# Patient Record
Sex: FEMALE | Race: WHITE | NOT HISPANIC OR LATINO | Employment: OTHER | URBAN - METROPOLITAN AREA
[De-identification: names, ages, dates, MRNs, and addresses within clinical notes are randomized per-mention and may not be internally consistent; named-entity substitution may affect disease eponyms.]

---

## 2017-03-01 ENCOUNTER — TELEPHONE (OUTPATIENT)
Dept: HEMATOLOGY/ONCOLOGY | Facility: CLINIC | Age: 77
End: 2017-03-01

## 2017-03-01 NOTE — LETTER
[facility]  [address] [place] [address]  Phone: [phone] April 26, 2017         Geraldine Martinez [address] [place] [address]    Dear Claduia:                                                          As part of patient care service, the [facility] Department of [facility] offers patients a reminder of follow-up appointments your provider has suggested. Our appointment reminder systems show that your previous appointment with Dr. Mascorro requires a follow-up appointment and lab work.    If you would like to schedule an appointment, please call the department at: Dept: [phone]     To ensure obtain the most convenient appointment for you schedule, we advise you to call at your earliest convenience. To help save you time, we also encourage patients to call between 2 and 5 PM whenever possible as that is when we experience lower call volumes.    There are many different benefit plans offered by insurance companies for your health needs.  If you are not certain what your particular benefits are, please contact Member Service Department of your insurance carrier prior to your visit. Please bring your current insurance card for verification of benefits to your appointment.  If your insurance has changed, please call [phone].  When your appointment is scheduled, we are reserving that time especially for you in order to provide the best possible care in a timely fashion.  Although we take care of many emergency situations on a daily basis that may unavoidably delay your appointment, we try to minimize this as much as possible and will provide you with an alternative appointment should the delay be unacceptable to you  -- please just ask if this is the case on the date of the visit and wish to reschedule.    We do sincerely appreciate your time, and your timely arrival for your appointment, so that all patients can been seen as close to their appointment  time as possible. If you find yourself unable to keep your appointed time, we would be happy to reschedule your appointment for a better time for you.  Please make every attempt to cancel and reschedule your appointment at least 24 hours prior to your scheduled appointment time. This allows us the opportunity to schedule another patient in need of our service.        @[email or web address]@

## 2017-05-08 DIAGNOSIS — Z51.11 ENCOUNTER FOR ANTINEOPLASTIC CHEMOTHERAPY: ICD-10-CM

## 2017-05-08 DIAGNOSIS — G62.0 NEUROPATHY DUE TO DRUGS: ICD-10-CM

## 2017-05-08 DIAGNOSIS — I48.20 CHRONIC ATRIAL FIBRILLATION: ICD-10-CM

## 2017-05-08 DIAGNOSIS — I10 ESSENTIAL HYPERTENSION: ICD-10-CM

## 2017-05-08 DIAGNOSIS — K21.00 GASTROESOPHAGEAL REFLUX DISEASE WITH ESOPHAGITIS: ICD-10-CM

## 2017-05-08 DIAGNOSIS — E78.00 HYPERCHOLESTEROLEMIA: ICD-10-CM

## 2017-05-08 NOTE — TELEPHONE ENCOUNTER
Informed pt that she is not due for a mammogram until Sept 2017 but that she does need to do fasting labs and have a visit with Dr. Mascorro. Appts scheduled for Tue 5/30/17. Pt needs refills on gabapentin, pravastatin and metoprolol. Informed her that Dr. Mascorro will be back in the office on Tue 5/9/17 to address her refill requests.    ----- Message from Livia Sauceda sent at 5/8/2017 10:10 AM CDT -----  Contact: [phone] or [phone] self  Patient is requesting to talk to Elizabeth. She needs to schedule a mammogram. Patient also needs medication refilled. She also needs an appointment but wants to talk with Elizabeth before doing all of this.

## 2017-05-09 DIAGNOSIS — Z51.11 ENCOUNTER FOR ANTINEOPLASTIC CHEMOTHERAPY: ICD-10-CM

## 2017-05-09 DIAGNOSIS — I10 ESSENTIAL HYPERTENSION: ICD-10-CM

## 2017-05-09 DIAGNOSIS — K21.00 GASTROESOPHAGEAL REFLUX DISEASE WITH ESOPHAGITIS: ICD-10-CM

## 2017-05-09 DIAGNOSIS — I48.20 CHRONIC ATRIAL FIBRILLATION: ICD-10-CM

## 2017-05-09 RX ORDER — METOPROLOL SUCCINATE 100 MG/1
100 TABLET, EXTENDED RELEASE ORAL DAILY
Qty: 90 TABLET | Refills: 3 | Status: SHIPPED | OUTPATIENT
Start: 2017-05-09 | End: 2017-05-09 | Stop reason: SDUPTHER

## 2017-05-09 RX ORDER — GABAPENTIN 300 MG/1
300 CAPSULE ORAL NIGHTLY
Qty: 90 CAPSULE | Refills: 3 | Status: SHIPPED | OUTPATIENT
Start: 2017-05-09 | End: 2018-04-26 | Stop reason: SDUPTHER

## 2017-05-09 RX ORDER — METOPROLOL SUCCINATE 100 MG/1
100 TABLET, EXTENDED RELEASE ORAL DAILY
Qty: 90 TABLET | Refills: 3 | Status: SHIPPED | OUTPATIENT
Start: 2017-05-09 | End: 2018-04-28 | Stop reason: SDUPTHER

## 2017-05-09 RX ORDER — PRAVASTATIN SODIUM 40 MG/1
40 TABLET ORAL DAILY
Qty: 90 TABLET | Refills: 3 | Status: SHIPPED | OUTPATIENT
Start: 2017-05-09 | End: 2018-04-26 | Stop reason: SDUPTHER

## 2017-05-30 ENCOUNTER — OFFICE VISIT (OUTPATIENT)
Dept: HEMATOLOGY/ONCOLOGY | Facility: CLINIC | Age: 77
End: 2017-05-30
Payer: MEDICARE

## 2017-05-30 ENCOUNTER — LAB VISIT (OUTPATIENT)
Dept: LAB | Facility: HOSPITAL | Age: 77
End: 2017-05-30
Attending: INTERNAL MEDICINE
Payer: MEDICARE

## 2017-05-30 VITALS
SYSTOLIC BLOOD PRESSURE: 124 MMHG | DIASTOLIC BLOOD PRESSURE: 78 MMHG | HEART RATE: 73 BPM | HEIGHT: 59 IN | TEMPERATURE: 98 F | WEIGHT: 157.44 LBS | BODY MASS INDEX: 31.74 KG/M2 | OXYGEN SATURATION: 97 %

## 2017-05-30 DIAGNOSIS — R79.9 ABNORMAL FINDING OF BLOOD CHEMISTRY: ICD-10-CM

## 2017-05-30 DIAGNOSIS — C50.911 BREAST CANCER, RIGHT BREAST: ICD-10-CM

## 2017-05-30 DIAGNOSIS — Z79.811 AROMATASE INHIBITOR USE: ICD-10-CM

## 2017-05-30 DIAGNOSIS — C50.411 BREAST CANCER OF UPPER-OUTER QUADRANT OF RIGHT FEMALE BREAST: Primary | ICD-10-CM

## 2017-05-30 DIAGNOSIS — K59.01 SLOW TRANSIT CONSTIPATION: ICD-10-CM

## 2017-05-30 LAB
ALBUMIN SERPL BCP-MCNC: 3.7 G/DL
ALP SERPL-CCNC: 53 U/L
ALT SERPL W/O P-5'-P-CCNC: 18 U/L
ANION GAP SERPL CALC-SCNC: 9 MMOL/L
AST SERPL-CCNC: 26 U/L
BASOPHILS # BLD AUTO: 0.02 K/UL
BASOPHILS NFR BLD: 0.3 %
BILIRUB SERPL-MCNC: 0.7 MG/DL
BUN SERPL-MCNC: 12 MG/DL
CALCIUM SERPL-MCNC: 9.7 MG/DL
CHLORIDE SERPL-SCNC: 104 MMOL/L
CHOLEST/HDLC SERPL: 2.6 {RATIO}
CO2 SERPL-SCNC: 28 MMOL/L
CREAT SERPL-MCNC: 0.9 MG/DL
DIFFERENTIAL METHOD: ABNORMAL
EOSINOPHIL # BLD AUTO: 0.2 K/UL
EOSINOPHIL NFR BLD: 2.6 %
ERYTHROCYTE [DISTWIDTH] IN BLOOD BY AUTOMATED COUNT: 14.7 %
EST. GFR  (AFRICAN AMERICAN): >60 ML/MIN/1.73 M^2
EST. GFR  (NON AFRICAN AMERICAN): >60 ML/MIN/1.73 M^2
GLUCOSE SERPL-MCNC: 100 MG/DL
HCT VFR BLD AUTO: 37.1 %
HDL/CHOLESTEROL RATIO: 38.8 %
HDLC SERPL-MCNC: 178 MG/DL
HDLC SERPL-MCNC: 69 MG/DL
HGB BLD-MCNC: 12.4 G/DL
LDLC SERPL CALC-MCNC: 86.8 MG/DL
LYMPHOCYTES # BLD AUTO: 2 K/UL
LYMPHOCYTES NFR BLD: 32.6 %
MCH RBC QN AUTO: 30.7 PG
MCHC RBC AUTO-ENTMCNC: 33.4 %
MCV RBC AUTO: 92 FL
MONOCYTES # BLD AUTO: 0.5 K/UL
MONOCYTES NFR BLD: 8.3 %
NEUTROPHILS # BLD AUTO: 3.5 K/UL
NEUTROPHILS NFR BLD: 56 %
NONHDLC SERPL-MCNC: 109 MG/DL
PLATELET # BLD AUTO: 293 K/UL
PMV BLD AUTO: 9.6 FL
POTASSIUM SERPL-SCNC: 4.4 MMOL/L
PROT SERPL-MCNC: 7.1 G/DL
RBC # BLD AUTO: 4.04 M/UL
SODIUM SERPL-SCNC: 141 MMOL/L
TRIGL SERPL-MCNC: 111 MG/DL
WBC # BLD AUTO: 6.25 K/UL

## 2017-05-30 PROCEDURE — 80061 LIPID PANEL: CPT

## 2017-05-30 PROCEDURE — 99213 OFFICE O/P EST LOW 20 MIN: CPT | Mod: PBBFAC,PO | Performed by: INTERNAL MEDICINE

## 2017-05-30 PROCEDURE — 85025 COMPLETE CBC W/AUTO DIFF WBC: CPT

## 2017-05-30 PROCEDURE — 99999 PR PBB SHADOW E&M-EST. PATIENT-LVL III: CPT | Mod: PBBFAC,,, | Performed by: INTERNAL MEDICINE

## 2017-05-30 PROCEDURE — 36415 COLL VENOUS BLD VENIPUNCTURE: CPT

## 2017-05-30 PROCEDURE — 80053 COMPREHEN METABOLIC PANEL: CPT

## 2017-05-30 PROCEDURE — 99213 OFFICE O/P EST LOW 20 MIN: CPT | Mod: S$PBB,,, | Performed by: INTERNAL MEDICINE

## 2017-05-30 RX ORDER — LACTULOSE 10 G/15ML
20 SOLUTION ORAL 4 TIMES DAILY PRN
Qty: 500 ML | Refills: 3 | Status: SHIPPED | OUTPATIENT
Start: 2017-05-30 | End: 2018-07-03 | Stop reason: SDUPTHER

## 2017-05-30 NOTE — PROGRESS NOTES
Subjective:       Patient ID: Claudia Sierra is a 76 y.o. female.    Chief Complaint: No chief complaint on file.    HPI      Ms. 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    Relevant oncologic history: She felt a mass in the right armpit in March 2013.  Mammogram showed a 5 mm right breast mass with 2 enlarged right axillary lymph nodes.  Biopsy of right breast mass showed fibroadenoma.  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  HER-2/agustín was negative.  Breast MRI did not show an actual primary.  A PET scan done in April showed hypermetabolic axillary and subpectoral nodes. She received 4 cycles of neoadjuvant Adriamycin/Cytoxan chemotherapy followed by one cycle of docetaxel chemotherapy.  She then underwent a right axillary lymph node dissection on 10/8/13. 12 lymph nodes were removed. None were involved with malignancy. She had a pathological complete response.  She then completed 9 weekly doses of paclitaxel from November 2013 to January 2014 in an adjuvant fashion. Following this she underwent 28 radiation treatments to the right breast starting 2/10/14 completing 3/20/14. She then developed lymphedema of the right breast. Nothing in the right arm. She was evaluated in the lymphedema clinic and underwent some physical therapy.    She started Arimidex on 7/10/15.     Neuropathy stable, she is on neurontin 300 mg QHS.   She wants to stay on this dose and take it at bedtime.  Taking it at the daytime makes her groggy.    Her chest port was removed in June 2015.  Gaining weight, feels it is from Arimidex.    Review of Systems    CONSTITUTIONAL: No weight gain. No fevers.  ENT/MOUTH: No voice change, sore throat, mouth ulcers or gum  bleeding.  CARDIOVASCULAR: No chest pain or shortness of breath.  RESPIRATORY: No cough, hemoptysis or dyspnea.  HEME/LYMPH: Some pain in the right axilla after lymph node dissection. Right breast edema - chronic and stable.  GASTROINTESTINAL: No abdominal pain, nausea or change in bowel habits.    Objective:      Physical Exam    GENERAL: Well-groomed, moderately-built. Vitals noted.  ENT&MOUTH: Oral mucosa moist. No thrush, gum bleeding or oral masses noted.  NECK: Supple without any masses. Thyroid is non-tender.  LYMPH NODES: None felt in the neck or supraclavicular areas.  BREAST: Well-healed surgical scar in the right axilla.  No open areas.  No drainage. Right breast swollen when compared to the left consistent with lymphedema of the breast. No arm lymphedema noted.  LUNGS: Clear. No wheeze.  Breathing nonlabored.  HEART: S1, S2 heard. Rhythm irregular. No tachycardia or gallops. No pedal edema.   ABDOMEN: Soft and non-tender. No palpable masses, hepatosplenomegaly or ascites.     Assessment:       1. Breast cancer of upper-outer quadrant of right female breast    2. Aromatase inhibitor use        Plan:   Labs reviewed.   Mammogram due Sept 2017.    She is having some weight gain, she is very concerned, feels it is from Arimidex.  Discussed the pros and cons of discontinuing her Arimidex in her clinical situation.  She wants to get her next mammogram that is scheduled for September and if that is unremarkable AND if she is not able to avoid weight gain in the next few months - she is agreeable to stopping Arimidex at that time.    Continue neurontin 300 mg QHS.     Refilled Lactulose.    RTC 6 months with labs.

## 2017-07-14 DIAGNOSIS — C50.411 MALIGNANT NEOPLASM OF UPPER-OUTER QUADRANT OF RIGHT BREAST IN FEMALE, ESTROGEN RECEPTOR POSITIVE: Primary | ICD-10-CM

## 2017-07-14 DIAGNOSIS — Z17.0 MALIGNANT NEOPLASM OF UPPER-OUTER QUADRANT OF RIGHT BREAST IN FEMALE, ESTROGEN RECEPTOR POSITIVE: Primary | ICD-10-CM

## 2017-07-14 RX ORDER — ANASTROZOLE 1 MG/1
1 TABLET ORAL DAILY
Qty: 90 TABLET | Refills: 3 | Status: SHIPPED | OUTPATIENT
Start: 2017-07-14 | End: 2018-05-10 | Stop reason: SDUPTHER

## 2017-09-12 ENCOUNTER — HOSPITAL ENCOUNTER (OUTPATIENT)
Dept: RADIOLOGY | Facility: HOSPITAL | Age: 77
Discharge: HOME | End: 2017-09-12
Attending: INTERNAL MEDICINE
Payer: MEDICARE

## 2017-09-12 DIAGNOSIS — C50.411 BREAST CANCER OF UPPER-OUTER QUADRANT OF RIGHT FEMALE BREAST: ICD-10-CM

## 2017-09-12 PROCEDURE — 77062 BREAST TOMOSYNTHESIS BI: CPT | Mod: TC

## 2017-09-12 PROCEDURE — 77062 BREAST TOMOSYNTHESIS BI: CPT | Mod: 26,,, | Performed by: RADIOLOGY

## 2017-09-12 PROCEDURE — 77066 DX MAMMO INCL CAD BI: CPT | Mod: 26,,, | Performed by: RADIOLOGY

## 2017-11-03 ENCOUNTER — HOSPITAL ENCOUNTER (EMERGENCY)
Facility: HOSPITAL | Age: 77
Discharge: HOME | End: 2017-11-03
Attending: EMERGENCY MEDICINE
Payer: MEDICARE

## 2017-11-03 VITALS
WEIGHT: 157 LBS | RESPIRATION RATE: 16 BRPM | DIASTOLIC BLOOD PRESSURE: 60 MMHG | OXYGEN SATURATION: 95 % | BODY MASS INDEX: 31.65 KG/M2 | TEMPERATURE: 99 F | HEART RATE: 82 BPM | HEIGHT: 59 IN | SYSTOLIC BLOOD PRESSURE: 136 MMHG

## 2017-11-03 DIAGNOSIS — J10.1 INFLUENZA A: Primary | ICD-10-CM

## 2017-11-03 LAB
ALBUMIN SERPL BCP-MCNC: 3.5 G/DL
ALP SERPL-CCNC: 49 U/L
ALT SERPL W/O P-5'-P-CCNC: 23 U/L
ANION GAP SERPL CALC-SCNC: 12 MMOL/L
AST SERPL-CCNC: 42 U/L
BASOPHILS # BLD AUTO: 0.02 K/UL
BASOPHILS NFR BLD: 0.4 %
BILIRUB SERPL-MCNC: 0.3 MG/DL
BUN SERPL-MCNC: 9 MG/DL
CALCIUM SERPL-MCNC: 9.1 MG/DL
CHLORIDE SERPL-SCNC: 100 MMOL/L
CO2 SERPL-SCNC: 21 MMOL/L
CREAT SERPL-MCNC: 0.8 MG/DL
DIFFERENTIAL METHOD: ABNORMAL
EOSINOPHIL # BLD AUTO: 0 K/UL
EOSINOPHIL NFR BLD: 0.4 %
ERYTHROCYTE [DISTWIDTH] IN BLOOD BY AUTOMATED COUNT: 14.7 %
EST. GFR  (AFRICAN AMERICAN): >60 ML/MIN/1.73 M^2
EST. GFR  (NON AFRICAN AMERICAN): >60 ML/MIN/1.73 M^2
FLUAV AG SPEC QL IA: POSITIVE
FLUBV AG SPEC QL IA: NEGATIVE
GLUCOSE SERPL-MCNC: 95 MG/DL
HCT VFR BLD AUTO: 37 %
HGB BLD-MCNC: 12.5 G/DL
LYMPHOCYTES # BLD AUTO: 2 K/UL
LYMPHOCYTES NFR BLD: 38.3 %
MCH RBC QN AUTO: 31.4 PG
MCHC RBC AUTO-ENTMCNC: 33.8 G/DL
MCV RBC AUTO: 93 FL
MONOCYTES # BLD AUTO: 1 K/UL
MONOCYTES NFR BLD: 20.1 %
NEUTROPHILS # BLD AUTO: 2.1 K/UL
NEUTROPHILS NFR BLD: 40.6 %
PLATELET # BLD AUTO: 238 K/UL
PMV BLD AUTO: 9.6 FL
POTASSIUM SERPL-SCNC: 4.2 MMOL/L
PROT SERPL-MCNC: 7 G/DL
RBC # BLD AUTO: 3.98 M/UL
SODIUM SERPL-SCNC: 133 MMOL/L
SPECIMEN SOURCE: ABNORMAL
WBC # BLD AUTO: 5.17 K/UL

## 2017-11-03 PROCEDURE — 99284 EMERGENCY DEPT VISIT MOD MDM: CPT

## 2017-11-03 PROCEDURE — 87400 INFLUENZA A/B EACH AG IA: CPT

## 2017-11-03 PROCEDURE — 80053 COMPREHEN METABOLIC PANEL: CPT

## 2017-11-03 PROCEDURE — 85025 COMPLETE CBC W/AUTO DIFF WBC: CPT

## 2017-11-03 PROCEDURE — 25000003 PHARM REV CODE 250: Performed by: EMERGENCY MEDICINE

## 2017-11-03 RX ORDER — OSELTAMIVIR PHOSPHATE 75 MG/1
75 CAPSULE ORAL 2 TIMES DAILY
Qty: 10 CAPSULE | Refills: 0 | Status: SHIPPED | OUTPATIENT
Start: 2017-11-03 | End: 2017-11-08

## 2017-11-03 RX ORDER — OSELTAMIVIR PHOSPHATE 75 MG/1
75 CAPSULE ORAL
Status: COMPLETED | OUTPATIENT
Start: 2017-11-03 | End: 2017-11-03

## 2017-11-03 RX ADMIN — OSELTAMIVIR PHOSPHATE 75 MG: 75 CAPSULE ORAL at 07:11

## 2017-11-03 NOTE — ED NOTES
Patient has verified the spelling of their name and  on armband  LOC: The patient is awake, alert, and aware of environment with an appropriate affect, the patient is oriented x 3 and speaking appropriately.   APPEARANCE: Patient resting comfortably and in no acute distress, patient is clean and well groomed, patient's clothing is properly fastened.   SKIN: The skin is warm and dry, color consistent with ethnicity, patient has normal skin turgor and moist mucus membranes, skin intact, no breakdown or bruising noted.   :   Voids without difficulty  MUSCULOSKELETAL: Patient moving all extremities spontaneously, no obvious swelling or deformities noted.   RESPIRATORY: Airway is open and patent, respirations are spontaneous, patient has a normal effort and rate, no accessory muscle use noted, bilateral breath sounds clear, denies SOB   ABDOMEN: Soft and non tender to palpation, no distention noted, normoactive bowel sounds present in all four quadrants.   CARDIAC:  Normal rate and rhythm, no peripheral edema noted, less then 3 second capillary refill, denies chest pain  COMPLAINT:  Generalized body aches, cough, headache, nausea  -  All symptoms since Wednesday, rates pain 3 out of 10

## 2017-11-04 NOTE — ED PROVIDER NOTES
Encounter Date: 11/3/2017       History     Chief Complaint   Patient presents with    Cough     Reports a productive cough with runny nose since Wed. Reports nausea without vomiting and a headache. Denies diarrhea or constipation.     Fever     + fever at home of 103 on Wed and 100.6 today.    Generalized Body Aches     Patient presents emergency Department with a cough for the past 2 days. She had a  fever of 103 two days ago along with a severe headache.  The patient states initially her cough was dry but now it's becoming more productive. She has had pneumonia in the past and is wrooed that she is developing pneumonia again. She recently went on a trip on a plane. She has not had a flu shot yet this year. She has a history of breast CA and has been in remission for the past 3-4 years.          Review of patient's allergies indicates:   Allergen Reactions    Codeine Other (See Comments)     Feel like (climbing the walls)    Penicillins Rash     Past Medical History:   Diagnosis Date    *Atrial fibrillation     Asthma     Blood transfusion     Breast cancer     Right breast cancer    Breast mass, right     Cataract     Hypertension     S/P chemotherapy, time since 4-12 weeks     breast/ lymph node cancer     Past Surgical History:   Procedure Laterality Date    APPENDECTOMY      BREAST BIOPSY      right    BREAST LUMPECTOMY Right 2013    HYSTERECTOMY      lymphnode removal      12 from Right axilla    PORTACATH PLACEMENT  2013    TONSILLECTOMY       Family History   Problem Relation Age of Onset    Cancer Sister     Heart disease Mother     Heart disease Father      Social History   Substance Use Topics    Smoking status: Never Smoker    Smokeless tobacco: Never Used    Alcohol use 1.2 oz/week     2 Glasses of wine per week     Review of Systems   Constitutional: Positive for activity change, appetite change, chills, fatigue and fever.   HENT: Positive for congestion and sore throat.     Respiratory: Positive for cough. Negative for chest tightness, shortness of breath and wheezing.    Cardiovascular: Negative for chest pain.   Gastrointestinal: Negative for abdominal pain, diarrhea, nausea and vomiting.   Genitourinary: Negative for dysuria and flank pain.   Musculoskeletal: Negative for back pain, neck pain and neck stiffness.   Skin: Negative for rash.   Neurological: Positive for light-headedness and headaches. Negative for dizziness and weakness.   Hematological: Does not bruise/bleed easily.       Physical Exam     Initial Vitals [11/03/17 1809]   BP Pulse Resp Temp SpO2   121/64 82 18 98.6 °F (37 °C) 99 %      MAP       83         Physical Exam    Nursing note and vitals reviewed.  Constitutional: She appears well-developed and well-nourished.   HENT:   Head: Normocephalic and atraumatic.   Mouth/Throat: Oropharynx is clear and moist.   Eyes: Conjunctivae and EOM are normal. Pupils are equal, round, and reactive to light.   Neck: Normal range of motion. Neck supple.   Cardiovascular: Normal rate, regular rhythm, normal heart sounds and intact distal pulses. Exam reveals no gallop and no friction rub.    No murmur heard.  Pulmonary/Chest: Breath sounds normal.   Abdominal: Soft. Bowel sounds are normal. She exhibits no distension. There is no tenderness. There is no rebound and no guarding.   Musculoskeletal: Normal range of motion. She exhibits no edema or tenderness.   Lymphadenopathy:     She has no cervical adenopathy.   Neurological: She is alert and oriented to person, place, and time. She has normal strength and normal reflexes.   Skin: Skin is warm and dry.   Psychiatric: She has a normal mood and affect. Her behavior is normal. Judgment and thought content normal.         ED Course   Procedures  Labs Reviewed   INFLUENZA A AND B ANTIGEN - Abnormal; Notable for the following:        Result Value    Influenza A Ag, EIA Positive (*)     All other components within normal limits   CBC  W/ AUTO DIFFERENTIAL   COMPREHENSIVE METABOLIC PANEL             Medical Decision Making:   Clinical Tests:   Lab Tests: Ordered and Reviewed  The following lab test(s) were unremarkable: CBC and CMP       <> Summary of Lab: Positive influenza A  Radiological Study: Ordered and Reviewed                   ED Course      Clinical Impression:   The encounter diagnosis was Influenza A.                           Lilliana Dunn MD  11/03/17 2053

## 2017-11-17 ENCOUNTER — TELEPHONE (OUTPATIENT)
Dept: HEMATOLOGY/ONCOLOGY | Facility: CLINIC | Age: 77
End: 2017-11-17

## 2018-01-11 ENCOUNTER — LAB VISIT (OUTPATIENT)
Dept: LAB | Facility: HOSPITAL | Age: 78
End: 2018-01-11
Attending: INTERNAL MEDICINE
Payer: MEDICARE

## 2018-01-11 ENCOUNTER — OFFICE VISIT (OUTPATIENT)
Dept: HEMATOLOGY/ONCOLOGY | Facility: CLINIC | Age: 78
End: 2018-01-11
Payer: MEDICARE

## 2018-01-11 VITALS
SYSTOLIC BLOOD PRESSURE: 139 MMHG | DIASTOLIC BLOOD PRESSURE: 72 MMHG | BODY MASS INDEX: 29.6 KG/M2 | HEIGHT: 59 IN | HEART RATE: 64 BPM | OXYGEN SATURATION: 95 % | WEIGHT: 146.81 LBS

## 2018-01-11 DIAGNOSIS — C50.911 BREAST CANCER, RIGHT BREAST: ICD-10-CM

## 2018-01-11 DIAGNOSIS — R79.9 ABNORMAL FINDING OF BLOOD CHEMISTRY: ICD-10-CM

## 2018-01-11 DIAGNOSIS — Z79.811 AROMATASE INHIBITOR USE: ICD-10-CM

## 2018-01-11 DIAGNOSIS — C50.411 MALIGNANT NEOPLASM OF UPPER-OUTER QUADRANT OF RIGHT BREAST IN FEMALE, ESTROGEN RECEPTOR POSITIVE: Primary | ICD-10-CM

## 2018-01-11 DIAGNOSIS — I89.0 LYMPHEDEMA: ICD-10-CM

## 2018-01-11 DIAGNOSIS — M94.9 DISORDER OF CARTILAGE: ICD-10-CM

## 2018-01-11 DIAGNOSIS — N64.89 EDEMA OF BREAST: ICD-10-CM

## 2018-01-11 DIAGNOSIS — Z17.0 MALIGNANT NEOPLASM OF UPPER-OUTER QUADRANT OF RIGHT BREAST IN FEMALE, ESTROGEN RECEPTOR POSITIVE: Primary | ICD-10-CM

## 2018-01-11 LAB
ALBUMIN SERPL BCP-MCNC: 3.5 G/DL
ALP SERPL-CCNC: 66 U/L
ALT SERPL W/O P-5'-P-CCNC: 24 U/L
ANION GAP SERPL CALC-SCNC: 8 MMOL/L
AST SERPL-CCNC: 31 U/L
BASOPHILS # BLD AUTO: 0.02 K/UL
BASOPHILS NFR BLD: 0.3 %
BILIRUB SERPL-MCNC: 0.4 MG/DL
BUN SERPL-MCNC: 10 MG/DL
CALCIUM SERPL-MCNC: 9.6 MG/DL
CHLORIDE SERPL-SCNC: 105 MMOL/L
CHOLEST SERPL-MCNC: 162 MG/DL
CHOLEST/HDLC SERPL: 2.5 {RATIO}
CO2 SERPL-SCNC: 26 MMOL/L
CREAT SERPL-MCNC: 0.8 MG/DL
DIFFERENTIAL METHOD: ABNORMAL
EOSINOPHIL # BLD AUTO: 0.2 K/UL
EOSINOPHIL NFR BLD: 2.3 %
ERYTHROCYTE [DISTWIDTH] IN BLOOD BY AUTOMATED COUNT: 14.3 %
EST. GFR  (AFRICAN AMERICAN): >60 ML/MIN/1.73 M^2
EST. GFR  (NON AFRICAN AMERICAN): >60 ML/MIN/1.73 M^2
GLUCOSE SERPL-MCNC: 100 MG/DL
HCT VFR BLD AUTO: 36.3 %
HDLC SERPL-MCNC: 65 MG/DL
HDLC SERPL: 40.1 %
HGB BLD-MCNC: 11.8 G/DL
LDLC SERPL CALC-MCNC: 83.6 MG/DL
LYMPHOCYTES # BLD AUTO: 1.9 K/UL
LYMPHOCYTES NFR BLD: 29 %
MCH RBC QN AUTO: 30.8 PG
MCHC RBC AUTO-ENTMCNC: 32.5 G/DL
MCV RBC AUTO: 95 FL
MONOCYTES # BLD AUTO: 0.6 K/UL
MONOCYTES NFR BLD: 9.5 %
NEUTROPHILS # BLD AUTO: 3.9 K/UL
NEUTROPHILS NFR BLD: 58.6 %
NONHDLC SERPL-MCNC: 97 MG/DL
PLATELET # BLD AUTO: 311 K/UL
PMV BLD AUTO: 9.2 FL
POTASSIUM SERPL-SCNC: 4.7 MMOL/L
PROT SERPL-MCNC: 7.2 G/DL
RBC # BLD AUTO: 3.83 M/UL
SODIUM SERPL-SCNC: 139 MMOL/L
TRIGL SERPL-MCNC: 67 MG/DL
WBC # BLD AUTO: 6.65 K/UL

## 2018-01-11 PROCEDURE — 99213 OFFICE O/P EST LOW 20 MIN: CPT | Mod: S$PBB,,, | Performed by: INTERNAL MEDICINE

## 2018-01-11 PROCEDURE — 99213 OFFICE O/P EST LOW 20 MIN: CPT | Mod: PBBFAC,PO | Performed by: INTERNAL MEDICINE

## 2018-01-11 PROCEDURE — 80053 COMPREHEN METABOLIC PANEL: CPT

## 2018-01-11 PROCEDURE — 80061 LIPID PANEL: CPT

## 2018-01-11 PROCEDURE — 36415 COLL VENOUS BLD VENIPUNCTURE: CPT

## 2018-01-11 PROCEDURE — 85025 COMPLETE CBC W/AUTO DIFF WBC: CPT

## 2018-01-11 PROCEDURE — 99999 PR PBB SHADOW E&M-EST. PATIENT-LVL III: CPT | Mod: PBBFAC,,, | Performed by: INTERNAL MEDICINE

## 2018-01-11 NOTE — PROGRESS NOTES
Subjective:       Patient ID: Claudia Sierra is a 77 y.o. female.    Chief Complaint: No chief complaint on file.    HPI      Ms. 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    Relevant oncologic history: She felt a mass in the right armpit in March 2013.  Mammogram showed a 5 mm right breast mass with 2 enlarged right axillary lymph nodes.  Biopsy of right breast mass showed fibroadenoma.  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  HER-2/agustín was negative.  Breast MRI did not show an actual primary.  A PET scan done in April showed hypermetabolic axillary and subpectoral nodes. She received 4 cycles of neoadjuvant Adriamycin/Cytoxan chemotherapy followed by one cycle of docetaxel chemotherapy.  She then underwent a right axillary lymph node dissection on 10/8/13. 12 lymph nodes were removed. None were involved with malignancy. She had a pathological complete response.  She then completed 9 weekly doses of paclitaxel from November 2013 to January 2014 in an adjuvant fashion. Following this she underwent 28 radiation treatments to the right breast starting 2/10/14 completing 3/20/14. She then developed lymphedema of the right breast. Nothing in the right arm. She was evaluated in the lymphedema clinic and underwent some physical therapy.    She started Arimidex on 7/10/15.     Neuropathy stable, she is on neurontin 300 mg QHS.   She wants to stay on this dose and take it at bedtime.  Taking it at the daytime makes her groggy.    Her chest port was removed in June 2015.  Weight stable -     Review of Systems    CONSTITUTIONAL: No weight gain. No fevers.  ENT/MOUTH: No voice change, sore throat, mouth ulcers or gum bleeding.  CARDIOVASCULAR: No chest  pain or shortness of breath.  RESPIRATORY: No cough, hemoptysis or dyspnea.  HEME/LYMPH: Some pain in the right axilla after lymph node dissection. Right breast edema - chronic and stable.  GASTROINTESTINAL: No abdominal pain, nausea or change in bowel habits.    Objective:      Physical Exam    GENERAL: Well-groomed, moderately-built. Vitals noted.  ENT&MOUTH: Oral mucosa moist. No thrush, gum bleeding or oral masses noted.  NECK: Supple without any masses. Thyroid is non-tender.  LYMPH NODES: None felt in the neck or supraclavicular areas.  BREAST: Well-healed surgical scar in the right axilla.  No open areas.  No drainage. Right breast swollen when compared to the left consistent with lymphedema of the breast. No arm lymphedema noted.  LUNGS: Clear. No wheeze.  Breathing nonlabored.  HEART: S1, S2 heard. Rhythm regular. No tachycardia or gallops. No pedal edema.   ABDOMEN: Soft and non-tender. No palpable masses, hepatosplenomegaly or ascites.     Assessment:       1. Malignant neoplasm of upper-outer quadrant of right breast in female, estrogen receptor positive    2. Aromatase inhibitor use    3. Lymphedema    4. Edema of breast    5. Abnormal finding of blood chemistry     6. Disorder of cartilage         Plan:   Labs reviewed.     Mammogram due Sept 2018.    Continue neurontin 300 mg QHS.     RTC in sept after mammogram. Will also check DEXA scan at that time

## 2018-04-26 DIAGNOSIS — E78.00 HYPERCHOLESTEROLEMIA: ICD-10-CM

## 2018-04-26 DIAGNOSIS — G62.0 NEUROPATHY DUE TO DRUGS: ICD-10-CM

## 2018-04-26 RX ORDER — GABAPENTIN 300 MG/1
CAPSULE ORAL
Qty: 90 CAPSULE | Refills: 3 | Status: SHIPPED | OUTPATIENT
Start: 2018-04-26 | End: 2019-03-30 | Stop reason: SDUPTHER

## 2018-04-26 RX ORDER — PRAVASTATIN SODIUM 40 MG/1
TABLET ORAL
Qty: 90 TABLET | Refills: 3 | Status: SHIPPED | OUTPATIENT
Start: 2018-04-26 | End: 2019-03-30 | Stop reason: SDUPTHER

## 2018-04-28 DIAGNOSIS — Z51.11 ENCOUNTER FOR ANTINEOPLASTIC CHEMOTHERAPY: ICD-10-CM

## 2018-04-28 DIAGNOSIS — I48.20 CHRONIC ATRIAL FIBRILLATION: ICD-10-CM

## 2018-04-28 DIAGNOSIS — I10 ESSENTIAL HYPERTENSION: ICD-10-CM

## 2018-04-28 DIAGNOSIS — K21.00 GASTROESOPHAGEAL REFLUX DISEASE WITH ESOPHAGITIS: ICD-10-CM

## 2018-04-30 RX ORDER — METOPROLOL SUCCINATE 100 MG/1
TABLET, EXTENDED RELEASE ORAL
Qty: 90 TABLET | Refills: 3 | Status: SHIPPED | OUTPATIENT
Start: 2018-04-30 | End: 2019-03-30 | Stop reason: SDUPTHER

## 2018-05-10 DIAGNOSIS — Z17.0 MALIGNANT NEOPLASM OF UPPER-OUTER QUADRANT OF RIGHT BREAST IN FEMALE, ESTROGEN RECEPTOR POSITIVE: ICD-10-CM

## 2018-05-10 DIAGNOSIS — C50.411 MALIGNANT NEOPLASM OF UPPER-OUTER QUADRANT OF RIGHT BREAST IN FEMALE, ESTROGEN RECEPTOR POSITIVE: ICD-10-CM

## 2018-05-10 RX ORDER — ANASTROZOLE 1 MG/1
1 TABLET ORAL DAILY
Qty: 90 TABLET | Refills: 3 | Status: SHIPPED | OUTPATIENT
Start: 2018-05-10 | End: 2019-06-29 | Stop reason: SDUPTHER

## 2018-07-03 DIAGNOSIS — K59.01 SLOW TRANSIT CONSTIPATION: ICD-10-CM

## 2018-07-03 RX ORDER — LACTULOSE 10 G/15ML
SOLUTION ORAL; RECTAL
Qty: 500 ML | Refills: 3 | Status: SHIPPED | OUTPATIENT
Start: 2018-07-03 | End: 2020-07-06 | Stop reason: SDUPTHER

## 2018-09-14 ENCOUNTER — TELEPHONE (OUTPATIENT)
Dept: HEMATOLOGY/ONCOLOGY | Facility: CLINIC | Age: 78
End: 2018-09-14

## 2018-09-14 DIAGNOSIS — E78.00 HYPERCHOLESTEROLEMIA: ICD-10-CM

## 2018-09-14 DIAGNOSIS — C50.411 MALIGNANT NEOPLASM OF UPPER-OUTER QUADRANT OF RIGHT FEMALE BREAST, UNSPECIFIED ESTROGEN RECEPTOR STATUS: Primary | ICD-10-CM

## 2018-09-14 DIAGNOSIS — E55.9 VITAMIN D DEFICIENCY, UNSPECIFIED: ICD-10-CM

## 2018-09-14 NOTE — TELEPHONE ENCOUNTER
Recall appt made for October. Pt confirmed.     ----- Message from Hannah Gonzalez sent at 9/14/2018 11:29 AM CDT -----  Contact: [phone]/ self   Patient called in requesting to speak with you. Patient prefers to speak with a nurse. Please advise.

## 2018-10-23 ENCOUNTER — HOSPITAL ENCOUNTER (OUTPATIENT)
Dept: RADIOLOGY | Facility: HOSPITAL | Age: 78
Discharge: HOME | End: 2018-10-23
Attending: INTERNAL MEDICINE
Payer: MEDICARE

## 2018-10-23 DIAGNOSIS — M94.9 DISORDER OF CARTILAGE: ICD-10-CM

## 2018-10-23 DIAGNOSIS — Z17.0 MALIGNANT NEOPLASM OF UPPER-OUTER QUADRANT OF RIGHT BREAST IN FEMALE, ESTROGEN RECEPTOR POSITIVE: ICD-10-CM

## 2018-10-23 DIAGNOSIS — C50.411 MALIGNANT NEOPLASM OF UPPER-OUTER QUADRANT OF RIGHT BREAST IN FEMALE, ESTROGEN RECEPTOR POSITIVE: ICD-10-CM

## 2018-10-23 PROCEDURE — 77080 DXA BONE DENSITY AXIAL: CPT | Mod: 26,,, | Performed by: RADIOLOGY

## 2018-10-23 PROCEDURE — 77062 BREAST TOMOSYNTHESIS BI: CPT | Mod: 26,,, | Performed by: RADIOLOGY

## 2018-10-23 PROCEDURE — 77062 BREAST TOMOSYNTHESIS BI: CPT | Mod: TC

## 2018-10-23 PROCEDURE — 77080 DXA BONE DENSITY AXIAL: CPT | Mod: TC

## 2018-10-23 PROCEDURE — 77066 DX MAMMO INCL CAD BI: CPT | Mod: 26,,, | Performed by: RADIOLOGY

## 2018-10-25 ENCOUNTER — LAB VISIT (OUTPATIENT)
Dept: LAB | Facility: HOSPITAL | Age: 78
End: 2018-10-25
Attending: INTERNAL MEDICINE
Payer: MEDICARE

## 2018-10-25 DIAGNOSIS — E55.9 VITAMIN D DEFICIENCY, UNSPECIFIED: ICD-10-CM

## 2018-10-25 DIAGNOSIS — C50.411 MALIGNANT NEOPLASM OF UPPER-OUTER QUADRANT OF RIGHT FEMALE BREAST, UNSPECIFIED ESTROGEN RECEPTOR STATUS: ICD-10-CM

## 2018-10-25 DIAGNOSIS — E78.00 HYPERCHOLESTEROLEMIA: ICD-10-CM

## 2018-10-25 LAB
25(OH)D3+25(OH)D2 SERPL-MCNC: 26 NG/ML
ALBUMIN SERPL BCP-MCNC: 3.9 G/DL
ALP SERPL-CCNC: 60 U/L
ALT SERPL W/O P-5'-P-CCNC: 19 U/L
ANION GAP SERPL CALC-SCNC: 10 MMOL/L
AST SERPL-CCNC: 30 U/L
BILIRUB SERPL-MCNC: 0.6 MG/DL
BUN SERPL-MCNC: 10 MG/DL
CALCIUM SERPL-MCNC: 10.1 MG/DL
CHLORIDE SERPL-SCNC: 106 MMOL/L
CHOLEST SERPL-MCNC: 194 MG/DL
CHOLEST/HDLC SERPL: 2.6 {RATIO}
CO2 SERPL-SCNC: 27 MMOL/L
CREAT SERPL-MCNC: 0.9 MG/DL
ERYTHROCYTE [DISTWIDTH] IN BLOOD BY AUTOMATED COUNT: 14.8 %
EST. GFR  (AFRICAN AMERICAN): >60 ML/MIN/1.73 M^2
EST. GFR  (NON AFRICAN AMERICAN): >60 ML/MIN/1.73 M^2
GLUCOSE SERPL-MCNC: 97 MG/DL
HCT VFR BLD AUTO: 38.6 %
HDLC SERPL-MCNC: 74 MG/DL
HDLC SERPL: 38.1 %
HGB BLD-MCNC: 12.7 G/DL
LDLC SERPL CALC-MCNC: 102.6 MG/DL
MCH RBC QN AUTO: 31.1 PG
MCHC RBC AUTO-ENTMCNC: 32.9 G/DL
MCV RBC AUTO: 95 FL
NEUTROPHILS # BLD AUTO: 2.9 K/UL
NONHDLC SERPL-MCNC: 120 MG/DL
PLATELET # BLD AUTO: 321 K/UL
PMV BLD AUTO: 9.6 FL
POTASSIUM SERPL-SCNC: 4.5 MMOL/L
PROT SERPL-MCNC: 7.6 G/DL
RBC # BLD AUTO: 4.08 M/UL
SODIUM SERPL-SCNC: 143 MMOL/L
TRIGL SERPL-MCNC: 87 MG/DL
WBC # BLD AUTO: 5.86 K/UL

## 2018-10-25 PROCEDURE — 85027 COMPLETE CBC AUTOMATED: CPT

## 2018-10-25 PROCEDURE — 80053 COMPREHEN METABOLIC PANEL: CPT

## 2018-10-25 PROCEDURE — 80061 LIPID PANEL: CPT

## 2018-10-25 PROCEDURE — 82306 VITAMIN D 25 HYDROXY: CPT

## 2018-10-25 PROCEDURE — 36415 COLL VENOUS BLD VENIPUNCTURE: CPT

## 2018-11-01 ENCOUNTER — OFFICE VISIT (OUTPATIENT)
Dept: HEMATOLOGY/ONCOLOGY | Facility: CLINIC | Age: 78
End: 2018-11-01
Payer: MEDICARE

## 2018-11-01 VITALS
OXYGEN SATURATION: 98 % | DIASTOLIC BLOOD PRESSURE: 88 MMHG | TEMPERATURE: 97 F | HEART RATE: 76 BPM | SYSTOLIC BLOOD PRESSURE: 173 MMHG | RESPIRATION RATE: 16 BRPM | WEIGHT: 153.44 LBS | BODY MASS INDEX: 30.99 KG/M2

## 2018-11-01 DIAGNOSIS — E55.9 VITAMIN D DEFICIENCY: ICD-10-CM

## 2018-11-01 DIAGNOSIS — Z79.811 AROMATASE INHIBITOR USE: Primary | ICD-10-CM

## 2018-11-01 DIAGNOSIS — N64.89 EDEMA OF BREAST: ICD-10-CM

## 2018-11-01 DIAGNOSIS — I89.0 LYMPHEDEMA: ICD-10-CM

## 2018-11-01 DIAGNOSIS — G62.0 NEUROPATHY DUE TO DRUGS: ICD-10-CM

## 2018-11-01 PROCEDURE — 99213 OFFICE O/P EST LOW 20 MIN: CPT | Mod: PBBFAC,PO | Performed by: INTERNAL MEDICINE

## 2018-11-01 PROCEDURE — 99999 PR PBB SHADOW E&M-EST. PATIENT-LVL III: CPT | Mod: PBBFAC,,, | Performed by: INTERNAL MEDICINE

## 2018-11-01 PROCEDURE — 99213 OFFICE O/P EST LOW 20 MIN: CPT | Mod: S$PBB,,, | Performed by: INTERNAL MEDICINE

## 2018-11-01 RX ORDER — SODIUM FLUORIDE/POTASSIUM NIT 1.1 %-5 %
PASTE (ML) DENTAL
Refills: 6 | COMMUNITY
Start: 2018-10-04 | End: 2021-08-03

## 2018-11-01 NOTE — PROGRESS NOTES
Subjective:       Patient ID: Claudia Sierra is a 78 y.o. female.    Chief Complaint: Breast Cancer    HPI      Ms. Claudia Sierra presents to the clinic for followup of locally advanced weakly ER positive, [place] negative and HER-2/agustín negative right breast carcinoma.    Relevant oncologic history: She felt a mass in the right armpit in March 2013.  Mammogram showed a 5 mm right breast mass with 2 enlarged right axillary lymph nodes.  Biopsy of right breast mass showed fibroadenoma.  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  HER-2/agustín was negative.  Breast MRI did not show an actual primary.  A PET scan done in April showed hypermetabolic axillary and subpectoral nodes. She received 4 cycles of neoadjuvant Adriamycin/Cytoxan chemotherapy followed by one cycle of docetaxel chemotherapy.  She then underwent a right axillary lymph node dissection on 10/8/13. 12 lymph nodes were removed. None were involved with malignancy. She had a pathological complete response.  She then completed 9 weekly doses of paclitaxel from November 2013 to January 2014 in an adjuvant fashion. Following this she underwent 28 radiation treatments to the right breast starting 2/10/14 completing 3/20/14. She then developed lymphedema of the right breast. Nothing in the right arm. She was evaluated in the lymphedema clinic and underwent some physical therapy.    She started Arimidex on 7/10/15.     Neuropathy stable, she is on neurontin 300 mg QHS.   She wants to stay on this dose and take it at bedtime.  Taking it at the daytime makes her groggy.    Her chest port was removed in June 2015.  Weight stable.    Review of Systems    CONSTITUTIONAL: No weight gain. No fevers.  ENT/MOUTH: No voice change, sore throat, mouth ulcers or gum bleeding.  CARDIOVASCULAR: No chest pain or shortness  of breath.  RESPIRATORY: No cough, hemoptysis or dyspnea.  HEME/LYMPH: Some pain in the right axilla after lymph node dissection. Right breast edema - chronic and stable.  GASTROINTESTINAL: No abdominal pain, nausea or change in bowel habits.    Objective:      Physical Exam    GENERAL: Well-groomed, moderately-built. Vitals noted.  ENT&MOUTH: Oral mucosa moist. No thrush, gum bleeding or oral masses noted.  NECK: Supple without any masses. Thyroid is non-tender.  LYMPH NODES: None felt in the neck or supraclavicular areas.  BREAST: Well-healed surgical scar in the right axilla.  No open areas.  No drainage. Right breast swollen when compared to the left consistent with lymphedema of the breast. No arm lymphedema noted.  LUNGS: Clear. No wheeze.  Breathing nonlabored.  HEART: S1, S2 heard. Rhythm regular. No tachycardia or gallops. No pedal edema.   ABDOMEN: Soft and non-tender. No palpable masses, hepatosplenomegaly or ascites.     Assessment:       1. 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 Edema of breast    4. Lymphedema        Plan:   Labs reviewed.     Next MMG due Oct 2019    Continue Neurontin 300 mg QHS.     Recent DEXA WNL.    Continue Arimidex.    Start OTC Vit D 2000 units/day for Vit D Def - Hopefully it will help with some of the bone pains    RTC 6 mo

## 2019-03-30 DIAGNOSIS — K21.00 GASTROESOPHAGEAL REFLUX DISEASE WITH ESOPHAGITIS: ICD-10-CM

## 2019-03-30 DIAGNOSIS — E78.00 HYPERCHOLESTEROLEMIA: ICD-10-CM

## 2019-03-30 DIAGNOSIS — I10 ESSENTIAL HYPERTENSION: ICD-10-CM

## 2019-03-30 DIAGNOSIS — Z51.11 ENCOUNTER FOR ANTINEOPLASTIC CHEMOTHERAPY: ICD-10-CM

## 2019-03-30 DIAGNOSIS — G62.0 NEUROPATHY DUE TO DRUGS: ICD-10-CM

## 2019-03-30 DIAGNOSIS — I48.20 CHRONIC ATRIAL FIBRILLATION: ICD-10-CM

## 2019-03-31 RX ORDER — PRAVASTATIN SODIUM 40 MG/1
TABLET ORAL
Qty: 90 TABLET | Refills: 3 | Status: SHIPPED | OUTPATIENT
Start: 2019-03-31 | End: 2020-03-30

## 2019-03-31 RX ORDER — GABAPENTIN 300 MG/1
CAPSULE ORAL
Qty: 90 CAPSULE | Refills: 3 | Status: SHIPPED | OUTPATIENT
Start: 2019-03-31 | End: 2020-01-29 | Stop reason: SDUPTHER

## 2019-03-31 RX ORDER — METOPROLOL SUCCINATE 100 MG/1
TABLET, EXTENDED RELEASE ORAL
Qty: 90 TABLET | Refills: 3 | Status: SHIPPED | OUTPATIENT
Start: 2019-03-31 | End: 2020-03-30

## 2019-05-02 ENCOUNTER — LAB VISIT (OUTPATIENT)
Dept: LAB | Facility: HOSPITAL | Age: 79
End: 2019-05-02
Attending: INTERNAL MEDICINE
Payer: MEDICARE

## 2019-05-02 ENCOUNTER — OFFICE VISIT (OUTPATIENT)
Dept: HEMATOLOGY/ONCOLOGY | Facility: CLINIC | Age: 79
End: 2019-05-02
Payer: MEDICARE

## 2019-05-02 VITALS
OXYGEN SATURATION: 97 % | DIASTOLIC BLOOD PRESSURE: 74 MMHG | WEIGHT: 147.06 LBS | SYSTOLIC BLOOD PRESSURE: 153 MMHG | RESPIRATION RATE: 16 BRPM | TEMPERATURE: 97 F | BODY MASS INDEX: 29.65 KG/M2 | HEIGHT: 59 IN | HEART RATE: 66 BPM

## 2019-05-02 DIAGNOSIS — E55.9 VITAMIN D DEFICIENCY: ICD-10-CM

## 2019-05-02 DIAGNOSIS — G62.0 NEUROPATHY DUE TO DRUGS: ICD-10-CM

## 2019-05-02 DIAGNOSIS — C50.411 MALIGNANT NEOPLASM OF UPPER-OUTER QUADRANT OF RIGHT BREAST IN FEMALE, ESTROGEN RECEPTOR POSITIVE: Primary | ICD-10-CM

## 2019-05-02 DIAGNOSIS — Z79.811 AROMATASE INHIBITOR USE: ICD-10-CM

## 2019-05-02 DIAGNOSIS — Z17.0 MALIGNANT NEOPLASM OF UPPER-OUTER QUADRANT OF RIGHT BREAST IN FEMALE, ESTROGEN RECEPTOR POSITIVE: Primary | ICD-10-CM

## 2019-05-02 DIAGNOSIS — N64.89 EDEMA OF BREAST: ICD-10-CM

## 2019-05-02 LAB
25(OH)D3+25(OH)D2 SERPL-MCNC: 49 NG/ML (ref 30–96)
ALBUMIN SERPL BCP-MCNC: 3.7 G/DL (ref 3.5–5.2)
ALP SERPL-CCNC: 51 U/L (ref 55–135)
ALT SERPL W/O P-5'-P-CCNC: 20 U/L (ref 10–44)
ANION GAP SERPL CALC-SCNC: 10 MMOL/L (ref 8–16)
AST SERPL-CCNC: 30 U/L (ref 10–40)
BASOPHILS # BLD AUTO: 0.02 K/UL (ref 0–0.2)
BASOPHILS NFR BLD: 0.3 % (ref 0–1.9)
BILIRUB SERPL-MCNC: 0.5 MG/DL (ref 0.1–1)
BUN SERPL-MCNC: 10 MG/DL (ref 8–23)
CALCIUM SERPL-MCNC: 9.8 MG/DL (ref 8.7–10.5)
CHLORIDE SERPL-SCNC: 101 MMOL/L (ref 95–110)
CO2 SERPL-SCNC: 27 MMOL/L (ref 23–29)
CREAT SERPL-MCNC: 0.9 MG/DL (ref 0.5–1.4)
DIFFERENTIAL METHOD: ABNORMAL
EOSINOPHIL # BLD AUTO: 0.1 K/UL (ref 0–0.5)
EOSINOPHIL NFR BLD: 2.3 % (ref 0–8)
ERYTHROCYTE [DISTWIDTH] IN BLOOD BY AUTOMATED COUNT: 14.8 % (ref 11.5–14.5)
EST. GFR  (AFRICAN AMERICAN): >60 ML/MIN/1.73 M^2
EST. GFR  (NON AFRICAN AMERICAN): >60 ML/MIN/1.73 M^2
GLUCOSE SERPL-MCNC: 92 MG/DL (ref 70–110)
HCT VFR BLD AUTO: 37.5 % (ref 37–48.5)
HGB BLD-MCNC: 12 G/DL (ref 12–16)
LYMPHOCYTES # BLD AUTO: 1.8 K/UL (ref 1–4.8)
LYMPHOCYTES NFR BLD: 29.6 % (ref 18–48)
MCH RBC QN AUTO: 29.9 PG (ref 27–31)
MCHC RBC AUTO-ENTMCNC: 32 G/DL (ref 32–36)
MCV RBC AUTO: 93 FL (ref 82–98)
MONOCYTES # BLD AUTO: 0.8 K/UL (ref 0.3–1)
MONOCYTES NFR BLD: 12.9 % (ref 4–15)
NEUTROPHILS # BLD AUTO: 3.4 K/UL (ref 1.8–7.7)
NEUTROPHILS NFR BLD: 54.6 % (ref 38–73)
PLATELET # BLD AUTO: 296 K/UL (ref 150–350)
PMV BLD AUTO: 9.6 FL (ref 9.2–12.9)
POTASSIUM SERPL-SCNC: 4.4 MMOL/L (ref 3.5–5.1)
PROT SERPL-MCNC: 6.9 G/DL (ref 6–8.4)
RBC # BLD AUTO: 4.02 M/UL (ref 4–5.4)
SODIUM SERPL-SCNC: 138 MMOL/L (ref 136–145)
WBC # BLD AUTO: 6.22 K/UL (ref 3.9–12.7)

## 2019-05-02 PROCEDURE — 99213 OFFICE O/P EST LOW 20 MIN: CPT | Mod: PBBFAC,PO | Performed by: INTERNAL MEDICINE

## 2019-05-02 PROCEDURE — 99213 OFFICE O/P EST LOW 20 MIN: CPT | Mod: S$PBB,,, | Performed by: INTERNAL MEDICINE

## 2019-05-02 PROCEDURE — 80053 COMPREHEN METABOLIC PANEL: CPT

## 2019-05-02 PROCEDURE — 99999 PR PBB SHADOW E&M-EST. PATIENT-LVL III: ICD-10-PCS | Mod: PBBFAC,,, | Performed by: INTERNAL MEDICINE

## 2019-05-02 PROCEDURE — 82306 VITAMIN D 25 HYDROXY: CPT

## 2019-05-02 PROCEDURE — 99213 PR OFFICE/OUTPT VISIT, EST, LEVL III, 20-29 MIN: ICD-10-PCS | Mod: S$PBB,,, | Performed by: INTERNAL MEDICINE

## 2019-05-02 PROCEDURE — 36415 COLL VENOUS BLD VENIPUNCTURE: CPT

## 2019-05-02 PROCEDURE — 85025 COMPLETE CBC W/AUTO DIFF WBC: CPT

## 2019-05-02 PROCEDURE — 99999 PR PBB SHADOW E&M-EST. PATIENT-LVL III: CPT | Mod: PBBFAC,,, | Performed by: INTERNAL MEDICINE

## 2019-05-02 NOTE — PROGRESS NOTES
Subjective:       Patient ID: Claudia Sierra is a 78 y.o. female.    Chief Complaint: No chief complaint on file.    HPI      Ms. Claudia Sierra presents to the clinic for followup of locally advanced weakly ER positive, [place] negative and HER-2/agustín negative right breast carcinoma.    Relevant oncologic history: She felt a mass in the right armpit in March 2013.  Mammogram showed a 5 mm right breast mass with 2 enlarged right axillary lymph nodes.  Biopsy of right breast mass showed fibroadenoma.  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  HER-2/agustín was negative.  Breast MRI did not show an actual primary.  A PET scan done in April showed hypermetabolic axillary and subpectoral nodes. She received 4 cycles of neoadjuvant Adriamycin/Cytoxan chemotherapy followed by one cycle of docetaxel chemotherapy.  She then underwent a right axillary lymph node dissection on 10/8/13. 12 lymph nodes were removed. None were involved with malignancy. She had a pathological complete response.  She then completed 9 weekly doses of paclitaxel from November 2013 to January 2014 in an adjuvant fashion. Following this she underwent 28 radiation treatments to the right breast starting 2/10/14 completing 3/20/14. She then developed lymphedema of the right breast. Nothing in the right arm. She was evaluated in the lymphedema clinic and underwent some physical therapy.    She started Arimidex on 7/10/15.     Neuropathy stable, she is on neurontin 300 mg QHS.   She wants to stay on this dose and take it at bedtime.  Taking it at the daytime makes her groggy.    Her chest port was removed in June 2015.  Weight stable.    Review of Systems    CONSTITUTIONAL: No weight gain. No fevers.  ENT/MOUTH: No voice change, sore throat, mouth ulcers or gum bleeding.  CARDIOVASCULAR: No chest pain  or shortness of breath.  RESPIRATORY: No cough, hemoptysis or dyspnea.  HEME/LYMPH: Some pain in the right axilla after lymph node dissection. Right breast edema - chronic and stable.  GASTROINTESTINAL: No abdominal pain, nausea or change in bowel habits.    Objective:      Physical Exam    GENERAL: Well-groomed, moderately-built. Vitals noted.  ENT&MOUTH: Oral mucosa moist. No thrush, gum bleeding or oral masses noted.  NECK: Supple without any masses. Thyroid is non-tender.  LYMPH NODES: None felt in the neck or supraclavicular areas.  BREAST: Well-healed surgical scar in the right axilla.  No open areas.  No drainage. Right breast swollen when compared to the left consistent with lymphedema of the breast. No arm lymphedema noted.  LUNGS: Clear. No wheeze.  Breathing nonlabored.  HEART: S1, S2 heard. Rhythm regular. No tachycardia or gallops. No pedal edema.   ABDOMEN: Soft and non-tender. No palpable masses, hepatosplenomegaly or ascites.     Assessment:       1. Malignant neoplasm of upper-outer quadrant of right breast in female, estrogen receptor positive    2. Aromatase inhibitor use    3. 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 Edema of breast    5. Vitamin D deficiency        Plan:   Labs reviewed.     Next MMG due Oct 2019    Continue Neurontin 300 mg QHS.     Last DEXA Oct 2018 WNL     Continue Arimidex - end date July 2020    Cont OTC Vit D 2000 units/day for Vit D Def - Hopefully it will help with some of the bone pains    RTC 6 mo

## 2019-05-03 ENCOUNTER — TELEPHONE (OUTPATIENT)
Dept: HEMATOLOGY/ONCOLOGY | Facility: CLINIC | Age: 79
End: 2019-05-03

## 2019-05-03 NOTE — TELEPHONE ENCOUNTER
Appt reminders for 6 month f/up appt sent out. Pt told this nurse during most recent f/up to pick a date and send reminders. Pt will call if schedule needs to change.

## 2019-05-03 NOTE — TELEPHONE ENCOUNTER
Informed pt of MD findings. Pt verbalized understanding.     ----- Message from Mariano Mascorro MD sent at 5/2/2019  6:31 PM CDT -----  Vit d level much improved, now 49. Was 26 last time.    She can continue current dose of vit d    pls inform pt

## 2019-06-29 DIAGNOSIS — C50.411 MALIGNANT NEOPLASM OF UPPER-OUTER QUADRANT OF RIGHT BREAST IN FEMALE, ESTROGEN RECEPTOR POSITIVE: ICD-10-CM

## 2019-06-29 DIAGNOSIS — Z17.0 MALIGNANT NEOPLASM OF UPPER-OUTER QUADRANT OF RIGHT BREAST IN FEMALE, ESTROGEN RECEPTOR POSITIVE: ICD-10-CM

## 2019-07-01 RX ORDER — ANASTROZOLE 1 MG/1
1 TABLET ORAL DAILY
Qty: 90 TABLET | Refills: 3 | Status: SHIPPED | OUTPATIENT
Start: 2019-07-01 | End: 2020-06-25

## 2019-09-12 ENCOUNTER — OFFICE VISIT (OUTPATIENT)
Dept: FAMILY MEDICINE | Facility: CLINIC | Age: 79
End: 2019-09-12
Payer: MEDICARE

## 2019-09-12 VITALS
OXYGEN SATURATION: 98 % | WEIGHT: 148.5 LBS | SYSTOLIC BLOOD PRESSURE: 124 MMHG | HEIGHT: 59 IN | TEMPERATURE: 99 F | DIASTOLIC BLOOD PRESSURE: 80 MMHG | HEART RATE: 68 BPM | BODY MASS INDEX: 29.94 KG/M2 | RESPIRATION RATE: 18 BRPM

## 2019-09-12 DIAGNOSIS — Z17.0 MALIGNANT NEOPLASM OF UPPER-OUTER QUADRANT OF RIGHT BREAST IN FEMALE, ESTROGEN RECEPTOR POSITIVE: ICD-10-CM

## 2019-09-12 DIAGNOSIS — K59.00 CONSTIPATION, UNSPECIFIED CONSTIPATION TYPE: Primary | ICD-10-CM

## 2019-09-12 DIAGNOSIS — Z23 NEED FOR INFLUENZA VACCINATION: ICD-10-CM

## 2019-09-12 DIAGNOSIS — C50.411 MALIGNANT NEOPLASM OF UPPER-OUTER QUADRANT OF RIGHT BREAST IN FEMALE, ESTROGEN RECEPTOR POSITIVE: ICD-10-CM

## 2019-09-12 DIAGNOSIS — Z86.79 HISTORY OF ATRIAL FIBRILLATION: ICD-10-CM

## 2019-09-12 DIAGNOSIS — F51.01 PRIMARY INSOMNIA: ICD-10-CM

## 2019-09-12 DIAGNOSIS — E78.2 MIXED HYPERLIPIDEMIA: ICD-10-CM

## 2019-09-12 DIAGNOSIS — I10 ESSENTIAL HYPERTENSION: ICD-10-CM

## 2019-09-12 PROCEDURE — 99999 PR PBB SHADOW E&M-EST. PATIENT-LVL IV: ICD-10-PCS | Mod: PBBFAC,,, | Performed by: INTERNAL MEDICINE

## 2019-09-12 PROCEDURE — 99204 PR OFFICE/OUTPT VISIT, NEW, LEVL IV, 45-59 MIN: ICD-10-PCS | Mod: S$PBB,,, | Performed by: INTERNAL MEDICINE

## 2019-09-12 PROCEDURE — 99999 PR PBB SHADOW E&M-EST. PATIENT-LVL IV: CPT | Mod: PBBFAC,,, | Performed by: INTERNAL MEDICINE

## 2019-09-12 PROCEDURE — 99214 OFFICE O/P EST MOD 30 MIN: CPT | Mod: PBBFAC,PN,25 | Performed by: INTERNAL MEDICINE

## 2019-09-12 PROCEDURE — 90662 IIV NO PRSV INCREASED AG IM: CPT | Mod: PBBFAC,PN | Performed by: INTERNAL MEDICINE

## 2019-09-12 PROCEDURE — 99204 OFFICE O/P NEW MOD 45 MIN: CPT | Mod: S$PBB,,, | Performed by: INTERNAL MEDICINE

## 2019-09-12 PROCEDURE — G0008 ADMIN INFLUENZA VIRUS VAC: HCPCS | Mod: PBBFAC

## 2019-09-12 RX ORDER — AMOXICILLIN 250 MG
1 CAPSULE ORAL NIGHTLY PRN
COMMUNITY
End: 2021-08-19

## 2019-09-12 RX ORDER — VIT C/E/ZN/COPPR/LUTEIN/ZEAXAN 250MG-90MG
1000 CAPSULE ORAL DAILY
Status: ON HOLD | COMMUNITY
End: 2022-01-31 | Stop reason: HOSPADM

## 2019-09-12 NOTE — PROGRESS NOTES
"[facility] Note    Chief Complaint      Chief Complaint   Patient presents with    Establish Care     sinus/dizziness       History of Present Illness      Claudia Sierra is a 79 y.o. female who presents today for   Chief Complaint   Patient presents with    Establish Care     sinus/dizziness   .  Patient comes to appointment here for establish visit with me for chronic issues as described in detail below . She just had labs done all reviewed by me form may . she will get influenza vaccine today . She also cannot remembers which pneumonia vaccine she has received she will [facility]out [facility] .    Problem List Items Addressed This Visit        Cardiac/Vascular    Essential hypertension    Overview     bp well controlled on current regimen          History of atrial fibrillation    Overview     pateint describes the afib started after chemo was given " a medication  In iv " and then was resolved . She was never placed on blood thinner per her report          Mixed hyperlipidemia    Overview     Stable on current regimen of pravachol .             Oncology    Malignant neoplasm of upper-outer quadrant of right breast in female, estrogen receptor positive    Overview     She is seeing dr cedeño for f/u and surveillance will be seeing again in 11/19 for repeat mammogram             GI    Constipation - Primary    Overview     Lactulose prn is workign well             Other    Insomnia    Overview     Is on Restoril currently is working well            Other Visit Diagnoses     Need for influenza vaccination                Past Medical History:  Past Medical History:   Diagnosis Date    *Atrial fibrillation     Asthma     Blood transfusion     Breast cancer     Right breast cancer    Breast mass, right     Cataract     Hypertension     S/P chemotherapy, time since 4-12 weeks     breast/ lymph node cancer       Past Surgical History:  Past Surgical History:   Procedure Laterality " Date    APPENDECTOMY      BREAST BIOPSY      right    BREAST LUMPECTOMY Right 2013    HYSTERECTOMY      INSERTION, PORT-A-CATH Left 4/23/2013    [facility]formed [facility]    LYMPHADENECTOMY, AXILLARY Right 10/8/2013    [facility]formed [facility]    lymphnode removal      12 from Right axilla    PORTACATH PLACEMENT  2013    REMOVAL-PORT-A-CATH Left 6/16/2015    [facility]    TONSILLECTOMY         Family History:  family history includes Cervical cancer in her sister; Heart disease in her father and mother.    Social History:  Social History     Socioeconomic History    Marital status:      Spouse name: Not on file    Number of children: Not on file    Years of education: Not on file    Highest education level: Not on file   Occupational History    Not on file   Social Needs    Financial resource strain: Not on file    Food insecurity:     Worry: Not on file     Inability: Not on file    Transportation needs:     Medical: Not on file     Non-medical: Not on file   Tobacco Use    Smoking status: Never Smoker    Smokeless tobacco: Never Used   Substance and Sexual Activity    Alcohol use: Yes     Alcohol/week: 1.2 oz     Types: 2 Glasses of wine per week    Drug use: No    Sexual activity: Never   Lifestyle    Physical activity:     Days per week: Not on file     Minutes per session: Not on file    Stress: Not on file   Relationships    Social connections:     Talks on phone: Not on file     Gets together: Not on file     Attends Temple service: Not on file     Active member of club or organization: Not on file     Attends meetings of clubs or organizations: Not on file     Relationship status: Not on file   Other Topics Concern    Not on file   Social History Narrative    Not on file       Review of Systems:   Review of Systems   Constitutional: Negative for fever and weight loss.   HENT: Negative for  congestion, hearing loss and sore throat.    Eyes: Negative for blurred vision.   Respiratory: Negative for cough, shortness of breath and wheezing.    Cardiovascular: Negative for chest pain, palpitations, claudication and leg swelling.   Gastrointestinal: Positive for constipation. Negative for abdominal pain, diarrhea and vomiting.   Genitourinary: Negative for dysuria.   Musculoskeletal: Negative for back pain and myalgias.   Skin: Negative for rash.   Neurological: Negative for focal weakness and headaches.   Endo/Heme/Allergies: Does not bruise/bleed easily.   Psychiatric/Behavioral: Negative for depression, memory loss and suicidal ideas. The patient has insomnia. The patient is not nervous/anxious.          Medications:  Outpatient Encounter Medications as of 9/12/2019   Medication Sig Dispense Refill    acetaminophen (TYLENOL) 325 MG tablet Take 1 tablet (325 mg total) by mouth every 6 (six) hours as needed for Pain. 12 tablet 0    anastrozole (ARIMIDEX) 1 mg Tab TAKE 1 TABLET (1 MG TOTAL) BY MOUTH ONCE DAILY. 90 tablet 3    ascorbic acid (VITAMIN C) 1000 MG tablet Take 1,000 mg by mouth once daily.      aspirin 81 MG Chew Take 81 mg by mouth once daily.      b complex vitamins tablet Take 1 tablet by mouth once daily.      cholecalciferol, vitamin D3, (VITAMIN D3) 1,000 unit capsule Take 1,000 Units by mouth once daily.      gabapentin (NEURONTIN) 300 MG capsule TAKE ONE CAPSULE EVERY EVENING 90 capsule 3    lactulose (CHRONULAC) 10 gram/15 mL solution TAKE 30 MLS 4 TIMES A DAY AS NEEDED FOR CONSTIPATION 500 mL 3    metoprolol succinate (TOPROL-XL) 100 MG 24 hr tablet TAKE 1 TABLET EVERY DAY 90 tablet 3    polycarbophil (FIBERCON) 625 mg tablet Take 625 mg by mouth once daily. PT TAKES 3 TABLETS DAILY      pravastatin (PRAVACHOL) 40 MG tablet TAKE 1 TABLET EVERY DAY 90 tablet 3    psyllium (METAMUCIL) powder Take 1 packet by mouth once daily.      ranitidine (ZANTAC) 150 MG capsule Take 150 mg  by mouth once daily.      senna-docusate 8.6-50 mg (SENNA WITH DOCUSATE SODIUM) 8.6-50 mg per tablet Take 1 tablet by mouth once daily.      temazepam (RESTORIL) 7.5 MG Cap Take 1 capsule (7.5 mg total) by mouth nightly as needed. 60 capsule 1    FLUZONE HIGH-DOSE 2018-19, PF, 180 mcg/0.5 mL vaccine       PREVIDENT 5000 SENSITIVE 1.1-5 % Pste AS DIRECTED  6     No facility-administered encounter medications on file as of 9/12/2019.         Allergies:  Review of patient's allergies indicates:   Allergen Reactions    Codeine Other (See Comments)     Feel like (climbing the walls)    Penicillins Rash         Physical Exam      Vitals:    09/12/19 1431   BP: 124/80   Pulse: 68   Resp: 18   Temp: 99 °F (37.2 °C)      Body mass index is 29.99 kg/m².    Physical Exam   Constitutional: She is oriented to person, place, and time. She appears well-developed and well-nourished.   HENT:   Mouth/Throat: Oropharynx is clear and moist.   Eyes: Pupils are equal, round, and reactive to light. EOM are normal.   Neck: Normal range of motion. No thyromegaly present.   Cardiovascular: Normal rate and regular rhythm. Exam reveals no gallop and no friction rub.   Murmur heard.   Systolic murmur is present with a grade of 3/6.  Pulses:       Dorsalis pedis pulses are 1+ on the right side, and 1+ on the left side.   Pulmonary/Chest: Breath sounds normal.   Abdominal: Soft. Bowel sounds are normal.   Musculoskeletal: Normal range of motion.   Lymphadenopathy:     She has no cervical adenopathy.   Neurological: She is alert and oriented to person, place, and time. No cranial nerve deficit.   Skin: Skin is warm. No rash noted.   Psychiatric: She has a normal mood and affect. Her behavior is normal.        Laboratory:  CBC:  No results for input(s): WBC, RBC, HGB, HCT, PLT, MCV, MCH, MCHC in the last 2160 hours.  CMP:  No results for input(s): GLU, CALCIUM, ALBUMIN, PROT, NA, K, CO2, CL, BUN, ALKPHOS, ALT, AST, BILITOT in the last 2160  hours.    Invalid input(s): CREATININ  URINALYSIS:  No results for input(s): COLORU, CLARITYU, SPECGRAV, PHUR, PROTEINUA, GLUCOSEU, BILIRUBINCON, BLOODU, WBCU, RBCU, BACTERIA, MUCUS, NITRITE, LEUKOCYTESUR, UROBILINOGEN, HYALINECASTS in the last 2160 hours.   LIPIDS:  No results for input(s): TSH, HDL, CHOL, TRIG, LDLCALC, CHOLHDL, NONHDLCHOL, TOTALCHOLEST in the last 2160 hours.  TSH:  No results for input(s): TSH in the last 2160 hours.  A1C:  No results for input(s): HGBA1C in the last 2160 hours.    Radiology:        Assessment:     Claudia Sierra is a 79 y.o.female with:    Constipation, unspecified constipation type    Essential hypertension    History of atrial fibrillation    Malignant neoplasm of upper-outer quadrant of right breast in female, estrogen receptor positive    Primary insomnia    Mixed hyperlipidemia    Need for influenza vaccination  -     Influenza - High Dose (65+) (PF) (IM)          Plan:     As above, continue current medications and maintain follow up with specialists.  Return to clinic in 6 months.    Frederick W Dantagnan [facility] [place]

## 2019-11-07 ENCOUNTER — HOSPITAL ENCOUNTER (OUTPATIENT)
Dept: RADIOLOGY | Facility: HOSPITAL | Age: 79
Discharge: HOME | End: 2019-11-07
Attending: INTERNAL MEDICINE
Payer: MEDICARE

## 2019-11-07 DIAGNOSIS — C50.411 MALIGNANT NEOPLASM OF UPPER-OUTER QUADRANT OF RIGHT BREAST IN FEMALE, ESTROGEN RECEPTOR POSITIVE: ICD-10-CM

## 2019-11-07 DIAGNOSIS — Z79.811 AROMATASE INHIBITOR USE: ICD-10-CM

## 2019-11-07 DIAGNOSIS — Z17.0 MALIGNANT NEOPLASM OF UPPER-OUTER QUADRANT OF RIGHT BREAST IN FEMALE, ESTROGEN RECEPTOR POSITIVE: ICD-10-CM

## 2019-11-07 PROCEDURE — 77062 BREAST TOMOSYNTHESIS BI: CPT | Mod: 26,,, | Performed by: RADIOLOGY

## 2019-11-07 PROCEDURE — 77062 MAMMO DIGITAL DIAGNOSTIC BILAT WITH TOMOSYNTHESIS_CAD: ICD-10-PCS | Mod: 26,,, | Performed by: RADIOLOGY

## 2019-11-07 PROCEDURE — 77066 MAMMO DIGITAL DIAGNOSTIC BILAT WITH TOMOSYNTHESIS_CAD: ICD-10-PCS | Mod: 26,,, | Performed by: RADIOLOGY

## 2019-11-07 PROCEDURE — 77066 DX MAMMO INCL CAD BI: CPT | Mod: 26,,, | Performed by: RADIOLOGY

## 2019-11-07 PROCEDURE — 77062 BREAST TOMOSYNTHESIS BI: CPT | Mod: TC

## 2019-11-08 ENCOUNTER — OFFICE VISIT (OUTPATIENT)
Dept: HEMATOLOGY/ONCOLOGY | Facility: CLINIC | Age: 79
End: 2019-11-08
Payer: MEDICARE

## 2019-11-08 VITALS
SYSTOLIC BLOOD PRESSURE: 149 MMHG | OXYGEN SATURATION: 99 % | DIASTOLIC BLOOD PRESSURE: 79 MMHG | HEART RATE: 68 BPM | BODY MASS INDEX: 30.55 KG/M2 | WEIGHT: 151.25 LBS | RESPIRATION RATE: 16 BRPM

## 2019-11-08 DIAGNOSIS — M94.9 DISORDER OF CARTILAGE, UNSPECIFIED: ICD-10-CM

## 2019-11-08 DIAGNOSIS — E55.9 VITAMIN D DEFICIENCY: ICD-10-CM

## 2019-11-08 DIAGNOSIS — N64.89 EDEMA OF BREAST: ICD-10-CM

## 2019-11-08 DIAGNOSIS — G62.0 NEUROPATHY DUE TO DRUGS: ICD-10-CM

## 2019-11-08 DIAGNOSIS — C50.411 MALIGNANT NEOPLASM OF UPPER-OUTER QUADRANT OF RIGHT BREAST IN FEMALE, ESTROGEN RECEPTOR POSITIVE: Primary | ICD-10-CM

## 2019-11-08 DIAGNOSIS — Z17.0 MALIGNANT NEOPLASM OF UPPER-OUTER QUADRANT OF RIGHT BREAST IN FEMALE, ESTROGEN RECEPTOR POSITIVE: Primary | ICD-10-CM

## 2019-11-08 PROCEDURE — 99213 OFFICE O/P EST LOW 20 MIN: CPT | Mod: PBBFAC,PO | Performed by: INTERNAL MEDICINE

## 2019-11-08 PROCEDURE — 99999 PR PBB SHADOW E&M-EST. PATIENT-LVL III: CPT | Mod: PBBFAC,,, | Performed by: INTERNAL MEDICINE

## 2019-11-08 PROCEDURE — 99213 OFFICE O/P EST LOW 20 MIN: CPT | Mod: S$PBB,,, | Performed by: INTERNAL MEDICINE

## 2019-11-08 PROCEDURE — 99999 PR PBB SHADOW E&M-EST. PATIENT-LVL III: ICD-10-PCS | Mod: PBBFAC,,, | Performed by: INTERNAL MEDICINE

## 2019-11-08 PROCEDURE — 99213 PR OFFICE/OUTPT VISIT, EST, LEVL III, 20-29 MIN: ICD-10-PCS | Mod: S$PBB,,, | Performed by: INTERNAL MEDICINE

## 2019-11-08 NOTE — PROGRESS NOTES
Subjective:       Patient ID: Claudia Sierra is a 79 y.o. female.    Chief Complaint: Breast Cancer    HPI      Ms. Claudia Sierra presents to the clinic for followup of locally advanced weakly ER positive, W[place] negative and HER-2/agustín negative right breast carcinoma.    Relevant oncologic history: She felt a mass in the right armpit in March 2013.  Mammogram showed a 5 mm right breast mass with 2 enlarged right axillary lymph nodes.  Biopsy of right breast mass showed fibroadenoma.  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  HER-2/agustín was negative.  Breast MRI did not show an actual primary.  A PET scan done in April showed hypermetabolic axillary and subpectoral nodes. She received 4 cycles of neoadjuvant Adriamycin/Cytoxan chemotherapy followed by one cycle of docetaxel chemotherapy.  She then underwent a right axillary lymph node dissection on 10/8/13. 12 lymph nodes were removed. None were involved with malignancy. She had a pathological complete response.  She then completed 9 weekly doses of paclitaxel from November 2013 to January 2014 in an adjuvant fashion. Following this she underwent 28 radiation treatments to the right breast starting 2/10/14 completing 3/20/14. She then developed lymphedema of the right breast. Nothing in the right arm. She was evaluated in the lymphedema clinic and underwent some physical therapy.    She started Arimidex on 7/10/15.     Neuropathy stable, she is on neurontin 300 mg QHS.   She wants to stay on this dose and take it at bedtime.  Taking it at the daytime makes her groggy.    Her chest port was removed in June 2015.  Weight stable.    Review of Systems    CONSTITUTIONAL: No weight gain. No fevers.  ENT/MOUTH: No voice change, sore throat, mouth ulcers or gum bleeding.  CARDIOVASCULAR: No chest pain or shortness  of breath.  RESPIRATORY: No cough, hemoptysis or dyspnea.  HEME/LYMPH: Some pain in the right axilla after lymph node dissection. Right breast edema - chronic and stable.  GASTROINTESTINAL: No abdominal pain, nausea or change in bowel habits.    Objective:      Physical Exam    GENERAL: Well-groomed, moderately-built. Vitals noted.  ENT&MOUTH: Oral mucosa moist. No thrush, gum bleeding or oral masses noted.  NECK: Supple without any masses. Thyroid is non-tender.  LYMPH NODES: None felt in the neck or supraclavicular areas.  BREAST: Well-healed surgical scar in the right axilla.  No open areas.  No drainage. Right breast swollen when compared to the left consistent with lymphedema of the breast. No arm lymphedema noted.  LUNGS: Clear. No wheeze.  Breathing nonlabored.  HEART: S1, S2 heard. Rhythm regular. No tachycardia or gallops. No pedal edema.   ABDOMEN: Soft and non-tender. No palpable masses, hepatosplenomegaly or ascites.     Assessment:     1. Malignant neoplasm of upper-outer quadrant of right breast in female, estrogen receptor positive    2. 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 Edema of breast    5. 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

## 2019-12-20 ENCOUNTER — HOSPITAL ENCOUNTER (EMERGENCY)
Facility: HOSPITAL | Age: 79
Discharge: HOME | End: 2019-12-20
Attending: EMERGENCY MEDICINE
Payer: MEDICARE

## 2019-12-20 VITALS
OXYGEN SATURATION: 99 % | BODY MASS INDEX: 29.23 KG/M2 | HEART RATE: 75 BPM | WEIGHT: 145 LBS | HEIGHT: 59 IN | TEMPERATURE: 98 F | RESPIRATION RATE: 20 BRPM | SYSTOLIC BLOOD PRESSURE: 185 MMHG | DIASTOLIC BLOOD PRESSURE: 88 MMHG

## 2019-12-20 DIAGNOSIS — B02.9 HERPES ZOSTER WITHOUT COMPLICATION: Primary | ICD-10-CM

## 2019-12-20 PROCEDURE — 99283 EMERGENCY DEPT VISIT LOW MDM: CPT

## 2019-12-20 PROCEDURE — 25000003 PHARM REV CODE 250: Performed by: EMERGENCY MEDICINE

## 2019-12-20 RX ORDER — ACYCLOVIR 800 MG/1
800 TABLET ORAL
Qty: 35 TABLET | Refills: 0 | Status: SHIPPED | OUTPATIENT
Start: 2019-12-20 | End: 2020-03-03

## 2019-12-20 RX ORDER — IBUPROFEN 600 MG/1
600 TABLET ORAL EVERY 6 HOURS PRN
Qty: 15 TABLET | Refills: 0 | Status: SHIPPED | OUTPATIENT
Start: 2019-12-20 | End: 2019-12-20 | Stop reason: CLARIF

## 2019-12-20 RX ORDER — IBUPROFEN 400 MG/1
800 TABLET ORAL
Status: COMPLETED | OUTPATIENT
Start: 2019-12-20 | End: 2019-12-20

## 2019-12-20 RX ADMIN — IBUPROFEN 800 MG: 400 TABLET, FILM COATED ORAL at 09:12

## 2019-12-20 NOTE — ED NOTES
79 year old female presents to ed cc of neck pain secondary to rash on neck and chest. Patient states noticed rash yesterday denies fever chills n/v/d

## 2019-12-20 NOTE — ED PROVIDER NOTES
Encounter Date: 12/20/2019    SCRIBE #1 NOTE: I, Román Plummer, am scribing for, and in the presence of,  Dr. Gonzalez. I have scribed the entire note.       History     Chief Complaint   Patient presents with    Neck Pain     x2-3 days with rash to L side of neck and upper chest that she noticed today     This is a 79 y.o. female who  has a past medical history of *Atrial fibrillation, Asthma, Blood transfusion, Breast cancer, Breast mass, right, Cataract, Hypertension, and S/P chemotherapy presents with chief complaint of neck pain. The patient reports her symptoms began 2-3 days ago as shooting left-sided neck pain radiating up toward her left ear and temple. She then noticed a rash this morning to the upper left chest and left neck which is painful to touch, and she has been unable to sleep or get comfortable. Patient denies any recent trauma, fever, SOB, or any recent stress. She denies any prior history of shingles or recent sick contacts.    The history is provided by the patient.     Review of patient's allergies indicates:   Allergen Reactions    Codeine Other (See Comments)     Feel like (climbing the walls)    Penicillins Rash     Past Medical History:   Diagnosis Date    *Atrial fibrillation     Asthma     Blood transfusion     Breast cancer     Right breast cancer    Breast mass, right     Cataract     Hypertension     S/P chemotherapy, time since 4-12 weeks     breast/ lymph node cancer     Past Surgical History:   Procedure Laterality Date    APPENDECTOMY      BREAST BIOPSY      right    BREAST LUMPECTOMY Right 2013    HYSTERECTOMY      lymphnode removal      12 from Right axilla    PORTACATH PLACEMENT  2013    TONSILLECTOMY       Family History   Problem Relation Age of Onset    Cervical cancer Sister     Heart disease Mother     Heart disease Father      Social History     Tobacco Use    Smoking status: Never Smoker    Smokeless tobacco: Never Used   Substance Use Topics     Alcohol use: Yes     Alcohol/week: 2.0 standard drinks     Types: 2 Glasses of wine per week    Drug use: No     Review of Systems   Constitutional: Negative for chills and fever.   HENT: Negative for ear pain and sore throat.    Eyes: Negative for redness.   Respiratory: Negative for shortness of breath.    Cardiovascular: Negative for chest pain.   Gastrointestinal: Negative for abdominal pain, diarrhea and vomiting.   Genitourinary: Negative for dysuria.   Musculoskeletal: Positive for myalgias and neck pain. Negative for back pain and neck stiffness.   Skin: Positive for rash.   Neurological: Negative for headaches.       Physical Exam     Initial Vitals [12/20/19 0822]   BP Pulse Resp Temp SpO2   (!) 203/90 77 19 99 °F (37.2 °C) 98 %      MAP       --         Physical Exam    Nursing note and vitals reviewed.  Constitutional: She appears well-developed and well-nourished. She is not diaphoretic. No distress.   HENT:   Head: Normocephalic and atraumatic.   Right Ear: External ear normal.   Left Ear: External ear normal.   No vesicles to ears or face.    Eyes: Conjunctivae and EOM are normal. Pupils are equal, round, and reactive to light.   Eye exam unremarkable    Neck: Normal range of motion. Neck supple.   Pt able to range neck in all directions without significant limitations. No meningeal signs appreciated.    Cardiovascular: Normal rate, regular rhythm and normal heart sounds.   Pulmonary/Chest: Breath sounds normal. No respiratory distress.   Abdominal: Soft. There is no tenderness.   Abdomen is soft/NT/ND; normal bowel sounds in all four quadrants   Musculoskeletal: Normal range of motion. She exhibits no edema or tenderness.   Neurological: She is alert and oriented to person, place, and time. She has normal strength.   Skin: Skin is warm and dry. Capillary refill takes less than 2 seconds. Rash noted.   Rash to the left anterior neck and chest that is vesicular nature that does not cross the midline;  appropriately tender to touch, no sign of infection           ED Course   Procedures  Labs Reviewed - No data to display       Imaging Results    None          Medical Decision Making:   ED Management:  - physical exam findings suggestive of Herpes Zoster virus   - will treat as OP with PO acyclovir, lidocaine patch  - pt stable for discharge  - No further intervention is indicated at this time after having taken into account the patient's history, physical exam findings, and empirical and objective data obtained during the patient's emergency department workup.   - The patient is at low risk for an emergent medical condition at this time, and I am of the belief that that it is safe to discharge the patient from the emergency department.   - The patient is instructed to follow up as outpatient as indicated on the discharge paperwork.    - I have discussed the specifics of the workup with the patient and the patient has verbalized understanding of the details of the workup, the diagnosis, the treatment plan, and the need for outpatient follow-up.    - Although the patient has no emergent etiology today this does not preclude the development of an emergent condition so, in addition, I have advised the patient that they can return to the ED and/or activate EMS at any time with worsening of their symptoms, change of their symptoms, or with any other medical complaint.    - The patient remained comfortable and stable during their visit in the ED.    - Discharge and follow-up instructions discussed with the patient who expressed understanding and willingness to comply with my recommendations.  - Results of all emergency department tests  discussed thoroughly with patient; all patient questions answered; pt in agreement with plan  - Pt instructed to follow up with PCP in 2-3 days for recheck of today's complaints  - Pt given strict emergency department return precautions for any new or worsening of symptoms  - Pt  discharged from the emergency department in stable condition, in no acute distress                                   Clinical Impression:       ICD-10-CM ICD-9-CM   1. Herpes zoster without complication B02.9 053.9       Disposition:   Disposition: Discharged  Condition: Stable      I, Renny Gonzalez,  personally performed the services described in this documentation. All medical record entries made by the scribe were at my direction and in my presence.  I have reviewed the chart and agree that the record reflects my personal performance and is accurate and complete. Renny Gonzalez M.D. 10:16 AM12/20/2019     Renny Gonzalez MD  12/20/19 1016

## 2019-12-30 ENCOUNTER — TELEPHONE (OUTPATIENT)
Dept: FAMILY MEDICINE | Facility: CLINIC | Age: 79
End: 2019-12-30

## 2019-12-30 RX ORDER — IBUPROFEN 600 MG/1
600 TABLET ORAL EVERY 6 HOURS PRN
Qty: 30 TABLET | Refills: 1 | Status: SHIPPED | OUTPATIENT
Start: 2019-12-30 | End: 2020-03-03

## 2019-12-30 NOTE — TELEPHONE ENCOUNTER
Patient went to ER on 12/20 for shingles, given Ibuprofen 600 and Zovirax. She completed the zovirax and only has a few Ibuprofen left. She is still in pain asking for refill on the ibuprofen and a cream to help with itching and pain if they have something?

## 2019-12-30 NOTE — TELEPHONE ENCOUNTER
----- Message from Yancy Weeks sent at 12/30/2019  9:19 AM CST -----  Contact: self  Patient is requesting a call back concerning having   ibuprofen tablet 600mg sent in to the pharmacy for the pain of her Shingles and a cream for her blisters. Please call

## 2020-01-07 ENCOUNTER — TELEPHONE (OUTPATIENT)
Dept: FAMILY MEDICINE | Facility: CLINIC | Age: 80
End: 2020-01-07

## 2020-01-07 NOTE — TELEPHONE ENCOUNTER
----- Message from Leeann Sierra sent at 1/7/2020 11:37 AM CST -----  Contact: [phone]/self   Patient is requesting to speak with nurse regarding the medication she is taking for the shingles. Please call and advise.

## 2020-01-07 NOTE — TELEPHONE ENCOUNTER
Pt is stating that she is still in pain from Shingles from 12/20.... She is asking what can she do. She can't sleep, and has pain all over.

## 2020-01-13 ENCOUNTER — TELEPHONE (OUTPATIENT)
Dept: FAMILY MEDICINE | Facility: CLINIC | Age: 80
End: 2020-01-13

## 2020-01-13 NOTE — TELEPHONE ENCOUNTER
Patient went to ER at [facility] and got diagnosis with shingles. Patient is wanting an appointment for tomorrow because she is still having bad pain to shoulder and neck. Patient is scheduled to come see Dr. Curtis tomorrow at 3:30.

## 2020-01-13 NOTE — TELEPHONE ENCOUNTER
----- Message from Mandi Shani sent at 1/13/2020 12:57 PM CST -----  Contact: self, [phone]  Patient states she has the shingles and wants to be seen tomorrow if possible. States the interior pain in her neck and shoulders is unbearable. Please advise.

## 2020-01-14 ENCOUNTER — OFFICE VISIT (OUTPATIENT)
Dept: FAMILY MEDICINE | Facility: CLINIC | Age: 80
End: 2020-01-14
Payer: MEDICARE

## 2020-01-14 VITALS
WEIGHT: 157.94 LBS | TEMPERATURE: 98 F | BODY MASS INDEX: 31.84 KG/M2 | HEIGHT: 59 IN | SYSTOLIC BLOOD PRESSURE: 130 MMHG | RESPIRATION RATE: 18 BRPM | DIASTOLIC BLOOD PRESSURE: 80 MMHG | HEART RATE: 86 BPM | OXYGEN SATURATION: 96 %

## 2020-01-14 DIAGNOSIS — B02.29 POST HERPETIC NEURALGIA: Primary | ICD-10-CM

## 2020-01-14 PROCEDURE — 99213 OFFICE O/P EST LOW 20 MIN: CPT | Mod: S$PBB,,, | Performed by: INTERNAL MEDICINE

## 2020-01-14 PROCEDURE — 1126F AMNT PAIN NOTED NONE PRSNT: CPT | Mod: ,,, | Performed by: INTERNAL MEDICINE

## 2020-01-14 PROCEDURE — 99999 PR PBB SHADOW E&M-EST. PATIENT-LVL IV: CPT | Mod: PBBFAC,,, | Performed by: INTERNAL MEDICINE

## 2020-01-14 PROCEDURE — 1126F PR PAIN SEVERITY QUANTIFIED, NO PAIN PRESENT: ICD-10-PCS | Mod: ,,, | Performed by: INTERNAL MEDICINE

## 2020-01-14 PROCEDURE — 1159F PR MEDICATION LIST DOCUMENTED IN MEDICAL RECORD: ICD-10-PCS | Mod: ,,, | Performed by: INTERNAL MEDICINE

## 2020-01-14 PROCEDURE — 99214 OFFICE O/P EST MOD 30 MIN: CPT | Mod: PBBFAC,PN | Performed by: INTERNAL MEDICINE

## 2020-01-14 PROCEDURE — 99999 PR PBB SHADOW E&M-EST. PATIENT-LVL IV: ICD-10-PCS | Mod: PBBFAC,,, | Performed by: INTERNAL MEDICINE

## 2020-01-14 PROCEDURE — 1159F MED LIST DOCD IN RCRD: CPT | Mod: ,,, | Performed by: INTERNAL MEDICINE

## 2020-01-14 PROCEDURE — 99213 PR OFFICE/OUTPT VISIT, EST, LEVL III, 20-29 MIN: ICD-10-PCS | Mod: S$PBB,,, | Performed by: INTERNAL MEDICINE

## 2020-01-20 ENCOUNTER — TELEPHONE (OUTPATIENT)
Dept: FAMILY MEDICINE | Facility: CLINIC | Age: 80
End: 2020-01-20

## 2020-01-20 DIAGNOSIS — B02.29 POST HERPETIC NEURALGIA: Primary | ICD-10-CM

## 2020-01-20 NOTE — TELEPHONE ENCOUNTER
----- Message from Maritza Britt sent at 1/20/2020  9:23 AM CST -----  Contact: Pt   Pt is requesting a call back from the nurse in regards to pain from shingles / she needs something for pain .  The medication provided has not helped , she cant sleep she is in terrible pain   She can be reached at [phone]    Thanks

## 2020-01-21 ENCOUNTER — PATIENT OUTREACH (OUTPATIENT)
Dept: ADMINISTRATIVE | Facility: OTHER | Age: 80
End: 2020-01-21

## 2020-01-22 NOTE — H&P (VIEW-ONLY)
[facility]  New Patient H&P    Referring Provider: Sajan Curtis Md  [address]  [address]  [place] [address]    Chief Complaint:   Chief Complaint   Patient presents with    Neck Pain    Shoulder Pain     left       History of Present Illness: Claudia Sierra is a 79 y.o. female referred by Dr. Sajan Silva* for Post herpetic neuralgia.      Onset: 12/17/19, started with a little pain in the neck/shoulder. She went to the ED on 12/20 and was diagnosed with shingles.   Location: Left Neck and left shoulder in C4-C5 distribution.   Radiation: Denies radiation into arm  Timing: constant  Quality: Aching, Burning and Throbbing  Exacerbating Factors: Movement.   Alleviating Factors: Pressure  Associated Symptoms: denies rashes or pain on the face. Denies weakness or numbness in her arms/neck/shoulders, but does get tingling in her fingertips since chemo for breast cancer. Denies vision changes, bowel or bladder changes. Denies significant weight loss.     Pain is limiting sleep, and her ability to get out into the community due to the pain     Severity: Currently: 7/10   Typical Range: 5-7/10     Exacerbation: 7/10     Pain Disability Index  Family/Home Responsibilities:: 7  Recreation:: 7  Social Activity:: 7  Occupation:: 7  Sexual Behavior:: 7  Self Care:: 7  Life-Support Activities:: 7  Pain Disability Index (PDI): 49    Previous Interventions:  - none    Previous Therapies:  PT/OT: no   Surgery: no   Previous Medications:   - NSAIDS: Ibuprofen. Tylenol doesn't help much.   - Muscle Relaxants:  none  - TCAs:   - SNRIs:   - Topicals:   - Anticonvulsants: gabapentin   - Opioids:     Current Pain Medications:  1. Ibuprofen   2. Gabapentin 600 mg TID    Blood Thinners: Aspirin 81 mg    Full Medication List:    Current Outpatient Medications:     anastrozole (ARIMIDEX) 1 mg Tab, TAKE 1 TABLET (1 MG TOTAL) BY MOUTH ONCE DAILY., Disp: 90 tablet, Rfl: 3    ascorbic acid (VITAMIN C)  1000 MG tablet, Take 1,000 mg by mouth once daily., Disp: , Rfl:     aspirin 81 MG Chew, Take 81 mg by mouth once daily., Disp: , Rfl:     b complex vitamins tablet, Take 1 tablet by mouth once daily., Disp: , Rfl:     cholecalciferol, vitamin D3, (VITAMIN D3) 1,000 unit capsule, Take 1,000 Units by mouth once daily., Disp: , Rfl:     FLUZONE HIGH-DOSE 2018-19, PF, 180 mcg/0.5 mL vaccine, , Disp: , Rfl:     gabapentin (NEURONTIN) 300 MG capsule, TAKE ONE CAPSULE EVERY EVENING, Disp: 90 capsule, Rfl: 3    ibuprofen (ADVIL,MOTRIN) 600 MG tablet, Take 1 tablet (600 mg total) by mouth every 6 (six) hours as needed for Pain., Disp: 30 tablet, Rfl: 1    lactulose (CHRONULAC) 10 gram/15 mL solution, TAKE 30 MLS 4 TIMES A DAY AS NEEDED FOR CONSTIPATION, Disp: 500 mL, Rfl: 3    metoprolol succinate (TOPROL-XL) 100 MG 24 hr tablet, TAKE 1 TABLET EVERY DAY, Disp: 90 tablet, Rfl: 3    polycarbophil (FIBERCON) 625 mg tablet, Take 625 mg by mouth once daily. PT TAKES 3 TABLETS DAILY, Disp: , Rfl:     pravastatin (PRAVACHOL) 40 MG tablet, TAKE 1 TABLET EVERY DAY, Disp: 90 tablet, Rfl: 3    PREVIDENT 5000 SENSITIVE 1.1-5 % Pste, AS DIRECTED, Disp: , Rfl: 6    psyllium (METAMUCIL) powder, Take 1 packet by mouth once daily., Disp: , Rfl:     ranitidine (ZANTAC) 150 MG capsule, Take 150 mg by mouth once daily., Disp: , Rfl:     senna-docusate 8.6-50 mg (SENNA WITH DOCUSATE SODIUM) 8.6-50 mg per tablet, Take 1 tablet by mouth once daily., Disp: , Rfl:     temazepam (RESTORIL) 7.5 MG Cap, Take 1 capsule (7.5 mg total) by mouth nightly as needed., Disp: 60 capsule, Rfl: 1    acetaminophen (TYLENOL) 325 MG tablet, Take 1 tablet (325 mg total) by mouth every 6 (six) hours as needed for Pain. (Patient not taking: Reported on 1/23/2020), Disp: 12 tablet, Rfl: 0    acyclovir (ZOVIRAX) 800 MG Tab, Take 1 tablet (800 mg total) by mouth 5 (five) times daily. for 7 days, Disp: 35 tablet, Rfl: 0    lidocaine (LIDODERM) 5 %, Use  1-2 patches per day over recommended area. Remove & Discard patch within 12 hours, Disp: 60 patch, Rfl: 1    nortriptyline (PAMELOR) 25 MG capsule, Take 1 capsule (25 mg total) by mouth every evening., Disp: 30 capsule, Rfl: 1     Review of Systems:  Review of Systems   Constitutional: Negative for fever and weight loss.   HENT: Negative for ear pain and tinnitus.    Eyes: Negative for pain and redness.   Respiratory: Negative for cough and shortness of breath.    Cardiovascular: Negative for chest pain and palpitations.   Gastrointestinal: Negative for constipation and heartburn.   Genitourinary: Negative.         Denies urinary incontinence. Denies urine retention.    Musculoskeletal: Positive for neck pain. Negative for back pain.   Skin: Positive for itching and rash.   Neurological: Positive for tingling. Negative for focal weakness and seizures.   Endo/Heme/Allergies: Negative for environmental allergies. Does not bruise/bleed easily.   Psychiatric/Behavioral: Negative for depression. The patient has insomnia. The patient is not nervous/anxious.        Allergies:  Codeine and Penicillins     Medical History:   has a past medical history of *Atrial fibrillation, Asthma, Blood transfusion, Breast cancer, Breast mass, right, Cataract, Hypertension, and S/P chemotherapy, time since 4-12 weeks.    Surgical History:   has a past surgical history that includes Hysterectomy; Appendectomy; Tonsillectomy; Portacath placement (2013); lymphnode removal; Breast biopsy; and Breast lumpectomy (Right, 2013).    Family History:  family history includes Cervical cancer in her sister; Heart disease in her father and mother.    Social History:   reports that she has never smoked. She has never used smokeless tobacco. She reports that she drinks about 2.0 standard drinks of alcohol per week. She reports that she does not use drugs.    Physical Exam:  BP (!) 175/84   Pulse 80   Wt 72.3 kg (159 lb 8 oz)   LMP  (LMP Unknown)    BMI 32.22 kg/m²   GEN: No acute distress. Calm, comfortable  HENT: Normocephalic, atraumatic, moist mucous membranes  EYE: Anicteric sclera, non-injected.   CV: Non-diaphoretic. Regular Rate. Radial Pulses 2+.  RESP: Breathing comfortably. Chest expansion symmetric.  EXT: No clubbing, cyanosis.   SKIN: Warm, & dry to palpation. No visible rashes or lesions of exposed skin.   PSYCH: Pleasant mood and appropriate affect. Recent and remote memory intact.   GAIT: Independent, normal ambulation  Neck Exam:       Inspection: No erythema, bruising. Healed scars in the left side of the neck and upper shoulders      Palpation: (+) Exquisite TTP of left side of neck and upper shoulder consistent with allodynia.       ROM:  Limitation in flexion, extension, lateral bending & rotation due to pain      Provocative Maneuvers:  (-) Spurling's bilaterally  Shoulder Exam:       Inspection: No erythema, bruising.       Palpation: Slight TTP of upper shoulder on the left.       ROM: Limited in abduction, internal rotation bilaterally  Neurologic Exam:     Alert. Speech is fluent and appropriate.     Cranial Nerves: Extra-ocular movements intact. Pupils equal. No strabismus. Face Symmetric. Jaw opens midline. Uvula midline. Tongue midline. Shoulder shrug symmetric.       Strength: 4+/5 diffusely in bilateral upper extremities     Sensation: Abnormal to LT in bilateral fingertips, otherwise grossly intact to light touch in bilateral upper extremities     Reflexes: Could not elicit b/l patella, achilles, biceps, brachioradialis, triceps     Tone: No abnormality appreciated in bilateral upper or lower extremities     (-) Holden bilaterally      Imaging:  none    Labs:  BMP  Lab Results   Component Value Date     11/07/2019    K 4.4 11/07/2019     11/07/2019    CO2 30 (H) 11/07/2019    BUN 11 11/07/2019    CREATININE 0.9 11/07/2019    CALCIUM 9.6 11/07/2019    ANIONGAP 8 11/07/2019    ESTGFRAFRICA >60 11/07/2019    EGFRNONAA  >60 11/07/2019     Lab Results   Component Value Date    ALT 19 11/07/2019    AST 29 11/07/2019    ALKPHOS 61 11/07/2019    BILITOT 0.6 11/07/2019     Lab Results   Component Value Date     11/07/2019       Assessment:  Claudia Sierra is a 79 y.o. female with the following diagnoses based on history, exam, and imaging:    Problem List Items Addressed This Visit     None      Visit Diagnoses     Herpes zoster radiculitis    -  Primary    Relevant Medications    nortriptyline (PAMELOR) 25 MG capsule    lidocaine (LIDODERM) 5 %    Other Relevant Orders    X-Ray Cervical Spine 5 View W Flex Extxt    MRI Cervical Spine Without Contrast    Neck pain        Relevant Medications    nortriptyline (PAMELOR) 25 MG capsule    lidocaine (LIDODERM) 5 %    Other Relevant Orders    X-Ray Cervical Spine 5 View W Flex Extxt    MRI Cervical Spine Without Contrast    Postherpetic neuralgia        Relevant Medications    nortriptyline (PAMELOR) 25 MG capsule    lidocaine (LIDODERM) 5 %    Neuropathic pain        Relevant Medications    nortriptyline (PAMELOR) 25 MG capsule    lidocaine (LIDODERM) 5 %          This is a pleasant 79 y.o. lady with neuropathic pain due to subacute herpetic neuralgia/post-herpetic neuralgia in the left C4-5 radicular dermatome.    Treatment Plan:   - PT/OT/HEP: None for now  - Procedures: Schedule for C7-T1 IL ELMIRA to try to minimize inflammation and damage to nerves from zoster  - Medications:   - Start lidoderm patches to the area to help with allodynia  - Start nortriptyline 25 mg qHS for sleep and pain, and there is some evidence that this may prevent PHN after 6 months.   - Imaging: Will order cervical x-ray and MRI prior to WAYNE.   - Labs: Reviewed.  Medications are appropriately dosed for current hepatorenal function.    Follow Up: RTC after procedure.     Alicia Proctor M.D.  Interventional Pain Medicine / Physical Medicine & Rehabilitation    Disclaimer: This note was partly generated using  dictation software which may occasionally result in transcription errors.

## 2020-01-22 NOTE — PROGRESS NOTES
[facility]  New Patient H&P    Referring Provider: Sajan Curtis Md  [address]  [address]  [place] [address]    Chief Complaint:   Chief Complaint   Patient presents with    Neck Pain    Shoulder Pain     left       History of Present Illness: Claudia Sierra is a 79 y.o. female referred by Dr. Sajan Silva* for Post herpetic neuralgia.      Onset: 12/17/19, started with a little pain in the neck/shoulder. She went to the ED on 12/20 and was diagnosed with shingles.   Location: Left Neck and left shoulder in C4-C5 distribution.   Radiation: Denies radiation into arm  Timing: constant  Quality: Aching, Burning and Throbbing  Exacerbating Factors: Movement.   Alleviating Factors: Pressure  Associated Symptoms: denies rashes or pain on the face. Denies weakness or numbness in her arms/neck/shoulders, but does get tingling in her fingertips since chemo for breast cancer. Denies vision changes, bowel or bladder changes. Denies significant weight loss.     Pain is limiting sleep, and her ability to get out into the community due to the pain     Severity: Currently: 7/10   Typical Range: 5-7/10     Exacerbation: 7/10     Pain Disability Index  Family/Home Responsibilities:: 7  Recreation:: 7  Social Activity:: 7  Occupation:: 7  Sexual Behavior:: 7  Self Care:: 7  Life-Support Activities:: 7  Pain Disability Index (PDI): 49    Previous Interventions:  - none    Previous Therapies:  PT/OT: no   Surgery: no   Previous Medications:   - NSAIDS: Ibuprofen. Tylenol doesn't help much.   - Muscle Relaxants:  none  - TCAs:   - SNRIs:   - Topicals:   - Anticonvulsants: gabapentin   - Opioids:     Current Pain Medications:  1. Ibuprofen   2. Gabapentin 600 mg TID    Blood Thinners: Aspirin 81 mg    Full Medication List:    Current Outpatient Medications:     anastrozole (ARIMIDEX) 1 mg Tab, TAKE 1 TABLET (1 MG TOTAL) BY MOUTH ONCE DAILY., Disp: 90 tablet, Rfl: 3    ascorbic acid (VITAMIN C)  1000 MG tablet, Take 1,000 mg by mouth once daily., Disp: , Rfl:     aspirin 81 MG Chew, Take 81 mg by mouth once daily., Disp: , Rfl:     b complex vitamins tablet, Take 1 tablet by mouth once daily., Disp: , Rfl:     cholecalciferol, vitamin D3, (VITAMIN D3) 1,000 unit capsule, Take 1,000 Units by mouth once daily., Disp: , Rfl:     FLUZONE HIGH-DOSE 2018-19, PF, 180 mcg/0.5 mL vaccine, , Disp: , Rfl:     gabapentin (NEURONTIN) 300 MG capsule, TAKE ONE CAPSULE EVERY EVENING, Disp: 90 capsule, Rfl: 3    ibuprofen (ADVIL,MOTRIN) 600 MG tablet, Take 1 tablet (600 mg total) by mouth every 6 (six) hours as needed for Pain., Disp: 30 tablet, Rfl: 1    lactulose (CHRONULAC) 10 gram/15 mL solution, TAKE 30 MLS 4 TIMES A DAY AS NEEDED FOR CONSTIPATION, Disp: 500 mL, Rfl: 3    metoprolol succinate (TOPROL-XL) 100 MG 24 hr tablet, TAKE 1 TABLET EVERY DAY, Disp: 90 tablet, Rfl: 3    polycarbophil (FIBERCON) 625 mg tablet, Take 625 mg by mouth once daily. PT TAKES 3 TABLETS DAILY, Disp: , Rfl:     pravastatin (PRAVACHOL) 40 MG tablet, TAKE 1 TABLET EVERY DAY, Disp: 90 tablet, Rfl: 3    PREVIDENT 5000 SENSITIVE 1.1-5 % Pste, AS DIRECTED, Disp: , Rfl: 6    psyllium (METAMUCIL) powder, Take 1 packet by mouth once daily., Disp: , Rfl:     ranitidine (ZANTAC) 150 MG capsule, Take 150 mg by mouth once daily., Disp: , Rfl:     senna-docusate 8.6-50 mg (SENNA WITH DOCUSATE SODIUM) 8.6-50 mg per tablet, Take 1 tablet by mouth once daily., Disp: , Rfl:     temazepam (RESTORIL) 7.5 MG Cap, Take 1 capsule (7.5 mg total) by mouth nightly as needed., Disp: 60 capsule, Rfl: 1    acetaminophen (TYLENOL) 325 MG tablet, Take 1 tablet (325 mg total) by mouth every 6 (six) hours as needed for Pain. (Patient not taking: Reported on 1/23/2020), Disp: 12 tablet, Rfl: 0    acyclovir (ZOVIRAX) 800 MG Tab, Take 1 tablet (800 mg total) by mouth 5 (five) times daily. for 7 days, Disp: 35 tablet, Rfl: 0    lidocaine (LIDODERM) 5 %, Use  1-2 patches per day over recommended area. Remove & Discard patch within 12 hours, Disp: 60 patch, Rfl: 1    nortriptyline (PAMELOR) 25 MG capsule, Take 1 capsule (25 mg total) by mouth every evening., Disp: 30 capsule, Rfl: 1     Review of Systems:  Review of Systems   Constitutional: Negative for fever and weight loss.   HENT: Negative for ear pain and tinnitus.    Eyes: Negative for pain and redness.   Respiratory: Negative for cough and shortness of breath.    Cardiovascular: Negative for chest pain and palpitations.   Gastrointestinal: Negative for constipation and heartburn.   Genitourinary: Negative.         Denies urinary incontinence. Denies urine retention.    Musculoskeletal: Positive for neck pain. Negative for back pain.   Skin: Positive for itching and rash.   Neurological: Positive for tingling. Negative for focal weakness and seizures.   Endo/Heme/Allergies: Negative for environmental allergies. Does not bruise/bleed easily.   Psychiatric/Behavioral: Negative for depression. The patient has insomnia. The patient is not nervous/anxious.        Allergies:  Codeine and Penicillins     Medical History:   has a past medical history of *Atrial fibrillation, Asthma, Blood transfusion, Breast cancer, Breast mass, right, Cataract, Hypertension, and S/P chemotherapy, time since 4-12 weeks.    Surgical History:   has a past surgical history that includes Hysterectomy; Appendectomy; Tonsillectomy; Portacath placement (2013); lymphnode removal; Breast biopsy; and Breast lumpectomy (Right, 2013).    Family History:  family history includes Cervical cancer in her sister; Heart disease in her father and mother.    Social History:   reports that she has never smoked. She has never used smokeless tobacco. She reports that she drinks about 2.0 standard drinks of alcohol per week. She reports that she does not use drugs.    Physical Exam:  BP (!) 175/84   Pulse 80   Wt 72.3 kg (159 lb 8 oz)   LMP  (LMP Unknown)    BMI 32.22 kg/m²   GEN: No acute distress. Calm, comfortable  HENT: Normocephalic, atraumatic, moist mucous membranes  EYE: Anicteric sclera, non-injected.   CV: Non-diaphoretic. Regular Rate. Radial Pulses 2+.  RESP: Breathing comfortably. Chest expansion symmetric.  EXT: No clubbing, cyanosis.   SKIN: Warm, & dry to palpation. No visible rashes or lesions of exposed skin.   PSYCH: Pleasant mood and appropriate affect. Recent and remote memory intact.   GAIT: Independent, normal ambulation  Neck Exam:       Inspection: No erythema, bruising. Healed scars in the left side of the neck and upper shoulders      Palpation: (+) Exquisite TTP of left side of neck and upper shoulder consistent with allodynia.       ROM:  Limitation in flexion, extension, lateral bending & rotation due to pain      Provocative Maneuvers:  (-) Spurling's bilaterally  Shoulder Exam:       Inspection: No erythema, bruising.       Palpation: Slight TTP of upper shoulder on the left.       ROM: Limited in abduction, internal rotation bilaterally  Neurologic Exam:     Alert. Speech is fluent and appropriate.     Cranial Nerves: Extra-ocular movements intact. Pupils equal. No strabismus. Face Symmetric. Jaw opens midline. Uvula midline. Tongue midline. Shoulder shrug symmetric.       Strength: 4+/5 diffusely in bilateral upper extremities     Sensation: Abnormal to LT in bilateral fingertips, otherwise grossly intact to light touch in bilateral upper extremities     Reflexes: Could not elicit b/l patella, achilles, biceps, brachioradialis, triceps     Tone: No abnormality appreciated in bilateral upper or lower extremities     (-) Holden bilaterally      Imaging:  none    Labs:  BMP  Lab Results   Component Value Date     11/07/2019    K 4.4 11/07/2019     11/07/2019    CO2 30 (H) 11/07/2019    BUN 11 11/07/2019    CREATININE 0.9 11/07/2019    CALCIUM 9.6 11/07/2019    ANIONGAP 8 11/07/2019    ESTGFRAFRICA >60 11/07/2019    EGFRNONAA  >60 11/07/2019     Lab Results   Component Value Date    ALT 19 11/07/2019    AST 29 11/07/2019    ALKPHOS 61 11/07/2019    BILITOT 0.6 11/07/2019     Lab Results   Component Value Date     11/07/2019       Assessment:  Claudia Sierra is a 79 y.o. female with the following diagnoses based on history, exam, and imaging:    Problem List Items Addressed This Visit     None      Visit Diagnoses     Herpes zoster radiculitis    -  Primary    Relevant Medications    nortriptyline (PAMELOR) 25 MG capsule    lidocaine (LIDODERM) 5 %    Other Relevant Orders    X-Ray Cervical Spine 5 View W Flex Extxt    MRI Cervical Spine Without Contrast    Neck pain        Relevant Medications    nortriptyline (PAMELOR) 25 MG capsule    lidocaine (LIDODERM) 5 %    Other Relevant Orders    X-Ray Cervical Spine 5 View W Flex Extxt    MRI Cervical Spine Without Contrast    Postherpetic neuralgia        Relevant Medications    nortriptyline (PAMELOR) 25 MG capsule    lidocaine (LIDODERM) 5 %    Neuropathic pain        Relevant Medications    nortriptyline (PAMELOR) 25 MG capsule    lidocaine (LIDODERM) 5 %          This is a pleasant 79 y.o. lady with neuropathic pain due to subacute herpetic neuralgia/post-herpetic neuralgia in the left C4-5 radicular dermatome.    Treatment Plan:   - PT/OT/HEP: None for now  - Procedures: Schedule for C7-T1 IL ELMIRA to try to minimize inflammation and damage to nerves from zoster  - Medications:   - Start lidoderm patches to the area to help with allodynia  - Start nortriptyline 25 mg qHS for sleep and pain, and there is some evidence that this may prevent PHN after 6 months.   - Imaging: Will order cervical x-ray and MRI prior to WAYNE.   - Labs: Reviewed.  Medications are appropriately dosed for current hepatorenal function.    Follow Up: RTC after procedure.     Alicia Proctor M.D.  Interventional Pain Medicine / Physical Medicine & Rehabilitation    Disclaimer: This note was partly generated using  dictation software which may occasionally result in transcription errors.

## 2020-01-23 ENCOUNTER — OFFICE VISIT (OUTPATIENT)
Dept: PAIN MEDICINE | Facility: CLINIC | Age: 80
End: 2020-01-23
Payer: MEDICARE

## 2020-01-23 ENCOUNTER — TELEPHONE (OUTPATIENT)
Dept: PAIN MEDICINE | Facility: CLINIC | Age: 80
End: 2020-01-23

## 2020-01-23 VITALS
HEART RATE: 80 BPM | WEIGHT: 159.5 LBS | BODY MASS INDEX: 32.22 KG/M2 | SYSTOLIC BLOOD PRESSURE: 175 MMHG | DIASTOLIC BLOOD PRESSURE: 84 MMHG

## 2020-01-23 DIAGNOSIS — M79.2 NEUROPATHIC PAIN: ICD-10-CM

## 2020-01-23 DIAGNOSIS — B02.29 POSTHERPETIC NEURALGIA: ICD-10-CM

## 2020-01-23 DIAGNOSIS — M54.2 NECK PAIN: ICD-10-CM

## 2020-01-23 DIAGNOSIS — B02.29: Primary | ICD-10-CM

## 2020-01-23 PROCEDURE — 99214 OFFICE O/P EST MOD 30 MIN: CPT | Mod: PBBFAC,PO | Performed by: PHYSICAL MEDICINE & REHABILITATION

## 2020-01-23 PROCEDURE — 1159F MED LIST DOCD IN RCRD: CPT | Mod: ,,, | Performed by: PHYSICAL MEDICINE & REHABILITATION

## 2020-01-23 PROCEDURE — 1159F PR MEDICATION LIST DOCUMENTED IN MEDICAL RECORD: ICD-10-PCS | Mod: ,,, | Performed by: PHYSICAL MEDICINE & REHABILITATION

## 2020-01-23 PROCEDURE — 99204 PR OFFICE/OUTPT VISIT, NEW, LEVL IV, 45-59 MIN: ICD-10-PCS | Mod: S$PBB,,, | Performed by: PHYSICAL MEDICINE & REHABILITATION

## 2020-01-23 PROCEDURE — 99999 PR PBB SHADOW E&M-EST. PATIENT-LVL IV: CPT | Mod: PBBFAC,,, | Performed by: PHYSICAL MEDICINE & REHABILITATION

## 2020-01-23 PROCEDURE — 1125F AMNT PAIN NOTED PAIN PRSNT: CPT | Mod: ,,, | Performed by: PHYSICAL MEDICINE & REHABILITATION

## 2020-01-23 PROCEDURE — 99999 PR PBB SHADOW E&M-EST. PATIENT-LVL IV: ICD-10-PCS | Mod: PBBFAC,,, | Performed by: PHYSICAL MEDICINE & REHABILITATION

## 2020-01-23 PROCEDURE — 99204 OFFICE O/P NEW MOD 45 MIN: CPT | Mod: S$PBB,,, | Performed by: PHYSICAL MEDICINE & REHABILITATION

## 2020-01-23 PROCEDURE — 1125F PR PAIN SEVERITY QUANTIFIED, PAIN PRESENT: ICD-10-PCS | Mod: ,,, | Performed by: PHYSICAL MEDICINE & REHABILITATION

## 2020-01-23 RX ORDER — LIDOCAINE 50 MG/G
PATCH TOPICAL
Qty: 60 PATCH | Refills: 1 | Status: SHIPPED | OUTPATIENT
Start: 2020-01-23 | End: 2021-08-03

## 2020-01-23 RX ORDER — NORTRIPTYLINE HYDROCHLORIDE 25 MG/1
25 CAPSULE ORAL NIGHTLY
Qty: 30 CAPSULE | Refills: 1 | Status: SHIPPED | OUTPATIENT
Start: 2020-01-23 | End: 2020-02-14

## 2020-01-23 NOTE — TELEPHONE ENCOUNTER
Returned call to patient. I looked in clinic and did not see her earring. She stated it is a silver cross with a tiny blue stone. I informed her if I came across it, I would call her and let her know. Patient verbalized understanding.     ----- Message from Sindhu Blackmon LPN sent at 1/23/2020  3:58 PM CST -----  Contact: pt      ----- Message -----  From: Adia Thomas  Sent: 1/23/2020   3:47 PM CST  To: [facility] Vargas Staff Pain Mgmt    Pt called lost earring in office please call Pt [phone]

## 2020-01-23 NOTE — LETTER
January 23, 2020      Sajan Curtis MD  [address]  [address]  [place] [address]           [facility]  [address]  [facility] [facility] [place] [facility] [address]  Phone: [phone]          Patient: Claudia Sierra   MR Number: [id number]   YOB: 1940   Date of Visit: 1/23/2020       Dear Dr. Sajan Curtis:    Thank you for referring Claudia Sierra to me for evaluation. Attached you will find relevant portions of my assessment and plan of care.    If you have questions, please do not hesitate to call me. I look forward to following Claudia Sierra along with you.    Sincerely,    Alicia Proctor MD    Enclosure  CC:  No Recipients    If you would like to receive this communication electronically, please contact [email or web address] or [phone] to request more information on [facility] [employer] access.    For providers and/or their staff who would like to refer a patient to [facility], please contact us through our one-stop-shop provider referral line, [facility], at [phone].    If you feel you have received this communication in error or would no longer like to receive these types of communications, please e-mail [email or web address]

## 2020-01-29 DIAGNOSIS — G62.0 NEUROPATHY DUE TO DRUGS: ICD-10-CM

## 2020-01-29 RX ORDER — GABAPENTIN 300 MG/1
300 CAPSULE ORAL NIGHTLY
Qty: 90 CAPSULE | Refills: 3 | Status: SHIPPED | OUTPATIENT
Start: 2020-01-29 | End: 2020-01-30

## 2020-01-29 NOTE — TELEPHONE ENCOUNTER
----- Message from Leeann Sierra sent at 1/29/2020  8:49 AM CST -----  Contact: [phone]/self   Patient is requesting a refill for gabapentin (NEURONTIN) 300 MG capsule. [facility] [place]. Please advise.

## 2020-01-30 ENCOUNTER — TELEPHONE (OUTPATIENT)
Dept: FAMILY MEDICINE | Facility: CLINIC | Age: 80
End: 2020-01-30

## 2020-01-30 DIAGNOSIS — B02.29 POST HERPETIC NEURALGIA: Primary | ICD-10-CM

## 2020-01-30 DIAGNOSIS — G62.0 NEUROPATHY DUE TO DRUGS: ICD-10-CM

## 2020-01-30 RX ORDER — GABAPENTIN 300 MG/1
300 CAPSULE ORAL 3 TIMES DAILY
Qty: 90 CAPSULE | Refills: 3 | Status: SHIPPED | OUTPATIENT
Start: 2020-01-30 | End: 2020-01-30 | Stop reason: DRUGHIGH

## 2020-01-30 RX ORDER — GABAPENTIN 600 MG/1
600 TABLET ORAL 3 TIMES DAILY PRN
Qty: 90 TABLET | Refills: 0 | Status: SHIPPED | OUTPATIENT
Start: 2020-01-30 | End: 2020-02-21

## 2020-01-30 NOTE — TELEPHONE ENCOUNTER
----- Message from Emmanuelle Brunner sent at 1/30/2020  1:21 PM CST -----  Contact: [phone]/self  Patient requesting to speak with you regarding a refill for gabapentin (NEURONTIN) 300 MG capsule. She says [facility] would not give her the Rx because it is too soon. She was taking it for a different reason. Please advise.

## 2020-01-30 NOTE — TELEPHONE ENCOUNTER
Spoke with patient. Informed patient that new prescription for Gabapentin has been sent to pharmacy. Pt verbalizes understanding.

## 2020-01-30 NOTE — TELEPHONE ENCOUNTER
"Spoke with patient. Pt calling in because she will be out of Gabapentin after today. Pt states that the pharmacy/insurance is refusing to refill the medication stating "It is too soon". Pt states that on 1/20, she was told by Dr Curtis to increase the Gabapentin to 600mg TID (6-300mg tabs daily), but prescription was only given for 300mg daily, so she has depleted her supply.   Informed patient that I would forward this message to Dr Curtis and once he responds someone would give her a call back. Pt verbalizes understanding.  "

## 2020-02-03 ENCOUNTER — TELEPHONE (OUTPATIENT)
Dept: PAIN MEDICINE | Facility: CLINIC | Age: 80
End: 2020-02-03

## 2020-02-03 NOTE — TELEPHONE ENCOUNTER
Returned call to patient and informed her that after  gets the xray and MRI results, he will let us know to call and schedule her for the procedure. Patient verbalized understanding.     ----- Message from Sindhu Blackmon LPN sent at 2/3/2020  1:28 PM CST -----  Contact: pt       ----- Message -----  From: Christie Jimenez  Sent: 2/3/2020   9:33 AM CST  To: Hitesh Vargas Staff Pain Mgmt    Pt needing a georgi back regarding to her orders she has scheduled for the MRI and Xray  Pt needing an appt for her epidural in the office     Pt contact # [phone]

## 2020-02-06 ENCOUNTER — HOSPITAL ENCOUNTER (OUTPATIENT)
Dept: RADIOLOGY | Facility: HOSPITAL | Age: 80
Discharge: HOME | End: 2020-02-06
Attending: PHYSICAL MEDICINE & REHABILITATION
Payer: MEDICARE

## 2020-02-06 DIAGNOSIS — M54.2 NECK PAIN: ICD-10-CM

## 2020-02-06 DIAGNOSIS — B02.29: ICD-10-CM

## 2020-02-06 PROCEDURE — 72052 X-RAY EXAM NECK SPINE 6/>VWS: CPT | Mod: TC,FY

## 2020-02-06 PROCEDURE — 72141 MRI NECK SPINE W/O DYE: CPT | Mod: 26,,, | Performed by: RADIOLOGY

## 2020-02-06 PROCEDURE — 72141 MRI NECK SPINE W/O DYE: CPT | Mod: TC

## 2020-02-06 PROCEDURE — 72052 XR CERVICAL SPINE 5 VIEW WITH FLEX AND EXT: ICD-10-PCS | Mod: 26,,, | Performed by: RADIOLOGY

## 2020-02-06 PROCEDURE — 72141 MRI CERVICAL SPINE WITHOUT CONTRAST: ICD-10-PCS | Mod: 26,,, | Performed by: RADIOLOGY

## 2020-02-06 PROCEDURE — 72052 X-RAY EXAM NECK SPINE 6/>VWS: CPT | Mod: 26,,, | Performed by: RADIOLOGY

## 2020-02-07 ENCOUNTER — TELEPHONE (OUTPATIENT)
Dept: PAIN MEDICINE | Facility: CLINIC | Age: 80
End: 2020-02-07

## 2020-02-07 DIAGNOSIS — M54.12 CERVICAL RADICULOPATHY: Primary | ICD-10-CM

## 2020-02-07 NOTE — TELEPHONE ENCOUNTER
Pt scheduled for 2/12/20 at 0945 for Cervical IL ELMIRA. Pt aware to check in at registration desk on the first floor of the hospital for 0845 am. Pt is taking ASA and aware to hold 6 days prior to procedure.

## 2020-02-12 ENCOUNTER — TELEPHONE (OUTPATIENT)
Dept: PAIN MEDICINE | Facility: CLINIC | Age: 80
End: 2020-02-12

## 2020-02-12 ENCOUNTER — HOSPITAL ENCOUNTER (OUTPATIENT)
Facility: HOSPITAL | Age: 80
Discharge: HOME | End: 2020-02-12
Attending: PHYSICAL MEDICINE & REHABILITATION | Admitting: PHYSICAL MEDICINE & REHABILITATION
Payer: MEDICARE

## 2020-02-12 VITALS
WEIGHT: 160 LBS | OXYGEN SATURATION: 96 % | HEIGHT: 59 IN | DIASTOLIC BLOOD PRESSURE: 70 MMHG | RESPIRATION RATE: 16 BRPM | BODY MASS INDEX: 32.25 KG/M2 | TEMPERATURE: 98 F | HEART RATE: 75 BPM | SYSTOLIC BLOOD PRESSURE: 149 MMHG

## 2020-02-12 DIAGNOSIS — G89.29 CHRONIC PAIN: ICD-10-CM

## 2020-02-12 DIAGNOSIS — M54.12 CERVICAL RADICULOPATHY: Primary | ICD-10-CM

## 2020-02-12 DIAGNOSIS — B02.29 POST HERPETIC NEURALGIA: ICD-10-CM

## 2020-02-12 PROCEDURE — 99152 MOD SED SAME PHYS/QHP 5/>YRS: CPT | Performed by: PHYSICAL MEDICINE & REHABILITATION

## 2020-02-12 PROCEDURE — 25000003 PHARM REV CODE 250: Performed by: PHYSICAL MEDICINE & REHABILITATION

## 2020-02-12 PROCEDURE — 99152 MOD SED SAME PHYS/QHP 5/>YRS: CPT | Mod: ,,, | Performed by: PHYSICAL MEDICINE & REHABILITATION

## 2020-02-12 PROCEDURE — 62321 NJX INTERLAMINAR CRV/THRC: CPT | Mod: ,,, | Performed by: PHYSICAL MEDICINE & REHABILITATION

## 2020-02-12 PROCEDURE — 63600175 PHARM REV CODE 636 W HCPCS: Performed by: PHYSICAL MEDICINE & REHABILITATION

## 2020-02-12 PROCEDURE — 99152 PR MOD CONSCIOUS SEDATION, SAME PHYS, 5+ YRS, FIRST 15 MIN: ICD-10-PCS | Mod: ,,, | Performed by: PHYSICAL MEDICINE & REHABILITATION

## 2020-02-12 PROCEDURE — 25500020 PHARM REV CODE 255: Performed by: PHYSICAL MEDICINE & REHABILITATION

## 2020-02-12 PROCEDURE — 62321 PR INJ CERV/THORAC, W/GUIDANCE: ICD-10-PCS | Mod: ,,, | Performed by: PHYSICAL MEDICINE & REHABILITATION

## 2020-02-12 PROCEDURE — 62321 NJX INTERLAMINAR CRV/THRC: CPT | Performed by: PHYSICAL MEDICINE & REHABILITATION

## 2020-02-12 RX ORDER — SODIUM BICARBONATE 1 MEQ/ML
SYRINGE (ML) INTRAVENOUS
Status: DISCONTINUED | OUTPATIENT
Start: 2020-02-12 | End: 2020-02-12 | Stop reason: HOSPADM

## 2020-02-12 RX ORDER — LIDOCAINE HYDROCHLORIDE 10 MG/ML
INJECTION INFILTRATION; PERINEURAL
Status: DISCONTINUED | OUTPATIENT
Start: 2020-02-12 | End: 2020-02-12 | Stop reason: HOSPADM

## 2020-02-12 RX ORDER — FENTANYL CITRATE 50 UG/ML
INJECTION, SOLUTION INTRAMUSCULAR; INTRAVENOUS
Status: DISCONTINUED | OUTPATIENT
Start: 2020-02-12 | End: 2020-02-12 | Stop reason: HOSPADM

## 2020-02-12 RX ORDER — MIDAZOLAM HYDROCHLORIDE 1 MG/ML
INJECTION, SOLUTION INTRAMUSCULAR; INTRAVENOUS
Status: DISCONTINUED | OUTPATIENT
Start: 2020-02-12 | End: 2020-02-12 | Stop reason: HOSPADM

## 2020-02-12 RX ORDER — SODIUM CHLORIDE 9 MG/ML
500 INJECTION, SOLUTION INTRAVENOUS CONTINUOUS
Status: ACTIVE | OUTPATIENT
Start: 2020-02-12 | End: 2020-02-13

## 2020-02-12 RX ORDER — LIDOCAINE HYDROCHLORIDE 10 MG/ML
INJECTION, SOLUTION EPIDURAL; INFILTRATION; INTRACAUDAL; PERINEURAL
Status: DISCONTINUED | OUTPATIENT
Start: 2020-02-12 | End: 2020-02-12 | Stop reason: HOSPADM

## 2020-02-12 RX ORDER — DEXAMETHASONE SODIUM PHOSPHATE 10 MG/ML
INJECTION INTRAMUSCULAR; INTRAVENOUS
Status: DISCONTINUED | OUTPATIENT
Start: 2020-02-12 | End: 2020-02-12 | Stop reason: HOSPADM

## 2020-02-12 RX ADMIN — SODIUM CHLORIDE 500 ML: 0.9 INJECTION, SOLUTION INTRAVENOUS at 10:02

## 2020-02-12 NOTE — PLAN OF CARE
Patient lying in bed with no complaints of pain or distress noted.  Monitors applied.  VSS.   Fall risk review with patient.  Procedure and recovery process explained to patient and all questions answered.  Patient verbalized understanding.  Will continue to monitor.

## 2020-02-12 NOTE — DISCHARGE SUMMARY
[facility]  Discharge Note  Short Stay     Admit Date: 2/12/2020    Discharge Date: 2/12/2020     Attending Physician: Alicia Proctor M.D.    Diagnoses:  Active Hospital Problems    Diagnosis  POA    Cervical radiculopathy [M54.12]  Yes    Post herpetic neuralgia [B02.29]  Yes     INCREASE TO GABAPENTIN 600 MG IN AM  MG CONT 300 MG IN MID DAY         Resolved Hospital Problems   No resolved problems to display.     Discharged Condition: Good     Hospital Course: Patient was admitted for an outpatient interventional pain management procedure and tolerated the procedure well with no complications.     Final Diagnoses: Same as principal problem.     Disposition: Home or Self Care     Follow up/Patient Instructions:        Reconciled Medications:     Medication List      CONTINUE taking these medications    acetaminophen 325 MG tablet  Commonly known as:  TYLENOL  Take 1 tablet (325 mg total) by mouth every 6 (six) hours as needed for Pain.     acyclovir 800 MG Tab  Commonly known as:  ZOVIRAX  Take 1 tablet (800 mg total) by mouth 5 (five) times daily. for 7 days     anastrozole 1 mg Tab  Commonly known as:  ARIMIDEX  TAKE 1 TABLET (1 MG TOTAL) BY MOUTH ONCE DAILY.     aspirin 81 MG Chew  Take 81 mg by mouth once daily.     b complex vitamins tablet  Take 1 tablet by mouth once daily.     Fluzone High-Dose 2018-19 (PF) 180 mcg/0.5 mL vaccine  Generic drug:  influenza     gabapentin 600 MG tablet  Commonly known as:  NEURONTIN  Take 1 tablet (600 mg total) by mouth 3 (three) times daily as needed.     ibuprofen 600 MG tablet  Commonly known as:  ADVIL,MOTRIN  Take 1 tablet (600 mg total) by mouth every 6 (six) hours as needed for Pain.     lactulose 10 gram/15 mL solution  Commonly known as:  CHRONULAC  TAKE 30 MLS 4 TIMES A DAY AS NEEDED FOR CONSTIPATION     lidocaine 5 %  Commonly known as:  LIDODERM  Use 1-2 patches per day over recommended area. Remove & Discard patch within 12 hours     metoprolol  succinate 100 MG 24 hr tablet  Commonly known as:  TOPROL-XL  TAKE 1 TABLET EVERY DAY     nortriptyline 25 MG capsule  Commonly known as:  PAMELOR  Take 1 capsule (25 mg total) by mouth every evening.     polycarbophil 625 mg tablet  Commonly known as:  FIBERCON  Take 625 mg by mouth once daily. PT TAKES 3 TABLETS DAILY     pravastatin 40 MG tablet  Commonly known as:  PRAVACHOL  TAKE 1 TABLET EVERY DAY     PreviDent 5000 Sensitive 1.1-5 % Pste  Generic drug:  sodium fluoride-pot nitrate  AS DIRECTED     psyllium powder  Commonly known as:  METAMUCIL  Take 1 packet by mouth once daily.     ranitidine 150 MG capsule  Commonly known as:  ZANTAC  Take 150 mg by mouth once daily.     Senna with Docusate Sodium 8.6-50 mg per tablet  Generic drug:  senna-docusate 8.6-50 mg  Take 1 tablet by mouth once daily.     temazepam 7.5 MG Cap  Commonly known as:  RESTORIL  Take 1 capsule (7.5 mg total) by mouth nightly as needed.     Vitamin C 1000 MG tablet  Generic drug:  ascorbic acid (vitamin C)  Take 1,000 mg by mouth once daily.     Vitamin D3 25 mcg (1,000 unit) capsule  Generic drug:  cholecalciferol (vitamin D3)  Take 1,000 Units by mouth once daily.           Discharge Procedure Orders (must include Diet, Follow-up, Activity)   Ice to affected area   Order Comments: Limit to 20 minute intervals or until skin is numb to the touch.     No driving until:   Order Comments: Until following day     Notify your health care provider if you experience any of the following:  temperature >100.4     Notify your health care provider if you experience any of the following:  persistent nausea and vomiting or diarrhea     Notify your health care provider if you experience any of the following:  severe uncontrolled pain     Notify your health care provider if you experience any of the following:  redness, tenderness, or signs of infection (pain, swelling, redness, odor or green/yellow discharge around incision site)     Notify your health  care provider if you experience any of the following:  difficulty breathing or increased cough     Notify your health care provider if you experience any of the following:  severe persistent headache     Notify your health care provider if you experience any of the following:  worsening rash     Notify your health care provider if you experience any of the following:  persistent dizziness, light-headedness, or visual disturbances     Notify your health care provider if you experience any of the following:  increased confusion or weakness     No dressing needed     Shower on day dressing removed (No bath)   Order Comments: Do not soak in bath/pool/hot tub day of procedure. Shower johana Proctor M.D.  Interventional Pain Medicine / Physical Medicine & Rehabilitation

## 2020-02-12 NOTE — DISCHARGE INSTRUCTIONS
Home Care Instructions Pain Management:    1.  DIET:    You may resume your normal diet today.    2.  BATHING:    You may shower with luke warm water.    3.  DRESSING:    You may remove your bandage today.    4.  ACTIVITY LEVEL:      You may resume your normal activities 24 hours after your procedure.    5.  MEDICATIONS:    You may resume your normal medications today.    6.  SPECIAL INSTRUCTIONS:    No heat to the injection site for 24 hours including bath or shower, heating pad, moist heat or hot tubs.    Use an ice pack to the injection site for any pain or discomfort.  Apply ice packs for 20 minute intervals as needed.    If you have received any sedatives by mouth today, you can not drive for 12 hours.    If you have received sedation through an IV, you can not drive for 24 hours.    PLEASE CALL YOUR DOCTOR FOR THE FOLLOWIN.  Redness or swelling around the injection site.  2.  Fever of 101 degrees.  3.  Drainage (pus) from the injection site.  4.  For any continuous bleeding (some dried blood over the incision is normal.)    FOR EMERGENCIES:    If any unusual problems or difficulties occur during clinic hours, call [phone] or dial [phone].    Follow up with with your physician in 2-3 weeks.

## 2020-02-12 NOTE — TELEPHONE ENCOUNTER
Returned call to patient. Patient wanted to know if it was still ok for her to take her gabapentin. I informed patient that it could take up to two weeks for the ELMIRA to take effect and that she can still take her medications. Patient verbalized understanding.     ----- Message from Danica Willett sent at 2/12/2020 12:13 PM CST -----  Contact: [phone]/ self   Patient requesting to speak with you regarding medication use. Please call.

## 2020-02-12 NOTE — OP NOTE
Cervical Interlaminar Epidural Steroid Injection Under Fluoroscopic Guidance:  I have reviewed the patient's medications, allergies and relevant histories prior to the procedure and no contraindications have been identified. The risks, benefits and alternatives to the procedure were discussed with the patient, and all questions regarding the procedure were answered to the patient's satisfaction. I personally obtained Claudia's consent prior to the start of the procedure and the signed consent can be found in the patient's chart.                                                         Time-out was taken to identify patient, procedure, laterality, and allergies prior to starting the procedure.       Date of Service: 02/12/2020  Procedure: C7-T1 cervical interlaminar epidural steroid injection under fluoroscopy.  Pre-Operative Diagnosis: Cervical Radiculopathy, Postherpetic Neuralgia  Post-Operative Diagnosis: Cervical Radiculopathy, Postherpetic Neuralgia    Physician: Alicia Proctor M.D.  Assistants: None    Medications Injected: Preservative-free dexamethasone 10 mg/mL and Xylocaine 1% MPF 1 mL  Local Anesthetic: Xylocaine 1% 10 mL with Sodium Bicarbonate 1ml.   Sedation Medications: Versed 2 mg, Fentanyl 50 mcg    Procedural Technique:  With the patient laying in a prone position with the neck in a slightly forward flexed position, the area was prepped and draped in the usual sterile fashion using ChloraPrep and a fenestrated drape.  The area was determined under fluoroscopic guidance.  Local anesthetic was utilized to anesthetize the skin and subcutaneous tissues in the area using a 27-gauge needle.  A 3.5 inch 18-gauge Touhy needle was introduced under fluoroscopic guidance to meet the lamina of T1.  The needle was then directed superior-medially and advanced using loss of resistance technique.  Once the tip of the needle was in the desired position, the contrast dye, Omnipaque, was injected to determine epidural  placement and no vascular runoff.  Digital subtraction was also employed to confirm that there was no vascular runoff.  The medications were then injected slowly. The needle was removed and bandage applied.     Estimated Blood Loss:  None.  Complications:  None.     Disposition: The patient tolerated the procedure well, and there were no apparent complications. Vital signs remained stable throughout the procedure. The patient was taken to the recovery area and monitored after the procedure. The patient was supplied with written discharge instructions for the procedure. If helpful, we can repeat as needed. The patient was discharged in a stable condition.    Follow-Up: We will see the patient back in 1-2 weeks or the patient may call to inform of status.

## 2020-02-12 NOTE — PROGRESS NOTES
MD Arrival Time:1047  Sedation Start Time:1047  Sedation End Time:1103  MD Departure Time:1103

## 2020-02-14 DIAGNOSIS — B02.29 POSTHERPETIC NEURALGIA: ICD-10-CM

## 2020-02-14 DIAGNOSIS — M54.2 NECK PAIN: ICD-10-CM

## 2020-02-14 DIAGNOSIS — B02.29: ICD-10-CM

## 2020-02-14 DIAGNOSIS — M79.2 NEUROPATHIC PAIN: ICD-10-CM

## 2020-02-14 RX ORDER — NORTRIPTYLINE HYDROCHLORIDE 25 MG/1
25 CAPSULE ORAL NIGHTLY
Qty: 30 CAPSULE | Refills: 1 | Status: SHIPPED | OUTPATIENT
Start: 2020-02-14 | End: 2020-03-03 | Stop reason: SINTOL

## 2020-02-21 DIAGNOSIS — B02.29 POST HERPETIC NEURALGIA: ICD-10-CM

## 2020-02-21 RX ORDER — GABAPENTIN 600 MG/1
600 TABLET ORAL 3 TIMES DAILY PRN
Qty: 90 TABLET | Refills: 0 | Status: SHIPPED | OUTPATIENT
Start: 2020-02-21 | End: 2020-03-03

## 2020-03-02 ENCOUNTER — PATIENT OUTREACH (OUTPATIENT)
Dept: ADMINISTRATIVE | Facility: OTHER | Age: 80
End: 2020-03-02

## 2020-03-03 ENCOUNTER — OFFICE VISIT (OUTPATIENT)
Dept: PAIN MEDICINE | Facility: CLINIC | Age: 80
End: 2020-03-03
Payer: MEDICARE

## 2020-03-03 VITALS
DIASTOLIC BLOOD PRESSURE: 83 MMHG | HEART RATE: 80 BPM | BODY MASS INDEX: 32.28 KG/M2 | SYSTOLIC BLOOD PRESSURE: 160 MMHG | WEIGHT: 159.81 LBS

## 2020-03-03 DIAGNOSIS — R20.8 ALLODYNIA: ICD-10-CM

## 2020-03-03 DIAGNOSIS — B02.29 POST HERPETIC NEURALGIA: Primary | ICD-10-CM

## 2020-03-03 DIAGNOSIS — M54.12 CERVICAL RADICULOPATHY: ICD-10-CM

## 2020-03-03 DIAGNOSIS — R53.81 PHYSICAL DECONDITIONING: ICD-10-CM

## 2020-03-03 PROCEDURE — 99999 PR PBB SHADOW E&M-EST. PATIENT-LVL III: CPT | Mod: PBBFAC,,, | Performed by: PHYSICAL MEDICINE & REHABILITATION

## 2020-03-03 PROCEDURE — 99213 OFFICE O/P EST LOW 20 MIN: CPT | Mod: PBBFAC,PO | Performed by: PHYSICAL MEDICINE & REHABILITATION

## 2020-03-03 PROCEDURE — 99999 PR PBB SHADOW E&M-EST. PATIENT-LVL III: ICD-10-PCS | Mod: PBBFAC,,, | Performed by: PHYSICAL MEDICINE & REHABILITATION

## 2020-03-03 PROCEDURE — 99213 OFFICE O/P EST LOW 20 MIN: CPT | Mod: S$PBB,,, | Performed by: PHYSICAL MEDICINE & REHABILITATION

## 2020-03-03 PROCEDURE — 99213 PR OFFICE/OUTPT VISIT, EST, LEVL III, 20-29 MIN: ICD-10-PCS | Mod: S$PBB,,, | Performed by: PHYSICAL MEDICINE & REHABILITATION

## 2020-03-03 RX ORDER — GABAPENTIN 600 MG/1
600 TABLET ORAL NIGHTLY PRN
Qty: 60 TABLET | Refills: 11 | Status: SHIPPED | OUTPATIENT
Start: 2020-03-03 | End: 2021-04-06

## 2020-03-03 NOTE — PROGRESS NOTES
[facility]    Chief Complaint:   Chief Complaint   Patient presents with    Neck Pain       History of Present Illness: Claudia Sierra is a 79 y.o. female referred by Sajan Curtis MD for Post herpetic neuralgia.      Onset: 12/17/19, started with a little pain in the neck/shoulder. She went to the ED on 12/20 and was diagnosed with shingles.   Location: Left Neck and left shoulder in C4-C5 distribution.   Radiation: Denies radiation into arm  Timing: constant  Quality: Aching, Burning and Throbbing  Exacerbating Factors: Movement.   Alleviating Factors: Pressure  Associated Symptoms: denies rashes or pain on the face. Denies weakness or numbness in her arms/neck/shoulders, but does get tingling in her fingertips since chemo for breast cancer. Denies vision changes, bowel or bladder changes. Denies significant weight loss.     Pain is limiting sleep, and her ability to get out into the community due to the pain     Severity: Currently: 7/10   Typical Range: 5-7/10     Exacerbation: 7/10    Interval History (03/03/2020):  Claudia Sierra returns today for follow up. Patient reports 4/10 pain score.   At the last clinic visit, started pamelor, lidoderm patches, ordered imaging and planned for C7-T1 IL ELMIRA.     02/12/20 C7-T1 cervical interlaminar epidural steroid injection  provided 100% relief. She was getting relief from pamelor, but had constipation so she had to stop the medication. She is having trouble getting the lidoderm patches to stick. She has also come down on gabapentin, because as the pain decreased, she noted more sedation with the gabapentin. She is only taking 300 mg qHS, because it affects her driving.     Currently, the Neck pain is improved.  She is still getting some allodynia with her close in the area. No change in the quality of the pain since the most recent visit is reported.  No significant interval events or traumas. No change in bowel or bladder function, &  no new weakness or numbness is reported. She does feel generally weaker overall though since she has not been able to do as much over the past 2-3 months due to the pain. No new musculoskeletal pain complaints.    Current Pain Scales:  Current: 4/10              Typical Range: 4-6/10     Pain Disability Index  Family/Home Responsibilities:: 4  Recreation:: 4  Social Activity:: 4  Occupation:: 4  Sexual Behavior:: 4  Self Care:: 4  Life-Support Activities:: 4  Pain Disability Index (PDI): 28    Previous Interventions:  - 02/12/20 C7-T1 cervical interlaminar epidural steroid injection with 100% relief    Previous Therapies:  PT/OT: no   Surgery: no   Previous Medications:   - NSAIDS: Ibuprofen. Tylenol doesn't help much.   - Muscle Relaxants:  none  - TCAs:   - SNRIs:   - Topicals:   - Anticonvulsants: gabapentin   - Opioids:     Current Pain Medications:  1. Ibuprofen   2. Gabapentin 600 mg TID    Blood Thinners: Aspirin 81 mg    Full Medication List:    Current Outpatient Medications:     anastrozole (ARIMIDEX) 1 mg Tab, TAKE 1 TABLET (1 MG TOTAL) BY MOUTH ONCE DAILY., Disp: 90 tablet, Rfl: 3    ascorbic acid (VITAMIN C) 1000 MG tablet, Take 1,000 mg by mouth once daily., Disp: , Rfl:     aspirin 81 MG Chew, Take 81 mg by mouth once daily., Disp: , Rfl:     b complex vitamins tablet, Take 1 tablet by mouth once daily., Disp: , Rfl:     cholecalciferol, vitamin D3, (VITAMIN D3) 1,000 unit capsule, Take 1,000 Units by mouth once daily., Disp: , Rfl:     FLUZONE HIGH-DOSE 2018-19, PF, 180 mcg/0.5 mL vaccine, , Disp: , Rfl:     gabapentin (NEURONTIN) 600 MG tablet, Take 1 tablet (600 mg total) by mouth nightly as needed., Disp: 60 tablet, Rfl: 11    lactulose (CHRONULAC) 10 gram/15 mL solution, TAKE 30 MLS 4 TIMES A DAY AS NEEDED FOR CONSTIPATION, Disp: 500 mL, Rfl: 3    lidocaine (LIDODERM) 5 %, Use 1-2 patches per day over recommended area. Remove & Discard patch within 12 hours, Disp: 60 patch, Rfl: 1     metoprolol succinate (TOPROL-XL) 100 MG 24 hr tablet, TAKE 1 TABLET EVERY DAY, Disp: 90 tablet, Rfl: 3    polycarbophil (FIBERCON) 625 mg tablet, Take 625 mg by mouth once daily. PT TAKES 3 TABLETS DAILY, Disp: , Rfl:     pravastatin (PRAVACHOL) 40 MG tablet, TAKE 1 TABLET EVERY DAY, Disp: 90 tablet, Rfl: 3    PREVIDENT 5000 SENSITIVE 1.1-5 % Pste, AS DIRECTED, Disp: , Rfl: 6    psyllium (METAMUCIL) powder, Take 1 packet by mouth once daily., Disp: , Rfl:     ranitidine (ZANTAC) 150 MG capsule, Take 150 mg by mouth once daily., Disp: , Rfl:     senna-docusate 8.6-50 mg (SENNA WITH DOCUSATE SODIUM) 8.6-50 mg per tablet, Take 1 tablet by mouth once daily., Disp: , Rfl:     temazepam (RESTORIL) 7.5 MG Cap, Take 1 capsule (7.5 mg total) by mouth nightly as needed., Disp: 60 capsule, Rfl: 1     Review of Systems:  Review of Systems   Constitutional: Negative for fever and weight loss.   HENT: Negative for ear pain and tinnitus.    Eyes: Negative for pain and redness.   Respiratory: Negative for cough and shortness of breath.    Cardiovascular: Negative for chest pain and palpitations.   Gastrointestinal: Negative for constipation and heartburn.   Genitourinary: Negative.         Denies urinary incontinence. Denies urine retention.    Musculoskeletal: Positive for neck pain. Negative for back pain.   Skin: Positive for itching and rash.   Neurological: Positive for tingling. Negative for focal weakness and seizures.   Endo/Heme/Allergies: Negative for environmental allergies. Does not bruise/bleed easily.   Psychiatric/Behavioral: Negative for depression. The patient has insomnia. The patient is not nervous/anxious.        Allergies:  Codeine and Penicillins     Medical History:   has a past medical history of *Atrial fibrillation, Asthma, Blood transfusion, Breast cancer, Breast mass, right, Cataract, Hypertension, and S/P chemotherapy, time since 4-12 weeks.    Surgical History:   has a past surgical history that  includes Hysterectomy; Appendectomy; Tonsillectomy; Portacath placement (2013); lymphnode removal; Breast biopsy; Breast lumpectomy (Right, 2013); and Epidural steroid injection into cervical spine (N/A, 2/12/2020).    Family History:  family history includes Cervical cancer in her sister; Heart disease in her father and mother.    Social History:   reports that she has never smoked. She has never used smokeless tobacco. She reports that she drinks about 2.0 standard drinks of alcohol per week. She reports that she does not use drugs.    Physical Exam:  BP (!) 160/83   Pulse 80   Wt 72.5 kg (159 lb 13.3 oz)   LMP  (LMP Unknown)   BMI 32.28 kg/m²   GEN: No acute distress. Calm, comfortable  HENT: Normocephalic, atraumatic, moist mucous membranes  EYE: Anicteric sclera, non-injected.   CV: Non-diaphoretic. Regular Rate. Radial Pulses 2+.  RESP: Breathing comfortably. Chest expansion symmetric.  EXT: No clubbing, cyanosis.   SKIN: Warm, & dry to palpation. No visible rashes or lesions of exposed skin.   PSYCH: Pleasant mood and appropriate affect. Recent and remote memory intact.   GAIT: Independent, normal ambulation  Neck Exam:       Inspection: No erythema, bruising. Healed scars in the left side of the neck and upper shoulders      Palpation: TTP of left side of neck and upper shoulder consistent with allodynia.       ROM:  Limitation in flexion, extension, lateral bending & rotation due to pain      Provocative Maneuvers:  (-) Spurling's bilaterally  Shoulder Exam:       Inspection: No erythema, bruising.       Palpation: Slight TTP of upper shoulder on the left.       ROM: Limited in abduction, internal rotation bilaterally  Neurologic Exam:     Alert. Speech is fluent and appropriate.     Cranial Nerves: Extra-ocular movements intact. Pupils equal. No strabismus. Face Symmetric. Jaw opens midline. Uvula midline. Tongue midline. Shoulder shrug symmetric.       Strength: 4+/5 diffusely in bilateral upper  extremities     Sensation: Abnormal to LT in bilateral fingertips, otherwise grossly intact to light touch in bilateral upper extremities     Reflexes: Could not elicit b/l patella, achilles, biceps, brachioradialis, triceps     Tone: No abnormality appreciated in bilateral upper or lower extremities     (-) Holden bilaterally      Imaging:  none    Labs:  BMP  Lab Results   Component Value Date     11/07/2019    K 4.4 11/07/2019     11/07/2019    CO2 30 (H) 11/07/2019    BUN 11 11/07/2019    CREATININE 0.9 11/07/2019    CALCIUM 9.6 11/07/2019    ANIONGAP 8 11/07/2019    ESTGFRAFRICA >60 11/07/2019    EGFRNONAA >60 11/07/2019     Lab Results   Component Value Date    ALT 19 11/07/2019    AST 29 11/07/2019    ALKPHOS 61 11/07/2019    BILITOT 0.6 11/07/2019     Lab Results   Component Value Date     11/07/2019       Assessment:  Claudia Sierra is a 79 y.o. female with the following diagnoses based on history, exam, and imaging:    Problem List Items Addressed This Visit        Neuro    Post herpetic neuralgia - Primary    Relevant Medications    gabapentin (NEURONTIN) 600 MG tablet    Cervical radiculopathy      Other Visit Diagnoses     Physical deconditioning        Allodynia              This is a pleasant 79 y.o. lady with neuropathic pain due to subacute herpetic neuralgia/post-herpetic neuralgia in the left C4-5 radicular dermatome.    Treatment Plan:   - PT/OT/HEP: None for now. She did note that she feels weaker and deconditioned since not being able to do as much with the pain over the past 2-3 months. Advised her that if she would like to get into PT for deconditioning, I am more than happy to order it for her.   - Procedures: May repeat C7-T1 IL ELMIRA if needed.  - Medications:   - Cont lidoderm patches to the area to help with allodynia.  Advised that she could try silk tape her paper tape to assist with keeping the patches in place.  She may also want to consider salon pas with  lidocaine + menthol as they may adhere better.   - DC nortriptyline 25 mg qHS due to side effects of constipation.  - Imaging: Reviewed   - Labs: Reviewed.  Medications are appropriately dosed for current hepatorenal function.    Follow Up: RTC as needed    Alicia Proctor M.D.  Interventional Pain Medicine / Physical Medicine & Rehabilitation    Disclaimer: This note was partly generated using dictation software which may occasionally result in transcription errors.

## 2020-03-09 DIAGNOSIS — M79.2 NEUROPATHIC PAIN: ICD-10-CM

## 2020-03-09 DIAGNOSIS — M54.2 NECK PAIN: ICD-10-CM

## 2020-03-09 DIAGNOSIS — B02.29 POSTHERPETIC NEURALGIA: ICD-10-CM

## 2020-03-09 DIAGNOSIS — B02.29: ICD-10-CM

## 2020-03-09 RX ORDER — NORTRIPTYLINE HYDROCHLORIDE 25 MG/1
25 CAPSULE ORAL NIGHTLY
Qty: 30 CAPSULE | Refills: 1 | OUTPATIENT
Start: 2020-03-09 | End: 2020-05-08

## 2020-03-12 ENCOUNTER — OFFICE VISIT (OUTPATIENT)
Dept: FAMILY MEDICINE | Facility: CLINIC | Age: 80
End: 2020-03-12
Payer: MEDICARE

## 2020-03-12 VITALS
BODY MASS INDEX: 32.27 KG/M2 | OXYGEN SATURATION: 98 % | DIASTOLIC BLOOD PRESSURE: 80 MMHG | HEART RATE: 81 BPM | TEMPERATURE: 99 F | WEIGHT: 160.06 LBS | SYSTOLIC BLOOD PRESSURE: 144 MMHG | HEIGHT: 59 IN | RESPIRATION RATE: 18 BRPM

## 2020-03-12 DIAGNOSIS — I10 ESSENTIAL HYPERTENSION: Primary | ICD-10-CM

## 2020-03-12 DIAGNOSIS — Z86.79 HISTORY OF ATRIAL FIBRILLATION: ICD-10-CM

## 2020-03-12 DIAGNOSIS — F51.01 PRIMARY INSOMNIA: ICD-10-CM

## 2020-03-12 DIAGNOSIS — K21.9 GASTROESOPHAGEAL REFLUX DISEASE WITHOUT ESOPHAGITIS: ICD-10-CM

## 2020-03-12 DIAGNOSIS — C50.411 MALIGNANT NEOPLASM OF UPPER-OUTER QUADRANT OF RIGHT BREAST IN FEMALE, ESTROGEN RECEPTOR POSITIVE: ICD-10-CM

## 2020-03-12 DIAGNOSIS — Z17.0 MALIGNANT NEOPLASM OF UPPER-OUTER QUADRANT OF RIGHT BREAST IN FEMALE, ESTROGEN RECEPTOR POSITIVE: ICD-10-CM

## 2020-03-12 DIAGNOSIS — K59.00 CONSTIPATION, UNSPECIFIED CONSTIPATION TYPE: ICD-10-CM

## 2020-03-12 PROCEDURE — 99214 PR OFFICE/OUTPT VISIT, EST, LEVL IV, 30-39 MIN: ICD-10-PCS | Mod: S$PBB,,, | Performed by: INTERNAL MEDICINE

## 2020-03-12 PROCEDURE — 99999 PR PBB SHADOW E&M-EST. PATIENT-LVL III: CPT | Mod: PBBFAC,,, | Performed by: INTERNAL MEDICINE

## 2020-03-12 PROCEDURE — 99214 OFFICE O/P EST MOD 30 MIN: CPT | Mod: S$PBB,,, | Performed by: INTERNAL MEDICINE

## 2020-03-12 PROCEDURE — 99999 PR PBB SHADOW E&M-EST. PATIENT-LVL III: ICD-10-PCS | Mod: PBBFAC,,, | Performed by: INTERNAL MEDICINE

## 2020-03-12 PROCEDURE — 99213 OFFICE O/P EST LOW 20 MIN: CPT | Mod: PBBFAC,PN | Performed by: INTERNAL MEDICINE

## 2020-03-12 NOTE — PROGRESS NOTES
"[facility] Note    Chief Complaint      Chief Complaint   Patient presents with    Follow-up     6 FMONTH FOLLOW UP        History of Present Illness      Claudia Sierra is a 79 y.o. female who presents today for   Chief Complaint   Patient presents with    Follow-up     6 FMONTH FOLLOW UP    .  Patient comes to appointment HERE for 6 m checkup for chronic issues as discussed in detail below . She is still dealing with some mild residual phn . Had nerve block which has helped tremendously. She is now able to continue on with adls . She is compliant with all adls . Dr cedeño onc for breast cancer surveillance .     Problem List Items Addressed This Visit        Cardiac/Vascular    Essential hypertension - Primary    Overview     bp well controlled on current regimen          History of atrial fibrillation    Overview     pateint describes the afib started after chemo was given " a medication  In iv " and then was resolved . She was never placed on blood thinner per her report . Stable             Oncology    Malignant neoplasm of upper-outer quadrant of right breast in female, estrogen receptor positive    Overview     She is seeing dr cedeño for f/u and surveillance  11/19  repeat mammogram ok             GI    GERD (gastroesophageal reflux disease)    Overview     Stable          Constipation    Overview     Lactulose prn is workign well             Other    Insomnia    Overview     Is on Restoril currently is working well                  Past Medical History:  Past Medical History:   Diagnosis Date    *Atrial fibrillation     Asthma     Blood transfusion     Breast cancer     Right breast cancer    Breast mass, right     Cataract     Hypertension     S/P chemotherapy, time since 4-12 weeks     breast/ lymph node cancer       Past Surgical History:  Past Surgical History:   Procedure Laterality Date    APPENDECTOMY      BREAST BIOPSY      right    BREAST LUMPECTOMY Right " 2013    EPIDURAL STEROID INJECTION INTO CERVICAL SPINE N/A 2/12/2020    Procedure: Injection-steroid-epidural-cervical--C7-T1 IL ELMIRA;  Surgeon: Alicia Proctor MD;  Location: [facility];  Service: Pain Management;  Laterality: N/A;  sign consent at DOS    HYSTERECTOMY      lymphnode removal      12 from Right axilla    PORTACATH PLACEMENT  2013    TONSILLECTOMY         Family History:  family history includes Cervical cancer in her sister; Heart disease in her father and mother.    Social History:  Social History     Socioeconomic History    Marital status:      Spouse name: Not on file    Number of children: Not on file    Years of education: Not on file    Highest education level: Not on file   Occupational History    Not on file   Social Needs    Financial resource strain: Not on file    Food insecurity:     Worry: Not on file     Inability: Not on file    Transportation needs:     Medical: Not on file     Non-medical: Not on file   Tobacco Use    Smoking status: Never Smoker    Smokeless tobacco: Never Used   Substance and Sexual Activity    Alcohol use: Yes     Alcohol/week: 2.0 standard drinks     Types: 2 Glasses of wine per week    Drug use: No    Sexual activity: Never   Lifestyle    Physical activity:     Days per week: Not on file     Minutes per session: Not on file    Stress: Not on file   Relationships    Social connections:     Talks on phone: Not on file     Gets together: Not on file     Attends Synagogue service: Not on file     Active member of club or organization: Not on file     Attends meetings of clubs or organizations: Not on file     Relationship status: Not on file   Other Topics Concern    Not on file   Social History Narrative    Not on file       Review of Systems:   Review of Systems   Constitutional: Negative for fever and weight loss.   HENT: Negative for congestion, hearing loss and sore throat.    Eyes: Negative for blurred vision.   Respiratory:  Negative for cough and shortness of breath.    Cardiovascular: Negative for chest pain, palpitations, claudication and leg swelling.   Gastrointestinal: Positive for constipation. Negative for diarrhea and heartburn.   Genitourinary: Negative for dysuria.   Musculoskeletal: Positive for neck pain. Negative for back pain and myalgias.   Skin: Negative for rash.   Neurological: Negative for focal weakness and headaches.   Psychiatric/Behavioral: Negative for depression, memory loss and suicidal ideas. The patient is not nervous/anxious.          Medications:  Outpatient Encounter Medications as of 3/12/2020   Medication Sig Dispense Refill    anastrozole (ARIMIDEX) 1 mg Tab TAKE 1 TABLET (1 MG TOTAL) BY MOUTH ONCE DAILY. 90 tablet 3    ascorbic acid (VITAMIN C) 1000 MG tablet Take 1,000 mg by mouth once daily.      aspirin 81 MG Chew Take 81 mg by mouth once daily.      b complex vitamins tablet Take 1 tablet by mouth once daily.      cholecalciferol, vitamin D3, (VITAMIN D3) 1,000 unit capsule Take 1,000 Units by mouth once daily.      FLUZONE HIGH-DOSE 2018-19, PF, 180 mcg/0.5 mL vaccine       gabapentin (NEURONTIN) 600 MG tablet Take 1 tablet (600 mg total) by mouth nightly as needed. 60 tablet 11    lactulose (CHRONULAC) 10 gram/15 mL solution TAKE 30 MLS 4 TIMES A DAY AS NEEDED FOR CONSTIPATION 500 mL 3    lidocaine (LIDODERM) 5 % Use 1-2 patches per day over recommended area. Remove & Discard patch within 12 hours 60 patch 1    metoprolol succinate (TOPROL-XL) 100 MG 24 hr tablet TAKE 1 TABLET EVERY DAY 90 tablet 3    polycarbophil (FIBERCON) 625 mg tablet Take 625 mg by mouth once daily. PT TAKES 3 TABLETS DAILY      pravastatin (PRAVACHOL) 40 MG tablet TAKE 1 TABLET EVERY DAY 90 tablet 3    PREVIDENT 5000 SENSITIVE 1.1-5 % Pste AS DIRECTED  6    psyllium (METAMUCIL) powder Take 1 packet by mouth once daily.      ranitidine (ZANTAC) 150 MG capsule Take 150 mg by mouth once daily.       senna-docusate 8.6-50 mg (SENNA WITH DOCUSATE SODIUM) 8.6-50 mg per tablet Take 1 tablet by mouth once daily.      temazepam (RESTORIL) 7.5 MG Cap Take 1 capsule (7.5 mg total) by mouth nightly as needed. 60 capsule 1     No facility-administered encounter medications on file as of 3/12/2020.         Allergies:  Review of patient's allergies indicates:   Allergen Reactions    Codeine Other (See Comments)     Feel like (climbing the walls)    Penicillins Rash         Physical Exam      Vitals:    03/12/20 1537   BP: (!) 144/80   Pulse: 81   Resp: 18   Temp: 98.6 °F (37 °C)      Body mass index is 32.33 kg/m².    Physical Exam   Constitutional: She is oriented to person, place, and time. She appears well-developed and well-nourished.   HENT:   Mouth/Throat: Oropharynx is clear and moist.   Eyes: Pupils are equal, round, and reactive to light. EOM are normal.   Neck: Normal range of motion. No thyromegaly present.   Cardiovascular: Normal rate and normal heart sounds. Exam reveals no gallop and no friction rub.   No murmur heard.  Pulmonary/Chest: Breath sounds normal.   Abdominal: Soft. Bowel sounds are normal.   Musculoskeletal: Normal range of motion.   Lymphadenopathy:     She has no cervical adenopathy.   Neurological: She is alert and oriented to person, place, and time. No cranial nerve deficit.   Skin: Skin is warm. No rash noted.   Psychiatric: She has a normal mood and affect. Her behavior is normal.        Laboratory:  CBC:  No results for input(s): WBC, RBC, HGB, HCT, PLT, MCV, MCH, MCHC in the last 2160 hours.  CMP:  No results for input(s): GLU, CALCIUM, ALBUMIN, PROT, NA, K, CO2, CL, BUN, ALKPHOS, ALT, AST, BILITOT in the last 2160 hours.    Invalid input(s): CREATININ  URINALYSIS:  No results for input(s): COLORU, CLARITYU, SPECGRAV, PHUR, PROTEINUA, GLUCOSEU, BILIRUBINCON, BLOODU, WBCU, RBCU, BACTERIA, MUCUS, NITRITE, LEUKOCYTESUR, UROBILINOGEN, HYALINECASTS in the last 2160 hours.   LIPIDS:  No  "results for input(s): TSH, HDL, CHOL, TRIG, LDLCALC, CHOLHDL, NONHDLCHOL, TOTALCHOLEST in the last 2160 hours.  TSH:  No results for input(s): TSH in the last 2160 hours.  A1C:  No results for input(s): HGBA1C in the last 2160 hours.    Radiology:        Assessment:     Claudia Sierra is a 79 y.o.female with:    Essential hypertension    History of atrial fibrillation    Gastroesophageal reflux disease without esophagitis    Primary insomnia    Malignant neoplasm of upper-outer quadrant of right breast in female, estrogen receptor positive    Constipation, unspecified constipation type          Plan:     Problem List Items Addressed This Visit        Cardiac/Vascular    Essential hypertension - Primary    Overview     bp well controlled on current regimen          History of atrial fibrillation    Overview     pateint describes the afib started after chemo was given " a medication  In iv " and then was resolved . She was never placed on blood thinner per her report . Stable             Oncology    Malignant neoplasm of upper-outer quadrant of right breast in female, estrogen receptor positive    Overview     She is seeing dr cedñeo for f/u and surveillance  11/19  repeat mammogram ok             GI    GERD (gastroesophageal reflux disease)    Overview     Stable          Constipation    Overview     Lactulose prn is workign well             Other    Insomnia    Overview     Is on Restoril currently is working well                As above, continue current medications and maintain follow up with specialists.  Return to clinic in 6  months.    Frederick W Dantagnan [facility] [place]                "

## 2020-03-29 DIAGNOSIS — K21.00 GASTROESOPHAGEAL REFLUX DISEASE WITH ESOPHAGITIS: ICD-10-CM

## 2020-03-29 DIAGNOSIS — I48.20 CHRONIC ATRIAL FIBRILLATION: ICD-10-CM

## 2020-03-29 DIAGNOSIS — I10 ESSENTIAL HYPERTENSION: ICD-10-CM

## 2020-03-29 DIAGNOSIS — Z51.11 ENCOUNTER FOR ANTINEOPLASTIC CHEMOTHERAPY: ICD-10-CM

## 2020-03-29 DIAGNOSIS — E78.00 HYPERCHOLESTEROLEMIA: ICD-10-CM

## 2020-03-30 RX ORDER — METOPROLOL SUCCINATE 100 MG/1
TABLET, EXTENDED RELEASE ORAL
Qty: 90 TABLET | Refills: 3 | Status: SHIPPED | OUTPATIENT
Start: 2020-03-30 | End: 2021-03-31

## 2020-03-30 RX ORDER — PRAVASTATIN SODIUM 40 MG/1
TABLET ORAL
Qty: 90 TABLET | Refills: 3 | Status: SHIPPED | OUTPATIENT
Start: 2020-03-30 | End: 2021-03-31

## 2020-04-28 ENCOUNTER — DOCUMENTATION ONLY (OUTPATIENT)
Dept: HEMATOLOGY/ONCOLOGY | Facility: CLINIC | Age: 80
End: 2020-04-28

## 2020-04-28 ENCOUNTER — TELEPHONE (OUTPATIENT)
Dept: HEMATOLOGY/ONCOLOGY | Facility: CLINIC | Age: 80
End: 2020-04-28

## 2020-04-28 DIAGNOSIS — C50.411 MALIGNANT NEOPLASM OF UPPER-OUTER QUADRANT OF RIGHT BREAST IN FEMALE, ESTROGEN RECEPTOR POSITIVE: Primary | ICD-10-CM

## 2020-04-28 DIAGNOSIS — Z17.0 MALIGNANT NEOPLASM OF UPPER-OUTER QUADRANT OF RIGHT BREAST IN FEMALE, ESTROGEN RECEPTOR POSITIVE: Primary | ICD-10-CM

## 2020-04-28 NOTE — TELEPHONE ENCOUNTER
appt rescheduled for sept----- Message from Leeann Sierra sent at 4/28/2020  9:47 AM CDT -----  Contact: [phone]/self   Patient is requesting to speak with nurse to discuss rescheduling her upcoming appointments. Please advise.

## 2020-07-06 ENCOUNTER — TELEPHONE (OUTPATIENT)
Dept: HEMATOLOGY/ONCOLOGY | Facility: CLINIC | Age: 80
End: 2020-07-06

## 2020-07-06 DIAGNOSIS — K59.01 SLOW TRANSIT CONSTIPATION: ICD-10-CM

## 2020-07-06 RX ORDER — LACTULOSE 10 G/15ML
30 SOLUTION ORAL; RECTAL 3 TIMES DAILY PRN
Qty: 500 ML | Refills: 3 | Status: SHIPPED | OUTPATIENT
Start: 2020-07-06 | End: 2021-08-03

## 2020-07-06 NOTE — TELEPHONE ENCOUNTER
----- Message from Karri Spears sent at 7/6/2020 11:23 AM CDT -----  Contact: patient//  [phone]  Refill request for lactulose (CHRONULAC) 10 gram/15 mL solution  PHARMACY: [facility][id number] - [place] - [id number] [facility]

## 2020-07-13 ENCOUNTER — TELEPHONE (OUTPATIENT)
Dept: FAMILY MEDICINE | Facility: CLINIC | Age: 80
End: 2020-07-13

## 2020-07-13 NOTE — TELEPHONE ENCOUNTER
----- Message from Ani Arguelles sent at 7/13/2020 10:55 AM CDT -----  Contact: SELF [phone] or [phone]  Patient is returning your call.  Please advise.

## 2020-07-13 NOTE — TELEPHONE ENCOUNTER
----- Message from Ani Arguelles sent at 7/13/2020 10:33 AM CDT -----  Contact: SELF [phone]  Patient would like to speak with you about being exposed and has questions Please advise

## 2020-07-13 NOTE — TELEPHONE ENCOUNTER
Patient she exposed to someone who was positive,explained that if she is not having symptoms she is not able to get tested. To call us if she starts with anything

## 2020-07-15 DIAGNOSIS — Z71.89 COMPLEX CARE COORDINATION: ICD-10-CM

## 2020-09-10 ENCOUNTER — OFFICE VISIT (OUTPATIENT)
Dept: FAMILY MEDICINE | Facility: CLINIC | Age: 80
End: 2020-09-10
Payer: MEDICARE

## 2020-09-10 VITALS
RESPIRATION RATE: 18 BRPM | DIASTOLIC BLOOD PRESSURE: 80 MMHG | OXYGEN SATURATION: 97 % | SYSTOLIC BLOOD PRESSURE: 122 MMHG | BODY MASS INDEX: 27.69 KG/M2 | WEIGHT: 137.38 LBS | HEIGHT: 59 IN | HEART RATE: 67 BPM | TEMPERATURE: 98 F

## 2020-09-10 DIAGNOSIS — F51.01 PRIMARY INSOMNIA: ICD-10-CM

## 2020-09-10 DIAGNOSIS — I10 ESSENTIAL HYPERTENSION: Primary | ICD-10-CM

## 2020-09-10 DIAGNOSIS — K21.9 GASTROESOPHAGEAL REFLUX DISEASE WITHOUT ESOPHAGITIS: ICD-10-CM

## 2020-09-10 DIAGNOSIS — M54.12 CERVICAL RADICULOPATHY: ICD-10-CM

## 2020-09-10 DIAGNOSIS — E78.2 MIXED HYPERLIPIDEMIA: ICD-10-CM

## 2020-09-10 DIAGNOSIS — Z17.0 MALIGNANT NEOPLASM OF UPPER-OUTER QUADRANT OF RIGHT BREAST IN FEMALE, ESTROGEN RECEPTOR POSITIVE: ICD-10-CM

## 2020-09-10 DIAGNOSIS — C50.411 MALIGNANT NEOPLASM OF UPPER-OUTER QUADRANT OF RIGHT BREAST IN FEMALE, ESTROGEN RECEPTOR POSITIVE: ICD-10-CM

## 2020-09-10 PROCEDURE — 99214 OFFICE O/P EST MOD 30 MIN: CPT | Mod: S$PBB,,, | Performed by: INTERNAL MEDICINE

## 2020-09-10 PROCEDURE — 99214 PR OFFICE/OUTPT VISIT, EST, LEVL IV, 30-39 MIN: ICD-10-PCS | Mod: S$PBB,,, | Performed by: INTERNAL MEDICINE

## 2020-09-10 PROCEDURE — 99999 PR PBB SHADOW E&M-EST. PATIENT-LVL IV: CPT | Mod: PBBFAC,,, | Performed by: INTERNAL MEDICINE

## 2020-09-10 PROCEDURE — 99999 PR PBB SHADOW E&M-EST. PATIENT-LVL IV: ICD-10-PCS | Mod: PBBFAC,,, | Performed by: INTERNAL MEDICINE

## 2020-09-10 PROCEDURE — 99214 OFFICE O/P EST MOD 30 MIN: CPT | Mod: PBBFAC,PN | Performed by: INTERNAL MEDICINE

## 2020-09-10 NOTE — PROGRESS NOTES
[facility] Note    Chief Complaint      Chief Complaint   Patient presents with    Follow-up     6 MONTH FOLLOW UP        History of Present Illness      Claudia Sierra is a 80 y.o. female who presents today for   Chief Complaint   Patient presents with    Follow-up     6 MONTH FOLLOW UP    .  Patient comes to appointment for 6 m checkup for chronic issues as discussed in detail below . She is compliant with all meds and diet . She needs influenza vaccine will be getting at her vaccine with her pharmacist . She is not as active as she usually is she is complaining of some shoulder pain which limits her . She is due to see dr davidson parmar for her routine breast cancer followup.    Problem List Items Addressed This Visit        Neuro    Cervical radiculopathy    Overview     Stable             Cardiac/Vascular    Essential hypertension - Primary    Overview     bp well controlled on current regimen          Mixed hyperlipidemia    Overview     Stable on current regimen of pravachol .             Oncology    Malignant neoplasm of upper-outer quadrant of right breast in female, estrogen receptor positive    Overview     She is seeing dr cedeño for f/u and surveillance  11/19  repeat mammogram ok             GI    GERD (gastroesophageal reflux disease)    Overview     Stable             Other    Insomnia    Overview     Is on Restoril currently is working well                  Past Medical History:  Past Medical History:   Diagnosis Date    *Atrial fibrillation     Asthma     Blood transfusion     Breast cancer     Right breast cancer    Breast mass, right     Cataract     Hypertension     S/P chemotherapy, time since 4-12 weeks     breast/ lymph node cancer       Past Surgical History:  Past Surgical History:   Procedure Laterality Date    APPENDECTOMY      BREAST BIOPSY      right    BREAST LUMPECTOMY Right 2013    EPIDURAL STEROID INJECTION INTO CERVICAL SPINE N/A 2/12/2020     Procedure: Injection-steroid-epidural-cervical--C7-T1 IL ELMIRA;  Surgeon: Alicia Proctor MD;  Location: [facility];  Service: Pain Management;  Laterality: N/A;  sign consent at DOS    HYSTERECTOMY      lymphnode removal      12 from Right axilla    PORTACATH PLACEMENT  2013    TONSILLECTOMY         Family History:  family history includes Cervical cancer in her sister; Heart disease in her father and mother.    Social History:  Social History     Socioeconomic History    Marital status:      Spouse name: Not on file    Number of children: Not on file    Years of education: Not on file    Highest education level: Not on file   Occupational History    Not on file   Social Needs    Financial resource strain: Not on file    Food insecurity     Worry: Not on file     Inability: Not on file    Transportation needs     Medical: Not on file     Non-medical: Not on file   Tobacco Use    Smoking status: Never Smoker    Smokeless tobacco: Never Used   Substance and Sexual Activity    Alcohol use: Yes     Alcohol/week: 2.0 standard drinks     Types: 2 Glasses of wine per week    Drug use: No    Sexual activity: Never   Lifestyle    Physical activity     Days per week: Not on file     Minutes per session: Not on file    Stress: Not on file   Relationships    Social connections     Talks on phone: Not on file     Gets together: Not on file     Attends [employer]ze service: Not on file     Active member of club or organization: Not on file     Attends meetings of clubs or organizations: Not on file     Relationship status: Not on file   Other Topics Concern    Not on file   Social History Narrative    Not on file       Review of Systems:   Review of Systems   Constitutional: Negative for fever and weight loss.   HENT: Negative for congestion, hearing loss and sore throat.    Eyes: Negative for blurred vision.   Respiratory: Negative for cough and shortness of breath.    Cardiovascular: Negative for  chest pain, palpitations, claudication and leg swelling.   Gastrointestinal: Negative for abdominal pain, constipation, diarrhea and heartburn.   Genitourinary: Negative for dysuria.   Musculoskeletal: Negative for back pain and myalgias.   Skin: Positive for itching. Negative for rash.   Neurological: Negative for focal weakness and headaches.   Psychiatric/Behavioral: Negative for depression, memory loss and suicidal ideas. The patient is not nervous/anxious.          Medications:  Outpatient Encounter Medications as of 9/10/2020   Medication Sig Dispense Refill    anastrozole (ARIMIDEX) 1 mg Tab TAKE 1 TABLET (1 MG TOTAL) BY MOUTH ONCE DAILY. 90 tablet 3    ascorbic acid (VITAMIN C) 1000 MG tablet Take 1,000 mg by mouth once daily.      aspirin 81 MG Chew Take 81 mg by mouth once daily.      b complex vitamins tablet Take 1 tablet by mouth once daily.      cholecalciferol, vitamin D3, (VITAMIN D3) 1,000 unit capsule Take 1,000 Units by mouth once daily.      FLUZONE HIGH-DOSE 2018-19, PF, 180 mcg/0.5 mL vaccine       gabapentin (NEURONTIN) 600 MG tablet Take 1 tablet (600 mg total) by mouth nightly as needed. 60 tablet 11    lactulose (CHRONULAC) 10 gram/15 mL solution Take 45 mLs (30 g total) by mouth 3 (three) times daily as needed (constipation). 500 mL 3    lidocaine (LIDODERM) 5 % Use 1-2 patches per day over recommended area. Remove & Discard patch within 12 hours 60 patch 1    metoprolol succinate (TOPROL-XL) 100 MG 24 hr tablet TAKE 1 TABLET EVERY DAY 90 tablet 3    polycarbophil (FIBERCON) 625 mg tablet Take 625 mg by mouth once daily. PT TAKES 3 TABLETS DAILY      pravastatin (PRAVACHOL) 40 MG tablet TAKE 1 TABLET EVERY DAY 90 tablet 3    PREVIDENT 5000 SENSITIVE 1.1-5 % Pste AS DIRECTED  6    psyllium (METAMUCIL) powder Take 1 packet by mouth once daily.      ranitidine (ZANTAC) 150 MG capsule Take 150 mg by mouth once daily.      senna-docusate 8.6-50 mg (SENNA WITH DOCUSATE SODIUM)  8.6-50 mg per tablet Take 1 tablet by mouth once daily.      temazepam (RESTORIL) 7.5 MG Cap Take 1 capsule (7.5 mg total) by mouth nightly as needed. 60 capsule 1     No facility-administered encounter medications on file as of 9/10/2020.         Allergies:  Review of patient's allergies indicates:   Allergen Reactions    Codeine Other (See Comments)     Feel like (climbing the walls)    Penicillins Rash         Physical Exam      Vitals:    09/10/20 1320   BP: 122/80   Pulse: 67   Resp: 18   Temp: 97.9 °F (36.6 °C)      Body mass index is 27.74 kg/m².    Physical Exam  Constitutional:       Appearance: She is well-developed.   Eyes:      Pupils: Pupils are equal, round, and reactive to light.   Neck:      Musculoskeletal: Normal range of motion.      Thyroid: No thyromegaly.   Cardiovascular:      Rate and Rhythm: Normal rate.      Heart sounds: Murmur present. Systolic murmur present with a grade of 2/6. No friction rub. No gallop.    Pulmonary:      Breath sounds: Normal breath sounds.   Abdominal:      General: Bowel sounds are normal.      Palpations: Abdomen is soft.   Musculoskeletal: Normal range of motion.   Lymphadenopathy:      Cervical: No cervical adenopathy.   Skin:     General: Skin is warm.      Findings: No rash.   Neurological:      Mental Status: She is alert and oriented to person, place, and time.      Cranial Nerves: No cranial nerve deficit.   Psychiatric:         Behavior: Behavior normal.          Laboratory:  CBC:  No results for input(s): WBC, RBC, HGB, HCT, PLT, MCV, MCH, MCHC in the last 2160 hours.  CMP:  No results for input(s): GLU, CALCIUM, ALBUMIN, PROT, NA, K, CO2, CL, BUN, ALKPHOS, ALT, AST, BILITOT in the last 2160 hours.    Invalid input(s): CREATININ  URINALYSIS:  No results for input(s): COLORU, CLARITYU, SPECGRAV, PHUR, PROTEINUA, GLUCOSEU, BILIRUBINCON, BLOODU, WBCU, RBCU, BACTERIA, MUCUS, NITRITE, LEUKOCYTESUR, UROBILINOGEN, HYALINECASTS in the last 2160 hours.    LIPIDS:  No results for input(s): TSH, HDL, CHOL, TRIG, LDLCALC, CHOLHDL, NONHDLCHOL, TOTALCHOLEST in the last 2160 hours.  TSH:  No results for input(s): TSH in the last 2160 hours.  A1C:  No results for input(s): HGBA1C in the last 2160 hours.    Radiology:        Assessment:     Claudia Sierra is a 80 y.o.female with:    Essential hypertension    Gastroesophageal reflux disease without esophagitis    Primary insomnia    Mixed hyperlipidemia    Cervical radiculopathy    Malignant neoplasm of upper-outer quadrant of right breast in female, estrogen receptor positive          Plan:     Problem List Items Addressed This Visit        Neuro    Cervical radiculopathy    Overview     Stable             Cardiac/Vascular    Essential hypertension - Primary    Overview     bp well controlled on current regimen          Mixed hyperlipidemia    Overview     Stable on current regimen of pravachol .             Oncology    Malignant neoplasm of upper-outer quadrant of right breast in female, estrogen receptor positive    Overview     She is seeing dr cedeño for f/u and surveillance  11/19  repeat mammogram ok             GI    GERD (gastroesophageal reflux disease)    Overview     Stable             Other    Insomnia    Overview     Is on Restoril currently is working well                As above, continue current medications and maintain follow up with specialists.  Return to clinic in 6 months.      Frederick W Dantagnan [facility] [place]

## 2020-09-22 ENCOUNTER — HOSPITAL ENCOUNTER (OUTPATIENT)
Dept: RADIOLOGY | Facility: HOSPITAL | Age: 80
Discharge: HOME | End: 2020-09-22
Attending: INTERNAL MEDICINE
Payer: MEDICARE

## 2020-09-22 ENCOUNTER — OFFICE VISIT (OUTPATIENT)
Dept: HEMATOLOGY/ONCOLOGY | Facility: CLINIC | Age: 80
End: 2020-09-22
Payer: MEDICARE

## 2020-09-22 VITALS
DIASTOLIC BLOOD PRESSURE: 80 MMHG | SYSTOLIC BLOOD PRESSURE: 151 MMHG | RESPIRATION RATE: 16 BRPM | OXYGEN SATURATION: 98 % | BODY MASS INDEX: 27.07 KG/M2 | HEART RATE: 69 BPM | TEMPERATURE: 98 F | WEIGHT: 134.06 LBS

## 2020-09-22 DIAGNOSIS — E55.9 VITAMIN D DEFICIENCY: ICD-10-CM

## 2020-09-22 DIAGNOSIS — C50.411 MALIGNANT NEOPLASM OF UPPER-OUTER QUADRANT OF RIGHT BREAST IN FEMALE, ESTROGEN RECEPTOR POSITIVE: ICD-10-CM

## 2020-09-22 DIAGNOSIS — M81.0 AGE-RELATED OSTEOPOROSIS WITHOUT CURRENT PATHOLOGICAL FRACTURE: ICD-10-CM

## 2020-09-22 DIAGNOSIS — C50.411 MALIGNANT NEOPLASM OF UPPER-OUTER QUADRANT OF RIGHT BREAST IN FEMALE, ESTROGEN RECEPTOR POSITIVE: Primary | ICD-10-CM

## 2020-09-22 DIAGNOSIS — Z79.811 AROMATASE INHIBITOR USE: ICD-10-CM

## 2020-09-22 DIAGNOSIS — Z17.0 MALIGNANT NEOPLASM OF UPPER-OUTER QUADRANT OF RIGHT BREAST IN FEMALE, ESTROGEN RECEPTOR POSITIVE: ICD-10-CM

## 2020-09-22 DIAGNOSIS — G62.0 NEUROPATHY DUE TO DRUGS: ICD-10-CM

## 2020-09-22 DIAGNOSIS — Z17.0 MALIGNANT NEOPLASM OF UPPER-OUTER QUADRANT OF RIGHT BREAST IN FEMALE, ESTROGEN RECEPTOR POSITIVE: Primary | ICD-10-CM

## 2020-09-22 PROCEDURE — 77066 DX MAMMO INCL CAD BI: CPT | Mod: 26,,, | Performed by: RADIOLOGY

## 2020-09-22 PROCEDURE — 99999 PR PBB SHADOW E&M-EST. PATIENT-LVL IV: CPT | Mod: PBBFAC,,, | Performed by: INTERNAL MEDICINE

## 2020-09-22 PROCEDURE — 99214 OFFICE O/P EST MOD 30 MIN: CPT | Mod: PBBFAC,PO | Performed by: INTERNAL MEDICINE

## 2020-09-22 PROCEDURE — 77066 MAMMO DIGITAL DIAGNOSTIC BILAT WITH TOMOSYNTHESIS_CAD: ICD-10-PCS | Mod: 26,,, | Performed by: RADIOLOGY

## 2020-09-22 PROCEDURE — 99213 PR OFFICE/OUTPT VISIT, EST, LEVL III, 20-29 MIN: ICD-10-PCS | Mod: S$PBB,,, | Performed by: INTERNAL MEDICINE

## 2020-09-22 PROCEDURE — 77062 MAMMO DIGITAL DIAGNOSTIC BILAT WITH TOMOSYNTHESIS_CAD: ICD-10-PCS | Mod: 26,,, | Performed by: RADIOLOGY

## 2020-09-22 PROCEDURE — 77066 DX MAMMO INCL CAD BI: CPT | Mod: TC

## 2020-09-22 PROCEDURE — 99999 PR PBB SHADOW E&M-EST. PATIENT-LVL IV: ICD-10-PCS | Mod: PBBFAC,,, | Performed by: INTERNAL MEDICINE

## 2020-09-22 PROCEDURE — 77062 BREAST TOMOSYNTHESIS BI: CPT | Mod: 26,,, | Performed by: RADIOLOGY

## 2020-09-22 PROCEDURE — 99213 OFFICE O/P EST LOW 20 MIN: CPT | Mod: S$PBB,,, | Performed by: INTERNAL MEDICINE

## 2020-09-22 NOTE — PROGRESS NOTES
Subjective:       Patient ID: Claudia Sierra is a 80 y.o. female.    Chief Complaint: Br Ca    HPI      Ms. Claudia Sierra presents to the clinic for followup of locally advanced weakly ER positive, MI negative and HER-2/agustín negative right breast carcinoma.    Relevant oncologic history: She felt a mass in the right armpit in March 2013.  Mammogram showed a 5 mm right breast mass with 2 enlarged right axillary lymph nodes.  Biopsy of right breast mass showed fibroadenoma.  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  HER-2/agustín was negative.  Breast MRI did not show an actual primary.  A PET scan done in April showed hypermetabolic axillary and subpectoral nodes. She received 4 cycles of neoadjuvant Adriamycin/Cytoxan chemotherapy followed by one cycle of docetaxel chemotherapy.  She then underwent a right axillary lymph node dissection on 10/8/13. 12 lymph nodes were removed. None were involved with malignancy. She had a pathological complete response.  She then completed 9 weekly doses of paclitaxel from November 2013 to January 2014 in an adjuvant fashion. Following this she underwent 28 radiation treatments to the right breast starting 2/10/14 completing 3/20/14. She then developed lymphedema of the right breast. Nothing in the right arm. She was evaluated in the lymphedema clinic and underwent some physical therapy.    She started Arimidex on 7/10/15.     Neuropathy stable, she is on neurontin 300 mg QHS.   She wants to stay on this dose and take it at bedtime.  Taking it at the daytime makes her groggy.    Her chest port was removed in June 2015.  Weight stable.    Review of Systems    CONSTITUTIONAL: No weight gain. No fevers.  ENT/MOUTH: No voice change, sore throat, mouth ulcers or gum bleeding.  CARDIOVASCULAR: No chest pain or shortness of  breath.  RESPIRATORY: No cough, hemoptysis or dyspnea.  HEME/LYMPH: Some pain in the right axilla after lymph node dissection. Right breast edema - chronic and stable.  GASTROINTESTINAL: No abdominal pain, nausea or change in bowel habits.    Objective:      Physical Exam    GENERAL: Well-groomed, moderately-built. Vitals noted.  ENT&MOUTH: Oral mucosa moist. No thrush, gum bleeding or oral masses noted.  NECK: Supple without any masses. Thyroid is non-tender.  LYMPH NODES: None felt in the neck or supraclavicular areas.  BREAST: Well-healed surgical scar in the right axilla.  No open areas.  No drainage. Right breast swollen when compared to the left consistent with lymphedema of the breast. No arm lymphedema noted.  LUNGS: Clear. No wheeze.  Breathing nonlabored.  HEART: S1, S2 heard. Rhythm regular. No tachycardia or gallops. No pedal edema.   ABDOMEN: Soft and non-tender. No palpable masses, hepatosplenomegaly or ascites.     Assessment:     1. Malignant neoplasm of upper-outer quadrant of right breast in female, estrogen receptor positive    2. 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 Aromatase inhibitor use    5. 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

## 2021-02-08 ENCOUNTER — PATIENT MESSAGE (OUTPATIENT)
Dept: FAMILY MEDICINE | Facility: CLINIC | Age: 81
End: 2021-02-08

## 2021-02-13 ENCOUNTER — IMMUNIZATION (OUTPATIENT)
Dept: FAMILY MEDICINE | Facility: CLINIC | Age: 81
End: 2021-02-13
Payer: COMMERCIAL

## 2021-02-13 DIAGNOSIS — Z23 NEED FOR VACCINATION: Primary | ICD-10-CM

## 2021-02-13 PROCEDURE — 91300 COVID-19, MRNA, LNP-S, PF, 30 MCG/0.3 ML DOSE VACCINE: CPT | Mod: PBBFAC | Performed by: FAMILY MEDICINE

## 2021-02-28 ENCOUNTER — EXTERNAL CHRONIC CARE MANAGEMENT (OUTPATIENT)
Dept: PRIMARY CARE CLINIC | Facility: CLINIC | Age: 81
End: 2021-02-28
Payer: MEDICARE

## 2021-02-28 PROCEDURE — 99490 PR CHRONIC CARE MGMT, 1ST 20 MIN: ICD-10-PCS | Mod: S$PBB,,, | Performed by: INTERNAL MEDICINE

## 2021-02-28 PROCEDURE — 99490 CHRNC CARE MGMT STAFF 1ST 20: CPT | Mod: PBBFAC,PN | Performed by: INTERNAL MEDICINE

## 2021-02-28 PROCEDURE — 99490 CHRNC CARE MGMT STAFF 1ST 20: CPT | Mod: S$PBB,,, | Performed by: INTERNAL MEDICINE

## 2021-03-06 ENCOUNTER — IMMUNIZATION (OUTPATIENT)
Dept: FAMILY MEDICINE | Facility: CLINIC | Age: 81
End: 2021-03-06
Payer: MEDICARE

## 2021-03-06 DIAGNOSIS — Z23 NEED FOR VACCINATION: Primary | ICD-10-CM

## 2021-03-06 PROCEDURE — 0002A COVID-19, MRNA, LNP-S, PF, 30 MCG/0.3 ML DOSE VACCINE: CPT | Mod: PBBFAC | Performed by: FAMILY MEDICINE

## 2021-03-06 PROCEDURE — 91300 COVID-19, MRNA, LNP-S, PF, 30 MCG/0.3 ML DOSE VACCINE: CPT | Mod: PBBFAC | Performed by: FAMILY MEDICINE

## 2021-03-11 ENCOUNTER — OFFICE VISIT (OUTPATIENT)
Dept: FAMILY MEDICINE | Facility: CLINIC | Age: 81
End: 2021-03-11
Payer: MEDICARE

## 2021-03-11 VITALS
BODY MASS INDEX: 27.04 KG/M2 | OXYGEN SATURATION: 95 % | RESPIRATION RATE: 18 BRPM | TEMPERATURE: 98 F | HEART RATE: 60 BPM | DIASTOLIC BLOOD PRESSURE: 80 MMHG | HEIGHT: 59 IN | WEIGHT: 134.13 LBS | SYSTOLIC BLOOD PRESSURE: 124 MMHG

## 2021-03-11 DIAGNOSIS — M25.561 CHRONIC PAIN OF RIGHT KNEE: ICD-10-CM

## 2021-03-11 DIAGNOSIS — G89.29 CHRONIC PAIN OF RIGHT KNEE: ICD-10-CM

## 2021-03-11 DIAGNOSIS — Z17.0 MALIGNANT NEOPLASM OF UPPER-OUTER QUADRANT OF RIGHT BREAST IN FEMALE, ESTROGEN RECEPTOR POSITIVE: ICD-10-CM

## 2021-03-11 DIAGNOSIS — C50.411 MALIGNANT NEOPLASM OF UPPER-OUTER QUADRANT OF RIGHT BREAST IN FEMALE, ESTROGEN RECEPTOR POSITIVE: ICD-10-CM

## 2021-03-11 DIAGNOSIS — E78.2 MIXED HYPERLIPIDEMIA: ICD-10-CM

## 2021-03-11 DIAGNOSIS — I10 ESSENTIAL HYPERTENSION: Primary | ICD-10-CM

## 2021-03-11 DIAGNOSIS — B02.29 POST HERPETIC NEURALGIA: ICD-10-CM

## 2021-03-11 DIAGNOSIS — K59.00 CONSTIPATION, UNSPECIFIED CONSTIPATION TYPE: ICD-10-CM

## 2021-03-11 PROCEDURE — 99214 PR OFFICE/OUTPT VISIT, EST, LEVL IV, 30-39 MIN: ICD-10-PCS | Mod: S$PBB,,, | Performed by: INTERNAL MEDICINE

## 2021-03-11 PROCEDURE — 99999 PR PBB SHADOW E&M-EST. PATIENT-LVL V: CPT | Mod: PBBFAC,,, | Performed by: INTERNAL MEDICINE

## 2021-03-11 PROCEDURE — 99999 PR PBB SHADOW E&M-EST. PATIENT-LVL V: ICD-10-PCS | Mod: PBBFAC,,, | Performed by: INTERNAL MEDICINE

## 2021-03-11 PROCEDURE — 99214 OFFICE O/P EST MOD 30 MIN: CPT | Mod: S$PBB,,, | Performed by: INTERNAL MEDICINE

## 2021-03-11 PROCEDURE — 99215 OFFICE O/P EST HI 40 MIN: CPT | Mod: PBBFAC,PN | Performed by: INTERNAL MEDICINE

## 2021-03-16 ENCOUNTER — TELEPHONE (OUTPATIENT)
Dept: ORTHOPEDICS | Facility: CLINIC | Age: 81
End: 2021-03-16

## 2021-03-16 DIAGNOSIS — M25.569 KNEE PAIN, UNSPECIFIED CHRONICITY, UNSPECIFIED LATERALITY: Primary | ICD-10-CM

## 2021-03-30 ENCOUNTER — OFFICE VISIT (OUTPATIENT)
Dept: HEMATOLOGY/ONCOLOGY | Facility: CLINIC | Age: 81
End: 2021-03-30
Payer: MEDICARE

## 2021-03-30 ENCOUNTER — HOSPITAL ENCOUNTER (OUTPATIENT)
Dept: RADIOLOGY | Facility: HOSPITAL | Age: 81
Discharge: HOME | End: 2021-03-30
Attending: INTERNAL MEDICINE
Payer: MEDICARE

## 2021-03-30 VITALS
OXYGEN SATURATION: 98 % | DIASTOLIC BLOOD PRESSURE: 88 MMHG | HEART RATE: 72 BPM | SYSTOLIC BLOOD PRESSURE: 155 MMHG | TEMPERATURE: 98 F | BODY MASS INDEX: 26.94 KG/M2 | WEIGHT: 133.38 LBS | RESPIRATION RATE: 18 BRPM

## 2021-03-30 DIAGNOSIS — Z12.31 ENCOUNTER FOR SCREENING MAMMOGRAM FOR MALIGNANT NEOPLASM OF BREAST: ICD-10-CM

## 2021-03-30 DIAGNOSIS — G62.0 NEUROPATHY DUE TO DRUGS: ICD-10-CM

## 2021-03-30 DIAGNOSIS — Z17.0 MALIGNANT NEOPLASM OF UPPER-OUTER QUADRANT OF RIGHT BREAST IN FEMALE, ESTROGEN RECEPTOR POSITIVE: Primary | ICD-10-CM

## 2021-03-30 DIAGNOSIS — Z17.0 MALIGNANT NEOPLASM OF UPPER-OUTER QUADRANT OF RIGHT BREAST IN FEMALE, ESTROGEN RECEPTOR POSITIVE: ICD-10-CM

## 2021-03-30 DIAGNOSIS — M81.0 AGE-RELATED OSTEOPOROSIS WITHOUT CURRENT PATHOLOGICAL FRACTURE: ICD-10-CM

## 2021-03-30 DIAGNOSIS — E55.9 VITAMIN D DEFICIENCY: ICD-10-CM

## 2021-03-30 DIAGNOSIS — C50.411 MALIGNANT NEOPLASM OF UPPER-OUTER QUADRANT OF RIGHT BREAST IN FEMALE, ESTROGEN RECEPTOR POSITIVE: ICD-10-CM

## 2021-03-30 DIAGNOSIS — C50.411 MALIGNANT NEOPLASM OF UPPER-OUTER QUADRANT OF RIGHT BREAST IN FEMALE, ESTROGEN RECEPTOR POSITIVE: Primary | ICD-10-CM

## 2021-03-30 PROCEDURE — 99214 PR OFFICE/OUTPT VISIT, EST, LEVL IV, 30-39 MIN: ICD-10-PCS | Mod: S$PBB,,, | Performed by: INTERNAL MEDICINE

## 2021-03-30 PROCEDURE — 99999 PR PBB SHADOW E&M-EST. PATIENT-LVL III: ICD-10-PCS | Mod: PBBFAC,,, | Performed by: INTERNAL MEDICINE

## 2021-03-30 PROCEDURE — 77080 DEXA BONE DENSITY SPINE HIP: ICD-10-PCS | Mod: 26,,, | Performed by: RADIOLOGY

## 2021-03-30 PROCEDURE — 99214 OFFICE O/P EST MOD 30 MIN: CPT | Mod: S$PBB,,, | Performed by: INTERNAL MEDICINE

## 2021-03-30 PROCEDURE — 99213 OFFICE O/P EST LOW 20 MIN: CPT | Mod: PBBFAC,25,PO | Performed by: INTERNAL MEDICINE

## 2021-03-30 PROCEDURE — 77080 DXA BONE DENSITY AXIAL: CPT | Mod: 26,,, | Performed by: RADIOLOGY

## 2021-03-30 PROCEDURE — 99999 PR PBB SHADOW E&M-EST. PATIENT-LVL III: CPT | Mod: PBBFAC,,, | Performed by: INTERNAL MEDICINE

## 2021-03-30 PROCEDURE — 77080 DXA BONE DENSITY AXIAL: CPT | Mod: TC

## 2021-03-31 ENCOUNTER — EXTERNAL CHRONIC CARE MANAGEMENT (OUTPATIENT)
Dept: PRIMARY CARE CLINIC | Facility: CLINIC | Age: 81
End: 2021-03-31
Payer: MEDICARE

## 2021-03-31 PROCEDURE — 99490 CHRNC CARE MGMT STAFF 1ST 20: CPT | Mod: S$PBB,,, | Performed by: INTERNAL MEDICINE

## 2021-03-31 PROCEDURE — 99490 PR CHRONIC CARE MGMT, 1ST 20 MIN: ICD-10-PCS | Mod: S$PBB,,, | Performed by: INTERNAL MEDICINE

## 2021-04-01 ENCOUNTER — TELEPHONE (OUTPATIENT)
Dept: ORTHOPEDICS | Facility: CLINIC | Age: 81
End: 2021-04-01

## 2021-04-06 DIAGNOSIS — B02.29 POST HERPETIC NEURALGIA: ICD-10-CM

## 2021-04-06 RX ORDER — GABAPENTIN 600 MG/1
600 TABLET ORAL NIGHTLY PRN
Qty: 90 TABLET | Refills: 0 | Status: SHIPPED | OUTPATIENT
Start: 2021-04-06 | End: 2021-07-12 | Stop reason: SDUPTHER

## 2021-04-08 ENCOUNTER — PATIENT MESSAGE (OUTPATIENT)
Dept: ORTHOPEDICS | Facility: CLINIC | Age: 81
End: 2021-04-08

## 2021-04-13 ENCOUNTER — PATIENT OUTREACH (OUTPATIENT)
Dept: ADMINISTRATIVE | Facility: OTHER | Age: 81
End: 2021-04-13

## 2021-04-15 ENCOUNTER — HOSPITAL ENCOUNTER (OUTPATIENT)
Dept: RADIOLOGY | Facility: HOSPITAL | Age: 81
Discharge: HOME | End: 2021-04-15
Attending: ORTHOPAEDIC SURGERY
Payer: MEDICARE

## 2021-04-15 ENCOUNTER — OFFICE VISIT (OUTPATIENT)
Dept: ORTHOPEDICS | Facility: CLINIC | Age: 81
End: 2021-04-15
Payer: MEDICARE

## 2021-04-15 VITALS
WEIGHT: 133.38 LBS | HEIGHT: 59 IN | BODY MASS INDEX: 26.89 KG/M2 | SYSTOLIC BLOOD PRESSURE: 138 MMHG | DIASTOLIC BLOOD PRESSURE: 74 MMHG | HEART RATE: 73 BPM

## 2021-04-15 DIAGNOSIS — M25.561 CHRONIC PAIN OF RIGHT KNEE: ICD-10-CM

## 2021-04-15 DIAGNOSIS — M16.11 PRIMARY OSTEOARTHRITIS OF RIGHT HIP: ICD-10-CM

## 2021-04-15 DIAGNOSIS — M25.569 KNEE PAIN, UNSPECIFIED CHRONICITY, UNSPECIFIED LATERALITY: ICD-10-CM

## 2021-04-15 DIAGNOSIS — G89.29 CHRONIC PAIN OF RIGHT KNEE: ICD-10-CM

## 2021-04-15 DIAGNOSIS — M16.11 PRIMARY OSTEOARTHRITIS OF RIGHT HIP: Primary | ICD-10-CM

## 2021-04-15 PROCEDURE — 99203 OFFICE O/P NEW LOW 30 MIN: CPT | Mod: S$PBB,,, | Performed by: ORTHOPAEDIC SURGERY

## 2021-04-15 PROCEDURE — 73502 X-RAY EXAM HIP UNI 2-3 VIEWS: CPT | Mod: 26,RT,, | Performed by: RADIOLOGY

## 2021-04-15 PROCEDURE — 73502 X-RAY EXAM HIP UNI 2-3 VIEWS: CPT | Mod: TC,PN,RT

## 2021-04-15 PROCEDURE — 99203 PR OFFICE/OUTPT VISIT, NEW, LEVL III, 30-44 MIN: ICD-10-PCS | Mod: S$PBB,,, | Performed by: ORTHOPAEDIC SURGERY

## 2021-04-15 PROCEDURE — 99999 PR PBB SHADOW E&M-EST. PATIENT-LVL V: CPT | Mod: PBBFAC,,, | Performed by: ORTHOPAEDIC SURGERY

## 2021-04-15 PROCEDURE — 73502 XR HIP 2 VIEW RIGHT: ICD-10-PCS | Mod: 26,RT,, | Performed by: RADIOLOGY

## 2021-04-15 PROCEDURE — 73564 X-RAY EXAM KNEE 4 OR MORE: CPT | Mod: TC,50,PN

## 2021-04-15 PROCEDURE — 73564 X-RAY EXAM KNEE 4 OR MORE: CPT | Mod: 26,RT,, | Performed by: RADIOLOGY

## 2021-04-15 PROCEDURE — 99999 PR PBB SHADOW E&M-EST. PATIENT-LVL V: ICD-10-PCS | Mod: PBBFAC,,, | Performed by: ORTHOPAEDIC SURGERY

## 2021-04-15 PROCEDURE — 73564 X-RAY EXAM KNEE 4 OR MORE: CPT | Mod: 26,LT,, | Performed by: RADIOLOGY

## 2021-04-15 PROCEDURE — 73564 PR  X-RAY KNEE 4+ VIEW: ICD-10-PCS | Mod: 26,LT,, | Performed by: RADIOLOGY

## 2021-04-15 PROCEDURE — 99215 OFFICE O/P EST HI 40 MIN: CPT | Mod: PBBFAC,25,PN | Performed by: ORTHOPAEDIC SURGERY

## 2021-04-15 RX ORDER — NAPROXEN 500 MG/1
500 TABLET ORAL 2 TIMES DAILY WITH MEALS
Qty: 60 TABLET | Refills: 1 | Status: SHIPPED | OUTPATIENT
Start: 2021-04-15 | End: 2021-08-03

## 2021-04-16 DIAGNOSIS — M16.11 PRIMARY OSTEOARTHRITIS OF RIGHT HIP: Primary | ICD-10-CM

## 2021-04-16 DIAGNOSIS — M25.561 PAIN IN BOTH KNEES, UNSPECIFIED CHRONICITY: ICD-10-CM

## 2021-04-16 DIAGNOSIS — M25.562 PAIN IN BOTH KNEES, UNSPECIFIED CHRONICITY: ICD-10-CM

## 2021-04-30 ENCOUNTER — EXTERNAL CHRONIC CARE MANAGEMENT (OUTPATIENT)
Dept: PRIMARY CARE CLINIC | Facility: CLINIC | Age: 81
End: 2021-04-30
Payer: MEDICARE

## 2021-04-30 PROCEDURE — 99439 PR CHRONIC CARE MGMT, EA ADDTL 20 MIN: ICD-10-PCS | Mod: S$PBB,,, | Performed by: INTERNAL MEDICINE

## 2021-04-30 PROCEDURE — 99490 CHRNC CARE MGMT STAFF 1ST 20: CPT | Mod: S$PBB,,, | Performed by: INTERNAL MEDICINE

## 2021-04-30 PROCEDURE — 99490 PR CHRONIC CARE MGMT, 1ST 20 MIN: ICD-10-PCS | Mod: S$PBB,,, | Performed by: INTERNAL MEDICINE

## 2021-04-30 PROCEDURE — 99439 CHRNC CARE MGMT STAF EA ADDL: CPT | Mod: S$PBB,,, | Performed by: INTERNAL MEDICINE

## 2021-05-11 ENCOUNTER — PES CALL (OUTPATIENT)
Dept: ADMINISTRATIVE | Facility: CLINIC | Age: 81
End: 2021-05-11

## 2021-05-31 ENCOUNTER — EXTERNAL CHRONIC CARE MANAGEMENT (OUTPATIENT)
Dept: PRIMARY CARE CLINIC | Facility: CLINIC | Age: 81
End: 2021-05-31
Payer: MEDICARE

## 2021-05-31 PROCEDURE — 99490 PR CHRONIC CARE MGMT, 1ST 20 MIN: ICD-10-PCS | Mod: S$PBB,,, | Performed by: INTERNAL MEDICINE

## 2021-05-31 PROCEDURE — 99490 CHRNC CARE MGMT STAFF 1ST 20: CPT | Mod: PBBFAC,PN | Performed by: INTERNAL MEDICINE

## 2021-05-31 PROCEDURE — 99490 CHRNC CARE MGMT STAFF 1ST 20: CPT | Mod: S$PBB,,, | Performed by: INTERNAL MEDICINE

## 2021-06-15 ENCOUNTER — TELEPHONE (OUTPATIENT)
Dept: OBSTETRICS AND GYNECOLOGY | Facility: CLINIC | Age: 81
End: 2021-06-15

## 2021-06-17 ENCOUNTER — PATIENT MESSAGE (OUTPATIENT)
Dept: FAMILY MEDICINE | Facility: CLINIC | Age: 81
End: 2021-06-17

## 2021-06-30 ENCOUNTER — EXTERNAL CHRONIC CARE MANAGEMENT (OUTPATIENT)
Dept: PRIMARY CARE CLINIC | Facility: CLINIC | Age: 81
End: 2021-06-30
Payer: MEDICARE

## 2021-06-30 ENCOUNTER — PATIENT OUTREACH (OUTPATIENT)
Dept: ADMINISTRATIVE | Facility: OTHER | Age: 81
End: 2021-06-30

## 2021-06-30 PROCEDURE — 99490 CHRNC CARE MGMT STAFF 1ST 20: CPT | Mod: PBBFAC,PN | Performed by: INTERNAL MEDICINE

## 2021-06-30 PROCEDURE — 99490 CHRNC CARE MGMT STAFF 1ST 20: CPT | Mod: S$PBB,,, | Performed by: INTERNAL MEDICINE

## 2021-06-30 PROCEDURE — 99490 PR CHRONIC CARE MGMT, 1ST 20 MIN: ICD-10-PCS | Mod: S$PBB,,, | Performed by: INTERNAL MEDICINE

## 2021-07-01 ENCOUNTER — OFFICE VISIT (OUTPATIENT)
Dept: ORTHOPEDICS | Facility: CLINIC | Age: 81
End: 2021-07-01
Payer: MEDICARE

## 2021-07-01 VITALS — HEIGHT: 59 IN | BODY MASS INDEX: 27.42 KG/M2 | WEIGHT: 136 LBS

## 2021-07-01 DIAGNOSIS — M16.11 PRIMARY OSTEOARTHRITIS OF RIGHT HIP: Primary | ICD-10-CM

## 2021-07-01 PROCEDURE — 99214 PR OFFICE/OUTPT VISIT, EST, LEVL IV, 30-39 MIN: ICD-10-PCS | Mod: S$PBB,,, | Performed by: ORTHOPAEDIC SURGERY

## 2021-07-01 PROCEDURE — 99999 PR PBB SHADOW E&M-EST. PATIENT-LVL III: ICD-10-PCS | Mod: PBBFAC,,, | Performed by: ORTHOPAEDIC SURGERY

## 2021-07-01 PROCEDURE — 99213 OFFICE O/P EST LOW 20 MIN: CPT | Mod: PBBFAC | Performed by: ORTHOPAEDIC SURGERY

## 2021-07-01 PROCEDURE — 99999 PR PBB SHADOW E&M-EST. PATIENT-LVL III: CPT | Mod: PBBFAC,,, | Performed by: ORTHOPAEDIC SURGERY

## 2021-07-01 PROCEDURE — 99214 OFFICE O/P EST MOD 30 MIN: CPT | Mod: S$PBB,,, | Performed by: ORTHOPAEDIC SURGERY

## 2021-07-12 ENCOUNTER — TELEPHONE (OUTPATIENT)
Dept: ORTHOPEDICS | Facility: CLINIC | Age: 81
End: 2021-07-12

## 2021-07-12 DIAGNOSIS — B02.29 POST HERPETIC NEURALGIA: ICD-10-CM

## 2021-07-12 RX ORDER — GABAPENTIN 600 MG/1
600 TABLET ORAL NIGHTLY PRN
Qty: 90 TABLET | Refills: 0 | Status: SHIPPED | OUTPATIENT
Start: 2021-07-12 | End: 2021-10-13 | Stop reason: SDUPTHER

## 2021-07-13 ENCOUNTER — TELEPHONE (OUTPATIENT)
Dept: ORTHOPEDICS | Facility: CLINIC | Age: 81
End: 2021-07-13

## 2021-07-14 ENCOUNTER — TELEPHONE (OUTPATIENT)
Dept: ORTHOPEDICS | Facility: CLINIC | Age: 81
End: 2021-07-14

## 2021-07-15 ENCOUNTER — TELEPHONE (OUTPATIENT)
Dept: ORTHOPEDICS | Facility: CLINIC | Age: 81
End: 2021-07-15

## 2021-07-15 DIAGNOSIS — M16.11 PRIMARY OSTEOARTHRITIS OF RIGHT HIP: Primary | ICD-10-CM

## 2021-07-16 ENCOUNTER — PATIENT MESSAGE (OUTPATIENT)
Dept: FAMILY MEDICINE | Facility: CLINIC | Age: 81
End: 2021-07-16

## 2021-07-31 ENCOUNTER — EXTERNAL CHRONIC CARE MANAGEMENT (OUTPATIENT)
Dept: PRIMARY CARE CLINIC | Facility: CLINIC | Age: 81
End: 2021-07-31
Payer: MEDICARE

## 2021-07-31 PROCEDURE — 99490 PR CHRONIC CARE MGMT, 1ST 20 MIN: ICD-10-PCS | Mod: S$PBB,,, | Performed by: INTERNAL MEDICINE

## 2021-07-31 PROCEDURE — 99490 CHRNC CARE MGMT STAFF 1ST 20: CPT | Mod: PBBFAC,PN | Performed by: INTERNAL MEDICINE

## 2021-07-31 PROCEDURE — 99490 CHRNC CARE MGMT STAFF 1ST 20: CPT | Mod: S$PBB,,, | Performed by: INTERNAL MEDICINE

## 2021-08-03 ENCOUNTER — OFFICE VISIT (OUTPATIENT)
Dept: ALLERGY | Facility: CLINIC | Age: 81
End: 2021-08-03
Payer: MEDICARE

## 2021-08-03 ENCOUNTER — PATIENT MESSAGE (OUTPATIENT)
Dept: ORTHOPEDICS | Facility: CLINIC | Age: 81
End: 2021-08-03

## 2021-08-03 VITALS — HEIGHT: 59 IN | WEIGHT: 135.38 LBS | BODY MASS INDEX: 27.29 KG/M2

## 2021-08-03 DIAGNOSIS — Z88.0 HX OF PENICILLIN ALLERGY: Primary | ICD-10-CM

## 2021-08-03 DIAGNOSIS — J31.0 CHRONIC RHINITIS: ICD-10-CM

## 2021-08-03 DIAGNOSIS — M16.11 PRIMARY OSTEOARTHRITIS OF RIGHT HIP: ICD-10-CM

## 2021-08-03 PROCEDURE — 99204 OFFICE O/P NEW MOD 45 MIN: CPT | Mod: S$PBB,,, | Performed by: ALLERGY & IMMUNOLOGY

## 2021-08-03 PROCEDURE — 99999 PR PBB SHADOW E&M-EST. PATIENT-LVL III: CPT | Mod: PBBFAC,,, | Performed by: ALLERGY & IMMUNOLOGY

## 2021-08-03 PROCEDURE — 99999 PR PBB SHADOW E&M-EST. PATIENT-LVL III: ICD-10-PCS | Mod: PBBFAC,,, | Performed by: ALLERGY & IMMUNOLOGY

## 2021-08-03 PROCEDURE — 99213 OFFICE O/P EST LOW 20 MIN: CPT | Mod: PBBFAC,PO | Performed by: ALLERGY & IMMUNOLOGY

## 2021-08-03 PROCEDURE — 99204 PR OFFICE/OUTPT VISIT, NEW, LEVL IV, 45-59 MIN: ICD-10-PCS | Mod: S$PBB,,, | Performed by: ALLERGY & IMMUNOLOGY

## 2021-08-03 RX ORDER — LORATADINE 10 MG/1
10 TABLET ORAL DAILY
COMMUNITY

## 2021-08-05 ENCOUNTER — PATIENT MESSAGE (OUTPATIENT)
Dept: ADMINISTRATIVE | Facility: OTHER | Age: 81
End: 2021-08-05

## 2021-08-08 DIAGNOSIS — M79.604 PAIN OF RIGHT LOWER EXTREMITY: Primary | ICD-10-CM

## 2021-08-08 DIAGNOSIS — I10 HYPERTENSION, UNSPECIFIED TYPE: ICD-10-CM

## 2021-08-09 ENCOUNTER — TELEPHONE (OUTPATIENT)
Dept: PREADMISSION TESTING | Facility: HOSPITAL | Age: 81
End: 2021-08-09

## 2021-08-11 ENCOUNTER — TELEPHONE (OUTPATIENT)
Dept: ORTHOPEDICS | Facility: CLINIC | Age: 81
End: 2021-08-11

## 2021-08-11 DIAGNOSIS — Z01.818 PRE-OP TESTING: ICD-10-CM

## 2021-08-12 ENCOUNTER — OFFICE VISIT (OUTPATIENT)
Dept: ALLERGY | Facility: CLINIC | Age: 81
End: 2021-08-12
Payer: MEDICARE

## 2021-08-12 VITALS
WEIGHT: 134.06 LBS | BODY MASS INDEX: 27.03 KG/M2 | DIASTOLIC BLOOD PRESSURE: 70 MMHG | SYSTOLIC BLOOD PRESSURE: 122 MMHG | HEIGHT: 59 IN

## 2021-08-12 DIAGNOSIS — J31.0 CHRONIC RHINITIS: Primary | ICD-10-CM

## 2021-08-12 DIAGNOSIS — Z88.0 HX OF PENICILLIN ALLERGY: ICD-10-CM

## 2021-08-12 PROCEDURE — 99214 PR OFFICE/OUTPT VISIT, EST, LEVL IV, 30-39 MIN: ICD-10-PCS | Mod: S$PBB,25,, | Performed by: ALLERGY & IMMUNOLOGY

## 2021-08-12 PROCEDURE — 95018 PR PERQ&IC ALLG TEST DRUGS/BIOL: ICD-10-PCS | Mod: S$PBB,,, | Performed by: ALLERGY & IMMUNOLOGY

## 2021-08-12 PROCEDURE — 99999 PR PBB SHADOW E&M-EST. PATIENT-LVL III: ICD-10-PCS | Mod: PBBFAC,,, | Performed by: ALLERGY & IMMUNOLOGY

## 2021-08-12 PROCEDURE — 99999 PR PBB SHADOW E&M-EST. PATIENT-LVL III: CPT | Mod: PBBFAC,,, | Performed by: ALLERGY & IMMUNOLOGY

## 2021-08-12 PROCEDURE — 95018 ALL TSTG PERQ&IQ DRUGS/BIOL: CPT | Mod: PBBFAC | Performed by: ALLERGY & IMMUNOLOGY

## 2021-08-12 PROCEDURE — 99214 OFFICE O/P EST MOD 30 MIN: CPT | Mod: S$PBB,25,, | Performed by: ALLERGY & IMMUNOLOGY

## 2021-08-12 PROCEDURE — 95018 ALL TSTG PERQ&IQ DRUGS/BIOL: CPT | Mod: S$PBB,,, | Performed by: ALLERGY & IMMUNOLOGY

## 2021-08-12 PROCEDURE — 99213 OFFICE O/P EST LOW 20 MIN: CPT | Mod: PBBFAC | Performed by: ALLERGY & IMMUNOLOGY

## 2021-08-12 RX ORDER — AZELASTINE 1 MG/ML
2 SPRAY, METERED NASAL 2 TIMES DAILY
Qty: 30 ML | Refills: 6 | Status: SHIPPED | OUTPATIENT
Start: 2021-08-12 | End: 2022-01-18

## 2021-08-16 PROBLEM — M25.551 RIGHT HIP PAIN: Status: ACTIVE | Noted: 2021-08-16

## 2021-08-18 ENCOUNTER — PATIENT OUTREACH (OUTPATIENT)
Dept: ADMINISTRATIVE | Facility: OTHER | Age: 81
End: 2021-08-18

## 2021-08-19 ENCOUNTER — OFFICE VISIT (OUTPATIENT)
Dept: INTERNAL MEDICINE | Facility: CLINIC | Age: 81
End: 2021-08-19
Payer: MEDICARE

## 2021-08-19 ENCOUNTER — HOSPITAL ENCOUNTER (OUTPATIENT)
Dept: RADIOLOGY | Facility: HOSPITAL | Age: 81
Discharge: HOME | End: 2021-08-19
Attending: PHYSICIAN ASSISTANT
Payer: MEDICARE

## 2021-08-19 ENCOUNTER — HOSPITAL ENCOUNTER (OUTPATIENT)
Dept: CARDIOLOGY | Facility: CLINIC | Age: 81
Discharge: HOME | End: 2021-08-19
Payer: MEDICARE

## 2021-08-19 ENCOUNTER — OFFICE VISIT (OUTPATIENT)
Dept: ORTHOPEDICS | Facility: CLINIC | Age: 81
End: 2021-08-19
Payer: MEDICARE

## 2021-08-19 VITALS
DIASTOLIC BLOOD PRESSURE: 69 MMHG | HEART RATE: 67 BPM | BODY MASS INDEX: 27.21 KG/M2 | RESPIRATION RATE: 16 BRPM | WEIGHT: 135 LBS | TEMPERATURE: 98 F | OXYGEN SATURATION: 99 % | HEIGHT: 59 IN | SYSTOLIC BLOOD PRESSURE: 160 MMHG

## 2021-08-19 VITALS — HEIGHT: 59 IN | BODY MASS INDEX: 27.21 KG/M2 | WEIGHT: 135 LBS

## 2021-08-19 DIAGNOSIS — G62.0 NEUROPATHY DUE TO DRUGS: ICD-10-CM

## 2021-08-19 DIAGNOSIS — M25.551 RIGHT HIP PAIN: ICD-10-CM

## 2021-08-19 DIAGNOSIS — M16.11 PRIMARY OSTEOARTHRITIS OF RIGHT HIP: ICD-10-CM

## 2021-08-19 DIAGNOSIS — C50.411 MALIGNANT NEOPLASM OF UPPER-OUTER QUADRANT OF RIGHT BREAST IN FEMALE, ESTROGEN RECEPTOR POSITIVE: ICD-10-CM

## 2021-08-19 DIAGNOSIS — Z86.79 HISTORY OF ATRIAL FIBRILLATION: ICD-10-CM

## 2021-08-19 DIAGNOSIS — M54.12 CERVICAL RADICULOPATHY: ICD-10-CM

## 2021-08-19 DIAGNOSIS — Z17.0 MALIGNANT NEOPLASM OF UPPER-OUTER QUADRANT OF RIGHT BREAST IN FEMALE, ESTROGEN RECEPTOR POSITIVE: ICD-10-CM

## 2021-08-19 DIAGNOSIS — I10 ESSENTIAL HYPERTENSION: ICD-10-CM

## 2021-08-19 DIAGNOSIS — K59.00 CONSTIPATION, UNSPECIFIED CONSTIPATION TYPE: ICD-10-CM

## 2021-08-19 DIAGNOSIS — D64.9 ANEMIA, UNSPECIFIED TYPE: ICD-10-CM

## 2021-08-19 DIAGNOSIS — M16.11 PRIMARY OSTEOARTHRITIS OF RIGHT HIP: Primary | ICD-10-CM

## 2021-08-19 DIAGNOSIS — I10 HYPERTENSION, UNSPECIFIED TYPE: ICD-10-CM

## 2021-08-19 DIAGNOSIS — E78.2 MIXED HYPERLIPIDEMIA: ICD-10-CM

## 2021-08-19 DIAGNOSIS — K21.9 GASTROESOPHAGEAL REFLUX DISEASE WITHOUT ESOPHAGITIS: ICD-10-CM

## 2021-08-19 DIAGNOSIS — B02.29 POST HERPETIC NEURALGIA: ICD-10-CM

## 2021-08-19 PROCEDURE — 93005 ELECTROCARDIOGRAM TRACING: CPT | Mod: PBBFAC | Performed by: INTERNAL MEDICINE

## 2021-08-19 PROCEDURE — 99499 UNLISTED E&M SERVICE: CPT | Mod: S$PBB,,, | Performed by: PHYSICIAN ASSISTANT

## 2021-08-19 PROCEDURE — 93010 EKG 12-LEAD: ICD-10-PCS | Mod: S$PBB,,, | Performed by: INTERNAL MEDICINE

## 2021-08-19 PROCEDURE — 72170 X-RAY EXAM OF PELVIS: CPT | Mod: TC

## 2021-08-19 PROCEDURE — 93010 ELECTROCARDIOGRAM REPORT: CPT | Mod: S$PBB,,, | Performed by: INTERNAL MEDICINE

## 2021-08-19 PROCEDURE — 99999 PR PBB SHADOW E&M-EST. PATIENT-LVL IV: ICD-10-PCS | Mod: PBBFAC,,,

## 2021-08-19 PROCEDURE — 99213 OFFICE O/P EST LOW 20 MIN: CPT | Mod: PBBFAC,27 | Performed by: PHYSICIAN ASSISTANT

## 2021-08-19 PROCEDURE — 99999 PR PBB SHADOW E&M-EST. PATIENT-LVL IV: CPT | Mod: PBBFAC,,,

## 2021-08-19 PROCEDURE — 99999 PR PBB SHADOW E&M-EST. PATIENT-LVL III: ICD-10-PCS | Mod: PBBFAC,,, | Performed by: PHYSICIAN ASSISTANT

## 2021-08-19 PROCEDURE — 99214 OFFICE O/P EST MOD 30 MIN: CPT | Mod: S$PBB,,, | Performed by: HOSPITALIST

## 2021-08-19 PROCEDURE — 99999 PR PBB SHADOW E&M-EST. PATIENT-LVL III: CPT | Mod: PBBFAC,,, | Performed by: PHYSICIAN ASSISTANT

## 2021-08-19 PROCEDURE — 99214 PR OFFICE/OUTPT VISIT, EST, LEVL IV, 30-39 MIN: ICD-10-PCS | Mod: S$PBB,,, | Performed by: HOSPITALIST

## 2021-08-19 PROCEDURE — 72170 X-RAY EXAM OF PELVIS: CPT | Mod: 26,,, | Performed by: RADIOLOGY

## 2021-08-19 PROCEDURE — 99214 OFFICE O/P EST MOD 30 MIN: CPT | Mod: PBBFAC,25

## 2021-08-19 PROCEDURE — 99499 NO LOS: ICD-10-PCS | Mod: S$PBB,,, | Performed by: PHYSICIAN ASSISTANT

## 2021-08-19 PROCEDURE — 72170 XR PELVIS ROUTINE AP: ICD-10-PCS | Mod: 26,,, | Performed by: RADIOLOGY

## 2021-08-20 RX ORDER — OXYCODONE HYDROCHLORIDE 5 MG/1
10 TABLET ORAL
Status: CANCELLED | OUTPATIENT
Start: 2021-08-20

## 2021-08-20 RX ORDER — ASPIRIN 81 MG/1
81 TABLET ORAL 2 TIMES DAILY
Status: CANCELLED | OUTPATIENT
Start: 2021-08-20

## 2021-08-20 RX ORDER — SODIUM CHLORIDE 9 MG/ML
INJECTION, SOLUTION INTRAVENOUS CONTINUOUS
Status: CANCELLED | OUTPATIENT
Start: 2021-08-20 | End: 2021-08-21

## 2021-08-20 RX ORDER — MORPHINE SULFATE 2 MG/ML
2 INJECTION, SOLUTION INTRAMUSCULAR; INTRAVENOUS
Status: CANCELLED | OUTPATIENT
Start: 2021-08-20

## 2021-08-20 RX ORDER — PROCHLORPERAZINE EDISYLATE 5 MG/ML
5 INJECTION INTRAMUSCULAR; INTRAVENOUS EVERY 6 HOURS PRN
Status: CANCELLED | OUTPATIENT
Start: 2021-08-20

## 2021-08-20 RX ORDER — ONDANSETRON 2 MG/ML
4 INJECTION INTRAMUSCULAR; INTRAVENOUS EVERY 8 HOURS PRN
Status: CANCELLED | OUTPATIENT
Start: 2021-08-20

## 2021-08-20 RX ORDER — MUPIROCIN 20 MG/G
1 OINTMENT TOPICAL
Status: CANCELLED | OUTPATIENT
Start: 2021-08-20

## 2021-08-20 RX ORDER — POLYETHYLENE GLYCOL 3350 17 G/17G
17 POWDER, FOR SOLUTION ORAL DAILY PRN
Status: CANCELLED | OUTPATIENT
Start: 2021-08-20

## 2021-08-20 RX ORDER — FENTANYL CITRATE 50 UG/ML
25 INJECTION, SOLUTION INTRAMUSCULAR; INTRAVENOUS EVERY 5 MIN PRN
Status: CANCELLED | OUTPATIENT
Start: 2021-08-20

## 2021-08-20 RX ORDER — LIDOCAINE HYDROCHLORIDE 10 MG/ML
1 INJECTION, SOLUTION EPIDURAL; INFILTRATION; INTRACAUDAL; PERINEURAL
Status: CANCELLED | OUTPATIENT
Start: 2021-08-20

## 2021-08-20 RX ORDER — CELECOXIB 200 MG/1
200 CAPSULE ORAL DAILY
Status: CANCELLED | OUTPATIENT
Start: 2021-08-20

## 2021-08-20 RX ORDER — PREGABALIN 75 MG/1
75 CAPSULE ORAL NIGHTLY
Status: CANCELLED | OUTPATIENT
Start: 2021-08-20

## 2021-08-20 RX ORDER — CEFAZOLIN SODIUM 2 G/50ML
2 SOLUTION INTRAVENOUS
Status: CANCELLED | OUTPATIENT
Start: 2021-08-20

## 2021-08-20 RX ORDER — AMOXICILLIN 250 MG
1 CAPSULE ORAL 2 TIMES DAILY
Status: CANCELLED | OUTPATIENT
Start: 2021-08-20

## 2021-08-20 RX ORDER — CEFAZOLIN SODIUM 2 G/50ML
2 SOLUTION INTRAVENOUS
Status: CANCELLED | OUTPATIENT
Start: 2021-08-20 | End: 2021-08-21

## 2021-08-20 RX ORDER — MIDAZOLAM HYDROCHLORIDE 1 MG/ML
1 INJECTION INTRAMUSCULAR; INTRAVENOUS EVERY 5 MIN PRN
Status: CANCELLED | OUTPATIENT
Start: 2021-08-20

## 2021-08-20 RX ORDER — POLYETHYLENE GLYCOL 3350 17 G/17G
17 POWDER, FOR SOLUTION ORAL DAILY
Status: CANCELLED | OUTPATIENT
Start: 2021-08-20

## 2021-08-20 RX ORDER — OXYCODONE HYDROCHLORIDE 5 MG/1
5 TABLET ORAL
Status: CANCELLED | OUTPATIENT
Start: 2021-08-20

## 2021-08-20 RX ORDER — NALOXONE HCL 0.4 MG/ML
0.02 VIAL (ML) INJECTION
Status: CANCELLED | OUTPATIENT
Start: 2021-08-20

## 2021-08-20 RX ORDER — ACETAMINOPHEN 500 MG
1000 TABLET ORAL EVERY 6 HOURS
Status: CANCELLED | OUTPATIENT
Start: 2021-08-20

## 2021-08-20 RX ORDER — MUPIROCIN 20 MG/G
1 OINTMENT TOPICAL 2 TIMES DAILY
Status: CANCELLED | OUTPATIENT
Start: 2021-08-20 | End: 2021-08-25

## 2021-08-20 RX ORDER — ACETAMINOPHEN 500 MG
1000 TABLET ORAL
Status: CANCELLED | OUTPATIENT
Start: 2021-08-20

## 2021-08-20 RX ORDER — FAMOTIDINE 20 MG/1
20 TABLET, FILM COATED ORAL 2 TIMES DAILY
Status: CANCELLED | OUTPATIENT
Start: 2021-08-20

## 2021-08-20 RX ORDER — CELECOXIB 200 MG/1
400 CAPSULE ORAL
Status: CANCELLED | OUTPATIENT
Start: 2021-08-20

## 2021-08-20 RX ORDER — SODIUM CHLORIDE 9 MG/ML
INJECTION, SOLUTION INTRAVENOUS
Status: CANCELLED | OUTPATIENT
Start: 2021-08-20

## 2021-08-20 RX ORDER — PREGABALIN 75 MG/1
75 CAPSULE ORAL
Status: CANCELLED | OUTPATIENT
Start: 2021-08-20

## 2021-08-27 ENCOUNTER — PATIENT MESSAGE (OUTPATIENT)
Dept: ORTHOPEDICS | Facility: CLINIC | Age: 81
End: 2021-08-27

## 2021-08-30 ENCOUNTER — PATIENT MESSAGE (OUTPATIENT)
Dept: ORTHOPEDICS | Facility: CLINIC | Age: 81
End: 2021-08-30

## 2021-08-31 ENCOUNTER — EXTERNAL CHRONIC CARE MANAGEMENT (OUTPATIENT)
Dept: PRIMARY CARE CLINIC | Facility: CLINIC | Age: 81
End: 2021-08-31
Payer: MEDICARE

## 2021-08-31 PROCEDURE — 99490 CHRNC CARE MGMT STAFF 1ST 20: CPT | Mod: PBBFAC,PN | Performed by: INTERNAL MEDICINE

## 2021-08-31 PROCEDURE — 99490 PR CHRONIC CARE MGMT, 1ST 20 MIN: ICD-10-PCS | Mod: S$PBB,,, | Performed by: INTERNAL MEDICINE

## 2021-08-31 PROCEDURE — 99490 CHRNC CARE MGMT STAFF 1ST 20: CPT | Mod: S$PBB,,, | Performed by: INTERNAL MEDICINE

## 2021-09-28 ENCOUNTER — PATIENT MESSAGE (OUTPATIENT)
Dept: FAMILY MEDICINE | Facility: CLINIC | Age: 81
End: 2021-09-28

## 2021-09-30 ENCOUNTER — EXTERNAL CHRONIC CARE MANAGEMENT (OUTPATIENT)
Dept: PRIMARY CARE CLINIC | Facility: CLINIC | Age: 81
End: 2021-09-30
Payer: MEDICARE

## 2021-09-30 PROCEDURE — 99490 CHRNC CARE MGMT STAFF 1ST 20: CPT | Mod: PBBFAC,PN | Performed by: INTERNAL MEDICINE

## 2021-09-30 PROCEDURE — 99490 PR CHRONIC CARE MGMT, 1ST 20 MIN: ICD-10-PCS | Mod: S$PBB,,, | Performed by: INTERNAL MEDICINE

## 2021-09-30 PROCEDURE — 99490 CHRNC CARE MGMT STAFF 1ST 20: CPT | Mod: S$PBB,,, | Performed by: INTERNAL MEDICINE

## 2021-10-05 ENCOUNTER — IMMUNIZATION (OUTPATIENT)
Dept: FAMILY MEDICINE | Facility: CLINIC | Age: 81
End: 2021-10-05
Payer: MEDICARE

## 2021-10-05 DIAGNOSIS — Z23 NEED FOR VACCINATION: Primary | ICD-10-CM

## 2021-10-05 PROCEDURE — 91300 COVID-19, MRNA, LNP-S, PF, 30 MCG/0.3 ML DOSE VACCINE: CPT | Mod: PBBFAC,PN

## 2021-10-05 PROCEDURE — 0003A COVID-19, MRNA, LNP-S, PF, 30 MCG/0.3 ML DOSE VACCINE: CPT | Mod: PBBFAC | Performed by: FAMILY MEDICINE

## 2021-10-13 DIAGNOSIS — B02.29 POST HERPETIC NEURALGIA: ICD-10-CM

## 2021-10-13 RX ORDER — GABAPENTIN 600 MG/1
600 TABLET ORAL NIGHTLY PRN
Qty: 90 TABLET | Refills: 3 | Status: SHIPPED | OUTPATIENT
Start: 2021-10-13 | End: 2022-10-07

## 2021-10-31 ENCOUNTER — EXTERNAL CHRONIC CARE MANAGEMENT (OUTPATIENT)
Dept: PRIMARY CARE CLINIC | Facility: CLINIC | Age: 81
End: 2021-10-31
Payer: MEDICARE

## 2021-10-31 PROCEDURE — 99490 CHRNC CARE MGMT STAFF 1ST 20: CPT | Mod: PBBFAC,PN | Performed by: INTERNAL MEDICINE

## 2021-10-31 PROCEDURE — 99490 CHRNC CARE MGMT STAFF 1ST 20: CPT | Mod: S$PBB,,, | Performed by: INTERNAL MEDICINE

## 2021-10-31 PROCEDURE — 99490 PR CHRONIC CARE MGMT, 1ST 20 MIN: ICD-10-PCS | Mod: S$PBB,,, | Performed by: INTERNAL MEDICINE

## 2021-10-31 PROCEDURE — 99439 CHRNC CARE MGMT STAF EA ADDL: CPT | Mod: S$PBB,,, | Performed by: INTERNAL MEDICINE

## 2021-10-31 PROCEDURE — 99439 PR CHRONIC CARE MGMT, EA ADDTL 20 MIN: ICD-10-PCS | Mod: S$PBB,,, | Performed by: INTERNAL MEDICINE

## 2021-10-31 PROCEDURE — 99439 CHRNC CARE MGMT STAF EA ADDL: CPT | Mod: PBBFAC,PN | Performed by: INTERNAL MEDICINE

## 2021-11-08 ENCOUNTER — TELEPHONE (OUTPATIENT)
Dept: HEMATOLOGY/ONCOLOGY | Facility: CLINIC | Age: 81
End: 2021-11-08
Payer: MEDICARE

## 2021-11-08 ENCOUNTER — PATIENT MESSAGE (OUTPATIENT)
Dept: ORTHOPEDICS | Facility: CLINIC | Age: 81
End: 2021-11-08
Payer: MEDICARE

## 2021-11-09 ENCOUNTER — PATIENT MESSAGE (OUTPATIENT)
Dept: FAMILY MEDICINE | Facility: CLINIC | Age: 81
End: 2021-11-09
Payer: MEDICARE

## 2021-11-11 DIAGNOSIS — M16.11 PRIMARY OSTEOARTHRITIS OF RIGHT HIP: Primary | ICD-10-CM

## 2021-11-16 ENCOUNTER — PATIENT MESSAGE (OUTPATIENT)
Dept: ADMINISTRATIVE | Facility: OTHER | Age: 81
End: 2021-11-16
Payer: MEDICARE

## 2021-11-24 ENCOUNTER — PATIENT MESSAGE (OUTPATIENT)
Dept: ADMINISTRATIVE | Facility: OTHER | Age: 81
End: 2021-11-24
Payer: MEDICARE

## 2021-11-30 ENCOUNTER — EXTERNAL CHRONIC CARE MANAGEMENT (OUTPATIENT)
Dept: PRIMARY CARE CLINIC | Facility: CLINIC | Age: 81
End: 2021-11-30
Payer: MEDICARE

## 2021-11-30 PROCEDURE — 99490 PR CHRONIC CARE MGMT, 1ST 20 MIN: ICD-10-PCS | Mod: S$PBB,,, | Performed by: INTERNAL MEDICINE

## 2021-11-30 PROCEDURE — 99490 CHRNC CARE MGMT STAFF 1ST 20: CPT | Mod: S$PBB,,, | Performed by: INTERNAL MEDICINE

## 2021-11-30 PROCEDURE — 99490 CHRNC CARE MGMT STAFF 1ST 20: CPT | Mod: PBBFAC,PN | Performed by: INTERNAL MEDICINE

## 2021-12-06 ENCOUNTER — PATIENT MESSAGE (OUTPATIENT)
Dept: HEMATOLOGY/ONCOLOGY | Facility: CLINIC | Age: 81
End: 2021-12-06
Payer: MEDICARE

## 2021-12-06 ENCOUNTER — PATIENT MESSAGE (OUTPATIENT)
Dept: FAMILY MEDICINE | Facility: CLINIC | Age: 81
End: 2021-12-06
Payer: MEDICARE

## 2021-12-06 ENCOUNTER — PATIENT MESSAGE (OUTPATIENT)
Dept: ADMINISTRATIVE | Facility: OTHER | Age: 81
End: 2021-12-06
Payer: MEDICARE

## 2021-12-16 ENCOUNTER — HOSPITAL ENCOUNTER (OUTPATIENT)
Dept: RADIOLOGY | Facility: HOSPITAL | Age: 81
Discharge: HOME | End: 2021-12-16
Attending: INTERNAL MEDICINE
Payer: MEDICARE

## 2021-12-16 DIAGNOSIS — Z17.0 MALIGNANT NEOPLASM OF UPPER-OUTER QUADRANT OF RIGHT BREAST IN FEMALE, ESTROGEN RECEPTOR POSITIVE: ICD-10-CM

## 2021-12-16 DIAGNOSIS — Z12.31 ENCOUNTER FOR SCREENING MAMMOGRAM FOR MALIGNANT NEOPLASM OF BREAST: ICD-10-CM

## 2021-12-16 DIAGNOSIS — E55.9 VITAMIN D DEFICIENCY: ICD-10-CM

## 2021-12-16 DIAGNOSIS — C50.411 MALIGNANT NEOPLASM OF UPPER-OUTER QUADRANT OF RIGHT BREAST IN FEMALE, ESTROGEN RECEPTOR POSITIVE: ICD-10-CM

## 2021-12-16 PROCEDURE — 77067 MAMMO DIGITAL SCREENING BILAT WITH TOMO: ICD-10-PCS | Mod: 26,,, | Performed by: RADIOLOGY

## 2021-12-16 PROCEDURE — 77063 MAMMO DIGITAL SCREENING BILAT WITH TOMO: ICD-10-PCS | Mod: 26,,, | Performed by: RADIOLOGY

## 2021-12-16 PROCEDURE — 77067 SCR MAMMO BI INCL CAD: CPT | Mod: 26,,, | Performed by: RADIOLOGY

## 2021-12-16 PROCEDURE — 77063 BREAST TOMOSYNTHESIS BI: CPT | Mod: 26,,, | Performed by: RADIOLOGY

## 2021-12-16 PROCEDURE — 77067 SCR MAMMO BI INCL CAD: CPT | Mod: TC

## 2021-12-20 ENCOUNTER — OFFICE VISIT (OUTPATIENT)
Dept: HEMATOLOGY/ONCOLOGY | Facility: CLINIC | Age: 81
End: 2021-12-20
Payer: MEDICARE

## 2021-12-20 VITALS
BODY MASS INDEX: 27.24 KG/M2 | RESPIRATION RATE: 18 BRPM | TEMPERATURE: 98 F | SYSTOLIC BLOOD PRESSURE: 152 MMHG | OXYGEN SATURATION: 99 % | DIASTOLIC BLOOD PRESSURE: 82 MMHG | HEIGHT: 59 IN | HEART RATE: 64 BPM | WEIGHT: 135.13 LBS

## 2021-12-20 DIAGNOSIS — G62.0 NEUROPATHY DUE TO DRUGS: ICD-10-CM

## 2021-12-20 DIAGNOSIS — C50.411 MALIGNANT NEOPLASM OF UPPER-OUTER QUADRANT OF RIGHT BREAST IN FEMALE, ESTROGEN RECEPTOR POSITIVE: Primary | ICD-10-CM

## 2021-12-20 DIAGNOSIS — E55.9 VITAMIN D DEFICIENCY: ICD-10-CM

## 2021-12-20 DIAGNOSIS — Z91.89 AT RISK FOR OSTEOPOROSIS: ICD-10-CM

## 2021-12-20 DIAGNOSIS — Z17.0 MALIGNANT NEOPLASM OF UPPER-OUTER QUADRANT OF RIGHT BREAST IN FEMALE, ESTROGEN RECEPTOR POSITIVE: Primary | ICD-10-CM

## 2021-12-20 PROCEDURE — 99999 PR PBB SHADOW E&M-EST. PATIENT-LVL IV: ICD-10-PCS | Mod: PBBFAC,,, | Performed by: INTERNAL MEDICINE

## 2021-12-20 PROCEDURE — 99999 PR PBB SHADOW E&M-EST. PATIENT-LVL IV: CPT | Mod: PBBFAC,,, | Performed by: INTERNAL MEDICINE

## 2021-12-20 PROCEDURE — 99214 PR OFFICE/OUTPT VISIT, EST, LEVL IV, 30-39 MIN: ICD-10-PCS | Mod: S$PBB,,, | Performed by: INTERNAL MEDICINE

## 2021-12-20 PROCEDURE — 99214 OFFICE O/P EST MOD 30 MIN: CPT | Mod: PBBFAC,PO | Performed by: INTERNAL MEDICINE

## 2021-12-20 PROCEDURE — 99214 OFFICE O/P EST MOD 30 MIN: CPT | Mod: S$PBB,,, | Performed by: INTERNAL MEDICINE

## 2021-12-21 ENCOUNTER — TELEPHONE (OUTPATIENT)
Dept: HEMATOLOGY/ONCOLOGY | Facility: CLINIC | Age: 81
End: 2021-12-21
Payer: MEDICARE

## 2021-12-21 DIAGNOSIS — C50.411 MALIGNANT NEOPLASM OF UPPER-OUTER QUADRANT OF RIGHT BREAST IN FEMALE, ESTROGEN RECEPTOR POSITIVE: Primary | ICD-10-CM

## 2021-12-21 DIAGNOSIS — Z17.0 MALIGNANT NEOPLASM OF UPPER-OUTER QUADRANT OF RIGHT BREAST IN FEMALE, ESTROGEN RECEPTOR POSITIVE: Primary | ICD-10-CM

## 2021-12-21 DIAGNOSIS — E55.9 VITAMIN D DEFICIENCY: Primary | ICD-10-CM

## 2021-12-21 DIAGNOSIS — Z12.31 ENCOUNTER FOR SCREENING MAMMOGRAM FOR MALIGNANT NEOPLASM OF BREAST: ICD-10-CM

## 2021-12-21 PROBLEM — Z91.89 AT RISK FOR OSTEOPOROSIS: Status: ACTIVE | Noted: 2021-12-21

## 2021-12-28 ENCOUNTER — OFFICE VISIT (OUTPATIENT)
Dept: FAMILY MEDICINE | Facility: CLINIC | Age: 81
End: 2021-12-28
Payer: MEDICARE

## 2021-12-28 VITALS
DIASTOLIC BLOOD PRESSURE: 80 MMHG | OXYGEN SATURATION: 95 % | RESPIRATION RATE: 18 BRPM | TEMPERATURE: 98 F | SYSTOLIC BLOOD PRESSURE: 120 MMHG | WEIGHT: 136.81 LBS | HEIGHT: 59 IN | BODY MASS INDEX: 27.58 KG/M2 | HEART RATE: 65 BPM

## 2021-12-28 DIAGNOSIS — Z17.0 MALIGNANT NEOPLASM OF UPPER-OUTER QUADRANT OF RIGHT BREAST IN FEMALE, ESTROGEN RECEPTOR POSITIVE: ICD-10-CM

## 2021-12-28 DIAGNOSIS — M25.551 RIGHT HIP PAIN: Primary | ICD-10-CM

## 2021-12-28 DIAGNOSIS — E78.2 MIXED HYPERLIPIDEMIA: ICD-10-CM

## 2021-12-28 DIAGNOSIS — C50.411 MALIGNANT NEOPLASM OF UPPER-OUTER QUADRANT OF RIGHT BREAST IN FEMALE, ESTROGEN RECEPTOR POSITIVE: ICD-10-CM

## 2021-12-28 DIAGNOSIS — I10 ESSENTIAL HYPERTENSION: ICD-10-CM

## 2021-12-28 DIAGNOSIS — Z85.3 HISTORY OF BREAST CANCER: ICD-10-CM

## 2021-12-28 PROBLEM — Z01.818 PREOP EXAM FOR INTERNAL MEDICINE: Status: ACTIVE | Noted: 2021-12-28

## 2021-12-28 PROCEDURE — 99214 OFFICE O/P EST MOD 30 MIN: CPT | Mod: PBBFAC,PN | Performed by: INTERNAL MEDICINE

## 2021-12-28 PROCEDURE — 99999 PR PBB SHADOW E&M-EST. PATIENT-LVL IV: ICD-10-PCS | Mod: PBBFAC,,, | Performed by: INTERNAL MEDICINE

## 2021-12-28 PROCEDURE — 99999 PR PBB SHADOW E&M-EST. PATIENT-LVL IV: CPT | Mod: PBBFAC,,, | Performed by: INTERNAL MEDICINE

## 2021-12-28 PROCEDURE — 99214 OFFICE O/P EST MOD 30 MIN: CPT | Mod: S$PBB,,, | Performed by: INTERNAL MEDICINE

## 2021-12-28 PROCEDURE — 99214 PR OFFICE/OUTPT VISIT, EST, LEVL IV, 30-39 MIN: ICD-10-PCS | Mod: S$PBB,,, | Performed by: INTERNAL MEDICINE

## 2021-12-31 ENCOUNTER — EXTERNAL CHRONIC CARE MANAGEMENT (OUTPATIENT)
Dept: PRIMARY CARE CLINIC | Facility: CLINIC | Age: 81
End: 2021-12-31
Payer: MEDICARE

## 2021-12-31 PROCEDURE — 99490 PR CHRONIC CARE MGMT, 1ST 20 MIN: ICD-10-PCS | Mod: S$PBB,,, | Performed by: INTERNAL MEDICINE

## 2021-12-31 PROCEDURE — 99490 CHRNC CARE MGMT STAFF 1ST 20: CPT | Mod: S$PBB,,, | Performed by: INTERNAL MEDICINE

## 2021-12-31 PROCEDURE — 99490 CHRNC CARE MGMT STAFF 1ST 20: CPT | Mod: PBBFAC,PN | Performed by: INTERNAL MEDICINE

## 2022-01-04 NOTE — ANESTHESIA PAT ROS NOTE
01/04/2022  Claudia Sierra is a 81 y.o., female.      Pre-op Assessment          Review of Systems  Anesthesia Hx:  No problems with previous Anesthesia  History of prior surgery of interest to airway management or planning: Previous anesthesia: General 6/16/15-REMOVAL-PORT-A-CATH (Left ) with general anesthesia.  Procedure performed at an [facility]. Denies Family Hx of Anesthesia complications.   Denies Personal Hx of Anesthesia complications.   Social:  Non-Smoker, Social Alcohol Use    Hematology/Oncology:         -- Transfusion: -- Transfusion Hx (no complications): --  Cancer in past history:  Breast right axillary node dissection lymphedema radiation, chemotherapy and surgery    EENT/Dental:   Eyes: Visual Impairment Has S/P Extraction - Bilateral Catarract   Cardiovascular:   Hypertension  Denies Angina. hyperlipidemia  Functional Capacity 2 METS  Disorder of Cardiac Rhythm, Atrial Fibrillation, Hx of Atrial Fibrillation    Pulmonary:   Denies Shortness of breath.  Denies Recent URI. HAS NOT EXPERIENCED ANY ASTHMA SYMPTOMS IN OVER 40 YEARS Asthma:  last episode was > 1 year ago. Emergency visits this year is none.  Current breathing status is optimal, free of wheezing.    Renal/:  Renal/ Normal     Hepatic/GI:   PUD, GERD POST HEPATIC NEURALGIA Hepatic/GI Symptoms: constipation.    Musculoskeletal:  Joint Disease:  Arthritis, Osteoarthritis  Cervical Spine Disorder, Cervical Disc Disease, Radiculopathy    Neurological:   CERVICAL RADICULOPATHY    CHEMO INDUCED NEUROPATHY Osteoarthritis  Peripheral Neuropathy    Psych:  Psychiatric Normal           Physical Exam  General:  Well nourished    Airway/Jaw/Neck:  Airway Findings: Mouth Opening: Normal Tongue: Normal  Jaw/Neck Findings:  Neck ROM: Normal ROM      Dental:  Dental Findings: In tact   Chest/Lungs:  Chest/Lungs Findings: Clear to  auscultation     Heart/Vascular:  Heart Findings: Rate: Normal  Rhythm: Regular Rhythm  Sounds: Normal        Mental Status:  Mental Status Findings:  Cooperative, Alert and Oriented         MD Tracey Elena III, RN; Zach JEFFERSON Staff; Aysha Perez PA-C  Caller: Unspecified (6 days ago,  1:42 PM)  Agree with your plan, current workup by PCP is adequate.   Thank you for checking!     12/28/21  Right hip pain - Primary      Overview       Will be getting right hip replacement with Dr condon .  preop assessment done today                    As above, continue current medications and maintain follow up with specialists.  Return to clinic in 6  months.       Sajan Curtis  [facility]        8/16/21-Patient is optimized   Galindo Montilla NP  Perioperative Medicine  [facility]   Pager [phone]

## 2022-01-06 ENCOUNTER — OFFICE VISIT (OUTPATIENT)
Dept: ORTHOPEDICS | Facility: CLINIC | Age: 82
End: 2022-01-06
Payer: MEDICARE

## 2022-01-06 ENCOUNTER — HOSPITAL ENCOUNTER (OUTPATIENT)
Dept: RADIOLOGY | Facility: HOSPITAL | Age: 82
Discharge: HOME | End: 2022-01-06
Attending: PHYSICIAN ASSISTANT
Payer: MEDICARE

## 2022-01-06 ENCOUNTER — HOSPITAL ENCOUNTER (OUTPATIENT)
Dept: PREADMISSION TESTING | Facility: HOSPITAL | Age: 82
Discharge: HOME | End: 2022-01-06
Attending: ORTHOPAEDIC SURGERY
Payer: MEDICARE

## 2022-01-06 VITALS
DIASTOLIC BLOOD PRESSURE: 85 MMHG | WEIGHT: 134.5 LBS | BODY MASS INDEX: 27.12 KG/M2 | HEIGHT: 59 IN | TEMPERATURE: 98 F | SYSTOLIC BLOOD PRESSURE: 147 MMHG | HEART RATE: 85 BPM | OXYGEN SATURATION: 98 %

## 2022-01-06 VITALS — HEIGHT: 59 IN | BODY MASS INDEX: 27.12 KG/M2 | WEIGHT: 134.5 LBS

## 2022-01-06 DIAGNOSIS — M16.11 PRIMARY OSTEOARTHRITIS OF RIGHT HIP: Primary | ICD-10-CM

## 2022-01-06 DIAGNOSIS — M16.11 PRIMARY OSTEOARTHRITIS OF RIGHT HIP: ICD-10-CM

## 2022-01-06 PROCEDURE — 99213 OFFICE O/P EST LOW 20 MIN: CPT | Mod: PBBFAC | Performed by: PHYSICIAN ASSISTANT

## 2022-01-06 PROCEDURE — 99213 PR OFFICE/OUTPT VISIT, EST, LEVL III, 20-29 MIN: ICD-10-PCS | Mod: S$PBB,,, | Performed by: PHYSICIAN ASSISTANT

## 2022-01-06 PROCEDURE — 99213 OFFICE O/P EST LOW 20 MIN: CPT | Mod: S$PBB,,, | Performed by: PHYSICIAN ASSISTANT

## 2022-01-06 PROCEDURE — 99999 PR PBB SHADOW E&M-EST. PATIENT-LVL III: ICD-10-PCS | Mod: PBBFAC,,, | Performed by: PHYSICIAN ASSISTANT

## 2022-01-06 PROCEDURE — 72170 X-RAY EXAM OF PELVIS: CPT | Mod: 26,,, | Performed by: RADIOLOGY

## 2022-01-06 PROCEDURE — 99999 PR PBB SHADOW E&M-EST. PATIENT-LVL III: CPT | Mod: PBBFAC,,, | Performed by: PHYSICIAN ASSISTANT

## 2022-01-06 PROCEDURE — 72170 XR PELVIS ROUTINE AP: ICD-10-PCS | Mod: 26,,, | Performed by: RADIOLOGY

## 2022-01-06 PROCEDURE — 72170 X-RAY EXAM OF PELVIS: CPT | Mod: TC

## 2022-01-06 NOTE — DISCHARGE INSTRUCTIONS
Your surgery has been scheduled for:__________________________________________    You should report to:  ____[facility], located on the [place] side of the first floor of the           [facility] ([phone])  ____[facility], located on the [facility] side of the            Second floor of the [facility] ([phone])  ____3rd Floor [facility] located on the [facility] side of the [facility] [phone]  Please Note   - Tell your doctor if you take Aspirin, products containing Aspirin, herbal medications  or blood thinners, such as Coumadin, Ticlid, or Plavix.  (Consult your provider regarding holding or stopping before surgery).  - Arrange for someone to drive you home following surgery.  You will not be allowed to leave the surgical facility alone or drive yourself home following sedation and anesthesia.  Before Surgery  - Stop taking all vitamins/ herbal medications 14days prior to surgery  - No Motrin/Advil (Ibuprofen) 1 day before surgery  - No Aleve (Naproxen) 7 days before surgery  - Stop Taking Asprin, products containing Asprin _____days before surgery  - Stop taking blood thinners_______days before surgery  - No Goody's/BC  Powder 7 days before surgery  - Refrain from drinking alcoholic beverages for 24hours before and after surgery  - Stop or limit smoking _________days before surgery  - You may take Tylenol for pain  Night before Surgery   Stop ALL solid food, gum, candy (including vitamins) 8 hours before arrival time.  (Please note: If your surgeon gives you different eating and drinking instructions, please follow surgeon's directions.)   Stop all CLOUDY liquids: coffee with creamer, formula, tube feeds, cloudy juices, non-human milk and breast milk with additives, 6 hours prior to arrival time.   Stop plain breast milk 4 hours prior to arrival time.   The patient should be ENCOURAGED to drink  carbohydrate-rich clear liquids (sports drinks, clear juices) until 2 hours prior to arrival time.   CLEAR liquids include only water, black coffee NO creamer, clear oral rehydration drinks, clear sports drinks or clear fruit juices (no orange juice, no pulpy juices, no apple cider). Advise patients if they can read newsprint through the liquid, it qualifies as clear liquid.    IF IN DOUBT, drink water instead.   - Take a shower or bath (shower is recommended).  Bathe with Hibiclens soap or an antibacterial soap from the neck down.  If not supplied by your surgeon, hibiclens soap will need to be purchased over the counter in pharmacy.  Rinse soap off thoroughly.  - Shampoo your hair with your regular shampoo  The Day of Surgery  · NOTHING TO  DRINK 2 hours before arrival time. If you are told to take medication on the morning of surgery, it may be taken with a sip of water.   - Take another bath or shower with hibiclens or any antibacterial soap, to reduce the chance of infection.  - Take heart and blood pressure medications with a small sip of water, as advised by the perioperative team.  - Do not take fluid pills  - You may brush your teeth and rinse your mouth, but do not swall any additional water.   - Do not apply perfumes, powder, body lotions or deodorant on the day of surgery.  - Nail polish should be removed.  - Do not wear makeup or moisturizer  - Wear comfortable clothes, such as a button front shirt and loose fitting pants.  - Leave all jewelry, including body piercings, and valuables at home.    - Bring any devices you will neeed after surgery such as crutches or canes.  - If you have sleep apnea, please bring your CPAP machine  In the event that your physical condition changes including the onset of a cold or respiratory illness, or if you have to delay or cancel your surgery, please notify your surgeon.    Anesthesia: Regional Anesthesia    Youre scheduled for surgery. During surgery, youll  receive medicine called anesthesia to keep you comfortable and pain-free. Your surgeon has decided that youll receive regional anesthesia. This sheet tells you what to expect with this type of anesthesia.  What is regional anesthesia?  Regional anesthesia numbs one region of your body. The anesthesia may be given around nerves or into veins in your arms, neck, or legs (nerve block or Too block). Or it may be sent into the spinal fluid (spinal anesthesia) or into the space just outside the spinal fluid (epidural anesthesia). You may also be given sedatives to help you relax.  Nerve block or Too block  A small area of the body, such as an arm or leg, can be numbed using a nerve block or [place] block.  · Nerve block. During a nerve block, your skin is numbed. A needle is then inserted near nerves that serve the area to be numbed. Anesthetic is sent through the needle.  · IV regional or Too block. For this type of block, an IV line is put into a vein. The blood flow to the area to be numbed is blocked for a short time. Anesthetic is sent through the IV.  Spinal anesthesia  Spinal anesthesia numbs your body from about the waist down.  · Anesthetic is injected into the spinal fluid. This is a substance that surrounds the spinal cord in your spinal column. The anesthetic blocks pain traveling from the body to the brain.  · To receive the anesthetic, your skin is numbed at the injection site on your back.  · A needle is then inserted into the spinal space. Anesthetic is sent into the spinal fluid through the needle.  Epidural anesthesia  Epidural anesthesia is most commonly used during childbirth and may also be used after surgical procedures of the chest, belly, and legs.  · Anesthetic is injected into the epidural space. This is just outside the dural sac which contains the spinal fluid.  · To receive the anesthetic, your skin is numbed at the injection site on your back.  · A needle is then inserted into the epidural  space. Anesthetic is sent into the epidural space through the needle.  · A small flexible catheter may be attached to the needle and left in place. This allows for continuous injections or infusions of anesthetic.  Anesthesia tools and medicines that might be near you during your procedure  · Local anesthetic. This medicine is given through a needle numbs one region of your body.  · Electrocardiography leads (electrodes). These are used to record your heart rate and rhythm.  · Blood pressure cuff. A cuff is placed on your arm to keep track of your blood pressure.  · Pulse oximeter. This small clip is placed on the end of the finger. It measures your blood oxygen level.  · Sedatives. These medicines may be given through an IV. They help to relax you and keep you comfortable. You may stay awake or sleep lightly.  · Oxygen. You may be given oxygen through a facemask.  Risks and possible complications  Regional anesthesia carries some risks. These include:  · Nausea and vomiting  · Headache  · Backache  · Decreased blood pressure  · Allergic reaction to the anesthetic  · Ongoing numbness (rare)  · Irregular heartbeat (rare)  · Cardiac arrest (rare)   Date Last Reviewed: 12/1/2016  © 8983-5299 [employer]. [address] [place] [address], [place] [address]. All rights reserved. This information is not intended as a substitute for professional medical care. Always follow your healthcare professional's instructions.

## 2022-01-07 ENCOUNTER — PATIENT MESSAGE (OUTPATIENT)
Dept: ORTHOPEDICS | Facility: CLINIC | Age: 82
End: 2022-01-07
Payer: MEDICARE

## 2022-01-07 DIAGNOSIS — Z01.818 PRE-OP TESTING: ICD-10-CM

## 2022-01-07 RX ORDER — CEFAZOLIN SODIUM 2 G/50ML
2 SOLUTION INTRAVENOUS
Status: CANCELLED | OUTPATIENT
Start: 2022-01-07

## 2022-01-07 RX ORDER — NALOXONE HCL 0.4 MG/ML
0.02 VIAL (ML) INJECTION
Status: CANCELLED | OUTPATIENT
Start: 2022-01-07

## 2022-01-07 RX ORDER — ASPIRIN 81 MG/1
81 TABLET ORAL 2 TIMES DAILY
Status: CANCELLED | OUTPATIENT
Start: 2022-01-07

## 2022-01-07 RX ORDER — MORPHINE SULFATE 2 MG/ML
2 INJECTION, SOLUTION INTRAMUSCULAR; INTRAVENOUS
Status: CANCELLED | OUTPATIENT
Start: 2022-01-07

## 2022-01-07 RX ORDER — PREGABALIN 75 MG/1
75 CAPSULE ORAL NIGHTLY
Status: CANCELLED | OUTPATIENT
Start: 2022-01-07

## 2022-01-07 RX ORDER — PROCHLORPERAZINE EDISYLATE 5 MG/ML
5 INJECTION INTRAMUSCULAR; INTRAVENOUS EVERY 6 HOURS PRN
Status: CANCELLED | OUTPATIENT
Start: 2022-01-07

## 2022-01-07 RX ORDER — MUPIROCIN 20 MG/G
1 OINTMENT TOPICAL
Status: CANCELLED | OUTPATIENT
Start: 2022-01-07

## 2022-01-07 RX ORDER — LIDOCAINE HYDROCHLORIDE 10 MG/ML
1 INJECTION, SOLUTION EPIDURAL; INFILTRATION; INTRACAUDAL; PERINEURAL
Status: CANCELLED | OUTPATIENT
Start: 2022-01-07

## 2022-01-07 RX ORDER — FAMOTIDINE 20 MG/1
20 TABLET, FILM COATED ORAL 2 TIMES DAILY
Status: CANCELLED | OUTPATIENT
Start: 2022-01-07

## 2022-01-07 RX ORDER — POLYETHYLENE GLYCOL 3350 17 G/17G
17 POWDER, FOR SOLUTION ORAL DAILY PRN
Status: CANCELLED | OUTPATIENT
Start: 2022-01-07

## 2022-01-07 RX ORDER — OXYCODONE HYDROCHLORIDE 5 MG/1
5 TABLET ORAL
Status: CANCELLED | OUTPATIENT
Start: 2022-01-07

## 2022-01-07 RX ORDER — CEFAZOLIN SODIUM 2 G/50ML
2 SOLUTION INTRAVENOUS
Status: CANCELLED | OUTPATIENT
Start: 2022-01-07 | End: 2022-01-08

## 2022-01-07 RX ORDER — POLYETHYLENE GLYCOL 3350 17 G/17G
17 POWDER, FOR SOLUTION ORAL DAILY
Status: CANCELLED | OUTPATIENT
Start: 2022-01-07

## 2022-01-07 RX ORDER — ACETAMINOPHEN 500 MG
1000 TABLET ORAL
Status: CANCELLED | OUTPATIENT
Start: 2022-01-07

## 2022-01-07 RX ORDER — MUPIROCIN 20 MG/G
1 OINTMENT TOPICAL 2 TIMES DAILY
Status: CANCELLED | OUTPATIENT
Start: 2022-01-07 | End: 2022-01-12

## 2022-01-07 RX ORDER — FENTANYL CITRATE 50 UG/ML
100 INJECTION, SOLUTION INTRAMUSCULAR; INTRAVENOUS
Status: CANCELLED | OUTPATIENT
Start: 2022-01-07 | End: 2022-01-08

## 2022-01-07 RX ORDER — CELECOXIB 200 MG/1
400 CAPSULE ORAL
Status: CANCELLED | OUTPATIENT
Start: 2022-01-07

## 2022-01-07 RX ORDER — FENTANYL CITRATE 50 UG/ML
25 INJECTION, SOLUTION INTRAMUSCULAR; INTRAVENOUS EVERY 5 MIN PRN
Status: CANCELLED | OUTPATIENT
Start: 2022-01-07

## 2022-01-07 RX ORDER — SODIUM CHLORIDE 9 MG/ML
INJECTION, SOLUTION INTRAVENOUS CONTINUOUS
Status: CANCELLED | OUTPATIENT
Start: 2022-01-07 | End: 2022-01-08

## 2022-01-07 RX ORDER — MIDAZOLAM HYDROCHLORIDE 1 MG/ML
4 INJECTION INTRAMUSCULAR; INTRAVENOUS
Status: CANCELLED | OUTPATIENT
Start: 2022-01-07 | End: 2022-01-08

## 2022-01-07 RX ORDER — ACETAMINOPHEN 500 MG
1000 TABLET ORAL EVERY 6 HOURS
Status: CANCELLED | OUTPATIENT
Start: 2022-01-07

## 2022-01-07 RX ORDER — AMOXICILLIN 250 MG
1 CAPSULE ORAL 2 TIMES DAILY
Status: CANCELLED | OUTPATIENT
Start: 2022-01-07

## 2022-01-07 RX ORDER — OXYCODONE HYDROCHLORIDE 5 MG/1
10 TABLET ORAL
Status: CANCELLED | OUTPATIENT
Start: 2022-01-07

## 2022-01-07 RX ORDER — ONDANSETRON 2 MG/ML
4 INJECTION INTRAMUSCULAR; INTRAVENOUS EVERY 8 HOURS PRN
Status: CANCELLED | OUTPATIENT
Start: 2022-01-07

## 2022-01-07 RX ORDER — CELECOXIB 200 MG/1
200 CAPSULE ORAL DAILY
Status: CANCELLED | OUTPATIENT
Start: 2022-01-07

## 2022-01-07 RX ORDER — SODIUM CHLORIDE 9 MG/ML
INJECTION, SOLUTION INTRAVENOUS
Status: CANCELLED | OUTPATIENT
Start: 2022-01-07

## 2022-01-07 RX ORDER — METHOCARBAMOL 750 MG/1
750 TABLET, FILM COATED ORAL 3 TIMES DAILY
Status: CANCELLED | OUTPATIENT
Start: 2022-01-07

## 2022-01-07 RX ORDER — PREGABALIN 75 MG/1
75 CAPSULE ORAL
Status: CANCELLED | OUTPATIENT
Start: 2022-01-07

## 2022-01-07 NOTE — PROGRESS NOTES
Claudia Sierra is a 81 y.o. year old here today for a pre-operative visit in preparation for a Right anterior total hip arthroplasty to be performed by Dr. Mcneal  on 1/31/2022.  she was last seen and treated in the clinic on 8/19/2021. she will be medically optimized by the pre op center. There has been no significant change in medical status since last visit. No fever, chills, malaise, or unexplained weight change.      Allergies, Medications, past medical and surgical history reviewed.    Focused examination performed.    Patient saw surgeon in clinic today. All questions answered. Patient encouraged to call with questions. Contact information given.     Pre, maria [place] rosario, and post operative procedures and expectations discussed. Goals of successful surgery reviewed and include manageable pain levels, surgical site free of infection, medication management, and ambulation with PT/OT assistance. Healthy weight management discussed with patient and caregiver who were receptive to eduction of healthy diet and activity. No other necessary lifestyle changes identified. Educated patient about signs and symptoms of infection, medication management, anticoagulation therapy, risk of tobacco and alcohol use, and self-care to promote healing. Surgical guide given for future reference. Hibiclens given to patient with instructions. All questions were answered.     Claudia Sierra verbalized an understanding to the education and goals. Patient has displayed readiness to engage in care and is ready to proceed with surgery.  Patient reports her sister is able and ready to provide assistance at home after discharge. Sister's  is chronically sick so patient will let us know if anything changes.    Surgical and blood consents signed.    Claudia Sierra will contact us if there are any questions, concerns, or changes in medical status prior to surgery.       Joint class: 8/26    COVID-19 test date: will be scheduled       Patient has discussed discharge planning with surgeon. Patient will be discharged to home following surgery.   patient will be scheduled with Home Health during hospitalization.
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
## 2022-01-27 ENCOUNTER — TELEPHONE (OUTPATIENT)
Dept: ORTHOPEDICS | Facility: CLINIC | Age: 82
End: 2022-01-27
Payer: MEDICARE

## 2022-01-27 ENCOUNTER — PATIENT MESSAGE (OUTPATIENT)
Dept: ORTHOPEDICS | Facility: CLINIC | Age: 82
End: 2022-01-27
Payer: MEDICARE

## 2022-01-27 RX ORDER — DEXTROMETHORPHAN HYDROBROMIDE, GUAIFENESIN 5; 100 MG/5ML; MG/5ML
650 LIQUID ORAL
Qty: 120 TABLET | Refills: 0 | Status: SHIPPED | OUTPATIENT
Start: 2022-01-27 | End: 2022-03-03 | Stop reason: SDUPTHER

## 2022-01-27 RX ORDER — ASPIRIN 81 MG/1
81 TABLET ORAL 2 TIMES DAILY
Qty: 60 TABLET | Refills: 0 | Status: ON HOLD | OUTPATIENT
Start: 2022-02-01 | End: 2023-06-03 | Stop reason: HOSPADM

## 2022-01-27 RX ORDER — METHOCARBAMOL 750 MG/1
750 TABLET, FILM COATED ORAL 3 TIMES DAILY PRN
Qty: 30 TABLET | Refills: 0 | Status: SHIPPED | OUTPATIENT
Start: 2022-01-27 | End: 2022-02-09 | Stop reason: SDUPTHER

## 2022-01-27 RX ORDER — DOCUSATE SODIUM 100 MG/1
100 CAPSULE, LIQUID FILLED ORAL 2 TIMES DAILY PRN
Qty: 60 CAPSULE | Refills: 0 | Status: SHIPPED | OUTPATIENT
Start: 2022-01-27 | End: 2023-06-19

## 2022-01-27 RX ORDER — CELECOXIB 200 MG/1
200 CAPSULE ORAL DAILY
Qty: 30 CAPSULE | Refills: 0 | Status: SHIPPED | OUTPATIENT
Start: 2022-01-27 | End: 2022-03-04 | Stop reason: SDUPTHER

## 2022-01-27 RX ORDER — OXYCODONE HYDROCHLORIDE 5 MG/1
TABLET ORAL
Qty: 30 TABLET | Refills: 0 | Status: SHIPPED | OUTPATIENT
Start: 2022-01-27 | End: 2022-02-09 | Stop reason: SDUPTHER

## 2022-01-27 NOTE — TELEPHONE ENCOUNTER
I called the patient regarding her patient protal message. The patient's questions were answered and she did not have any other questions at the time. The patient verbalized understanding and has no further questions.

## 2022-01-28 ENCOUNTER — LAB VISIT (OUTPATIENT)
Dept: PRIMARY CARE CLINIC | Facility: CLINIC | Age: 82
End: 2022-01-28
Payer: MEDICARE

## 2022-01-28 ENCOUNTER — TELEPHONE (OUTPATIENT)
Dept: ORTHOPEDICS | Facility: CLINIC | Age: 82
End: 2022-01-28
Payer: MEDICARE

## 2022-01-28 DIAGNOSIS — Z01.818 PRE-OP TESTING: ICD-10-CM

## 2022-01-28 PROCEDURE — U0003 INFECTIOUS AGENT DETECTION BY NUCLEIC ACID (DNA OR RNA); SEVERE ACUTE RESPIRATORY SYNDROME CORONAVIRUS 2 (SARS-COV-2) (CORONAVIRUS DISEASE [COVID-19]), AMPLIFIED PROBE TECHNIQUE, MAKING USE OF HIGH THROUGHPUT TECHNOLOGIES AS DESCRIBED BY CMS-2020-01-R: HCPCS | Performed by: ORTHOPAEDIC SURGERY

## 2022-01-28 PROCEDURE — U0005 INFEC AGEN DETEC AMPLI PROBE: HCPCS | Performed by: ORTHOPAEDIC SURGERY

## 2022-01-28 NOTE — TELEPHONE ENCOUNTER
I called the patient today regarding her surgery. I left a message for the patient to call me back. I left my name and phone number.

## 2022-01-28 NOTE — TELEPHONE ENCOUNTER
I called the patient today regarding surgery on 1/31/2022 with Dr. Cisco Mcneal. I informed the patient that her surgery will be at  [facility] ([facility] [place] [address]). I informed the patient they must arrive at 10:00am and their surgery will start at 12:00pm.     I reminded the patient about her COVID-19 swab test. The patient completed her pre-procedural Covid-19 test today.    I reminded the patient that they cannot eat or drink after midnight, the night before surgery.     I reminded the patient to be careful of their skin over the next few days to make sure they do not get any cuts, scratches or scrapes.    The patient verbalized that they have received their walker, bedside commode and shower chair from [facility].     I informed the patient that she will do a myChart Virtual Visit - Audio with our Orthopedic Nurse Navigator on 2/2/2022. I informed the patient to log into their virtual appointment 15 minutes before their scheduled time.    The patient verbalized understanding and has no further questions.

## 2022-01-29 LAB
SARS-COV-2 RNA RESP QL NAA+PROBE: NOT DETECTED
SARS-COV-2- CYCLE NUMBER: NORMAL

## 2022-01-30 ENCOUNTER — ANESTHESIA EVENT (OUTPATIENT)
Dept: SURGERY | Facility: HOSPITAL | Age: 82
End: 2022-01-30
Payer: MEDICARE

## 2022-01-30 RX ORDER — AZELASTINE 1 MG/ML
2 SPRAY, METERED NASAL 2 TIMES DAILY
Qty: 90 ML | Refills: 2 | Status: SHIPPED | OUTPATIENT
Start: 2022-01-30 | End: 2023-06-19

## 2022-01-31 ENCOUNTER — HOSPITAL ENCOUNTER (OUTPATIENT)
Facility: HOSPITAL | Age: 82
Discharge: HOME | End: 2022-02-02
Attending: ORTHOPAEDIC SURGERY | Admitting: ORTHOPAEDIC SURGERY
Payer: MEDICARE

## 2022-01-31 ENCOUNTER — TELEPHONE (OUTPATIENT)
Dept: PHARMACY | Facility: CLINIC | Age: 82
End: 2022-01-31
Payer: MEDICARE

## 2022-01-31 ENCOUNTER — EXTERNAL CHRONIC CARE MANAGEMENT (OUTPATIENT)
Dept: PRIMARY CARE CLINIC | Facility: CLINIC | Age: 82
End: 2022-01-31
Payer: MEDICARE

## 2022-01-31 ENCOUNTER — ANESTHESIA (OUTPATIENT)
Dept: SURGERY | Facility: HOSPITAL | Age: 82
End: 2022-01-31
Payer: MEDICARE

## 2022-01-31 DIAGNOSIS — M16.11 PRIMARY OSTEOARTHRITIS OF RIGHT HIP: ICD-10-CM

## 2022-01-31 PROCEDURE — 63600175 PHARM REV CODE 636 W HCPCS: Performed by: PHYSICIAN ASSISTANT

## 2022-01-31 PROCEDURE — 94761 N-INVAS EAR/PLS OXIMETRY MLT: CPT

## 2022-01-31 PROCEDURE — 88304 TISSUE EXAM BY PATHOLOGIST: CPT | Performed by: PATHOLOGY

## 2022-01-31 PROCEDURE — 71000033 HC RECOVERY, INTIAL HOUR: Performed by: ORTHOPAEDIC SURGERY

## 2022-01-31 PROCEDURE — 99900035 HC TECH TIME PER 15 MIN (STAT)

## 2022-01-31 PROCEDURE — C1713 ANCHOR/SCREW BN/BN,TIS/BN: HCPCS | Performed by: ORTHOPAEDIC SURGERY

## 2022-01-31 PROCEDURE — D9220A PRA ANESTHESIA: ICD-10-PCS | Mod: ANES,,, | Performed by: ANESTHESIOLOGY

## 2022-01-31 PROCEDURE — 99490 CHRNC CARE MGMT STAFF 1ST 20: CPT | Mod: S$PBB,,, | Performed by: INTERNAL MEDICINE

## 2022-01-31 PROCEDURE — 25000003 PHARM REV CODE 250: Performed by: ORTHOPAEDIC SURGERY

## 2022-01-31 PROCEDURE — D9220A PRA ANESTHESIA: ICD-10-PCS | Mod: CRNA,,, | Performed by: NURSE ANESTHETIST, CERTIFIED REGISTERED

## 2022-01-31 PROCEDURE — 88311 PR  DECALCIFY TISSUE: ICD-10-PCS | Mod: 26,,, | Performed by: PATHOLOGY

## 2022-01-31 PROCEDURE — 36000710: Performed by: ORTHOPAEDIC SURGERY

## 2022-01-31 PROCEDURE — 25000003 PHARM REV CODE 250: Performed by: NURSE ANESTHETIST, CERTIFIED REGISTERED

## 2022-01-31 PROCEDURE — 99490 PR CHRONIC CARE MGMT, 1ST 20 MIN: ICD-10-PCS | Mod: S$PBB,,, | Performed by: INTERNAL MEDICINE

## 2022-01-31 PROCEDURE — 36000711: Performed by: ORTHOPAEDIC SURGERY

## 2022-01-31 PROCEDURE — 25000003 PHARM REV CODE 250: Performed by: STUDENT IN AN ORGANIZED HEALTH CARE EDUCATION/TRAINING PROGRAM

## 2022-01-31 PROCEDURE — 63600175 PHARM REV CODE 636 W HCPCS: Performed by: NURSE ANESTHETIST, CERTIFIED REGISTERED

## 2022-01-31 PROCEDURE — 27130 TOTAL HIP ARTHROPLASTY: CPT | Mod: RT,GC,, | Performed by: ORTHOPAEDIC SURGERY

## 2022-01-31 PROCEDURE — 88304 TISSUE EXAM BY PATHOLOGIST: CPT | Mod: 26,,, | Performed by: PATHOLOGY

## 2022-01-31 PROCEDURE — 27201423 OPTIME MED/SURG SUP & DEVICES STERILE SUPPLY: Performed by: ORTHOPAEDIC SURGERY

## 2022-01-31 PROCEDURE — D9220A PRA ANESTHESIA: Mod: ANES,,, | Performed by: ANESTHESIOLOGY

## 2022-01-31 PROCEDURE — 88311 DECALCIFY TISSUE: CPT | Mod: 26,,, | Performed by: PATHOLOGY

## 2022-01-31 PROCEDURE — 25000003 PHARM REV CODE 250: Performed by: PHYSICIAN ASSISTANT

## 2022-01-31 PROCEDURE — 94799 UNLISTED PULMONARY SVC/PX: CPT

## 2022-01-31 PROCEDURE — 99490 CHRNC CARE MGMT STAFF 1ST 20: CPT | Mod: PBBFAC | Performed by: INTERNAL MEDICINE

## 2022-01-31 PROCEDURE — C1776 JOINT DEVICE (IMPLANTABLE): HCPCS | Performed by: ORTHOPAEDIC SURGERY

## 2022-01-31 PROCEDURE — 71000039 HC RECOVERY, EACH ADD'L HOUR: Performed by: ORTHOPAEDIC SURGERY

## 2022-01-31 PROCEDURE — 88311 DECALCIFY TISSUE: CPT | Performed by: PATHOLOGY

## 2022-01-31 PROCEDURE — D9220A PRA ANESTHESIA: Mod: CRNA,,, | Performed by: NURSE ANESTHETIST, CERTIFIED REGISTERED

## 2022-01-31 PROCEDURE — 37000008 HC ANESTHESIA 1ST 15 MINUTES: Performed by: ORTHOPAEDIC SURGERY

## 2022-01-31 PROCEDURE — 37000009 HC ANESTHESIA EA ADD 15 MINS: Performed by: ORTHOPAEDIC SURGERY

## 2022-01-31 PROCEDURE — 88304 PR  SURG PATH,LEVEL III: ICD-10-PCS | Mod: 26,,, | Performed by: PATHOLOGY

## 2022-01-31 PROCEDURE — 63600175 PHARM REV CODE 636 W HCPCS: Performed by: ORTHOPAEDIC SURGERY

## 2022-01-31 PROCEDURE — 27130 PR TOTAL HIP ARTHROPLASTY: ICD-10-PCS | Mod: RT,GC,, | Performed by: ORTHOPAEDIC SURGERY

## 2022-01-31 DEVICE — SCREW PINACLE 6.5MM X 30MM: Type: IMPLANTABLE DEVICE | Site: HIP | Status: FUNCTIONAL

## 2022-01-31 DEVICE — SCREW BONE CANCACT 6.5X15: Type: IMPLANTABLE DEVICE | Site: HIP | Status: FUNCTIONAL

## 2022-01-31 DEVICE — IMPLANTABLE DEVICE: Type: IMPLANTABLE DEVICE | Site: HIP | Status: FUNCTIONAL

## 2022-01-31 DEVICE — CEMENT BONE SURG SMPLX P RADPQ: Type: IMPLANTABLE DEVICE | Site: HIP | Status: FUNCTIONAL

## 2022-01-31 DEVICE — CUP ACT PNCL SEC 48MM TITANIUM: Type: IMPLANTABLE DEVICE | Site: HIP | Status: FUNCTIONAL

## 2022-01-31 DEVICE — HEAD ARTICUL CERAMAX +5 32MM: Type: IMPLANTABLE DEVICE | Site: HIP | Status: FUNCTIONAL

## 2022-01-31 DEVICE — LINE ACET PINN 32X48 ALTRX: Type: IMPLANTABLE DEVICE | Site: HIP | Status: FUNCTIONAL

## 2022-01-31 RX ORDER — ROPIVACAINE/EPI/CLONIDINE/KET 2.46-0.005
SYRINGE (ML) INJECTION
Status: DISCONTINUED | OUTPATIENT
Start: 2022-01-31 | End: 2022-01-31 | Stop reason: HOSPADM

## 2022-01-31 RX ORDER — VANCOMYCIN HYDROCHLORIDE 1 G/20ML
INJECTION, POWDER, LYOPHILIZED, FOR SOLUTION INTRAVENOUS
Status: DISCONTINUED | OUTPATIENT
Start: 2022-01-31 | End: 2022-01-31 | Stop reason: HOSPADM

## 2022-01-31 RX ORDER — FENTANYL CITRATE 50 UG/ML
100 INJECTION, SOLUTION INTRAMUSCULAR; INTRAVENOUS
Status: DISCONTINUED | OUTPATIENT
Start: 2022-01-31 | End: 2022-01-31 | Stop reason: HOSPADM

## 2022-01-31 RX ORDER — AZELASTINE 1 MG/ML
2 SPRAY, METERED NASAL 2 TIMES DAILY
Status: DISCONTINUED | OUTPATIENT
Start: 2022-01-31 | End: 2022-02-02 | Stop reason: HOSPADM

## 2022-01-31 RX ORDER — ONDANSETRON 2 MG/ML
4 INJECTION INTRAMUSCULAR; INTRAVENOUS EVERY 8 HOURS PRN
Status: DISCONTINUED | OUTPATIENT
Start: 2022-01-31 | End: 2022-02-02 | Stop reason: HOSPADM

## 2022-01-31 RX ORDER — POLYETHYLENE GLYCOL 3350 17 G/17G
17 POWDER, FOR SOLUTION ORAL DAILY PRN
Status: DISCONTINUED | OUTPATIENT
Start: 2022-01-31 | End: 2022-02-02 | Stop reason: HOSPADM

## 2022-01-31 RX ORDER — OXYCODONE HYDROCHLORIDE 10 MG/1
10 TABLET ORAL
Status: DISCONTINUED | OUTPATIENT
Start: 2022-01-31 | End: 2022-02-01

## 2022-01-31 RX ORDER — ONDANSETRON 2 MG/ML
4 INJECTION INTRAMUSCULAR; INTRAVENOUS DAILY PRN
Status: DISCONTINUED | OUTPATIENT
Start: 2022-01-31 | End: 2022-01-31 | Stop reason: HOSPADM

## 2022-01-31 RX ORDER — CEFAZOLIN SODIUM/D5W 2 G/100 ML
2 PLASTIC BAG, INJECTION (ML) INTRAVENOUS
Status: COMPLETED | OUTPATIENT
Start: 2022-01-31 | End: 2022-01-31

## 2022-01-31 RX ORDER — MUPIROCIN 20 MG/G
1 OINTMENT TOPICAL 2 TIMES DAILY
Status: DISCONTINUED | OUTPATIENT
Start: 2022-01-31 | End: 2022-02-02 | Stop reason: HOSPADM

## 2022-01-31 RX ORDER — CEFAZOLIN SODIUM/WATER 2 G/20 ML
2 SYRINGE (ML) INTRAVENOUS
Status: DISCONTINUED | OUTPATIENT
Start: 2022-01-31 | End: 2022-01-31

## 2022-01-31 RX ORDER — ONDANSETRON 2 MG/ML
INJECTION INTRAMUSCULAR; INTRAVENOUS
Status: DISCONTINUED | OUTPATIENT
Start: 2022-01-31 | End: 2022-01-31

## 2022-01-31 RX ORDER — FENTANYL CITRATE 50 UG/ML
25 INJECTION, SOLUTION INTRAMUSCULAR; INTRAVENOUS EVERY 5 MIN PRN
Status: DISCONTINUED | OUTPATIENT
Start: 2022-01-31 | End: 2022-01-31 | Stop reason: HOSPADM

## 2022-01-31 RX ORDER — NALOXONE HCL 0.4 MG/ML
0.02 VIAL (ML) INJECTION
Status: DISCONTINUED | OUTPATIENT
Start: 2022-01-31 | End: 2022-02-02 | Stop reason: HOSPADM

## 2022-01-31 RX ORDER — PROPOFOL 10 MG/ML
VIAL (ML) INTRAVENOUS CONTINUOUS PRN
Status: DISCONTINUED | OUTPATIENT
Start: 2022-01-31 | End: 2022-01-31

## 2022-01-31 RX ORDER — ACETAMINOPHEN 500 MG
1000 TABLET ORAL EVERY 6 HOURS
Status: DISCONTINUED | OUTPATIENT
Start: 2022-01-31 | End: 2022-02-02 | Stop reason: HOSPADM

## 2022-01-31 RX ORDER — DEXAMETHASONE SODIUM PHOSPHATE 4 MG/ML
INJECTION, SOLUTION INTRA-ARTICULAR; INTRALESIONAL; INTRAMUSCULAR; INTRAVENOUS; SOFT TISSUE
Status: DISCONTINUED | OUTPATIENT
Start: 2022-01-31 | End: 2022-01-31

## 2022-01-31 RX ORDER — SODIUM CHLORIDE 9 MG/ML
INJECTION, SOLUTION INTRAVENOUS
Status: COMPLETED | OUTPATIENT
Start: 2022-01-31 | End: 2022-01-31

## 2022-01-31 RX ORDER — CEFAZOLIN SODIUM/WATER 2 G/20 ML
2 SYRINGE (ML) INTRAVENOUS
Status: COMPLETED | OUTPATIENT
Start: 2022-01-31 | End: 2022-01-31

## 2022-01-31 RX ORDER — PHENYLEPHRINE HYDROCHLORIDE 10 MG/ML
INJECTION INTRAVENOUS
Status: DISCONTINUED | OUTPATIENT
Start: 2022-01-31 | End: 2022-01-31

## 2022-01-31 RX ORDER — PREGABALIN 75 MG/1
75 CAPSULE ORAL
Status: COMPLETED | OUTPATIENT
Start: 2022-01-31 | End: 2022-01-31

## 2022-01-31 RX ORDER — SODIUM CHLORIDE 9 MG/ML
INJECTION, SOLUTION INTRAVENOUS CONTINUOUS
Status: ACTIVE | OUTPATIENT
Start: 2022-01-31 | End: 2022-02-01

## 2022-01-31 RX ORDER — METHOCARBAMOL 750 MG/1
750 TABLET, FILM COATED ORAL 3 TIMES DAILY
Status: DISCONTINUED | OUTPATIENT
Start: 2022-01-31 | End: 2022-02-02 | Stop reason: HOSPADM

## 2022-01-31 RX ORDER — MUPIROCIN 20 MG/G
1 OINTMENT TOPICAL
Status: COMPLETED | OUTPATIENT
Start: 2022-01-31 | End: 2022-01-31

## 2022-01-31 RX ORDER — TRANEXAMIC ACID 100 MG/ML
1000 INJECTION, SOLUTION INTRAVENOUS
Status: DISCONTINUED | OUTPATIENT
Start: 2022-01-31 | End: 2022-01-31 | Stop reason: HOSPADM

## 2022-01-31 RX ORDER — HALOPERIDOL 5 MG/ML
0.5 INJECTION INTRAMUSCULAR EVERY 10 MIN PRN
Status: DISCONTINUED | OUTPATIENT
Start: 2022-01-31 | End: 2022-01-31 | Stop reason: HOSPADM

## 2022-01-31 RX ORDER — PRAVASTATIN SODIUM 20 MG/1
40 TABLET ORAL DAILY
Status: DISCONTINUED | OUTPATIENT
Start: 2022-02-01 | End: 2022-02-02 | Stop reason: HOSPADM

## 2022-01-31 RX ORDER — FAMOTIDINE 20 MG/1
20 TABLET, FILM COATED ORAL 2 TIMES DAILY
Status: DISCONTINUED | OUTPATIENT
Start: 2022-01-31 | End: 2022-02-02 | Stop reason: HOSPADM

## 2022-01-31 RX ORDER — AMOXICILLIN 250 MG
1 CAPSULE ORAL 2 TIMES DAILY
Status: DISCONTINUED | OUTPATIENT
Start: 2022-01-31 | End: 2022-02-02 | Stop reason: HOSPADM

## 2022-01-31 RX ORDER — ASPIRIN 81 MG/1
81 TABLET ORAL DAILY
Status: ON HOLD | COMMUNITY
End: 2022-01-31 | Stop reason: HOSPADM

## 2022-01-31 RX ORDER — MORPHINE SULFATE 2 MG/ML
2 INJECTION, SOLUTION INTRAMUSCULAR; INTRAVENOUS
Status: DISCONTINUED | OUTPATIENT
Start: 2022-01-31 | End: 2022-02-01

## 2022-01-31 RX ORDER — ASPIRIN 81 MG/1
81 TABLET ORAL 2 TIMES DAILY
Status: DISCONTINUED | OUTPATIENT
Start: 2022-01-31 | End: 2022-02-02 | Stop reason: HOSPADM

## 2022-01-31 RX ORDER — SODIUM CHLORIDE 0.9 % (FLUSH) 0.9 %
10 SYRINGE (ML) INJECTION
Status: DISCONTINUED | OUTPATIENT
Start: 2022-01-31 | End: 2022-01-31 | Stop reason: HOSPADM

## 2022-01-31 RX ORDER — MIDAZOLAM HYDROCHLORIDE 1 MG/ML
4 INJECTION INTRAMUSCULAR; INTRAVENOUS
Status: DISCONTINUED | OUTPATIENT
Start: 2022-01-31 | End: 2022-01-31 | Stop reason: HOSPADM

## 2022-01-31 RX ORDER — CETIRIZINE HYDROCHLORIDE 10 MG/1
10 TABLET ORAL DAILY
Status: DISCONTINUED | OUTPATIENT
Start: 2022-02-01 | End: 2022-02-02 | Stop reason: HOSPADM

## 2022-01-31 RX ORDER — PREGABALIN 75 MG/1
75 CAPSULE ORAL NIGHTLY
Status: DISCONTINUED | OUTPATIENT
Start: 2022-01-31 | End: 2022-02-01

## 2022-01-31 RX ORDER — TRANEXAMIC ACID 100 MG/ML
INJECTION, SOLUTION INTRAVENOUS
Status: DISCONTINUED | OUTPATIENT
Start: 2022-01-31 | End: 2022-01-31 | Stop reason: HOSPADM

## 2022-01-31 RX ORDER — ACETAMINOPHEN 500 MG
1000 TABLET ORAL
Status: COMPLETED | OUTPATIENT
Start: 2022-01-31 | End: 2022-01-31

## 2022-01-31 RX ORDER — KETAMINE HCL IN 0.9 % NACL 50 MG/5 ML
SYRINGE (ML) INTRAVENOUS
Status: DISCONTINUED | OUTPATIENT
Start: 2022-01-31 | End: 2022-01-31

## 2022-01-31 RX ORDER — TRANEXAMIC ACID 100 MG/ML
1000 INJECTION, SOLUTION INTRAVENOUS
Status: COMPLETED | OUTPATIENT
Start: 2022-01-31 | End: 2022-01-31

## 2022-01-31 RX ORDER — METOPROLOL SUCCINATE 50 MG/1
100 TABLET, EXTENDED RELEASE ORAL DAILY
Status: DISCONTINUED | OUTPATIENT
Start: 2022-02-01 | End: 2022-02-02 | Stop reason: HOSPADM

## 2022-01-31 RX ORDER — PROCHLORPERAZINE EDISYLATE 5 MG/ML
5 INJECTION INTRAMUSCULAR; INTRAVENOUS EVERY 6 HOURS PRN
Status: DISCONTINUED | OUTPATIENT
Start: 2022-01-31 | End: 2022-02-02 | Stop reason: HOSPADM

## 2022-01-31 RX ORDER — OXYCODONE HYDROCHLORIDE 5 MG/1
5 TABLET ORAL
Status: DISCONTINUED | OUTPATIENT
Start: 2022-01-31 | End: 2022-02-01

## 2022-01-31 RX ORDER — VANCOMYCIN HCL IN 5 % DEXTROSE 1G/250ML
1000 PLASTIC BAG, INJECTION (ML) INTRAVENOUS
Status: COMPLETED | OUTPATIENT
Start: 2022-01-31 | End: 2022-01-31

## 2022-01-31 RX ORDER — CELECOXIB 200 MG/1
400 CAPSULE ORAL
Status: COMPLETED | OUTPATIENT
Start: 2022-01-31 | End: 2022-01-31

## 2022-01-31 RX ORDER — OXYCODONE HYDROCHLORIDE 5 MG/1
5 TABLET ORAL
Status: DISCONTINUED | OUTPATIENT
Start: 2022-01-31 | End: 2022-01-31 | Stop reason: HOSPADM

## 2022-01-31 RX ORDER — FENTANYL CITRATE 50 UG/ML
INJECTION, SOLUTION INTRAMUSCULAR; INTRAVENOUS
Status: DISCONTINUED | OUTPATIENT
Start: 2022-01-31 | End: 2022-01-31

## 2022-01-31 RX ORDER — LIDOCAINE HYDROCHLORIDE 10 MG/ML
1 INJECTION, SOLUTION EPIDURAL; INFILTRATION; INTRACAUDAL; PERINEURAL
Status: DISCONTINUED | OUTPATIENT
Start: 2022-01-31 | End: 2022-01-31 | Stop reason: HOSPADM

## 2022-01-31 RX ORDER — POLYETHYLENE GLYCOL 3350 17 G/17G
17 POWDER, FOR SOLUTION ORAL DAILY
Status: DISCONTINUED | OUTPATIENT
Start: 2022-01-31 | End: 2022-02-02 | Stop reason: HOSPADM

## 2022-01-31 RX ADMIN — FENTANYL CITRATE 25 MCG: 50 INJECTION, SOLUTION INTRAMUSCULAR; INTRAVENOUS at 01:01

## 2022-01-31 RX ADMIN — METHOCARBAMOL 750 MG: 750 TABLET ORAL at 09:01

## 2022-01-31 RX ADMIN — SODIUM CHLORIDE: 0.9 INJECTION, SOLUTION INTRAVENOUS at 11:01

## 2022-01-31 RX ADMIN — MUPIROCIN 1 G: 20 OINTMENT TOPICAL at 11:01

## 2022-01-31 RX ADMIN — FAMOTIDINE 20 MG: 20 TABLET, FILM COATED ORAL at 09:01

## 2022-01-31 RX ADMIN — TRANEXAMIC ACID 1000 MG: 100 INJECTION, SOLUTION INTRAVENOUS at 01:01

## 2022-01-31 RX ADMIN — Medication 2 G: at 01:01

## 2022-01-31 RX ADMIN — PHENYLEPHRINE HYDROCHLORIDE 100 MCG: 10 INJECTION INTRAVENOUS at 02:01

## 2022-01-31 RX ADMIN — OXYCODONE HYDROCHLORIDE 10 MG: 10 TABLET ORAL at 10:01

## 2022-01-31 RX ADMIN — AZELASTINE HYDROCHLORIDE 274 MCG: 137 SPRAY, METERED NASAL at 09:01

## 2022-01-31 RX ADMIN — ACETAMINOPHEN 1000 MG: 500 TABLET ORAL at 11:01

## 2022-01-31 RX ADMIN — PROPOFOL 100 MCG/KG/MIN: 10 INJECTION, EMULSION INTRAVENOUS at 01:01

## 2022-01-31 RX ADMIN — MUPIROCIN 1 G: 20 OINTMENT TOPICAL at 09:01

## 2022-01-31 RX ADMIN — PHENYLEPHRINE HYDROCHLORIDE 100 MCG: 10 INJECTION INTRAVENOUS at 03:01

## 2022-01-31 RX ADMIN — PHENYLEPHRINE HYDROCHLORIDE 0.25 MCG/KG/MIN: 10 INJECTION INTRAVENOUS at 01:01

## 2022-01-31 RX ADMIN — POLYETHYLENE GLYCOL 3350 17 G: 17 POWDER, FOR SOLUTION ORAL at 04:01

## 2022-01-31 RX ADMIN — Medication 20 MG: at 01:01

## 2022-01-31 RX ADMIN — CELECOXIB 400 MG: 200 CAPSULE ORAL at 11:01

## 2022-01-31 RX ADMIN — GLYCOPYRROLATE 0.2 MG: 0.2 INJECTION, SOLUTION INTRAMUSCULAR; INTRAVITREAL at 01:01

## 2022-01-31 RX ADMIN — MEPIVACAINE HYDROCHLORIDE 5 ML/HR: 15 INJECTION, SOLUTION EPIDURAL; INFILTRATION at 03:01

## 2022-01-31 RX ADMIN — METHOCARBAMOL 750 MG: 750 TABLET ORAL at 04:01

## 2022-01-31 RX ADMIN — PHENYLEPHRINE HYDROCHLORIDE 100 MCG: 10 INJECTION INTRAVENOUS at 01:01

## 2022-01-31 RX ADMIN — PHENYLEPHRINE HYDROCHLORIDE 50 MCG: 10 INJECTION INTRAVENOUS at 01:01

## 2022-01-31 RX ADMIN — DOCUSATE SODIUM 50 MG AND SENNOSIDES 8.6 MG 1 TABLET: 8.6; 5 TABLET, FILM COATED ORAL at 09:01

## 2022-01-31 RX ADMIN — PREGABALIN 75 MG: 75 CAPSULE ORAL at 11:01

## 2022-01-31 RX ADMIN — PREGABALIN 75 MG: 75 CAPSULE ORAL at 09:01

## 2022-01-31 RX ADMIN — DEXAMETHASONE SODIUM PHOSPHATE 8 MG: 4 INJECTION, SOLUTION INTRAMUSCULAR; INTRAVENOUS at 01:01

## 2022-01-31 RX ADMIN — TRANEXAMIC ACID 1000 MG: 100 INJECTION, SOLUTION INTRAVENOUS at 03:01

## 2022-01-31 RX ADMIN — VANCOMYCIN HYDROCHLORIDE 1000 MG: 1 INJECTION, POWDER, LYOPHILIZED, FOR SOLUTION INTRAVENOUS at 11:01

## 2022-01-31 RX ADMIN — ONDANSETRON 4 MG: 2 INJECTION, SOLUTION INTRAMUSCULAR; INTRAVENOUS at 03:01

## 2022-01-31 RX ADMIN — LIDOCAINE HYDROCHLORIDE AND EPINEPHRINE 5 ML: 15; 5 INJECTION, SOLUTION EPIDURAL at 03:01

## 2022-01-31 RX ADMIN — ACETAMINOPHEN 1000 MG: 500 TABLET ORAL at 06:01

## 2022-01-31 RX ADMIN — PHENYLEPHRINE HYDROCHLORIDE 50 MCG: 10 INJECTION INTRAVENOUS at 02:01

## 2022-01-31 RX ADMIN — Medication 2 G: at 09:01

## 2022-01-31 RX ADMIN — ASPIRIN 81 MG: 81 TABLET, COATED ORAL at 09:01

## 2022-01-31 RX ADMIN — SODIUM CHLORIDE: 0.9 INJECTION, SOLUTION INTRAVENOUS at 03:01

## 2022-01-31 NOTE — TELEPHONE ENCOUNTER
DOCUMENTATION ONLY   oxyCODONE HCl 5mg tablets  Cash price: $6 for 30 tablets    [facility]   Federal Employee Program  This letter is to inform you that we did not approve your recent Prior Approval request for Oxycodone IR 5mg tablets.    How we based our decision  The indicated use of this medication, as provided does not meet the [facility] and Blue Shield Service Benefit Plans criteria due to the following reason:    patients who are treatment naive requesting more than a 7 day supply are required to have exactly a 7 day supply fill of any immediate release opioid before receiving additional medication. The use of this medication in a patient who has not received at least 7 days of any opioid therapy in the last 180 days does not establish medical necessity for this drug. The first fill of an immediate release opioid NOT exceeding a 7 day supply AND the Plans daily dosing limits is excluded from this denial. Please refer to the Prior Approval (PA) criteria for quantity limits and conditions. Medical necessity is determined by adherence to generally accepted standards of medical practice in the [place], is clinically appropriate, in terms of type, frequency, extent, site, duration and considered effective for the patient's illness, injury, disease, or its symptoms. For more information, please refer to the [facility] and Blue Shield Service Benefit Plan brochure ([place] ). Details regarding medical necessity are listed in section 10. Patients who have received cancer treatment (chemotherapy, radiation, or oncology medications) in the last six months may be exempt from this denial. Please submit medical records documenting treatment for review.

## 2022-01-31 NOTE — PLAN OF CARE
[facility] - Surgery (Hospital)    HOME HEALTH ORDERS  FACE TO FACE ENCOUNTER    Patient Name: Claudia Sierra  YOB: 1940    PCP: Sajan Curtis MD   PCP Address: [address] [place] [address] [address]  PCP Phone Number: [phone]  PCP Fax: [phone]    Encounter Date: 01/31/2022    Admit to Home Health    Diagnoses:  Active Hospital Problems    Diagnosis  POA    *Primary osteoarthritis of right hip [M16.11]  Yes      Resolved Hospital Problems   No resolved problems to display.       Future Appointments   Date Time Provider Department Center   2/3/2022  1:00 PM TELEMED NURSE, EULOGIO ORTHO [facility]   2/11/2022 10:45 AM RUSTAM AnandC ORTHO [address]   12/21/2022  8:10 AM APPOINTMENT LAB, LILLIAN EVANS [facility]   12/21/2022  8:45 AM KN MAMMO1 KN MAMMO [facility]   12/22/2022 10:40 AM Mariano Mascorro MD [facility] HEM ONC [facility]           I have seen and examined this patient face to face today. My clinical findings that support the need for the home health skilled services and home bound status are the following:  Weakness/numbness causing balance and gait disturbance due to Joint Replacement making it taxing to leave home.    Allergies:  Review of patient's allergies indicates:   Allergen Reactions    Codeine Other (See Comments)     Feel like (climbing the walls)    Benadryl [diphenhydramine hcl] Anxiety       Diet: regular diet, WBAT With walker, no extremes of motion    Activities: activity as tolerated    Home Health Admitting Clinician:   SN/PT to complete comprehensive assessment including routine vital signs. Instruct on disease process and s/s of complications to report to MD. Follow specific home health arthoplasty protocol. Review/verify medication list sent home with the patient at time of discharge  and instruct patient/caregiver as needed. If coumadin ordered, coumadin clinic to manage INR with INR draws 2x per week with a goal to  maintain INR between 1.8 and 2.2. Frequency may be adjusted depending on start of care date.    Notify MD if SBP > 160 or < 90; DBP > 90 or < 50; HR > 120 or < 50; Temp > 101    Home Medical Equipment:  Walker, 3-1 bedside commode, transfer tub bench    CONSULTS:    Physical Therapy may admit if patient not on coumadin, PT to perform comprehensive assessment if performing admit visit and generate therapy plan of care. Evaluate for home safety and equipment needs; Establish/upgrade home exercise program. Perform/instruct on therapeutic exercises, gait training, transfer training, and Range of Motion.      OTHER: (only select if patient needs other therapy disciplines)  Occupational Therapy to evaluate and treat. Evaluate home environment for safety and equipment needs. Perform/Instruct on transfers, ADL training, ROM, and therapeutic exercises.    WOUND CARE ORDERS:  Leave dressing on. Ok to shower, don't soak in tub.    Medications: Review discharge medications with patient and family and provide education.      Current Discharge Medication List      START taking these medications    Details   acetaminophen (TYLENOL) 650 MG TbSR Take 1 tablet (650 mg total) by mouth every 6 to 8 hours as needed (pain).  Qty: 120 tablet, Refills: 0      celecoxib (CELEBREX) 200 MG capsule Take 1 capsule (200 mg total) by mouth once daily.  Qty: 30 capsule, Refills: 0      docusate sodium (COLACE) 100 MG capsule Take 1 capsule (100 mg total) by mouth 2 (two) times daily as needed for Constipation.  Qty: 60 capsule, Refills: 0      methocarbamoL (ROBAXIN) 750 MG Tab Take 1 tablet (750 mg total) by mouth 3 (three) times daily as needed (muscle spasms).  Qty: 30 tablet, Refills: 0      oxyCODONE (ROXICODONE) 5 MG immediate release tablet Take 1 tab by mouth every 4-6 hours as needed for pain  Qty: 30 tablet, Refills: 0    Comments: Greater than 7 day supply is medically necessary. Deliver to [facility] for surgery 1/31/2022.          CONTINUE these medications which have CHANGED    Details   aspirin (ECOTRIN) 81 MG EC tablet Take 1 tablet (81 mg total) by mouth 2 (two) times daily.  Qty: 60 tablet, Refills: 0         CONTINUE these medications which have NOT CHANGED    Details   azelastine (ASTELIN) 137 mcg (0.1 %) nasal spray 2 sprays (274 mcg total) by Nasal route 2 (two) times daily.  Qty: 90 mL, Refills: 2      beta-carotene,A,-vits C,E/mins (OCUVITE ORAL) Take 1 tablet by mouth every evening.      gabapentin (NEURONTIN) 600 MG tablet Take 1 tablet (600 mg total) by mouth nightly as needed (pain).  Qty: 90 tablet, Refills: 3    Associated Diagnoses: Post herpetic neuralgia      lactulose (CHRONULAC) 10 gram/15 mL solution TAKE 45 MLS (30 G TOTAL) BY MOUTH 3 (THREE) TIMES DAILY AS NEEDED (CONSTIPATION).  Qty: 473 mL, Refills: 4    Associated Diagnoses: Slow transit constipation      loratadine (CLARITIN) 10 mg tablet Take 10 mg by mouth once daily.      metoprolol succinate (TOPROL-XL) 100 MG 24 hr tablet TAKE 1 TABLET BY MOUTH EVERY DAY  Qty: 90 tablet, Refills: 3    Associated Diagnoses: Encounter for antineoplastic chemotherapy; Essential hypertension; Chronic atrial fibrillation; Gastroesophageal reflux disease with esophagitis      polycarbophil (FIBERCON) 625 mg tablet Take 625 mg by mouth every evening. PT TAKES 3 TABLETS DAILY      pravastatin (PRAVACHOL) 40 MG tablet TAKE 1 TABLET BY MOUTH EVERY DAY  Qty: 90 tablet, Refills: 3    Associated Diagnoses: Hypercholesterolemia         STOP taking these medications       ascorbic acid, vitamin C, (VITAMIN C) 1000 MG tablet Comments:   Reason for Stopping:         b complex vitamins tablet Comments:   Reason for Stopping:         cholecalciferol, vitamin D3, (VITAMIN D3) 25 mcg (1,000 unit) capsule Comments:   Reason for Stopping:               I certify that this patient is confined to her home and needs intermittent skilled nursing care, physical therapy and occupational therapy.

## 2022-01-31 NOTE — ANESTHESIA PREPROCEDURE EVALUATION
01/31/2022  Claudia Sierra is a 81 y.o., female.    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 .    APPENDECTOMY      BREAST BIOPSY      right    BREAST LUMPECTOMY Right 2013    EPIDURAL STEROID INJECTION INTO CERVICAL SPINE N/A 2/12/2020    Procedure: Injection-steroid-epidural-cervical--C7-T1 IL ELMIRA;  Surgeon: Alicia Proctor MD;  [facility];  Service: Pain Management;  Laterality: N/A;  sign consent at DOS    EYE SURGERY  2018    HYSTERECTOMY      lymphnode removal      12 from Right axilla    PORTACATH PLACEMENT  2013    TONSILLECTOMY       Current Discharge Medication List      START taking these medications    Details   acetaminophen (TYLENOL) 650 MG TbSR Take 1 tablet (650 mg total) by mouth every 6 to 8 hours as needed (pain).  Qty: 120 tablet, Refills: 0      !! aspirin (ECOTRIN) 81 MG EC tablet Take 1  tablet (81 mg total) by mouth 2 (two) times daily.  Qty: 60 tablet, Refills: 0      celecoxib (CELEBREX) 200 MG capsule Take 1 capsule (200 mg total) by mouth once daily.  Qty: 30 capsule, Refills: 0      docusate sodium (COLACE) 100 MG capsule Take 1 capsule (100 mg total) by mouth 2 (two) times daily as needed for Constipation.  Qty: 60 capsule, Refills: 0      methocarbamoL (ROBAXIN) 750 MG Tab Take 1 tablet (750 mg total) by mouth 3 (three) times daily as needed (muscle spasms).  Qty: 30 tablet, Refills: 0      oxyCODONE (ROXICODONE) 5 MG immediate release tablet Take 1 tab by mouth every 4-6 hours as needed for pain  Qty: 30 tablet, Refills: 0    Comments: Greater than 7 day supply is medically necessary. Deliver to [facility] for surgery 1/31/2022.       !! - Potential duplicate medications found. Please discuss with provider.      CONTINUE these medications which have NOT CHANGED    Details   ascorbic acid, vitamin C, (VITAMIN C) 1000 MG tablet Take 1,000 mg by mouth once daily.      !! aspirin (ECOTRIN) 81 MG EC tablet Take 81 mg by mouth once daily.      azelastine (ASTELIN) 137 mcg (0.1 %) nasal spray 2 sprays (274 mcg total) by Nasal route 2 (two) times daily.  Qty: 90 mL, Refills: 2      b complex vitamins tablet Take 1 tablet by mouth once daily.      beta-carotene,A,-vits C,E/mins (OCUVITE ORAL) Take 1 tablet by mouth every evening.      cholecalciferol, vitamin D3, (VITAMIN D3) 25 mcg (1,000 unit) capsule Take 1,000 Units by mouth once daily.      gabapentin (NEURONTIN) 600 MG tablet Take 1 tablet (600 mg total) by mouth nightly as needed (pain).  Qty: 90 tablet, Refills: 3    Associated Diagnoses: Post herpetic neuralgia      lactulose (CHRONULAC) 10 gram/15 mL solution TAKE 45 MLS (30 G TOTAL) BY MOUTH 3 (THREE) TIMES DAILY AS NEEDED (CONSTIPATION).  Qty: 473 mL, Refills: 4    Associated Diagnoses: Slow transit constipation      loratadine (CLARITIN) 10 mg tablet Take 10 mg by mouth once daily.       metoprolol succinate (TOPROL-XL) 100 MG 24 hr tablet TAKE 1 TABLET BY MOUTH EVERY DAY  Qty: 90 tablet, Refills: 3    Associated Diagnoses: Encounter for antineoplastic chemotherapy; Essential hypertension; Chronic atrial fibrillation; Gastroesophageal reflux disease with esophagitis      polycarbophil (FIBERCON) 625 mg tablet Take 625 mg by mouth every evening. PT TAKES 3 TABLETS DAILY      pravastatin (PRAVACHOL) 40 MG tablet TAKE 1 TABLET BY MOUTH EVERY DAY  Qty: 90 tablet, Refills: 3    Associated Diagnoses: Hypercholesterolemia       !! - Potential duplicate medications found. Please discuss with provider.          Anesthesia Evaluation    I have reviewed the Patient Summary Reports.    I have reviewed the Nursing Notes. I have reviewed the NPO Status.      Review of Systems  Anesthesia Hx:  No problems with previous Anesthesia  History of prior surgery of interest to airway management or planning: Previous anesthesia: General 6/16/15-REMOVAL-PORT-A-CATH (Left ) with general anesthesia.  Procedure performed at an [facility]. Denies Family Hx of Anesthesia complications.   Denies Personal Hx of Anesthesia complications.   Social:  Non-Smoker, Social Alcohol Use    Hematology/Oncology:         -- Transfusion: -- Transfusion Hx (no complications): --  Cancer in past history:  Breast right axillary node dissection lymphedema radiation, chemotherapy and surgery    EENT/Dental:   Eyes: Visual Impairment Has S/P Extraction - Bilateral Catarract   Cardiovascular:   Hypertension  Denies Angina. hyperlipidemia  Functional Capacity 2 METS  Disorder of Cardiac Rhythm, Atrial Fibrillation, Hx of Atrial Fibrillation    Pulmonary:   Denies Shortness of breath.  Denies Recent URI. HAS NOT EXPERIENCED ANY ASTHMA SYMPTOMS IN OVER 40 YEARS Asthma:  last episode was > 1 year ago. Emergency visits this year is none.  Current breathing status is optimal, free of wheezing.    Renal/:  Renal/ Normal     Hepatic/GI:   PUD,  GERD POST HEPATIC NEURALGIA Hepatic/GI Symptoms: constipation.    Musculoskeletal:  Joint Disease:  Arthritis, Osteoarthritis  Cervical Spine Disorder, Cervical Disc Disease, Radiculopathy    Neurological:   CERVICAL RADICULOPATHY    CHEMO INDUCED NEUROPATHY Osteoarthritis  Peripheral Neuropathy    Psych:  Psychiatric Normal           Physical Exam  General:  Well nourished    Airway/Jaw/Neck:  Airway Findings: Mouth Opening: Normal Tongue: Normal  Jaw/Neck Findings:  Neck ROM: Normal ROM      Dental:  Dental Findings: In tact   Chest/Lungs:  Chest/Lungs Findings: Clear to auscultation     Heart/Vascular:  Heart Findings: Rate: Normal  Rhythm: Regular Rhythm  Sounds: Normal        Mental Status:  Mental Status Findings:  Cooperative, Alert and Oriented         MD Tracey Elena III, RN; Zach JEFFERSON Staff; Aysha Perez PA-C  Caller: Unspecified (6 days ago,  1:42 PM)  Agree with your plan, current workup by PCP is adequate.   Thank you for checking!     12/28/21  Right hip pain - Primary      Overview       Will be getting right hip replacement with Dr condon .  preop assessment done today                    As above, continue current medications and maintain follow up with specialists.  Return to clinic in 6  months.       Sajan Curtis  [facility]        8/16/21-Patient is optimized   Galindo Montilla NP  Perioperative Medicine  [facility]   Pager [phone]         Anesthesia Plan  Type of Anesthesia, risks & benefits discussed:  Anesthesia Type:  general, CSE    Patient's Preference:   Plan Factors:          Intra-op Monitoring Plan: standard ASA monitors  Intra-op Monitoring Plan Comments:   Post Op Pain Control Plan: multimodal analgesia, IV/PO Opioids PRN and per primary service following discharge from PACU  Post Op Pain Control Plan Comments:     Induction:   IV  Beta Blocker:  Patient is not currently on a  Beta-Blocker (No further documentation required).       Informed Consent: Patient understands risks and agrees with Anesthesia plan.  Questions answered. Anesthesia consent signed with patient.  ASA Score: 2     Day of Surgery Review of History & Physical:    H&P update referred to the surgeon.         Ready For Surgery From Anesthesia Perspective.

## 2022-01-31 NOTE — PT/OT/SLP PROGRESS
Occupational Therapy      Patient Name:  Claudia Sierra   MRN:  [id number]    Patient not seen today secondary to patient not resolved. Will follow-up for OT evaluation 2/1/22.    1/31/2022

## 2022-01-31 NOTE — OP NOTE
Fredis ARDON right hip  OPERATIVE NOTE      Date of Operation:   1/31/2022    Providers Performing:   Surgeon(s):  Cisco Mcneal III, MD  Assistant: Toan Lora MD    Preoperative Diagnosis:   Right hip osteoarthritis, M16.11    Postoperative Diagnosis:   Same    Operative Procedure:   Right Total Hip Arthroplasty, Anterior Approach, CPT 06220    Anesthesia:   Spinal/Epidural  MYA cocktail: KATIE    Estimated Blood Loss:   350 cc     Specimens:   Tissue sent for permanent pathology    Complications:   None, stable to PACU.    Implants Utilized:   Depuy  Roberts Sector Gription; Size 48  Roberts Altrx polyethylene liner; 48 OD, 32 ID, neutral  [place] cemented 2 STD  Biolox Delta Ceramic 12/14 taper 32mm + 5  Acetabular screw 30mm    2 bags simplex P    Implant Name Type Inv. Item Serial No.  Lot No. LRB No. Used Action   CEMENT BONE SURG SMPLX P RADPQ - [id number]  CEMENT BONE SURG SMPLX P RADPQ  SHIMAUMA Print System TAYA. [id number] Right 2 Implanted   CUP ACT PNCL SEC 48MM TITANIUM - [id number]  CUP ACT PNCL SEC 48MM TITANIUM  [employer] [id number] Right 1 Implanted   LINE ACET PINN 32X48 ALTRX - [id number]  LINE ACET PINN 32X48 ALTRX  [employer] [id number] Right 1 Implanted   SCREW PINACLE 6.5MM X 30MM - [id number]  SCREW PINACLE 6.5MM X 30MM  [employer] [id number] Right 1 Implanted   SCREW BONE CANCACT 6.5X15 - [id number]  SCREW BONE CANCACT 6.5X15  [employer] [id number] Right 1 Implanted   STEM CENTRALIZER 9.25MM    [employer]PU[employer] [id number] Right 1 Implanted   STEM FEM TPR PCT CMNT SZ 2 - [id number]  STEM FEM TPR PCT CMNT SZ 2  [employer] [id number] Right 1 Implanted   HEAD ARTICUL CERAMAX +5 32MM - [id number]  HEAD ARTICUL CERAMAX +5 32MM  SYNTHES [id number] Right 1 Implanted       Indications:   The patient has longstanding right hip pain secondary to primary osteoarthritis. They have failed conservative management which includes anti-inflammatory medications and activity modification. We reviewed the risks and benefits of  the direct anterior hip replacement with the patient prior to surgery including risk of infection, lateral thigh numbness, blood clot, leg length inequality, dislocation, components loosening, components wearing out, need for revision surgery, continued pain, limping, and injury to nerves and vessels.  After discussing the risks and benefits of the procedure they would like to proceed with surgery.    Operative Notes:   The patient was met in the holding area. The operative site was marked. Anesthesia administered spinal anesthesia. The patient was placed supine on a Russell table and the operative site was identified. The hip was prepped and draped in the usual fashion. IV antibiotics were administered and a timeout was performed.     I performed a direct anterior approach to the hip. Skin incision was made and the fascia of the tensor fascia cyndie was identified. I utilized the interval between the tensor fascia cyndie and sartorious for direct dissection to the hip joint. I identified and coagulated the ascending branch of the lateral circumflex vessel. Standard retractors over the anterior hip capsule were placed and the capsulotomy was performed. The capsule was tagged for retraction. The femoral head was identified and the femoral neck osteotomy was made in accordance with preoperative templating. The femoral head was then removed with a corkscrew.    The standard anterior, posterior, and superior retractors were placed around the acetabulum. The capsular labrum and foveal contents were removed. Sequential acetabular reamers were used to prepare the acetabulum. Once good good fit was achieved, the final acetabular cup was selected to be one millimeter larger in diameter than the last reamer used. The cup was checked for a good rim fit and a provisional impaction was performed to verify positioning on fluoroscopy. Once this was satisfactory, the impaction was completed. I checked to make sure there was no overhanging  osteophytes anteriorly as well as making sure that there was not excessive acetabular cup exposed. Screws were placed in the cup to augment initial fixation. The final liner was impacted into place and I confirmed that it was well seated.    The hydraulic hook was placed around the femur. The femur was externally rotated and the standard calcar and greater trochanter retractors were placed. A provisional posterior capsulotomy was performed. Then, gross traction was released and the leg was extended and adducted. The appropriate release was performed to allow elevation of the femur for good visualization of the femoral canal.     A box osteotome was used to open the femoral canal and a canal finder was used to sound the canal. The starter broach and sequential broaches were used until stability was appropriate. A trial reduction was performed and intraoperative fluoroscopy was used to confirm appropriate leg lengths and offset.  Once the trial femoral components appeared appropriately positioned, the hip was dislocated, the femur re-exposed, and the trial femoral components were removed. The canal was brushed, irrigated, and dried. Cement was mixed. A cement restrictor was placed at appropriate depth. Canal was filled and pressurized with cement. The final femoral stem was insterted to the level of the neck cut and cement was allowed to dry. The final ball was impacted onto a dry trunnion and the hip was reduced checking for appropriate soft tissue tension. Final intra-operative fluoroscopy was obtained to confirm implant positioning, leg length, and offset.     While checking xray, a 0.35% betadine solution was left to soak in the wound. The wound was copiously irrigated. I checked for any residual bleeders and made sure that there was appropriate hemostasis. The local anesthetic cocktail was administered. 1 gram of vancomycin powder was placed in the wound. The wound was closed in layers using #1 vicryl at the  fascia, then 2-0 vicryl, 3-0 monocryl, 4-0 monocryl and Prineo Mesh+Dermabond. A sterile dressing was placed.     There were no complications or evidence of intraoperative fracture. All counts were correct.    The first-assistant was critical to all steps of the operation, including retraction during exposure and bone preparation, as well as the deep and superficial wound closure.  Dr. Mcneal performed and/or supervised the key portions of this surgical procedure, including evaluation of the bone cuts, reshaping of the bony elements, and insertion of the provisional and final components.    Post Op Plan:   The patient will be weightbearing as tolerated with a walker with no extremes of motion  ASA for DVT prophylaxis (81mg BID for one month)  24 hours of IV antibiotics.      Signed by: Cisco Mcneal III, MD

## 2022-01-31 NOTE — PLAN OF CARE
Pre-op complete. Pt resting comfortably with call light in reach. Pt has walker at bedside. Sister at bedside.

## 2022-01-31 NOTE — TRANSFER OF CARE
"Anesthesia Transfer of Care Note    Patient: Claudia Sierra    Procedure(s) Performed: Procedure(s) (LRB):  ARTHROPLASTY, HIP, TOTAL, ANTERIOR APPROACH:RIGHT: DEPUY-C-STEM+PINNACLE (Right)    Patient location: PACU    Anesthesia Type: general    Transport from OR: Transported from OR on 6-10 L/min O2 by face mask with adequate spontaneous ventilation    Post pain: adequate analgesia    Post assessment: no apparent anesthetic complications and tolerated procedure well    Post vital signs: stable    Level of consciousness: awake, alert and oriented    Nausea/Vomiting: no nausea/vomiting    Complications: none    Transfer of care protocol was followed      Last vitals:   Visit Vitals  BP (!) 77/41 (BP Location: Left arm, Patient Position: Lying)   Pulse 63   Temp 37.1 °C (98.7 °F) (Oral)   Resp 18   Ht 4' 11" (1.499 m)   Wt 61.2 kg (135 lb)   LMP  (LMP Unknown)   SpO2 95%   Breastfeeding No   BMI 27.27 kg/m²     "

## 2022-02-01 LAB
BUN SERPL-MCNC: 9 MG/DL (ref 6–30)
CHLORIDE SERPL-SCNC: 102 MMOL/L (ref 95–110)
CREAT SERPL-MCNC: 0.7 MG/DL (ref 0.5–1.4)
GLUCOSE SERPL-MCNC: 133 MG/DL (ref 70–110)
HCT VFR BLD CALC: 32 %PCV (ref 36–54)
POC IONIZED CALCIUM: 1.15 MMOL/L (ref 1.06–1.42)
POC TCO2 (MEASURED): 22 MMOL/L (ref 23–29)
POTASSIUM BLD-SCNC: 4 MMOL/L (ref 3.5–5.1)
SAMPLE: ABNORMAL
SODIUM BLD-SCNC: 136 MMOL/L (ref 136–145)

## 2022-02-01 PROCEDURE — 25000003 PHARM REV CODE 250: Performed by: NURSE PRACTITIONER

## 2022-02-01 PROCEDURE — 97535 SELF CARE MNGMENT TRAINING: CPT

## 2022-02-01 PROCEDURE — 97161 PT EVAL LOW COMPLEX 20 MIN: CPT

## 2022-02-01 PROCEDURE — 97116 GAIT TRAINING THERAPY: CPT

## 2022-02-01 PROCEDURE — 82565 ASSAY OF CREATININE: CPT

## 2022-02-01 PROCEDURE — 25000003 PHARM REV CODE 250: Performed by: STUDENT IN AN ORGANIZED HEALTH CARE EDUCATION/TRAINING PROGRAM

## 2022-02-01 PROCEDURE — 84295 ASSAY OF SERUM SODIUM: CPT

## 2022-02-01 PROCEDURE — 25000003 PHARM REV CODE 250: Performed by: PHYSICIAN ASSISTANT

## 2022-02-01 PROCEDURE — 63600175 PHARM REV CODE 636 W HCPCS: Performed by: PHYSICIAN ASSISTANT

## 2022-02-01 PROCEDURE — 97110 THERAPEUTIC EXERCISES: CPT

## 2022-02-01 PROCEDURE — 25000003 PHARM REV CODE 250: Performed by: ANESTHESIOLOGY

## 2022-02-01 PROCEDURE — 84132 ASSAY OF SERUM POTASSIUM: CPT

## 2022-02-01 PROCEDURE — 85014 HEMATOCRIT: CPT

## 2022-02-01 PROCEDURE — 99499 UNLISTED E&M SERVICE: CPT | Mod: ,,, | Performed by: ANESTHESIOLOGY

## 2022-02-01 PROCEDURE — 99900035 HC TECH TIME PER 15 MIN (STAT)

## 2022-02-01 PROCEDURE — 94761 N-INVAS EAR/PLS OXIMETRY MLT: CPT

## 2022-02-01 PROCEDURE — 82330 ASSAY OF CALCIUM: CPT

## 2022-02-01 PROCEDURE — 99499 NO LOS: ICD-10-PCS | Mod: ,,, | Performed by: ANESTHESIOLOGY

## 2022-02-01 PROCEDURE — 97165 OT EVAL LOW COMPLEX 30 MIN: CPT

## 2022-02-01 RX ORDER — CELECOXIB 200 MG/1
200 CAPSULE ORAL DAILY
Status: DISCONTINUED | OUTPATIENT
Start: 2022-02-01 | End: 2022-02-02 | Stop reason: HOSPADM

## 2022-02-01 RX ORDER — OXYCODONE HYDROCHLORIDE 5 MG/1
5 TABLET ORAL
Status: DISCONTINUED | OUTPATIENT
Start: 2022-02-01 | End: 2022-02-02 | Stop reason: HOSPADM

## 2022-02-01 RX ORDER — GABAPENTIN 300 MG/1
600 CAPSULE ORAL NIGHTLY
Status: DISCONTINUED | OUTPATIENT
Start: 2022-02-01 | End: 2022-02-02 | Stop reason: HOSPADM

## 2022-02-01 RX ORDER — CEFAZOLIN SODIUM/D5W 2 G/100 ML
2 PLASTIC BAG, INJECTION (ML) INTRAVENOUS ONCE
Status: COMPLETED | OUTPATIENT
Start: 2022-02-01 | End: 2022-02-01

## 2022-02-01 RX ADMIN — ACETAMINOPHEN 1000 MG: 500 TABLET ORAL at 05:02

## 2022-02-01 RX ADMIN — MUPIROCIN 1 G: 20 OINTMENT TOPICAL at 08:02

## 2022-02-01 RX ADMIN — METHOCARBAMOL 750 MG: 750 TABLET ORAL at 08:02

## 2022-02-01 RX ADMIN — ACETAMINOPHEN 1000 MG: 500 TABLET ORAL at 11:02

## 2022-02-01 RX ADMIN — OXYCODONE 5 MG: 5 TABLET ORAL at 08:02

## 2022-02-01 RX ADMIN — Medication 2 G: at 05:02

## 2022-02-01 RX ADMIN — MORPHINE SULFATE 2 MG: 2 INJECTION, SOLUTION INTRAMUSCULAR; INTRAVENOUS at 12:02

## 2022-02-01 RX ADMIN — ACETAMINOPHEN 1000 MG: 500 TABLET ORAL at 12:02

## 2022-02-01 RX ADMIN — SODIUM CHLORIDE 1000 ML: 0.9 INJECTION, SOLUTION INTRAVENOUS at 09:02

## 2022-02-01 RX ADMIN — CELECOXIB 200 MG: 200 CAPSULE ORAL at 08:02

## 2022-02-01 RX ADMIN — FAMOTIDINE 20 MG: 20 TABLET, FILM COATED ORAL at 08:02

## 2022-02-01 RX ADMIN — METOPROLOL SUCCINATE 100 MG: 50 TABLET, EXTENDED RELEASE ORAL at 12:02

## 2022-02-01 RX ADMIN — DOCUSATE SODIUM 50 MG AND SENNOSIDES 8.6 MG 1 TABLET: 8.6; 5 TABLET, FILM COATED ORAL at 08:02

## 2022-02-01 RX ADMIN — AZELASTINE HYDROCHLORIDE 274 MCG: 137 SPRAY, METERED NASAL at 08:02

## 2022-02-01 RX ADMIN — POLYETHYLENE GLYCOL 3350 17 G: 17 POWDER, FOR SOLUTION ORAL at 08:02

## 2022-02-01 RX ADMIN — OXYCODONE HYDROCHLORIDE 10 MG: 10 TABLET ORAL at 03:02

## 2022-02-01 RX ADMIN — CETIRIZINE HYDROCHLORIDE 10 MG: 10 TABLET, FILM COATED ORAL at 08:02

## 2022-02-01 RX ADMIN — METHOCARBAMOL 750 MG: 750 TABLET ORAL at 04:02

## 2022-02-01 RX ADMIN — ASPIRIN 81 MG: 81 TABLET, COATED ORAL at 08:02

## 2022-02-01 RX ADMIN — PRAVASTATIN SODIUM 40 MG: 20 TABLET ORAL at 08:02

## 2022-02-01 RX ADMIN — GABAPENTIN 600 MG: 300 CAPSULE ORAL at 09:02

## 2022-02-01 RX ADMIN — METHOCARBAMOL 750 MG: 750 TABLET ORAL at 09:02

## 2022-02-01 NOTE — ANESTHESIA POSTPROCEDURE EVALUATION
Anesthesia Post Evaluation    Patient: Claudia Sierra    Procedure(s) Performed: Procedure(s) (LRB):  ARTHROPLASTY, HIP, TOTAL, ANTERIOR APPROACH:RIGHT: DEPUY-C-STEM+PINNACLE (Right)    Final Anesthesia Type: CSE      Patient location during evaluation: PACU  Patient participation: Yes- Able to Participate  Level of consciousness: awake and alert and oriented  Post-procedure vital signs: reviewed and stable  Pain management: adequate  Airway patency: patent    PONV status at discharge: No PONV  Anesthetic complications: no      Cardiovascular status: hemodynamically stable  Respiratory status: nasal cannula  Hydration status: euvolemic  Follow-up not needed.          Vitals Value Taken Time   /96 01/31/22 1833   Temp 36.2 °C (97.2 °F) 01/31/22 1745   Pulse 81 01/31/22 1833   Resp 18 01/31/22 1833   SpO2 96 % 01/31/22 1833         Event Time   Out of Recovery 18:00:00         Pain/Ki Score: Pain Rating Prior to Med Admin: 0 (1/31/2022  6:15 PM)  Ki Score: 9 (1/31/2022  4:45 PM)

## 2022-02-01 NOTE — PT/OT/SLP EVAL
"Occupational Therapy   Evaluation    Name: Claudia Sierra  MRN: [id number]  Admitting Diagnosis:  Primary osteoarthritis of right hip 1 Day Post-Op    Recommendations:     Discharge Recommendations: home with home health  Discharge Equipment Recommendations:  none  Barriers to discharge:  None    Assessment:     Claudia Sierra is a 81 y.o. female with a medical diagnosis of Primary osteoarthritis of right hip.  She presents s/p right MICHAEL anterior approach.Patient completed bed mobility at contact guard assistance. She completed toilet transfer/task and grooming task in standing at contact guard assistance. She required minimal assistance with lower body dressing. She required frequent verbal cuing throughout session for anterior hip precautions.  Patient would benefit from skilled OT needs s/p right MICHAEL to increase independence with ADLs and ADL transfers while adhering to anterior hip precautions.  Performance deficits affecting function: weakness,impaired functional mobilty,gait instability,impaired balance,impaired self care skills,decreased lower extremity function,decreased ROM,orthopedic precautions.      Rehab Prognosis: Good; patient would benefit from acute skilled OT services to address these deficits and reach maximum level of function.       Plan:     Patient to be seen daily to address the above listed problems via self-care/home management,therapeutic activities,therapeutic exercises  · Plan of Care Expires: 02/04/22  · Plan of Care Reviewed with: patient    Subjective     Chief Complaint: "burning pain"   Patient/Family Comments/goals: "back to social life"     Occupational Profile:  Living Environment: Patient lives alone in 1 level home with 1 steps to enter and has a walk in shower   Previous level of function: independent with ADLs, has been using sock aid, ambulating without device   Roles and Routines: Members of women clubs   Equipment Used at Home:  walker, rolling,bedside commode,hip " kit  Assistance upon Discharge: neighbor during day and sister overnight      Pain/Comfort:  · Pain Rating 1: 3/10  · Location - Side 1: Right  · Location - Orientation 1: generalized  · Location 1: hip  · Pain Addressed 1: Pre-medicate for activity,Reposition,Distraction  · Pain Rating Post-Intervention 1:  (reduced burning)  · Location - Side 2: Right  · Location - Orientation 2: anterior  · Location 2: hip    Patients cultural, spiritual, Restoration conflicts given the current situation: no    Objective:     Communicated with: Nursing prior to session.  Patient found supine with cryotherapy,FCD upon OT entry to room.    General Precautions: Standard, fall   Orthopedic Precautions:RLE weight bearing as tolerated,RLE anterior precautions (no extreme motion and flexion 0-90)   Braces: N/A     Occupational Performance:    Bed Mobility:    · Patient completed Scooting/Bridging with contact guard assistance  · Patient completed Supine to Sit with contact guard assistance    Functional Mobility/Transfers:  · Patient completed Sit <> Stand Transfer with contact guard assistance  with  rolling walker   · Patient completed Toilet Transfer Step Transfer technique with contact guard assistance with  rolling walker   · Patient completed chair transfer with step transfer technique with contact guard assistance with rolling walker   · Functional Mobility: patient ambulated to/from bathroom with use of rolling walker at contact guard assistance     Activities of Daily Living:  · Grooming: contact guard assistance standing at sink to wash hands, brush teeth, wash up, patient did take a sitting rest break on bedside commode during task   · Upper Body Dressing: stand by assistance sitting in chair to don overhead dress  · Toileting: contact guard assistance completed on bedside commode placed over toilet   · Lower Body Dressing: minimum assistance sitting in chair to don underwear with use of reacher: required stand by assistance  for doffing socks with shoe horn and donning shoes with shoe horn     Cognitive/Visual Perceptual:  Cognitive/Psychosocial Skills:     -       Oriented to: Person, Place and Time   -       Follows Commands/attention:Follows multistep  commands    Physical Exam:  Upper Extremity Range of Motion:     -       Right Upper Extremity: WFL  -       Left Upper Extremity: WFL  Upper Extremity Strength:    -       Right Upper Extremity: WFL  -       Left Upper Extremity: WFL    AMPAC 6 Click ADL:  AMPAC Total Score: 19    Treatment & Education:    Patient educated on anterior hip precautions with no extreme motions and hip flexion 0-90 degrees    Patient educated to dress surgical side first and further dressing techniques s/p surgery   Patient educated on use of dressing aids for lower body dressing: sock aid, reacher and shoe horn   Patient educated on hand placement for transfers    Patient educated on rolling walker placement for ADLs  Patient educated on role OT and plan of care s/p surgery at [facility], white board updated as needed       Patient left up in chair with call button in reach, RN notified and ice donned     GOALS:   Multidisciplinary Problems     Occupational Therapy Goals        Problem: Occupational Therapy Goal    Goal Priority Disciplines Outcome Interventions   Occupational Therapy Goal     OT, PT/OT Ongoing, Progressing    Description: Goals to be met by: 2/4/22    Patient will increase functional independence with ADLs by performing:    UE Dressing with Modified [place].  LE Dressing with Supervision.  Grooming while standing at sink with Supervision.  Toileting from toilet with Supervision for hygiene and clothing management.   Toilet transfer to toilet with Supervision.                     History:     Past Medical History:   Diagnosis Date    *Atrial fibrillation     Breast cancer     Right breast cancer    Hypertension     Slow transit constipation 12/19/2013       Past  Surgical History:   Procedure Laterality Date    ADENOIDECTOMY  70 years ago    At same time as tonsillectomy. .    APPENDECTOMY      ARTHROPLASTY OF HIP BY ANTERIOR APPROACH Right 1/31/2022    Procedure: ARTHROPLASTY, HIP, TOTAL, ANTERIOR APPROACH:RIGHT: DEPUY-C-STEM+PINNACLE;  Surgeon: Cisco Mcneal III, MD;  Location: [facility] OR;  Service: Orthopedics;  Laterality: Right;    BREAST BIOPSY      right    BREAST LUMPECTOMY Right 2013    EPIDURAL STEROID INJECTION INTO CERVICAL SPINE N/A 2/12/2020    Procedure: Injection-steroid-epidural-cervical--C7-T1 IL ELMIRA;  Surgeon: Alicia Proctor MD;  [facility];  Service: Pain Management;  Laterality: N/A;  sign consent at [facility]    EYE SURGERY  2018    HYSTERECTOMY      lymphnode removal      12 from Right axilla    PORTACATH PLACEMENT  2013    TONSILLECTOMY         Time Tracking:     OT Date of Treatment: 02/01/22  OT Start Time: 0859  OT Stop Time: 0959  OT Total Time (min): 60 min    Billable Minutes:Evaluation 10  Self Care/Home Management 50    Concepcion De Jesus OT  2/1/2022

## 2022-02-01 NOTE — PLAN OF CARE
[facility])  Discharge Final Note    Primary Care Provider: Sajan Curtis MD    Expected Discharge Date:     Final Discharge Note (most recent)     Final Note - 02/01/22 1328        Final Note    Assessment Type Final Discharge Note     Anticipated Discharge Disposition Home-Health Care c   [facility]     What phone number can be called within the next 1-3 days to see how you are doing after discharge? [phone]     Hospital Resources/Appts/Education Provided Provided patient/caregiver with written discharge plan information;Appointments scheduled by Navigator/Coordinator                 Future Appointments   Date Time Provider Department Center   2/3/2022  1:00 PM TELEMED NURSE, [facility]   2/11/2022 10:45 AM Concepcion [place], NP [facility]TH[facility]   12/21/2022  8:10 AM APPOINTMENT [facility]   12/21/2022  8:45 AM [facility]   12/22/2022 10:40 AM Mariano Mascorro MD [facility] HEM ONC [facility]

## 2022-02-01 NOTE — SUBJECTIVE & OBJECTIVE
"Principal Problem:Primary osteoarthritis of right hip    Principal Orthopedic Problem: s/p R anterior MICHAEL on 1/31/22    Interval History: NAEON. VSS. Pt did report accidentally urinating on herself post op due to sedation, however this has resolved and pt has been able to urinate on her own overnight. Pain controlled at this time. Tolerating PO intake. Htc 32.    Review of patient's allergies indicates:   Allergen Reactions    Codeine Other (See Comments)     Feel like (climbing the walls)    Benadryl [diphenhydramine hcl] Anxiety       Current Facility-Administered Medications   Medication    0.9%  NaCl infusion    acetaminophen tablet 1,000 mg    aspirin EC tablet 81 mg    azelastine 137 mcg (0.1 %) nasal spray 274 mcg    celecoxib capsule 200 mg    cetirizine tablet 10 mg    famotidine tablet 20 mg    methocarbamoL tablet 750 mg    metoprolol succinate (TOPROL-XL) 24 hr tablet 100 mg    morphine injection 2 mg    mupirocin 2 % ointment 1 g    naloxone 0.4 mg/mL injection 0.02 mg    ondansetron injection 4 mg    oxyCODONE immediate release tablet 5 mg    oxyCODONE immediate release tablet Tab 10 mg    polycarbophil tablet 625 mg    polyethylene glycol packet 17 g    polyethylene glycol packet 17 g    pravastatin tablet 40 mg    pregabalin capsule 75 mg    prochlorperazine injection Soln 5 mg    senna-docusate 8.6-50 mg per tablet 1 tablet     Objective:     Vital Signs (Most Recent):  Temp: 98.2 °F (36.8 °C) (02/01/22 0403)  Pulse: 65 (02/01/22 0403)  Resp: 18 (02/01/22 0403)  BP: (!) 127/59 (02/01/22 0403)  SpO2: 98 % (02/01/22 0403) Vital Signs (24h Range):  Temp:  [97.2 °F (36.2 °C)-98.7 °F (37.1 °C)] 98.2 °F (36.8 °C)  Pulse:  [61-89] 65  Resp:  [13-24] 18  SpO2:  [92 %-100 %] 98 %  BP: ()/(38-96) 127/59     Weight: 61.2 kg (135 lb)  Height: 4' 11" (149.9 cm)  Body mass index is 27.27 kg/m².      Intake/Output Summary (Last 24 hours) at 2/1/2022 0654  Last data filed at 2/1/2022 " 0600  Gross per 24 hour   Intake 3077.5 ml   Output 2550 ml   Net 527.5 ml       Ortho/SPM Exam   General appearance - no acute distress, conversant  Musculoskeletal -  Dressing C/D/I  Fires quad/TA/EHL/GSC  SILT SP/DP/TN  WWP distally  Calves soft, nontender bilateral      Significant Labs: All pertinent labs within the past 24 hours have been reviewed.    Significant Imaging: I have reviewed all pertinent imaging results/findings.

## 2022-02-01 NOTE — PROGRESS NOTES
Acute Pain Service and Perioperative Surgical Home Progress Note    Interval history  Claudia Sierra is a 81 y.o., female,     Surgery:  Procedure(s) (LRB):  ARTHROPLASTY, HIP, TOTAL, ANTERIOR APPROACH:RIGHT: DEPUY-C-STEM+PINNACLE (Right)    Post Op Day #: 1    Problem List:    Active Hospital Problems    Diagnosis  POA    *Primary osteoarthritis of right hip [M16.11]  Yes      Resolved Hospital Problems   No resolved problems to display.       Subjective:       General appearance of alert, oriented, no complaints   Pain with rest: 1    Numbers   Pain with movement: 2    Numbers   Side Effects    1. Pruritis No    2. Nausea No    3. Motor Blockade Yes, 1=Ability to bend knees and ankles    4. Sedation No, 1=awake and alert    Schedule Medications:    acetaminophen  1,000 mg Oral Q6H    aspirin  81 mg Oral BID    azelastine  2 spray Nasal BID    ceFAZolin (ANCEF) IVPB  2 g Intravenous Once    celecoxib  200 mg Oral Daily    cetirizine  10 mg Oral Daily    famotidine  20 mg Oral BID    methocarbamoL  750 mg Oral TID    metoprolol succinate  100 mg Oral Daily    mupirocin  1 g Nasal BID    polycarbophil  625 mg Oral QHS    polyethylene glycol  17 g Oral Daily    pravastatin  40 mg Oral Daily    pregabalin  75 mg Oral QHS    senna-docusate 8.6-50 mg  1 tablet Oral BID        Continuous Infusions:   sodium chloride 0.9% 150 mL/hr at 01/31/22 1559        PRN Medications:  morphine, naloxone, ondansetron, oxyCODONE, oxyCODONE, polyethylene glycol, prochlorperazine       Antibiotics:  Antibiotics (From admission, onward)            Start     Stop Route Frequency Ordered    02/01/22 0513  ceFAZolin 2 gram in dextrose 5% 100 mL IVPB (premix)         -- IV Once 02/01/22 0513    01/31/22 2100  mupirocin 2 % ointment 1 g         02/05 2059 Nasl 2 times daily 01/31/22 1529             Objective:         Vital Signs (Most Recent):  Temp: 98.2 °F (36.8 °C) (02/01/22 0403)  Pulse: 65 (02/01/22 0403)  Resp: 18  (02/01/22 0403)  BP: (!) 127/59 (02/01/22 0403)  SpO2: 98 % (02/01/22 0403) Vital Signs Range (Last 24H):  Temp:  [97.2 °F (36.2 °C)-98.7 °F (37.1 °C)]   Pulse:  [61-89]   Resp:  [13-24]   BP: ()/(38-96)   SpO2:  [92 %-100 %]          I & O (Last 24H):    Intake/Output Summary (Last 24 hours) at 2/1/2022 0565  Last data filed at 2/1/2022 0357  Gross per 24 hour   Intake 2527.5 ml   Output 1800 ml   Net 727.5 ml       Physical Exam:    GA: Alert, comfortable, no acute distress.   Pulmonary: Clear to auscultation A/P/L. No wheezing, crackles, or rhonchi.  Cardiac: RRR S1 & S2 w/o rubs/murmurs/gallops.   Abdominal:Bowel sounds present. No tenderness to palpation or distension. No appreciable hepatosplenomegaly.   Skin: No jaundice, rashes, or visible lesions.         Laboratory:  CBC:   Recent Labs     02/01/22  0448   HCT 32*       BMP: No results for input(s): NA, K, CO2, CL, BUN, CREATININE, GLU, MG, PHOS, CALCIUM in the last 72 hours.    No results for input(s): PT, INR, PROTIME, APTT in the last 72 hours.      Anticoagulant dose ASA81 at 2158.    Assessment:  Patient doing well with no complaints.  Surgical site looks good.  Compartments are soft.  Good sensation.       Pain control adequate    Plan:     1) Pain: Patient pain well-controlled with multi-modal regimen.   2)AFIB - METOPROLOL, No Blood thinners   3) FEN/GI: Tolerating liquids, advance diet as tolerated    4) Dispo: Pt working well with PT/OT. Case management and SW for placement. Plan for D/C today.   5) HTN - Cont. Home meds          Evaluator Corey Driver PA-C

## 2022-02-01 NOTE — ADDENDUM NOTE
Addendum  created 02/01/22 0814 by Corey Guaman MD    New alternative orders accepted, Order Reconciliation Section accessed, Order list changed, Pharmacy for encounter modified

## 2022-02-01 NOTE — ASSESSMENT & PLAN NOTE
Claudia Sierra is a 81 y.o. female s/p R anterior MICHAEL on 1/31/22      Pain control: multimodal  PT/OT: WBAT RLE with walker; Anterior hip precautions: Weight bearing as tolerated with walker, Avoid extremes of hip motion, no extension of hip, no external rotation, and no hip flexion past 90 deg.   DVT PPx: asa 81 bid x 4 weeks, SCDs at all times when not ambulating  Abx: postop Ancef complete  Labs: htc 32    Dispo: dc home with home health PT tomorrow

## 2022-02-01 NOTE — PLAN OF CARE
01/31/22 2108   SALDANA Message   Medicare Outpatient and Observation Notification regarding financial responsibility Given to patient/caregiver;Explained to patient/caregiver;Signed/date by patient/caregiver   Date SALDANA was signed 01/31/22   Time SALDANA was signed 2108      [Follow-Up Visit] : a follow-up visit for

## 2022-02-01 NOTE — PROGRESS NOTES
[facility])  Orthopedics  Progress Note    Patient Name: Claudia Sierra  MRN: [id number]  Admission Date: 1/31/2022  Hospital Length of Stay: 0 days  Attending Provider: Cisco Mcneal III, MD  Primary Care Provider: Sajan Curtis MD  Follow-up For: Procedure(s) (LRB):  ARTHROPLASTY, HIP, TOTAL, ANTERIOR APPROACH:RIGHT: DEPUY-C-STEM+PINNACLE (Right)    Post-Operative Day: 1 Day Post-Op  Subjective:     Principal Problem:Primary osteoarthritis of right hip    Principal Orthopedic Problem: s/p R anterior MICHAEL on 1/31/22    Interval History: NAEON. VSS. Pt did report accidentally urinating on herself post op due to sedation, however this has resolved and pt has been able to urinate on her own overnight. Pain controlled at this time. Tolerating PO intake. c 32.    Review of patient's allergies indicates:   Allergen Reactions    Codeine Other (See Comments)     Feel like (climbing the walls)    Benadryl [diphenhydramine hcl] Anxiety       Current Facility-Administered Medications   Medication    0.9%  NaCl infusion    acetaminophen tablet 1,000 mg    aspirin EC tablet 81 mg    azelastine 137 mcg (0.1 %) nasal spray 274 mcg    celecoxib capsule 200 mg    cetirizine tablet 10 mg    famotidine tablet 20 mg    methocarbamoL tablet 750 mg    metoprolol succinate (TOPROL-XL) 24 hr tablet 100 mg    morphine injection 2 mg    mupirocin 2 % ointment 1 g    naloxone 0.4 mg/mL injection 0.02 mg    ondansetron injection 4 mg    oxyCODONE immediate release tablet 5 mg    oxyCODONE immediate release tablet Tab 10 mg    polycarbophil tablet 625 mg    polyethylene glycol packet 17 g    polyethylene glycol packet 17 g    pravastatin tablet 40 mg    pregabalin capsule 75 mg    prochlorperazine injection Soln 5 mg    senna-docusate 8.6-50 mg per tablet 1 tablet     Objective:     Vital Signs (Most Recent):  Temp: 98.2 °F (36.8 °C) (02/01/22 0403)  Pulse: 65 (02/01/22 0403)  Resp: 18  "(02/01/22 0403)  BP: (!) 127/59 (02/01/22 0403)  SpO2: 98 % (02/01/22 0403) Vital Signs (24h Range):  Temp:  [97.2 °F (36.2 °C)-98.7 °F (37.1 °C)] 98.2 °F (36.8 °C)  Pulse:  [61-89] 65  Resp:  [13-24] 18  SpO2:  [92 %-100 %] 98 %  BP: ()/(38-96) 127/59     Weight: 61.2 kg (135 lb)  Height: 4' 11" (149.9 cm)  Body mass index is 27.27 kg/m².      Intake/Output Summary (Last 24 hours) at 2/1/2022 0654  Last data filed at 2/1/2022 0600  Gross per 24 hour   Intake 3077.5 ml   Output 2550 ml   Net 527.5 ml       Ortho/SPM Exam   General appearance - no acute distress, conversant  Musculoskeletal -  Dressing C/D/I  Fires quad/TA/EHL/GSC  SILT SP/DP/TN  WWP distally  Calves soft, nontender bilateral      Significant Labs: All pertinent labs within the past 24 hours have been reviewed.    Significant Imaging: I have reviewed all pertinent imaging results/findings.    Assessment/Plan:     * Primary osteoarthritis of right hip  Claudia Sierra is a 81 y.o. female s/p R anterior MICHAEL on 1/31/22      Pain control: multimodal  PT/OT: WBAT RLE with walker; Anterior hip precautions: Weight bearing as tolerated with walker, Avoid extremes of hip motion, no extension of hip, no external rotation, and no hip flexion past 90 deg.   DVT PPx: asa 81 bid x 4 weeks, SCDs at all times when not ambulating  Abx: postop Ancef complete  Labs: htc 32    Dispo: dc home with home health PT tomorrow              Terrence Sanchez MD  Orthopedics  [facility] ([facility])  "

## 2022-02-01 NOTE — PLAN OF CARE
Patient tolerated PT session fairly well. Patient ambulated 30ft and 90ft with RW and stand by assistance. No LOB or SOB noted. Patient performed R LE therex x10 reps. Patient educated in anterior hip precautions. Patient will have HHPT. Patient staying an extra night due to limited support at home.       Problem: Physical Therapy Goal  Goal: Physical Therapy Goal  Description: Goals to be met by: 2022    Patient will increase functional independence with mobility by performin. Supine to sit with supervision  2. Sit to stand transfer with Supervision  3. Gait x300 feet with Supervision using Rolling Walker  4. Ascend/Descend 1 curb step with Stand by assistance using Rolling Walker  5. Lower extremity exercise program x30 reps per handout, with supervision        Outcome: Ongoing, Progressing

## 2022-02-01 NOTE — PLAN OF CARE
Problem: Adult Inpatient Plan of Care  Goal: Plan of Care Review  Outcome: Ongoing, Progressing  Flowsheets (Taken 1/31/2022 1911)  Plan of Care Reviewed With: patient     Problem: Adult Inpatient Plan of Care  Goal: Patient-Specific Goal (Individualized)  Outcome: Ongoing, Progressing  Flowsheets (Taken 1/31/2022 1850)  Anxieties, Fears or Concerns: none  Individualized Care Needs: Keep pt and family up to date  Patient-Specific Goals (Include Timeframe): post op pain control     Problem: Pain Acute  Goal: Acceptable Pain Control and Functional Ability  Intervention: Develop Pain Management Plan  Flowsheets (Taken 1/31/2022 1911)  Pain Management Interventions:   cold applied   care clustered     Problem: Adjustment to Surgery (Hip Arthroplasty)  Goal: Optimal Coping  Intervention: Support Psychosocial Response to Surgery and Mobility Changes  Flowsheets (Taken 1/31/2022 1830)  Supportive Measures: active listening utilized     Problem: Pain (Hip Arthroplasty)  Goal: Acceptable Pain Control  Intervention: Prevent or Manage Pain  Flowsheets (Taken 1/31/2022 1830)  Pain Management Interventions:   cold applied   care clustered    PT received AAO x 4. Pt still C/O numbness to lower extremities but states she is starting to feel tingling. Bed locked and in lowest position. Oriented to room and callbell.   Fall precautions maintained. Non skid socks on while out of bed. Pt instructed to call for assistance, skin integrity maintained. Callbell placed within reach and use encouraged.

## 2022-02-01 NOTE — PLAN OF CARE
Problem: Occupational Therapy Goal  Goal: Occupational Therapy Goal  Description: Goals to be met by: 2/4/22    Patient will increase functional independence with ADLs by performing:    UE Dressing with Modified [place].  LE Dressing with Supervision.  Grooming while standing at sink with Supervision.  Toileting from toilet with Supervision for hygiene and clothing management.   Toilet transfer to toilet with Supervision.    Outcome: Ongoing, Progressing    OT evaluation with goals established. Patient completed bed mobility at contact guard assistance. She completed toilet transfer/task and grooming task in standing at contact guard assistance. She required minimal assistance with lower body dressing. She required frequent verbal cuing throughout session for anterior hip precautions.

## 2022-02-01 NOTE — PT/OT/SLP EVAL
"Physical Therapy Evaluation and Treatment     Patient Name:  Claudia Sierra   MRN:  [id number]    Recommendations:     Discharge Recommendations:  home health PT   Discharge Equipment Recommendations: none   Barriers to discharge: Decreased caregiver support - neighbor will assist during the day and sister at night     Assessment:     Claudia Sierra is a 81 y.o. female admitted with a medical diagnosis of Primary osteoarthritis of right hip.  She presents with the following impairments/functional limitations:  weakness,impaired functional mobilty,gait instability,impaired balance,decreased lower extremity function,pain,decreased ROM,impaired skin,orthopedic precautions. Patient tolerated PT session fairly well. Patient ambulated 30ft and 90ft with RW and stand by assistance. No LOB or SOB noted. Patient performed R LE therex x10 reps. Patient educated in anterior hip precautions. Patient will have HHPT. Patient staying an extra night due to limited support at home.     Rehab Prognosis: Good; patient would benefit from acute skilled PT services to address these deficits and reach maximum level of function.    Recent Surgery: Procedure(s) (LRB):  ARTHROPLASTY, HIP, TOTAL, ANTERIOR APPROACH:RIGHT: DEPUY-C-STEM+PINNACLE (Right) 1 Day Post-Op    Plan:     During this hospitalization, patient to be seen daily to address the identified rehab impairments via therapeutic activities,gait training,therapeutic exercises and progress toward the following goals:    · Plan of Care Expires:  02/04/22    Subjective     Chief Complaint: "Burning" pain down right thigh and leg.   Patient/Family Comments/goals: To get back to her social activities.   Pain/Comfort:  · Pain Rating 1:  ("burning" but did not rate with number)  · Location 1: thigh  · Pain Addressed 1: Pre-medicate for activity,Reposition,Distraction    Patients cultural, spiritual, [employer]ology conflicts given the current situation:  n/a    Living " Environment:  Patient lives alone in a [facility] with 1 step to enter. Prior to admission, patients level of function was independent with functional mobility.  Equipment at home: bedside commode,walker, rolling,hip kit. Upon discharge, patient will have assistance from neighbor during the day and sister at night due to her sister working during the day.    Objective:     Communicated with RN prior to session.  Patient found up in chair with cryotherapy (patient with shoes on and no FCD's) upon PT entry to room.    General Precautions: Standard, fall   Orthopedic Precautions:RLE weight bearing as tolerated,RLE anterior precautions (no extremes of motion, hip flexion 0-90 degrees.)   Braces: N/A    Exams:  · Cognitive Exam:  Patient is oriented to Person, Place, Time and Situation  · Gross Motor Coordination:  WFL  · Sensation:    · -       Intact  · RLE ROM: WFL within anterior hip precautions   · RLE Strength: appears WFL but did not formally assess due to pain  · LLE ROM: WFL  · LLE Strength: WFL    Functional Mobility:  · Transfers:     · Sit to Stand:  stand by assistance with rolling walker x2 from bedside chair   · Gait:  Patient ambulated 30ft and 90ft with Rolling Walker and stand by assistance using 3-point gait. Patient demonstrated decreased karin and decreased step length during gait due to pain, decreased ROM, and decreased strength.    Therapeutic Activities and Exercises:  Patient performed R LE therex x10 reps for heel slides, SAQ over bolster, A/P, quad set, and glute set all within anterior hip precautions.     Patient educated in:  -PT role and POC  -safety with transfers including hand placement  -gait sequencing and RW management  -OOB activity to maximize recovery including ambulating with nursing staff assistance and RW while in the hospital   -HEP for therex at home with handout provided   -anterior hip precautions     AM-PAC 6 CLICK MOBILITY  Total Score:17     Patient left up in chair with all  lines intact, call button in reach, RN notified and shoes removed and FCD's re-applied to patient due to patient staying the night.    GOALS:   Multidisciplinary Problems     Physical Therapy Goals        Problem: Physical Therapy Goal    Goal Priority Disciplines Outcome Goal Variances Interventions   Physical Therapy Goal     PT, PT/OT Ongoing, Progressing     Description: Goals to be met by: 2022    Patient will increase functional independence with mobility by performin. Supine to sit with supervision  2. Sit to stand transfer with Supervision  3. Gait x300 feet with Supervision using Rolling Walker  4. Ascend/Descend 1 curb step with Stand by assistance using Rolling Walker  5. Lower extremity exercise program x30 reps per handout, with supervision                         History:     Past Medical History:   Diagnosis Date    *Atrial fibrillation     Breast cancer     Right breast cancer    Hypertension     Slow transit constipation 2013       Past Surgical History:   Procedure Laterality Date    ADENOIDECTOMY  70 years ago    At same time as tonsillectomy. .    APPENDECTOMY      ARTHROPLASTY OF HIP BY ANTERIOR APPROACH Right 2022    Procedure: ARTHROPLASTY, HIP, TOTAL, ANTERIOR APPROACH:RIGHT: DEPUY-C-STEM+PINNACLE;  Surgeon: Cisco Mcneal III, MD;  [facility];  Service: Orthopedics;  Laterality: Right;    BREAST BIOPSY      right    BREAST LUMPECTOMY Right     EPIDURAL STEROID INJECTION INTO CERVICAL SPINE N/A 2020    Procedure: Injection-steroid-epidural-cervical--C7-T1 IL ELMIRA;  Surgeon: Alicia Proctor MD;  Location: [facility];  Service: Pain Management;  Laterality: N/A;  sign consent at DOS    EYE SURGERY  2018    HYSTERECTOMY      lymphnode removal      12 from Right axilla    PORTACATH PLACEMENT      TONSILLECTOMY         Time Tracking:     PT Received On: 22  PT Start Time: 1044     PT Stop Time: 1121  PT Total Time (min): 37 min      Billable Minutes: Evaluation 8 Gait Training 18 and Therapeutic Exercise 11      02/01/2022

## 2022-02-01 NOTE — NURSING TRANSFER
Nursing Transfer Note      1/31/2022     Reason patient is being transferred: pt can dorsal and plantar flex feet. Patient's spinal has not completely resolved. Patient BLE are numb. Dr Guaman and Samra, RN aware.    Transfer To: room 304    Transfer via bed    Transfer with IVF    Transported by RN    Medicines sent: mupirocin ointment    Any special needs or follow-up needed: neurovascular assessment    Chart send with patient: Yes    Notified: sister at bedside     Patient reassessed at: 1/31/22 at 1815    Upon arrival to floor: patient oriented to room, call bell in reach and bed in lowest position

## 2022-02-02 VITALS
BODY MASS INDEX: 27.21 KG/M2 | RESPIRATION RATE: 18 BRPM | OXYGEN SATURATION: 99 % | SYSTOLIC BLOOD PRESSURE: 144 MMHG | HEART RATE: 72 BPM | DIASTOLIC BLOOD PRESSURE: 62 MMHG | TEMPERATURE: 98 F | HEIGHT: 59 IN | WEIGHT: 135 LBS

## 2022-02-02 PROCEDURE — 94761 N-INVAS EAR/PLS OXIMETRY MLT: CPT

## 2022-02-02 PROCEDURE — 99900035 HC TECH TIME PER 15 MIN (STAT)

## 2022-02-02 PROCEDURE — 97116 GAIT TRAINING THERAPY: CPT

## 2022-02-02 PROCEDURE — 25000003 PHARM REV CODE 250: Performed by: STUDENT IN AN ORGANIZED HEALTH CARE EDUCATION/TRAINING PROGRAM

## 2022-02-02 PROCEDURE — 25000003 PHARM REV CODE 250: Performed by: PHYSICIAN ASSISTANT

## 2022-02-02 PROCEDURE — 97110 THERAPEUTIC EXERCISES: CPT

## 2022-02-02 PROCEDURE — 25000003 PHARM REV CODE 250: Performed by: ANESTHESIOLOGY

## 2022-02-02 PROCEDURE — 97535 SELF CARE MNGMENT TRAINING: CPT

## 2022-02-02 RX ADMIN — PRAVASTATIN SODIUM 40 MG: 20 TABLET ORAL at 09:02

## 2022-02-02 RX ADMIN — POLYETHYLENE GLYCOL 3350 17 G: 17 POWDER, FOR SOLUTION ORAL at 09:02

## 2022-02-02 RX ADMIN — AZELASTINE HYDROCHLORIDE 274 MCG: 137 SPRAY, METERED NASAL at 09:02

## 2022-02-02 RX ADMIN — ACETAMINOPHEN 1000 MG: 500 TABLET ORAL at 06:02

## 2022-02-02 RX ADMIN — METOPROLOL SUCCINATE 100 MG: 50 TABLET, EXTENDED RELEASE ORAL at 09:02

## 2022-02-02 RX ADMIN — METHOCARBAMOL 750 MG: 750 TABLET ORAL at 09:02

## 2022-02-02 RX ADMIN — CETIRIZINE HYDROCHLORIDE 10 MG: 10 TABLET, FILM COATED ORAL at 09:02

## 2022-02-02 RX ADMIN — OXYCODONE 5 MG: 5 TABLET ORAL at 09:02

## 2022-02-02 RX ADMIN — CELECOXIB 200 MG: 200 CAPSULE ORAL at 09:02

## 2022-02-02 RX ADMIN — DOCUSATE SODIUM 50 MG AND SENNOSIDES 8.6 MG 1 TABLET: 8.6; 5 TABLET, FILM COATED ORAL at 09:02

## 2022-02-02 RX ADMIN — MUPIROCIN 1 G: 20 OINTMENT TOPICAL at 09:02

## 2022-02-02 RX ADMIN — FAMOTIDINE 20 MG: 20 TABLET, FILM COATED ORAL at 09:02

## 2022-02-02 RX ADMIN — ASPIRIN 81 MG: 81 TABLET, COATED ORAL at 09:02

## 2022-02-02 NOTE — PROGRESS NOTES
Acute Pain Service and Perioperative Surgical Home Progress Note    Interval history  Claudia Sierra is a 81 y.o., female, s/p right MICHAEL.   asymptomatic hypotension last evening.  1L NS given and BP normalized.  Pt remained asymptomatic throughout.     Surgery:  Procedure(s) (LRB):  ARTHROPLASTY, HIP, TOTAL, ANTERIOR APPROACH:RIGHT: DEPUY-C-STEM+PINNACLE (Right)    Post Op Day #: 2    Problem List:    Active Hospital Problems    Diagnosis  POA    *Primary osteoarthritis of right hip [M16.11]  Yes      Resolved Hospital Problems   No resolved problems to display.       Subjective:       General appearance of alert, oriented, no complaints   Pain with rest: 1    Numbers   Pain with movement: 2    Numbers   Side Effects    1. Pruritis No    2. Nausea No    3. Motor Blockade No, 0=Ability to raise lower extremities off bed    4. Sedation No, 1=awake and alert    Schedule Medications:    acetaminophen  1,000 mg Oral Q6H    aspirin  81 mg Oral BID    azelastine  2 spray Nasal BID    celecoxib  200 mg Oral Daily    cetirizine  10 mg Oral Daily    famotidine  20 mg Oral BID    gabapentin  600 mg Oral QHS    methocarbamoL  750 mg Oral TID    metoprolol succinate  100 mg Oral Daily    mupirocin  1 g Nasal BID    polycarbophil  625 mg Oral QHS    polyethylene glycol  17 g Oral Daily    pravastatin  40 mg Oral Daily    senna-docusate 8.6-50 mg  1 tablet Oral BID        Continuous Infusions:       PRN Medications:  naloxone, ondansetron, oxyCODONE, polyethylene glycol, prochlorperazine       Antibiotics:  Antibiotics (From admission, onward)            Start     Stop Route Frequency Ordered    01/31/22 2100  mupirocin 2 % ointment 1 g         02/05 2059 Nasl 2 times daily 01/31/22 1529             Objective:         Vital Signs (Most Recent):  Temp: 97.5 °F (36.4 °C) (02/02/22 0401)  Pulse: 69 (02/02/22 0401)  Resp: 18 (02/02/22 0401)  BP: 114/61 (02/02/22 0401)  SpO2: 99 % (02/02/22 0401) Vital Signs Range  (Last 24H):  Temp:  [97.3 °F (36.3 °C)-97.9 °F (36.6 °C)]   Pulse:  [66-86]   Resp:  [16-20]   BP: ()/(46-67)   SpO2:  [96 %-100 %]          I & O (Last 24H):    Intake/Output Summary (Last 24 hours) at 2/2/2022 0561  Last data filed at 2/2/2022 0400  Gross per 24 hour   Intake 850 ml   Output 1600 ml   Net -750 ml       Physical Exam:    GA: Alert, comfortable, no acute distress.   Pulmonary: Clear to auscultation A/P/L. No wheezing, crackles, or rhonchi.  Cardiac: RRR S1 & S2 w/o rubs/murmurs/gallops.   Abdominal:Bowel sounds present. No tenderness to palpation or distension. No appreciable hepatosplenomegaly.   Skin: No jaundice, rashes, or visible lesions.         Laboratory:  CBC:   Recent Labs     02/01/22  0448   HCT 32*       BMP: No results for input(s): NA, K, CO2, CL, BUN, CREATININE, GLU, MG, PHOS, CALCIUM in the last 72 hours.    No results for input(s): PT, INR, PROTIME, APTT in the last 72 hours.      Anticoagulant dose asa 81mg at 2057.    Assessment:         Pain control adequate    Plan:     1) Pain: Patient pain well-controlled with multi-modal regimen.   2) BP: continue to monitor   3) FEN/GI: Tolerating regular diet   4) Dispo: Pt working well with PT/OT. Case management and SW for placement. Plan for D/C today.          Evaluator Glenda Fortune NP

## 2022-02-02 NOTE — ASSESSMENT & PLAN NOTE
Claudia Sierra is a 81 y.o. female s/p R anterior MICHAEL on 1/31/22      Pain control: multimodal  PT/OT: WBAT RLE with walker; Anterior hip precautions: Weight bearing as tolerated with walker, Avoid extremes of hip motion, no extension of hip, no external rotation, and no hip flexion past 90 deg.   DVT PPx: asa 81 bid x 4 weeks, SCDs at all times when not ambulating  Abx: postop Ancef complete  Labs: htc 32 yesterday    Dispo: dc home with home health PT today

## 2022-02-02 NOTE — PT/OT/SLP PROGRESS
"Physical Therapy Treatment and Discharge     Patient Name:  Claudia Sierra   MRN:  [id number]    Recommendations:     Discharge Recommendations:  home health PT   Discharge Equipment Recommendations: none   Barriers to discharge: None    Assessment:     Claudia Sierra is a 81 y.o. female admitted with a medical diagnosis of Primary osteoarthritis of right hip.  She presents with the following impairments/functional limitations:  weakness,impaired functional mobilty,gait instability,impaired balance,decreased lower extremity function,pain,decreased ROM,impaired skin,orthopedic precautions. Patient tolerated PT session well. Patient ambulated 120ft with RW and supervision. No LOB or SOB noted. Patient ascended/descended 2 6" curb step with RW and contact guard assistance. Patient performed R LE therex x10 reps. Patient and neighbor educated in anterior hip precautions. Patient will have HHPT. Neighbor present during PT session and verbalized understanding of all education provided. Patient ready to discharge home from PT standpoint.     Rehab Prognosis: Good; patient would benefit from acute skilled PT services to address these deficits and reach maximum level of function.    Recent Surgery: Procedure(s) (LRB):  ARTHROPLASTY, HIP, TOTAL, ANTERIOR APPROACH:RIGHT: DEPUY-C-STEM+PINNACLE (Right) 2 Days Post-Op    Plan:     During this hospitalization, patient to be seen daily to address the identified rehab impairments via therapeutic activities,gait training,therapeutic exercises and progress toward the following goals:    · Plan of Care Expires:  02/04/22    Subjective     Chief Complaint: Burning pain down right leg.  Patient/Family Comments/goals: To get back to her social activities.   Pain/Comfort:  · Pain Rating 1:  ("burning" down right leg but did not rate with number)  · Location - Side 1: Right  · Location 1: thigh  · Pain Addressed 1: Pre-medicate for activity,Reposition,Distraction      Objective: " "    Communicated with RN prior to session.  Patient found up in chair with cryotherapy upon PT entry to room. Neighbor present in room.     General Precautions: Standard, fall   Orthopedic Precautions:RLE weight bearing as tolerated,RLE anterior precautions (no extremes of motion, hip flexion 0-90 degrees.)   Braces: N/A     Functional Mobility:  · Transfers:     · Sit to Stand:  supervision with rolling walker x2 from bedside chair   · Gait:  Patient ambulated 120ft with Rolling Walker and supervision  using 3-point gait. Patient demonstrated decreased karin and decreased step length during gait due to pain, decreased ROM and decreased strength.  · Stairs: Patient ascended/descended 2 6" curb step with RW and contact guard assistance.      AM-PAC 6 CLICK MOBILITY  Turning over in bed (including adjusting bedclothes, sheets and blankets)?: 3  Sitting down on and standing up from a chair with arms (e.g., wheelchair, bedside commode, etc.): 4  Moving from lying on back to sitting on the side of the bed?: 3  Moving to and from a bed to a chair (including a wheelchair)?: 4  Need to walk in hospital room?: 4  Climbing 3-5 steps with a railing?: 3  Basic Mobility Total Score: 21       Therapeutic Activities and Exercises:  Patient performed R LE therex x10 reps for heel slides, SAQ over bolster, A/P, quad set, and glute set all within anterior hip precautions.       Patient and neighbor educated in:  -PT role and POC  -safety with transfers including hand placement  -gait sequencing and RW management  -OOB activity to maximize recovery including ambulating at home to prevent DVT   -SUV transfer  -curb training  -HEP for therex at home with handout provided   -anterior hip precautions     Patient left up in chair with all lines intact, call button in reach, RN notified and neighbor present.    GOALS:   Multidisciplinary Problems     Physical Therapy Goals        Problem: Physical Therapy Goal    Goal Priority Disciplines " Outcome Goal Variances Interventions   Physical Therapy Goal     PT, PT/OT Ongoing, Progressing     Description: Goals to be met by: 2022    Patient will increase functional independence with mobility by performin. Supine to sit with supervision  2. Sit to stand transfer with Supervision  3. Gait x300 feet with Supervision using Rolling Walker  4. Ascend/Descend 1 curb step with Stand by assistance using Rolling Walker  5. Lower extremity exercise program x30 reps per handout, with supervision                         Time Tracking:     PT Received On: 22  PT Start Time: 1036     PT Stop Time: 1107  PT Total Time (min): 31 min     Billable Minutes: Gait Training 21 and Therapeutic Exercise 10    Treatment Type: Treatment  PT/PTA: PT       2022

## 2022-02-02 NOTE — PLAN OF CARE
Problem: Adult Inpatient Plan of Care  Goal: Plan of Care Review  Outcome: Ongoing, Progressing  Goal: Patient-Specific Goal (Individualized)  Outcome: Ongoing, Progressing  Goal: Absence of Hospital-Acquired Illness or Injury  Outcome: Ongoing, Progressing  Intervention: Identify and Manage Fall Risk  Flowsheets (Taken 2/2/2022 0310)  Safety Promotion/Fall Prevention:   assistive device/personal item within reach   bedside commode chair   lighting adjusted   medications reviewed   nonskid shoes/socks when out of bed   side rails raised x 2   Supervised toileting - stay within arms reach   instructed to call staff for mobility  Intervention: Prevent Skin Injury  Flowsheets (Taken 2/2/2022 0310)  Body Position: weight shifting  Intervention: Prevent and Manage VTE (Venous Thromboembolism) Risk  Flowsheets (Taken 2/2/2022 0310)  Activity Management:   Ambulated to bathroom - L4   Rolling - L1   Ankle pumps - L1   Arm raise - L1  Intervention: Prevent Infection  Flowsheets (Taken 2/2/2022 0310)  Infection Prevention:   rest/sleep promoted   single patient room provided   visitors restricted/screened  Goal: Optimal Comfort and Wellbeing  Outcome: Ongoing, Progressing  Intervention: Monitor Pain and Promote Comfort  Flowsheets (Taken 2/2/2022 0310)  Pain Management Interventions: care clustered  Intervention: Provide Person-Centered Care  Flowsheets (Taken 2/2/2022 0310)  Trust Relationship/Rapport:   care explained   choices provided   emotional support provided   empathic listening provided   thoughts/feelings acknowledged   reassurance provided   questions encouraged   questions answered  Goal: Readiness for Transition of Care  Outcome: Ongoing, Progressing     Problem: Pain Acute  Goal: Acceptable Pain Control and Functional Ability  Outcome: Ongoing, Progressing  Intervention: Develop Pain Management Plan  Flowsheets (Taken 2/2/2022 0310)  Pain Management Interventions: care clustered  Intervention: Prevent or  Manage Pain  Flowsheets (Taken 2/2/2022 0310)  Sleep/Rest Enhancement: awakenings minimized  Bowel Elimination Promotion:   adequate fluid intake promoted   ambulation promoted  Medication Review/Management: medications reviewed  Intervention: Optimize Psychosocial Wellbeing  Flowsheets (Taken 2/2/2022 0310)  Supportive Measures:   active listening utilized   verbalization of feelings encouraged  Diversional Activities: television     Problem: Adjustment to Surgery (Hip Arthroplasty)  Goal: Optimal Coping  Outcome: Ongoing, Progressing  Intervention: Support Psychosocial Response to Surgery and Mobility Changes  Flowsheets (Taken 2/2/2022 0310)  Supportive Measures:   active listening utilized   verbalization of feelings encouraged     Problem: Bleeding (Hip Arthroplasty)  Goal: Absence of Bleeding  Outcome: Ongoing, Progressing  Intervention: Monitor and Manage Bleeding  Flowsheets (Taken 2/2/2022 0310)  Bleeding Management: dressing monitored     Problem: Bowel Motility Impaired (Hip Arthroplasty)  Goal: Effective Bowel Elimination  Outcome: Ongoing, Progressing  Intervention: Enhance Bowel Motility and Elimination  Flowsheets (Taken 2/2/2022 0310)  Bowel Motility Enhancement:   ambulation promoted   fluid intake encouraged     Problem: Fluid and Electrolyte Imbalance (Hip Arthroplasty)  Goal: Fluid and Electrolyte Balance  Outcome: Ongoing, Progressing     Problem: Functional Ability Impaired (Hip Arthroplasty)  Goal: Optimal Functional Ability  Outcome: Ongoing, Progressing  Intervention: Promote Optimal Functional Status  Flowsheets (Taken 2/2/2022 0310)  Assistive Device Utilized: walker  Activity Management:   Ambulated to bathroom - L4   Rolling - L1   Ankle pumps - L1   Arm raise - L1     Problem: Infection (Hip Arthroplasty)  Goal: Absence of Infection Signs and Symptoms  Outcome: Ongoing, Progressing  Intervention: Prevent or Manage Infection  Flowsheets (Taken 2/2/2022 0310)  Infection Management:  aseptic technique maintained     Problem: Neurovascular Compromise (Hip Arthroplasty)  Goal: Intact Neurovascular Status  Outcome: Ongoing, Progressing  Intervention: Prevent or Manage Neurovascular Compromise  Flowsheets (Taken 2/2/2022 0310)  Compartment Syndrome Management: active flexion/extension encouraged  Compartment Syndrome Surveillance: no pain with passive muscle stretch     Problem: Ongoing Anesthesia Effects (Hip Arthroplasty)  Goal: Anesthesia/Sedation Recovery  Outcome: Ongoing, Progressing  Intervention: Optimize Anesthesia Recovery  Flowsheets (Taken 2/2/2022 0310)  Safety Promotion/Fall Prevention:   assistive device/personal item within reach   bedside commode chair   lighting adjusted   medications reviewed   nonskid shoes/socks when out of bed   side rails raised x 2   Supervised toileting - stay within arms reach   instructed to call staff for mobility     Problem: Pain (Hip Arthroplasty)  Goal: Acceptable Pain Control  Outcome: Ongoing, Progressing  Intervention: Prevent or Manage Pain  Flowsheets (Taken 2/2/2022 0310)  Diversional Activities: television  Pain Management Interventions: care clustered     Problem: Postoperative Nausea and Vomiting (Hip Arthroplasty)  Goal: Nausea and Vomiting Relief  Outcome: Ongoing, Progressing     Problem: Postoperative Urinary Retention (Hip Arthroplasty)  Goal: Effective Urinary Elimination  Outcome: Ongoing, Progressing     Problem: Respiratory Compromise (Hip Arthroplasty)  Goal: Effective Oxygenation and Ventilation  Outcome: Ongoing, Progressing  Intervention: Optimize Oxygenation and Ventilation  Flowsheets (Taken 2/2/2022 0310)  Head of Bed (HOB) Positioning: HOB at 30 degrees     Problem: Skin Injury Risk Increased  Goal: Skin Health and Integrity  Outcome: Ongoing, Progressing  Intervention: Optimize Skin Protection  Flowsheets (Taken 2/2/2022 0310)  Pressure Reduction Techniques:   weight shift assistance provided   frequent weight shift  encouraged  Head of Bed (HOB) Positioning: HOB at 30 degrees

## 2022-02-02 NOTE — ADDENDUM NOTE
Addendum  created 02/02/22 1049 by Cherry Wallace MD    Charge Capture section accepted, Cosign clinical note with attestation

## 2022-02-02 NOTE — SUBJECTIVE & OBJECTIVE
"Principal Problem:Primary osteoarthritis of right hip    Principal Orthopedic Problem: s/p R anterior MICHAEL on 1/31/22    Interval History: NAEON. Htn overnight, 86/47, asymptomatic, pressures improved with 1 L bolus, now stable. Ambulated 120 ft with PT yesterday. Voiding and tolerating PO intake. No BM yet.     Review of patient's allergies indicates:   Allergen Reactions    Codeine Other (See Comments)     Feel like (climbing the walls)    Benadryl [diphenhydramine hcl] Anxiety       Current Facility-Administered Medications   Medication    acetaminophen tablet 1,000 mg    aspirin EC tablet 81 mg    azelastine 137 mcg (0.1 %) nasal spray 274 mcg    celecoxib capsule 200 mg    cetirizine tablet 10 mg    famotidine tablet 20 mg    gabapentin capsule 600 mg    methocarbamoL tablet 750 mg    metoprolol succinate (TOPROL-XL) 24 hr tablet 100 mg    mupirocin 2 % ointment 1 g    naloxone 0.4 mg/mL injection 0.02 mg    ondansetron injection 4 mg    oxyCODONE immediate release tablet 5 mg    polycarbophil tablet 625 mg    polyethylene glycol packet 17 g    polyethylene glycol packet 17 g    pravastatin tablet 40 mg    prochlorperazine injection Soln 5 mg    senna-docusate 8.6-50 mg per tablet 1 tablet     Objective:     Vital Signs (Most Recent):  Temp: 97.5 °F (36.4 °C) (02/02/22 0401)  Pulse: 69 (02/02/22 0401)  Resp: 18 (02/02/22 0401)  BP: 114/61 (02/02/22 0401)  SpO2: 99 % (02/02/22 0401) Vital Signs (24h Range):  Temp:  [97.3 °F (36.3 °C)-97.9 °F (36.6 °C)] 97.5 °F (36.4 °C)  Pulse:  [66-84] 69  Resp:  [16-20] 18  SpO2:  [96 %-100 %] 99 %  BP: ()/(46-67) 114/61     Weight: 61.2 kg (135 lb)  Height: 4' 11" (149.9 cm)  Body mass index is 27.27 kg/m².      Intake/Output Summary (Last 24 hours) at 2/2/2022 7689  Last data filed at 2/2/2022 0400  Gross per 24 hour   Intake 300 ml   Output 850 ml   Net -550 ml       Ortho/SPM Exam   General appearance - no acute distress, " conversant  Musculoskeletal -  Dressing C/D/I  Fires quad/TA/EHL/GSC  SILT SP/DP/TN  WWP distally  Calves soft, nontender bilateral      Significant Labs: All pertinent labs within the past 24 hours have been reviewed.    Significant Imaging: I have reviewed all pertinent imaging results/findings.

## 2022-02-02 NOTE — PT/OT/SLP PROGRESS
"Occupational Therapy   Treatment/Discharge    Name: Claudia Sierra  MRN: [id number]  Admitting Diagnosis:  Primary osteoarthritis of right hip  2 Days Post-Op    Recommendations:     Discharge Recommendations: home with home health  Discharge Equipment Recommendations:  none  Barriers to discharge:  None    Assessment:     Claudia Sierra is a 81 y.o. female with a medical diagnosis of Primary osteoarthritis of right hip.  Patient is s/p right MICHAEL anterior approach. She completed lower body dressing and further ADLs at supervision. She is appropriate for discharge home. Performance deficits affecting function are weakness,impaired functional mobilty,gait instability,impaired balance,impaired self care skills,decreased lower extremity function,decreased ROM,orthopedic precautions.     Rehab Prognosis:  Good; patient would benefit from acute skilled OT services to address these deficits and reach maximum level of function.       Plan:     · DC from OT    Subjective     "This is my neighbor"   Pain/Comfort:  · Pain Rating 1:  (burning once up but did not rate)  · Location - Side 1: Right  · Location - Orientation 1: generalized  · Location 1: thigh  · Pain Addressed 1: Pre-medicate for activity,Reposition,Distraction    Objective:     Communicated with: RN prior to session.  Patient found supine with cryotherapy,FCD upon OT entry to room.    General Precautions: Standard, fall   Orthopedic Precautions:RLE weight bearing as tolerated,RLE anterior precautions (no extreme motions and 0-90 flexion)   Braces: N/A  Respiratory Status: Room air     Occupational Performance:     Bed Mobility:    · Patient completed Scooting/Bridging with supervision  · Patient completed Supine to Sit with supervision     Functional Mobility/Transfers:  · Patient completed Sit <> Stand Transfer with supervision  with  rolling walker   · Patient completed Toilet Transfer Step Transfer technique with supervision with  rolling walker "   · Patient completed chair transfer with step transfer technique with supervision with rolling walker   · Functional Mobility: patient ambulated to/from bathroom with use of rolling walker at supervision     Activities of Daily Living:  · Grooming: supervision standing at sink to wash hands, brush teeth  · Lower Body Dressing: supervision sitting in chair to don pants with reacher, doff socks with shoe horn, don socks with sock aid and don shoes with shoe horn   · Toileting: supervision completed on bedside commode placed over toilet       [facility] 6 Click ADL: 19    Treatment & Education:  -Patient educated on anterior hip precautions with no extreme motions and hip flexion 0-90 degrees  -Patient educated to avoid tight waist bands on surgical bandage on anterior hip  -Patient educated to dress surgical side first and further dressing techniques s/p surgery   -Patient educated on use of dressing aids for lower body dressing: sock aid, reacher and shoe horn   -Patient educated on hand placement for transfers   -Patient educated on rolling walker placement for ADLs  -Patient educated on proper foot wear s/p surgery   -Patient educated on car transfers s/p surgery  Patient educated on role OT and plan of care s/p surgery at [facility], white board updated as needed     Patient left up in chair with call button in reach, RN notified and neighbor  presentEducation:   ice donned     GOALS:   Multidisciplinary Problems     Occupational Therapy Goals     Not on file          Multidisciplinary Problems (Resolved)        Problem: Occupational Therapy Goal    Goal Priority Disciplines Outcome Interventions   Occupational Therapy Goal   (Resolved)     OT, PT/OT Met    Description: Goals to be met by: 2/4/22    Patient will increase functional independence with ADLs by performing:    UE Dressing with Modified [place].  LE Dressing with Supervision.  Grooming while standing at sink with Supervision.  Toileting  from toilet with Supervision for hygiene and clothing management.   Toilet transfer to toilet with Supervision.                     Time Tracking:     OT Date of Treatment: 02/02/22  OT Start Time: 0906  OT Stop Time: 0953  OT Total Time (min): 47 min    Billable Minutes:Self Care/Home Management 47               2/2/2022

## 2022-02-02 NOTE — PLAN OF CARE
Problem: Occupational Therapy Goal  Goal: Occupational Therapy Goal  Description: Goals to be met by: 2/4/22    Patient will increase functional independence with ADLs by performing:    UE Dressing with Modified [place].  LE Dressing with Supervision.  Grooming while standing at sink with Supervision.  Toileting from toilet with Supervision for hygiene and clothing management.   Toilet transfer to toilet with Supervision.    Outcome: Met    OT goals met for discharge home

## 2022-02-02 NOTE — PLAN OF CARE
Pt AAOX4, very pleasant. BP was low, has improved. Room air. IV intact saline locked. Pt very sleepy, however wakes up spontaneously to my voice. States she did not sleep the night before and is making up for it. Pt has ambulated to the restroom. Minimal complaints of pain but immediately goes back to sleep. Surgical dressing dry clean and intact. Pt ambulates with walker stand by assist. Needs assistant ance setting up food. Safety measures in place, call bell with in reach. Instructed to call for assistance. No falls at this time.
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
## 2022-02-02 NOTE — PROGRESS NOTES
[facility])  Orthopedics  Progress Note    Patient Name: Claudia Sierra  MRN: [id number]  Admission Date: 1/31/2022  Hospital Length of Stay: 0 days  Attending Provider: Cisco Mcneal III, MD  Primary Care Provider: Sajan Curtis MD  Follow-up For: Procedure(s) (LRB):  ARTHROPLASTY, HIP, TOTAL, ANTERIOR APPROACH:RIGHT: DEPUY-C-STEM+PINNACLE (Right)    Post-Operative Day: 2 Days Post-Op  Subjective:     Principal Problem:Primary osteoarthritis of right hip    Principal Orthopedic Problem: s/p R anterior MICHAEL on 1/31/22    Interval History: NAEON. Htn overnight, 86/47, asymptomatic, pressures improved with 1 L bolus, now stable. Ambulated 120 ft with PT yesterday. Voiding and tolerating PO intake. No BM yet.     Review of patient's allergies indicates:   Allergen Reactions    Codeine Other (See Comments)     Feel like (climbing the walls)    Benadryl [diphenhydramine hcl] Anxiety       Current Facility-Administered Medications   Medication    acetaminophen tablet 1,000 mg    aspirin EC tablet 81 mg    azelastine 137 mcg (0.1 %) nasal spray 274 mcg    celecoxib capsule 200 mg    cetirizine tablet 10 mg    famotidine tablet 20 mg    gabapentin capsule 600 mg    methocarbamoL tablet 750 mg    metoprolol succinate (TOPROL-XL) 24 hr tablet 100 mg    mupirocin 2 % ointment 1 g    naloxone 0.4 mg/mL injection 0.02 mg    ondansetron injection 4 mg    oxyCODONE immediate release tablet 5 mg    polycarbophil tablet 625 mg    polyethylene glycol packet 17 g    polyethylene glycol packet 17 g    pravastatin tablet 40 mg    prochlorperazine injection Soln 5 mg    senna-docusate 8.6-50 mg per tablet 1 tablet     Objective:     Vital Signs (Most Recent):  Temp: 97.5 °F (36.4 °C) (02/02/22 0401)  Pulse: 69 (02/02/22 0401)  Resp: 18 (02/02/22 0401)  BP: 114/61 (02/02/22 0401)  SpO2: 99 % (02/02/22 0401) Vital Signs (24h Range):  Temp:  [97.3 °F (36.3 °C)-97.9 °F (36.6 °C)] 97.5 °F  "(36.4 °C)  Pulse:  [66-84] 69  Resp:  [16-20] 18  SpO2:  [96 %-100 %] 99 %  BP: ()/(46-67) 114/61     Weight: 61.2 kg (135 lb)  Height: 4' 11" (149.9 cm)  Body mass index is 27.27 kg/m².      Intake/Output Summary (Last 24 hours) at 2/2/2022 0713  Last data filed at 2/2/2022 0400  Gross per 24 hour   Intake 300 ml   Output 850 ml   Net -550 ml       Ortho/SPM Exam   General appearance - no acute distress, conversant  Musculoskeletal -  Dressing C/D/I  Fires quad/TA/EHL/GSC  SILT SP/DP/TN  WWP distally  Calves soft, nontender bilateral      Significant Labs: All pertinent labs within the past 24 hours have been reviewed.    Significant Imaging: I have reviewed all pertinent imaging results/findings.    Assessment/Plan:     * Primary osteoarthritis of right hip  Claudia Sierra is a 81 y.o. female s/p R anterior MICHAEL on 1/31/22      Pain control: multimodal  PT/OT: WBAT RLE with walker; Anterior hip precautions: Weight bearing as tolerated with walker, Avoid extremes of hip motion, no extension of hip, no external rotation, and no hip flexion past 90 deg.   DVT PPx: asa 81 bid x 4 weeks, SCDs at all times when not ambulating  Abx: postop Ancef complete  Labs: htc 32 yesterday    Dispo: ND home with home health PT today              Terrence Sanchez MD  Orthopedics  [place] [facility]  "

## 2022-02-03 ENCOUNTER — CLINICAL SUPPORT (OUTPATIENT)
Dept: ORTHOPEDICS | Facility: CLINIC | Age: 82
End: 2022-02-03
Payer: MEDICARE

## 2022-02-03 DIAGNOSIS — Z96.649 STATUS POST HIP REPLACEMENT, UNSPECIFIED LATERALITY: Primary | ICD-10-CM

## 2022-02-03 PROCEDURE — 99499 UNLISTED E&M SERVICE: CPT | Mod: 95,,, | Performed by: ORTHOPAEDIC SURGERY

## 2022-02-03 PROCEDURE — G0180 MD CERTIFICATION HHA PATIENT: HCPCS | Mod: ,,, | Performed by: ORTHOPAEDIC SURGERY

## 2022-02-03 PROCEDURE — 99499 NO LOS: ICD-10-PCS | Mod: 95,,, | Performed by: ORTHOPAEDIC SURGERY

## 2022-02-03 PROCEDURE — G0180 PR HOME HEALTH MD CERTIFICATION: ICD-10-PCS | Mod: ,,, | Performed by: ORTHOPAEDIC SURGERY

## 2022-02-03 NOTE — PROGRESS NOTES
Established Patient - Audio Only Telehealth Visit     The patient location is: home  The chief complaint leading to consultation is: post-op DC f/u  Visit type: Virtual visit with audio only (telephone)  Total time spent with patient: 6 min       The reason for the audio only service rather than synchronous audio and video virtual visit was related to technical difficulties or patient preference/necessity.     Each patient to whom I provide medical services by telemedicine is:  (1) informed of the relationship between the physician and patient and the respective role of any other health care provider with respect to management of the patient; and (2) notified that they may decline to receive medical services by telemedicine and may withdraw from such care at any time. Patient verbally consented to receive this service via voice-only telephone call.       HPI: s/p hip     Assessment and plan:  See below    I called the patient today regarding her surgery with Dr. Cisco Mcneal III. The patient had a right anterior MICHAEL on 1/31.     Pain Scale: 6 / 10    Any issues with Fever: No.    Any issues with medications (specifically DVT prophylaxis): No. ASA 81 BID    Any issues with bowel movements:  Passing jw: No.                                                                 Urination: No.                                                                 Constipation: No. last BM 2/2    Completing at home exercises: Yes:     Any concerns regarding their dressing/bandage:  No.    Patient confirmed first HH-PT appointment:  HHPT on  2/3 at am.     Any other concerns: No.        The patient was informed that if they have any urgent issues with their bandage, medications or any other health concerns regarding their surgery to call the 24/7 Orthopedic Post-op Hot Line at [phone]. The patient was reminded that if they have any chest pain or shortness of breath to call 911 or go to the ER.    The patient verbalized  understanding and does not have any other questions                                    This service was not originating from a related E/M service provided within the previous 7 days nor will  to an E/M service or procedure within the next 24 hours or my soonest available appointment.  Prevailing standard of care was able to be met in this audio-only visit.

## 2022-02-04 DIAGNOSIS — Z96.641 STATUS POST RIGHT HIP REPLACEMENT: Primary | ICD-10-CM

## 2022-02-08 ENCOUNTER — TELEPHONE (OUTPATIENT)
Dept: ORTHOPEDICS | Facility: CLINIC | Age: 82
End: 2022-02-08
Payer: MEDICARE

## 2022-02-08 LAB
FINAL PATHOLOGIC DIAGNOSIS: NORMAL
Lab: NORMAL

## 2022-02-08 NOTE — TELEPHONE ENCOUNTER
I called and spoke to the patient to reschedule her appointment with Dr. Mcneal.   The patient is understanding and has no further questions.

## 2022-02-09 ENCOUNTER — PATIENT MESSAGE (OUTPATIENT)
Dept: ORTHOPEDICS | Facility: CLINIC | Age: 82
End: 2022-02-09
Payer: MEDICARE

## 2022-02-09 DIAGNOSIS — Z96.649 STATUS POST HIP REPLACEMENT, UNSPECIFIED LATERALITY: Primary | ICD-10-CM

## 2022-02-09 RX ORDER — OXYCODONE HYDROCHLORIDE 5 MG/1
TABLET ORAL
Qty: 30 TABLET | Refills: 0 | Status: SHIPPED | OUTPATIENT
Start: 2022-02-09 | End: 2022-02-22 | Stop reason: ALTCHOICE

## 2022-02-09 RX ORDER — METHOCARBAMOL 750 MG/1
750 TABLET, FILM COATED ORAL 3 TIMES DAILY PRN
Qty: 30 TABLET | Refills: 0 | Status: SHIPPED | OUTPATIENT
Start: 2022-02-09 | End: 2022-02-22 | Stop reason: SDUPTHER

## 2022-02-14 ENCOUNTER — EXTERNAL HOME HEALTH (OUTPATIENT)
Dept: HOME HEALTH SERVICES | Facility: HOSPITAL | Age: 82
End: 2022-02-14
Payer: MEDICARE

## 2022-02-15 ENCOUNTER — OFFICE VISIT (OUTPATIENT)
Dept: ORTHOPEDICS | Facility: CLINIC | Age: 82
End: 2022-02-15
Payer: MEDICARE

## 2022-02-15 VITALS — BODY MASS INDEX: 27.21 KG/M2 | WEIGHT: 135 LBS | HEIGHT: 59 IN

## 2022-02-15 DIAGNOSIS — Z96.649 STATUS POST HIP REPLACEMENT, UNSPECIFIED LATERALITY: Primary | ICD-10-CM

## 2022-02-15 PROCEDURE — 99213 OFFICE O/P EST LOW 20 MIN: CPT | Mod: PBBFAC | Performed by: NURSE PRACTITIONER

## 2022-02-15 PROCEDURE — 99999 PR PBB SHADOW E&M-EST. PATIENT-LVL III: ICD-10-PCS | Mod: PBBFAC,,, | Performed by: NURSE PRACTITIONER

## 2022-02-15 PROCEDURE — 99024 PR POST-OP FOLLOW-UP VISIT: ICD-10-PCS | Mod: S$PBB,POP,, | Performed by: NURSE PRACTITIONER

## 2022-02-15 PROCEDURE — 99024 POSTOP FOLLOW-UP VISIT: CPT | Mod: S$PBB,POP,, | Performed by: NURSE PRACTITIONER

## 2022-02-15 PROCEDURE — 99999 PR PBB SHADOW E&M-EST. PATIENT-LVL III: CPT | Mod: PBBFAC,,, | Performed by: NURSE PRACTITIONER

## 2022-02-15 NOTE — PROGRESS NOTES
"Claudia Sierra presents for initial post-operative visit following a right total hip arthroplasty performed by Dr. Mcneal on 1/31/2022.       Exam:  Height 4' 11" (1.499 m), weight 61.2 kg (135 lb).   Patient is ambulating well with walker   Incision is clean and dry without drainage or erythema.      Initial post-operative radiographs reviewed today revealing satisfactory position of the prosthesis. Dr. Mcneal came to the room to evaluate as well.     A/P  2 weeks s/p right total hip replacement  - Patient advised to keep the incision clean and dry for the next 24 hours after which she  may wash the area with antibacterial soap in the shower, but will not submerge incision until one month post op.  - Antibiotic prophylaxis reviewed  - Continue aspirin for 1 month post op  - Pain medication refilled  - Follow up in 4 weeks with surgeon. Pt will call clinic immediately with problems/concerns.     "

## 2022-02-17 ENCOUNTER — TELEPHONE (OUTPATIENT)
Dept: ORTHOPEDICS | Facility: CLINIC | Age: 82
End: 2022-02-17
Payer: MEDICARE
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
## 2022-02-22 ENCOUNTER — PATIENT MESSAGE (OUTPATIENT)
Dept: ORTHOPEDICS | Facility: CLINIC | Age: 82
End: 2022-02-22
Payer: MEDICARE

## 2022-02-22 DIAGNOSIS — Z96.649 STATUS POST HIP REPLACEMENT, UNSPECIFIED LATERALITY: ICD-10-CM

## 2022-02-22 RX ORDER — HYDROCODONE BITARTRATE AND ACETAMINOPHEN 5; 325 MG/1; MG/1
1 TABLET ORAL EVERY 6 HOURS PRN
Qty: 28 TABLET | Refills: 0 | Status: SHIPPED | OUTPATIENT
Start: 2022-02-22 | End: 2023-01-26

## 2022-02-22 RX ORDER — METHOCARBAMOL 750 MG/1
750 TABLET, FILM COATED ORAL 3 TIMES DAILY PRN
Qty: 30 TABLET | Refills: 0 | Status: SHIPPED | OUTPATIENT
Start: 2022-02-22 | End: 2022-03-03 | Stop reason: SDUPTHER

## 2022-02-22 NOTE — PROGRESS NOTES
Robaxin refilled and pain medication ordered. Rx's sent to [facility] as requested.

## 2022-02-28 ENCOUNTER — EXTERNAL CHRONIC CARE MANAGEMENT (OUTPATIENT)
Dept: PRIMARY CARE CLINIC | Facility: CLINIC | Age: 82
End: 2022-02-28
Payer: MEDICARE

## 2022-02-28 PROCEDURE — 99490 CHRNC CARE MGMT STAFF 1ST 20: CPT | Mod: PBBFAC | Performed by: INTERNAL MEDICINE

## 2022-02-28 PROCEDURE — 99490 CHRNC CARE MGMT STAFF 1ST 20: CPT | Mod: S$PBB,,, | Performed by: INTERNAL MEDICINE

## 2022-02-28 PROCEDURE — 99490 PR CHRONIC CARE MGMT, 1ST 20 MIN: ICD-10-PCS | Mod: S$PBB,,, | Performed by: INTERNAL MEDICINE

## 2022-03-03 DIAGNOSIS — Z96.649 STATUS POST HIP REPLACEMENT, UNSPECIFIED LATERALITY: ICD-10-CM

## 2022-03-03 RX ORDER — METHOCARBAMOL 750 MG/1
750 TABLET, FILM COATED ORAL 3 TIMES DAILY PRN
Qty: 30 TABLET | Refills: 0 | Status: SHIPPED | OUTPATIENT
Start: 2022-03-03 | End: 2023-01-26

## 2022-03-03 RX ORDER — DEXTROMETHORPHAN HYDROBROMIDE, GUAIFENESIN 5; 100 MG/5ML; MG/5ML
650 LIQUID ORAL
Qty: 120 TABLET | Refills: 0 | Status: SHIPPED | OUTPATIENT
Start: 2022-03-03

## 2022-03-07 ENCOUNTER — PATIENT MESSAGE (OUTPATIENT)
Dept: ORTHOPEDICS | Facility: CLINIC | Age: 82
End: 2022-03-07
Payer: MEDICARE

## 2022-03-07 RX ORDER — CELECOXIB 200 MG/1
200 CAPSULE ORAL DAILY
Qty: 30 CAPSULE | Refills: 0 | Status: SHIPPED | OUTPATIENT
Start: 2022-03-07 | End: 2022-04-06

## 2022-03-15 ENCOUNTER — OFFICE VISIT (OUTPATIENT)
Dept: ORTHOPEDICS | Facility: CLINIC | Age: 82
End: 2022-03-15
Payer: MEDICARE

## 2022-03-15 ENCOUNTER — HOSPITAL ENCOUNTER (OUTPATIENT)
Dept: RADIOLOGY | Facility: HOSPITAL | Age: 82
Discharge: HOME | End: 2022-03-15
Attending: ORTHOPAEDIC SURGERY
Payer: MEDICARE

## 2022-03-15 VITALS — WEIGHT: 134.94 LBS | HEIGHT: 59 IN | BODY MASS INDEX: 27.2 KG/M2

## 2022-03-15 DIAGNOSIS — Z96.641 STATUS POST RIGHT HIP REPLACEMENT: Primary | ICD-10-CM

## 2022-03-15 DIAGNOSIS — Z96.641 STATUS POST RIGHT HIP REPLACEMENT: ICD-10-CM

## 2022-03-15 PROCEDURE — 99024 POSTOP FOLLOW-UP VISIT: CPT | Mod: POP,,, | Performed by: ORTHOPAEDIC SURGERY

## 2022-03-15 PROCEDURE — 99212 OFFICE O/P EST SF 10 MIN: CPT | Mod: PBBFAC | Performed by: ORTHOPAEDIC SURGERY

## 2022-03-15 PROCEDURE — 73502 X-RAY EXAM HIP UNI 2-3 VIEWS: CPT | Mod: 26,RT,, | Performed by: RADIOLOGY

## 2022-03-15 PROCEDURE — 99024 PR POST-OP FOLLOW-UP VISIT: ICD-10-PCS | Mod: POP,,, | Performed by: ORTHOPAEDIC SURGERY

## 2022-03-15 PROCEDURE — 73502 X-RAY EXAM HIP UNI 2-3 VIEWS: CPT | Mod: TC,RT

## 2022-03-15 PROCEDURE — 99999 PR PBB SHADOW E&M-EST. PATIENT-LVL II: CPT | Mod: PBBFAC,,, | Performed by: ORTHOPAEDIC SURGERY

## 2022-03-15 PROCEDURE — 73502 XR HIP WITH PELVIS WHEN PERFORMED, 2 OR 3  VIEWS RIGHT: ICD-10-PCS | Mod: 26,RT,, | Performed by: RADIOLOGY

## 2022-03-15 PROCEDURE — 99999 PR PBB SHADOW E&M-EST. PATIENT-LVL II: ICD-10-PCS | Mod: PBBFAC,,, | Performed by: ORTHOPAEDIC SURGERY

## 2022-03-15 NOTE — PROGRESS NOTES
Subjective:     HPI:   Claudia Sierra is a 81 y.o. female who presents 6 weeks out from right MICHAEL     Date of surgery: 1/31/22    Medications: tylenol BID, m relaxant PRN, compound cream minimal relief    Assistive Devices: RW (folding)    PT: finished, says HH PT was very aggressive, 20 reps was too much    Limitations: none    Nerve pain/discomfort LFCN, otherwise doing well  Using compound cream and gabapentin  Wearing loose clothes   a little bit of improvement every day     Objective:   Body mass index is 27.25 kg/m².  Exam:    Gait: limp/antalgic/trendelenburg none    Incision: healed    Active straight leg raise: good strength, no discomfort    Extension: 0    Flexion: 90    Abduction: 30    Adduction: 20    External rotation: 30    Internal rotation: 30 no pain with A/P ROM    LLD: 0 cm supine     Mild hypersensitivity to light touch LFCN distribution      Imaging:  Indication:  Annual exam status post right total hip arthroplasty  Exam Ordered: Radiographs taken today include an anteroposterior pelvis, and cross table lateral view of the proximal femur including the hip joint  Details of Examination: Today's exam shows a well fixed, well positioned total hip arthroplasty with no evidence of wear, osteolysis, or loosening.  Impression:  Status post right total hip arthroplasty, implant in good position with no abnormality       Assessment:       ICD-10-CM ICD-9-CM   1. Status post right hip replacement  Z96.641 V43.64      LFCN neuropathic pain     Plan:       Patient is doing very well with the hip replacement at this time.  They will continue routine care of their hip and see me for their routine follow-up.  If there are problems in the interim they will see me back sooner. Prophylactic antibiotic protocol given and explained to patient.     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            Past Medical History:   Diagnosis Date    *Atrial fibrillation     Breast cancer     Right breast cancer    Hypertension     Slow transit constipation 12/19/2013       Past Surgical History:   Procedure Laterality Date    ADENOIDECTOMY  70 years ago    At same time as tonsillectomy. .    APPENDECTOMY      ARTHROPLASTY OF HIP BY ANTERIOR APPROACH Right 1/31/2022    Procedure: ARTHROPLASTY, HIP, TOTAL, ANTERIOR APPROACH:RIGHT: DEPUY-C-STEM+PINNACLE;  Surgeon: Cisco Mcneal III, MD;  Location: [facility] OR;  Service: Orthopedics;  Laterality: Right;    BREAST BIOPSY      right    BREAST LUMPECTOMY Right 2013    EPIDURAL STEROID INJECTION INTO CERVICAL SPINE N/A 2/12/2020    Procedure: Injection-steroid-epidural-cervical--C7-T1 IL ELMIRA;  Surgeon: Alicia Proctor MD;  Location: [facility];  Service: Pain Management;  Laterality: N/A;  sign consent at [facility]    EYE SURGERY  2018    HYSTERECTOMY      lymphnode removal      12 from Right axilla    PORTACATH PLACEMENT  2013    TONSILLECTOMY         Family History   Problem Relation Age of Onset    Cervical cancer Sister 79    Heart disease Mother     Hypertension Mother     Heart disease Father        Social History     Socioeconomic History    Marital status:     Number of children: 1   Occupational History     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

## 2022-03-31 ENCOUNTER — EXTERNAL CHRONIC CARE MANAGEMENT (OUTPATIENT)
Dept: PRIMARY CARE CLINIC | Facility: CLINIC | Age: 82
End: 2022-03-31
Payer: MEDICARE

## 2022-03-31 PROCEDURE — 99490 PR CHRONIC CARE MGMT, 1ST 20 MIN: ICD-10-PCS | Mod: S$PBB,,, | Performed by: INTERNAL MEDICINE

## 2022-03-31 PROCEDURE — 99490 CHRNC CARE MGMT STAFF 1ST 20: CPT | Mod: S$PBB,,, | Performed by: INTERNAL MEDICINE

## 2022-03-31 PROCEDURE — 99490 CHRNC CARE MGMT STAFF 1ST 20: CPT | Mod: PBBFAC | Performed by: INTERNAL MEDICINE

## 2022-04-18 ENCOUNTER — PATIENT MESSAGE (OUTPATIENT)
Dept: ORTHOPEDICS | Facility: CLINIC | Age: 82
End: 2022-04-18
Payer: MEDICARE

## 2022-04-19 ENCOUNTER — PATIENT MESSAGE (OUTPATIENT)
Dept: HEMATOLOGY/ONCOLOGY | Facility: CLINIC | Age: 82
End: 2022-04-19
Payer: MEDICARE

## 2022-04-19 ENCOUNTER — PATIENT MESSAGE (OUTPATIENT)
Dept: ADMINISTRATIVE | Facility: OTHER | Age: 82
End: 2022-04-19
Payer: MEDICARE

## 2022-04-19 ENCOUNTER — PATIENT MESSAGE (OUTPATIENT)
Dept: ORTHOPEDICS | Facility: CLINIC | Age: 82
End: 2022-04-19
Payer: MEDICARE

## 2022-04-26 ENCOUNTER — OFFICE VISIT (OUTPATIENT)
Dept: ORTHOPEDICS | Facility: CLINIC | Age: 82
End: 2022-04-26
Payer: MEDICARE

## 2022-04-26 VITALS — BODY MASS INDEX: 26.75 KG/M2 | WEIGHT: 136.25 LBS | HEIGHT: 60 IN

## 2022-04-26 DIAGNOSIS — Z96.641 STATUS POST RIGHT HIP REPLACEMENT: Primary | ICD-10-CM

## 2022-04-26 PROCEDURE — 99999 PR PBB SHADOW E&M-EST. PATIENT-LVL III: ICD-10-PCS | Mod: PBBFAC,,, | Performed by: ORTHOPAEDIC SURGERY

## 2022-04-26 PROCEDURE — 99999 PR PBB SHADOW E&M-EST. PATIENT-LVL III: CPT | Mod: PBBFAC,,, | Performed by: ORTHOPAEDIC SURGERY

## 2022-04-26 PROCEDURE — 99024 POSTOP FOLLOW-UP VISIT: CPT | Mod: POP,,, | Performed by: ORTHOPAEDIC SURGERY

## 2022-04-26 PROCEDURE — 99213 OFFICE O/P EST LOW 20 MIN: CPT | Mod: PBBFAC | Performed by: ORTHOPAEDIC SURGERY

## 2022-04-26 PROCEDURE — 99024 PR POST-OP FOLLOW-UP VISIT: ICD-10-PCS | Mod: POP,,, | Performed by: ORTHOPAEDIC SURGERY

## 2022-04-26 NOTE — PROGRESS NOTES
Subjective:     HPI:   Claudia Sierra is a 81 y.o. female who presents 12 weeks out from right MICHAEL     Date of surgery: 1/31/22    Medications: prn tylenol to help sleep    Assistive Devices: none    Limitations: none    LFCN improving with time, compound cream, and gabapentin  Back on pre-op gabapentin 600mg QHS for foot neuropathy    Its all getting so much better  Trying to walk more, still rebuilding stamina  Can put on socks and shoes and cross legs       Objective:   Body mass index is 27.06 kg/m².  Exam:    Gait: limp/antalgic/trendelenburg none    Incision: healed    Active straight leg raise: good strength, no discomfort    No pain ROM  30 - 30 IR - ER    LLD: 0 cm supine       Imaging:  None today        Assessment:       ICD-10-CM ICD-9-CM   1. Status post right hip replacement  Z96.641 V43.64      Doing well     Plan:       Patient is doing very well with the hip replacement at this time.  They will continue routine care of their hip and see me for their routine follow-up.  If there are problems in the interim they will see me back sooner. Prophylactic antibiotic protocol given and explained to patient.     9 month follow up for annual xray      No orders of the defined types were placed in this encounter.            Past Medical History:   Diagnosis Date    *Atrial fibrillation     Breast cancer     Right breast cancer    Hypertension     Slow transit constipation 12/19/2013       Past Surgical History:   Procedure Laterality Date    ADENOIDECTOMY  70 years ago    At same time as tonsillectomy. .    APPENDECTOMY      ARTHROPLASTY OF HIP BY ANTERIOR APPROACH Right 1/31/2022    Procedure: ARTHROPLASTY, HIP, TOTAL, ANTERIOR APPROACH:RIGHT: DEPUY-C-STEM+PINNACLE;  Surgeon: Cisco Mcneal III, MD;  Location: [facility];  Service: Orthopedics;  Laterality: Right;    BREAST BIOPSY      right    BREAST LUMPECTOMY Right 2013    EPIDURAL STEROID INJECTION INTO CERVICAL SPINE N/A 2/12/2020     Procedure: Injection-steroid-epidural-cervical--C7-T1 IL ELMIRA;  Surgeon: Alicia Proctor MD;  Location: [facility];  Service: Pain Management;  Laterality: N/A;  sign consent at [facility]    EYE SURGERY  2018    HYSTERECTOMY      lymphnode removal      12 from Right axilla    PORTACATH PLACEMENT  2013    TONSILLECTOMY         Family History   Problem Relation Age of Onset    Cervical cancer Sister 79    Heart disease Mother     Hypertension Mother     Heart disease Father        Social History     Socioeconomic History    Marital status:     Number of children: 1   Occupational History     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

## 2022-04-30 ENCOUNTER — EXTERNAL CHRONIC CARE MANAGEMENT (OUTPATIENT)
Dept: PRIMARY CARE CLINIC | Facility: CLINIC | Age: 82
End: 2022-04-30
Payer: MEDICARE

## 2022-04-30 PROCEDURE — 99439 CHRNC CARE MGMT STAF EA ADDL: CPT | Mod: PBBFAC,27 | Performed by: INTERNAL MEDICINE

## 2022-04-30 PROCEDURE — 99490 CHRNC CARE MGMT STAFF 1ST 20: CPT | Mod: S$PBB,,, | Performed by: INTERNAL MEDICINE

## 2022-04-30 PROCEDURE — 99439 CHRNC CARE MGMT STAF EA ADDL: CPT | Mod: S$PBB,,, | Performed by: INTERNAL MEDICINE

## 2022-04-30 PROCEDURE — 99439 PR CHRONIC CARE MGMT, EA ADDTL 20 MIN: ICD-10-PCS | Mod: S$PBB,,, | Performed by: INTERNAL MEDICINE

## 2022-04-30 PROCEDURE — 99490 PR CHRONIC CARE MGMT, 1ST 20 MIN: ICD-10-PCS | Mod: S$PBB,,, | Performed by: INTERNAL MEDICINE

## 2022-04-30 PROCEDURE — 99490 CHRNC CARE MGMT STAFF 1ST 20: CPT | Mod: PBBFAC | Performed by: INTERNAL MEDICINE

## 2022-05-31 ENCOUNTER — EXTERNAL CHRONIC CARE MANAGEMENT (OUTPATIENT)
Dept: PRIMARY CARE CLINIC | Facility: CLINIC | Age: 82
End: 2022-05-31
Payer: MEDICARE

## 2022-05-31 PROCEDURE — 99490 CHRNC CARE MGMT STAFF 1ST 20: CPT | Mod: S$PBB,,, | Performed by: INTERNAL MEDICINE

## 2022-05-31 PROCEDURE — 99490 PR CHRONIC CARE MGMT, 1ST 20 MIN: ICD-10-PCS | Mod: S$PBB,,, | Performed by: INTERNAL MEDICINE

## 2022-05-31 PROCEDURE — 99490 CHRNC CARE MGMT STAFF 1ST 20: CPT | Mod: PBBFAC | Performed by: INTERNAL MEDICINE

## 2022-06-30 ENCOUNTER — EXTERNAL CHRONIC CARE MANAGEMENT (OUTPATIENT)
Dept: PRIMARY CARE CLINIC | Facility: CLINIC | Age: 82
End: 2022-06-30
Payer: MEDICARE

## 2022-06-30 PROCEDURE — 99490 CHRNC CARE MGMT STAFF 1ST 20: CPT | Mod: S$PBB,,, | Performed by: INTERNAL MEDICINE

## 2022-06-30 PROCEDURE — 99490 PR CHRONIC CARE MGMT, 1ST 20 MIN: ICD-10-PCS | Mod: S$PBB,,, | Performed by: INTERNAL MEDICINE

## 2022-06-30 PROCEDURE — 99490 CHRNC CARE MGMT STAFF 1ST 20: CPT | Mod: PBBFAC | Performed by: INTERNAL MEDICINE

## 2022-07-31 ENCOUNTER — EXTERNAL CHRONIC CARE MANAGEMENT (OUTPATIENT)
Dept: PRIMARY CARE CLINIC | Facility: CLINIC | Age: 82
End: 2022-07-31
Payer: MEDICARE

## 2022-07-31 PROCEDURE — 99490 PR CHRONIC CARE MGMT, 1ST 20 MIN: ICD-10-PCS | Mod: S$PBB,,, | Performed by: INTERNAL MEDICINE

## 2022-07-31 PROCEDURE — 99490 CHRNC CARE MGMT STAFF 1ST 20: CPT | Mod: PBBFAC | Performed by: INTERNAL MEDICINE

## 2022-07-31 PROCEDURE — 99490 CHRNC CARE MGMT STAFF 1ST 20: CPT | Mod: S$PBB,,, | Performed by: INTERNAL MEDICINE

## 2022-09-05 ENCOUNTER — PATIENT MESSAGE (OUTPATIENT)
Dept: INTERNAL MEDICINE | Facility: CLINIC | Age: 82
End: 2022-09-05
Payer: MEDICARE

## 2022-09-16 ENCOUNTER — TELEPHONE (OUTPATIENT)
Dept: HEMATOLOGY/ONCOLOGY | Facility: CLINIC | Age: 82
End: 2022-09-16
Payer: MEDICARE

## 2022-09-16 NOTE — TELEPHONE ENCOUNTER
RE: Reschedule Appointment Call     Attempted to call patient regarding need to reschedule their appointment with Dr. Mascorro. However, no answer. Left voice message with clinic number for return call.  Staff message sent to contact patient for rescheduling.     Future Appointments   Date Time Provider Department Center   12/21/2022  8:10 AM APPOINTMENT [facility] LAB [facility]   12/21/2022  8:45 AM [facility] MAMMO1 [facility] MAMMO [facility]   12/22/2022 10:40 AM Mariano Mascorro MD [facility] HEM ONC [facility]       Call time: 1 minutes

## 2022-11-30 ENCOUNTER — EXTERNAL CHRONIC CARE MANAGEMENT (OUTPATIENT)
Dept: PRIMARY CARE CLINIC | Facility: CLINIC | Age: 82
End: 2022-11-30
Payer: MEDICARE

## 2022-11-30 PROCEDURE — 99490 CHRNC CARE MGMT STAFF 1ST 20: CPT | Mod: PBBFAC | Performed by: INTERNAL MEDICINE

## 2022-11-30 PROCEDURE — 99490 PR CHRONIC CARE MGMT, 1ST 20 MIN: ICD-10-PCS | Mod: S$PBB,,, | Performed by: INTERNAL MEDICINE

## 2022-11-30 PROCEDURE — 99490 CHRNC CARE MGMT STAFF 1ST 20: CPT | Mod: S$PBB,,, | Performed by: INTERNAL MEDICINE

## 2022-12-31 ENCOUNTER — EXTERNAL CHRONIC CARE MANAGEMENT (OUTPATIENT)
Dept: PRIMARY CARE CLINIC | Facility: CLINIC | Age: 82
End: 2022-12-31
Payer: MEDICARE

## 2022-12-31 PROCEDURE — 99490 PR CHRONIC CARE MGMT, 1ST 20 MIN: ICD-10-PCS | Mod: S$PBB,,, | Performed by: INTERNAL MEDICINE

## 2022-12-31 PROCEDURE — 99490 CHRNC CARE MGMT STAFF 1ST 20: CPT | Mod: S$PBB,,, | Performed by: INTERNAL MEDICINE

## 2022-12-31 PROCEDURE — 99490 CHRNC CARE MGMT STAFF 1ST 20: CPT | Mod: PBBFAC | Performed by: INTERNAL MEDICINE

## 2023-01-26 ENCOUNTER — HOSPITAL ENCOUNTER (OUTPATIENT)
Dept: RADIOLOGY | Facility: HOSPITAL | Age: 83
Discharge: HOME | End: 2023-01-26
Attending: INTERNAL MEDICINE
Payer: MEDICARE

## 2023-01-26 ENCOUNTER — OFFICE VISIT (OUTPATIENT)
Dept: HEMATOLOGY/ONCOLOGY | Facility: CLINIC | Age: 83
End: 2023-01-26
Payer: MEDICARE

## 2023-01-26 VITALS
RESPIRATION RATE: 18 BRPM | HEART RATE: 80 BPM | SYSTOLIC BLOOD PRESSURE: 185 MMHG | WEIGHT: 135.56 LBS | BODY MASS INDEX: 26.93 KG/M2 | OXYGEN SATURATION: 98 % | DIASTOLIC BLOOD PRESSURE: 81 MMHG

## 2023-01-26 DIAGNOSIS — Z85.3 HISTORY OF BREAST CANCER: Primary | ICD-10-CM

## 2023-01-26 DIAGNOSIS — Z12.31 ENCOUNTER FOR SCREENING MAMMOGRAM FOR MALIGNANT NEOPLASM OF BREAST: ICD-10-CM

## 2023-01-26 DIAGNOSIS — Z92.21 HISTORY OF CHEMOTHERAPY: ICD-10-CM

## 2023-01-26 DIAGNOSIS — R53.81 PHYSICAL DECONDITIONING: ICD-10-CM

## 2023-01-26 DIAGNOSIS — C50.411 MALIGNANT NEOPLASM OF UPPER-OUTER QUADRANT OF RIGHT BREAST IN FEMALE, ESTROGEN RECEPTOR POSITIVE: ICD-10-CM

## 2023-01-26 DIAGNOSIS — T45.1X5A CHEMOTHERAPY-INDUCED PERIPHERAL NEUROPATHY: ICD-10-CM

## 2023-01-26 DIAGNOSIS — G62.0 CHEMOTHERAPY-INDUCED PERIPHERAL NEUROPATHY: ICD-10-CM

## 2023-01-26 DIAGNOSIS — Z17.0 MALIGNANT NEOPLASM OF UPPER-OUTER QUADRANT OF RIGHT BREAST IN FEMALE, ESTROGEN RECEPTOR POSITIVE: ICD-10-CM

## 2023-01-26 DIAGNOSIS — Z92.3 HISTORY OF RADIATION THERAPY: ICD-10-CM

## 2023-01-26 PROCEDURE — 99214 OFFICE O/P EST MOD 30 MIN: CPT | Mod: S$PBB,,, | Performed by: INTERNAL MEDICINE

## 2023-01-26 PROCEDURE — 77067 SCR MAMMO BI INCL CAD: CPT | Mod: 26,,, | Performed by: RADIOLOGY

## 2023-01-26 PROCEDURE — 77063 BREAST TOMOSYNTHESIS BI: CPT | Mod: 26,,, | Performed by: RADIOLOGY

## 2023-01-26 PROCEDURE — 77067 SCR MAMMO BI INCL CAD: CPT | Mod: TC

## 2023-01-26 PROCEDURE — 99999 PR PBB SHADOW E&M-EST. PATIENT-LVL IV: ICD-10-PCS | Mod: PBBFAC,,, | Performed by: INTERNAL MEDICINE

## 2023-01-26 PROCEDURE — 77063 MAMMO DIGITAL SCREENING BILAT WITH TOMO: ICD-10-PCS | Mod: 26,,, | Performed by: RADIOLOGY

## 2023-01-26 PROCEDURE — 77067 MAMMO DIGITAL SCREENING BILAT WITH TOMO: ICD-10-PCS | Mod: 26,,, | Performed by: RADIOLOGY

## 2023-01-26 PROCEDURE — 99214 OFFICE O/P EST MOD 30 MIN: CPT | Mod: PBBFAC,PO | Performed by: INTERNAL MEDICINE

## 2023-01-26 PROCEDURE — 99999 PR PBB SHADOW E&M-EST. PATIENT-LVL IV: CPT | Mod: PBBFAC,,, | Performed by: INTERNAL MEDICINE

## 2023-01-26 PROCEDURE — 99214 PR OFFICE/OUTPT VISIT, EST, LEVL IV, 30-39 MIN: ICD-10-PCS | Mod: S$PBB,,, | Performed by: INTERNAL MEDICINE

## 2023-01-26 NOTE — PROGRESS NOTES
Subjective:       Patient ID: Claudia Sierra is a 82 y.o. female.    Chief Complaint: history of breast cancer    HPI      Ms. 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    Relevant oncologic history: She felt a mass in the right armpit in March 2013.  Mammogram showed a 5 mm right breast mass with 2 enlarged right axillary lymph nodes.  Biopsy of right breast mass showed fibroadenoma.  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  HER-2/agustín was negative.  Breast MRI did not show an actual primary.  A PET scan done in April showed hypermetabolic axillary and subpectoral nodes. She received 4 cycles of neoadjuvant Adriamycin/Cytoxan chemotherapy followed by one cycle of docetaxel chemotherapy.  She then underwent a right axillary lymph node dissection on 10/8/13. 12 lymph nodes were removed. None were involved with malignancy. She had a pathological complete response.  She then completed 9 weekly doses of paclitaxel from November 2013 to January 2014 in an adjuvant fashion. Following this she underwent 28 radiation treatments to the right breast starting 2/10/14 completing 3/20/14. She then developed lymphedema of the right breast. Nothing in the right arm. She was evaluated in the lymphedema clinic and underwent some physical therapy.    Took Arimidex July 2015 - Sept 2020    Neuropathy stable, she is on neurontin 300 mg QHS.   She wants to stay on this dose and take it at bedtime.  Taking it at the daytime makes her groggy.    Her chest port was removed in June 2015.    Underwent Right Hip MICHAEL Jan 31, 2022    Interval history:  - she presents for a follow-up appointment for her history of breast cancer. She had previously seen Dr. Mascorro.  - she  underwent mammogram today.  - today, she is doing well. She denies shortness of breath, chest pain, nausea, vomiting, diarrhea, constipation.        Review of Systems   Constitutional:  Negative for fatigue.   HENT:  Negative for sore throat.    Eyes:  Negative for visual disturbance.   Respiratory:  Negative for cough and shortness of breath.    Cardiovascular:  Negative for chest pain.   Gastrointestinal:  Negative for abdominal pain, constipation, diarrhea, nausea and vomiting.   Genitourinary:  Negative for dysuria.   Musculoskeletal:  Negative for back pain.   Skin:  Negative for rash.   Neurological:  Negative for headaches.   Hematological:  Negative for adenopathy.   Psychiatric/Behavioral:  The patient is not nervous/anxious.          Objective:     Vitals:    01/26/23 1139   BP: (!) 185/81   BP Location: Left arm   Patient Position: Sitting   BP Method: Medium (Automatic)   Pulse: 80   Resp: 18   SpO2: 98%   Weight: 61.5 kg (135 lb 9.3 oz)         BMI: Body mass index is 26.93 kg/m².    Physical Exam  Vitals and nursing note reviewed.   Constitutional:       General: She is not in acute distress.     Appearance: She is well-developed.   HENT:      Head: Normocephalic and atraumatic.   Eyes:      Pupils: Pupils are equal, round, and reactive to light.   Cardiovascular:      Rate and Rhythm: Normal rate and regular rhythm.   Pulmonary:      Effort: Pulmonary effort is normal.      Breath sounds: Normal breath sounds.   Abdominal:      General: Bowel sounds are normal.      Palpations: Abdomen is soft.   Musculoskeletal:         General: Normal range of motion.      Cervical back: Normal range of motion and neck supple.   Skin:     General: Skin is warm and dry.   Neurological:      Mental Status: She is alert and oriented to person, place, and time. Mental status is at baseline.   Psychiatric:         Behavior: Behavior normal.         Thought Content: Thought content normal.         Judgment: Judgment  normal.     Labs have been reviewed.    Lab Results   Component Value Date    WBC 6.27 01/26/2023    HGB 12.0 01/26/2023    HCT 36.8 (L) 01/26/2023    MCV 93 01/26/2023     01/26/2023       Imaging:  Mammogram (1/26/23): official radiology report is pending      Assessment:     1. History of breast cancer    2. Encounter for screening mammogram for malignant neoplasm of breast    3. Chemotherapy-induced peripheral neuropathy    4. History of chemotherapy    5. History of radiation therapy    6. Physical deconditioning       Plan:     History of cancer of the right breast  - she had previously seen Dr. Mascorro.  - diagnosed in 2013  - she received 4 cycles of neoadjuvant Adriamycin/Cytoxan chemotherapy followed by one cycle of docetaxel chemotherapy.  - She then underwent a right axillary lymph node dissection on 10/8/13. 12 lymph nodes were removed. None were involved with malignancy. She had a pathological complete response.    - She then completed 9 weekly doses of paclitaxel from November 2013 to January 2014 in an adjuvant fashion.   - Following this she underwent 28 radiation treatments to the right breast starting 2/10/14 completing 3/20/14.  - She then developed lymphedema of the right breast. Nothing in the right arm. She was evaluated in the lymphedema clinic and underwent some physical therapy.  - she took Arimidex July 2015 - Sept 2020  - she underwent mammogram today. Follow up official radiology report. Assuming negative/normal result, we will have her return to clinic in one year with repeat mammogram.     Physical deconditioning / s/p right hip replacement  - refer to outpatient physical therapy.    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 Follow up official radiology report. Assuming negative/normal result, we will have her return to clinic in one year with repeat mammogram.     [place]  KAIN Wynne.  Hematology/Oncology  [facility]  [address], [address]  [place] [address]  Phone: [phone]  Fax: [phone]

## 2023-01-30 ENCOUNTER — TELEPHONE (OUTPATIENT)
Dept: HEMATOLOGY/ONCOLOGY | Facility: CLINIC | Age: 83
End: 2023-01-30
Payer: MEDICARE

## 2023-01-31 ENCOUNTER — EXTERNAL CHRONIC CARE MANAGEMENT (OUTPATIENT)
Dept: PRIMARY CARE CLINIC | Facility: CLINIC | Age: 83
End: 2023-01-31
Payer: MEDICARE

## 2023-01-31 PROCEDURE — 99490 PR CHRONIC CARE MGMT, 1ST 20 MIN: ICD-10-PCS | Mod: ,,, | Performed by: INTERNAL MEDICINE

## 2023-01-31 PROCEDURE — 99490 CHRNC CARE MGMT STAFF 1ST 20: CPT | Mod: ,,, | Performed by: INTERNAL MEDICINE

## 2023-02-28 ENCOUNTER — EXTERNAL CHRONIC CARE MANAGEMENT (OUTPATIENT)
Dept: PRIMARY CARE CLINIC | Facility: CLINIC | Age: 83
End: 2023-02-28
Payer: MEDICARE

## 2023-02-28 PROCEDURE — 99490 CHRNC CARE MGMT STAFF 1ST 20: CPT | Mod: S$PBB,,, | Performed by: INTERNAL MEDICINE

## 2023-02-28 PROCEDURE — 99490 PR CHRONIC CARE MGMT, 1ST 20 MIN: ICD-10-PCS | Mod: S$PBB,,, | Performed by: INTERNAL MEDICINE

## 2023-02-28 PROCEDURE — 99490 CHRNC CARE MGMT STAFF 1ST 20: CPT | Mod: PBBFAC | Performed by: INTERNAL MEDICINE

## 2023-03-01 ENCOUNTER — PATIENT MESSAGE (OUTPATIENT)
Dept: ORTHOPEDICS | Facility: CLINIC | Age: 83
End: 2023-03-01
Payer: MEDICARE

## 2023-03-01 DIAGNOSIS — Z96.641 STATUS POST RIGHT HIP REPLACEMENT: Primary | ICD-10-CM

## 2023-03-02 ENCOUNTER — PATIENT MESSAGE (OUTPATIENT)
Dept: ADMINISTRATIVE | Facility: OTHER | Age: 83
End: 2023-03-02
Payer: MEDICARE

## 2023-03-02 ENCOUNTER — PATIENT MESSAGE (OUTPATIENT)
Dept: ORTHOPEDICS | Facility: CLINIC | Age: 83
End: 2023-03-02
Payer: MEDICARE

## 2023-03-16 ENCOUNTER — HOSPITAL ENCOUNTER (OUTPATIENT)
Dept: RADIOLOGY | Facility: HOSPITAL | Age: 83
Discharge: HOME | End: 2023-03-16
Attending: ORTHOPAEDIC SURGERY
Payer: MEDICARE

## 2023-03-16 ENCOUNTER — OFFICE VISIT (OUTPATIENT)
Dept: ORTHOPEDICS | Facility: CLINIC | Age: 83
End: 2023-03-16
Payer: MEDICARE

## 2023-03-16 VITALS — BODY MASS INDEX: 27.01 KG/M2 | WEIGHT: 137.56 LBS | HEIGHT: 60 IN

## 2023-03-16 DIAGNOSIS — Z96.641 STATUS POST RIGHT HIP REPLACEMENT: ICD-10-CM

## 2023-03-16 DIAGNOSIS — Z96.641 STATUS POST RIGHT HIP REPLACEMENT: Primary | ICD-10-CM

## 2023-03-16 PROCEDURE — 73502 X-RAY EXAM HIP UNI 2-3 VIEWS: CPT | Mod: 26,RT,, | Performed by: RADIOLOGY

## 2023-03-16 PROCEDURE — 99213 OFFICE O/P EST LOW 20 MIN: CPT | Mod: S$PBB,,, | Performed by: ORTHOPAEDIC SURGERY

## 2023-03-16 PROCEDURE — 99999 PR PBB SHADOW E&M-EST. PATIENT-LVL III: CPT | Mod: PBBFAC,,, | Performed by: ORTHOPAEDIC SURGERY

## 2023-03-16 PROCEDURE — 99213 OFFICE O/P EST LOW 20 MIN: CPT | Mod: PBBFAC | Performed by: ORTHOPAEDIC SURGERY

## 2023-03-16 PROCEDURE — 99999 PR PBB SHADOW E&M-EST. PATIENT-LVL III: ICD-10-PCS | Mod: PBBFAC,,, | Performed by: ORTHOPAEDIC SURGERY

## 2023-03-16 PROCEDURE — 73502 XR HIP WITH PELVIS WHEN PERFORMED, 2 OR 3  VIEWS RIGHT: ICD-10-PCS | Mod: 26,RT,, | Performed by: RADIOLOGY

## 2023-03-16 PROCEDURE — 73502 X-RAY EXAM HIP UNI 2-3 VIEWS: CPT | Mod: TC,RT

## 2023-03-16 PROCEDURE — 99213 PR OFFICE/OUTPT VISIT, EST, LEVL III, 20-29 MIN: ICD-10-PCS | Mod: S$PBB,,, | Performed by: ORTHOPAEDIC SURGERY

## 2023-03-16 NOTE — PROGRESS NOTES
"Subjective:     HPI:   Claudia Sierra is a 82 y.o. female who presents for annual exam s/p right MICHAEL     Date of surgery: 1/31/22    Medications: none    Assistive Devices: none    Limitations: none    R hip doing ok "all in all I feel great about having MICHAEL, dont have pain I used to have and can do what I want to do"    -When lifts leg to get in car some posterior/postero-lateral pain  -Occ bursts of pain lateral thigh once every few days  -C/o "stamina" issues and issues with confidence with stepping on and off curbs, etc. "Steadiness"  Had covid January - made stamina issues worse  Tired if walk any distance  Doesn't have people to walk with  -saw oncologist, ?due to neuropathy - ordered PT, wants guidance on that           Objective:   Body mass index is 27.32 kg/m².  Exam:    Gait: limp/antalgic/trendelenburg none    Incision: healed    Active straight leg raise: good strength, no discomfort ant, mild lateral thigh    Extension: 0    Flexion: 90    Abduction: 30    Adduction: 20    External rotation: 30    Internal rotation: 20 - some post discomfort at glut max insertion    LLD: 0.5 cm supine R long    Ttp B GT      Imaging:  Indication:  Annual exam status post right total hip arthroplasty  Exam Ordered: Radiographs taken today include an anteroposterior pelvis, an AP and lateral view of the proximal femur including the hip joint  Details of Examination: Today's exam shows a well fixed, well positioned total hip arthroplasty with no evidence of wear, osteolysis, or loosening.  Impression:  Status post right total hip arthroplasty, implant in good position with no abnormality       Assessment:       ICD-10-CM ICD-9-CM   1. Status post right hip replacement  Z96.641 V43.64      MICHAEL Doing well  Multiple potential pain generators:  -degen LBP  -glut max tendonitis  -B GTB  -IT band   -LFCN v referred L spine symptoms     Plan:       Patient is doing very well with the hip replacement at this time.  They " will continue routine care of their hip and see me for their routine follow-up.  If there are problems in the interim they will see me back sooner.    Agree with PT for general conditioning  Get up and get moving, look into [facility]  Staminia: rec PCP eval    6 months no XR      No orders of the defined types were placed in this encounter.            Past Medical History:   Diagnosis Date    *Atrial fibrillation     Breast cancer     Right breast cancer    Hypertension     Slow transit constipation 12/19/2013       Past Surgical History:   Procedure Laterality Date    ADENOIDECTOMY  70 years ago    At same time as tonsillectomy. .    APPENDECTOMY      ARTHROPLASTY OF HIP BY ANTERIOR APPROACH Right 1/31/2022    Procedure: ARTHROPLASTY, HIP, TOTAL, ANTERIOR APPROACH:RIGHT: DEPUY-C-STEM+PINNACLE;  Surgeon: Cisco Mcneal III, MD;  Location: [facility];  Service: Orthopedics;  Laterality: Right;    BREAST BIOPSY      right    BREAST LUMPECTOMY Right 2013    EPIDURAL STEROID INJECTION INTO CERVICAL SPINE N/A 2/12/2020    Procedure: Injection-steroid-epidural-cervical--C7-T1 IL ELMIRA;  Surgeon: Alicia Proctor MD;  Location: [facility] PAIN MGT;  Service: Pain Management;  Laterality: N/A;  sign consent at [facility]    EYE SURGERY  2018    HYSTERECTOMY      lymphnode removal      12 from Right axilla    PORTACATH PLACEMENT  2013    TONSILLECTOMY         Family History   Problem Relation Age of Onset    Cervical cancer Sister 79    Heart disease Mother     Hypertension Mother     Heart disease Father        Social History     Socioeconomic History    Marital status:     Number of children: 1   Occupational History     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

## 2023-03-31 ENCOUNTER — EXTERNAL CHRONIC CARE MANAGEMENT (OUTPATIENT)
Dept: PRIMARY CARE CLINIC | Facility: CLINIC | Age: 83
End: 2023-03-31
Payer: MEDICARE

## 2023-03-31 PROBLEM — M62.81 MUSCLE WEAKNESS (GENERALIZED): Status: ACTIVE | Noted: 2023-03-31

## 2023-03-31 PROBLEM — Z78.9 IMPAIRED MOBILITY AND ACTIVITIES OF DAILY LIVING: Status: ACTIVE | Noted: 2023-03-31

## 2023-03-31 PROBLEM — Z74.09 IMPAIRED MOBILITY AND ACTIVITIES OF DAILY LIVING: Status: ACTIVE | Noted: 2023-03-31

## 2023-03-31 PROBLEM — R53.81 PHYSICAL DECONDITIONING: Status: ACTIVE | Noted: 2023-03-31

## 2023-03-31 PROCEDURE — 99490 CHRNC CARE MGMT STAFF 1ST 20: CPT | Mod: PBBFAC | Performed by: INTERNAL MEDICINE

## 2023-03-31 PROCEDURE — 99490 PR CHRONIC CARE MGMT, 1ST 20 MIN: ICD-10-PCS | Mod: S$PBB,,, | Performed by: INTERNAL MEDICINE

## 2023-03-31 PROCEDURE — 99490 CHRNC CARE MGMT STAFF 1ST 20: CPT | Mod: S$PBB,,, | Performed by: INTERNAL MEDICINE

## 2023-04-19 DIAGNOSIS — I10 ESSENTIAL HYPERTENSION: ICD-10-CM

## 2023-04-19 DIAGNOSIS — K21.00 GASTROESOPHAGEAL REFLUX DISEASE WITH ESOPHAGITIS: ICD-10-CM

## 2023-04-19 DIAGNOSIS — I48.20 CHRONIC ATRIAL FIBRILLATION: ICD-10-CM

## 2023-04-19 DIAGNOSIS — E78.00 HYPERCHOLESTEROLEMIA: ICD-10-CM

## 2023-04-19 DIAGNOSIS — Z51.11 ENCOUNTER FOR ANTINEOPLASTIC CHEMOTHERAPY: ICD-10-CM

## 2023-04-19 RX ORDER — METOPROLOL SUCCINATE 100 MG/1
100 TABLET, EXTENDED RELEASE ORAL DAILY
Qty: 90 TABLET | Refills: 3 | Status: SHIPPED | OUTPATIENT
Start: 2023-04-19 | End: 2024-04-01

## 2023-04-19 RX ORDER — PRAVASTATIN SODIUM 40 MG/1
40 TABLET ORAL DAILY
Qty: 90 TABLET | Refills: 3 | Status: SHIPPED | OUTPATIENT
Start: 2023-04-19 | End: 2024-04-01

## 2023-04-30 ENCOUNTER — EXTERNAL CHRONIC CARE MANAGEMENT (OUTPATIENT)
Dept: PRIMARY CARE CLINIC | Facility: CLINIC | Age: 83
End: 2023-04-30
Payer: MEDICARE

## 2023-04-30 PROCEDURE — 99490 CHRNC CARE MGMT STAFF 1ST 20: CPT | Mod: PBBFAC | Performed by: INTERNAL MEDICINE

## 2023-04-30 PROCEDURE — 99490 PR CHRONIC CARE MGMT, 1ST 20 MIN: ICD-10-PCS | Mod: S$PBB,,, | Performed by: INTERNAL MEDICINE

## 2023-04-30 PROCEDURE — 99490 CHRNC CARE MGMT STAFF 1ST 20: CPT | Mod: S$PBB,,, | Performed by: INTERNAL MEDICINE

## 2023-05-30 ENCOUNTER — PATIENT MESSAGE (OUTPATIENT)
Dept: PRIMARY CARE CLINIC | Facility: CLINIC | Age: 83
End: 2023-05-30
Payer: MEDICARE

## 2023-05-31 ENCOUNTER — EXTERNAL CHRONIC CARE MANAGEMENT (OUTPATIENT)
Dept: PRIMARY CARE CLINIC | Facility: CLINIC | Age: 83
End: 2023-05-31
Payer: MEDICARE

## 2023-05-31 PROCEDURE — 99490 CHRNC CARE MGMT STAFF 1ST 20: CPT | Mod: S$PBB,,, | Performed by: INTERNAL MEDICINE

## 2023-05-31 PROCEDURE — 99490 PR CHRONIC CARE MGMT, 1ST 20 MIN: ICD-10-PCS | Mod: S$PBB,,, | Performed by: INTERNAL MEDICINE

## 2023-05-31 PROCEDURE — 99490 CHRNC CARE MGMT STAFF 1ST 20: CPT | Mod: PBBFAC | Performed by: INTERNAL MEDICINE

## 2023-06-01 ENCOUNTER — HOSPITAL ENCOUNTER (INPATIENT)
Facility: HOSPITAL | Age: 83
LOS: 2 days | Discharge: HOME | DRG: 309 | End: 2023-06-03
Attending: EMERGENCY MEDICINE | Admitting: STUDENT IN AN ORGANIZED HEALTH CARE EDUCATION/TRAINING PROGRAM
Payer: MEDICARE

## 2023-06-01 ENCOUNTER — TELEPHONE (OUTPATIENT)
Dept: PRIMARY CARE CLINIC | Facility: CLINIC | Age: 83
End: 2023-06-01
Payer: MEDICARE

## 2023-06-01 DIAGNOSIS — R42 DIZZINESS AND GIDDINESS: ICD-10-CM

## 2023-06-01 DIAGNOSIS — I48.91 ATRIAL FIBRILLATION WITH RVR: Chronic | ICD-10-CM

## 2023-06-01 DIAGNOSIS — R42 DIZZINESS: ICD-10-CM

## 2023-06-01 DIAGNOSIS — I48.91 ATRIAL FIBRILLATION: ICD-10-CM

## 2023-06-01 DIAGNOSIS — I48.91 ATRIAL FIBRILLATION WITH RAPID VENTRICULAR RESPONSE: Primary | ICD-10-CM

## 2023-06-01 DIAGNOSIS — R07.9 CHEST PAIN: ICD-10-CM

## 2023-06-01 LAB
ALBUMIN SERPL BCP-MCNC: 3.9 G/DL (ref 3.5–5.2)
ALP SERPL-CCNC: 55 U/L (ref 55–135)
ALT SERPL W/O P-5'-P-CCNC: 22 U/L (ref 10–44)
ANION GAP SERPL CALC-SCNC: 13 MMOL/L (ref 8–16)
AST SERPL-CCNC: 30 U/L (ref 10–40)
BACTERIA #/AREA URNS HPF: ABNORMAL /HPF
BASOPHILS # BLD AUTO: 0.03 K/UL (ref 0–0.2)
BASOPHILS NFR BLD: 0.4 % (ref 0–1.9)
BILIRUB SERPL-MCNC: 0.3 MG/DL (ref 0.1–1)
BILIRUB UR QL STRIP: NEGATIVE
BNP SERPL-MCNC: 194 PG/ML (ref 0–99)
BUN SERPL-MCNC: 14 MG/DL (ref 8–23)
CALCIUM SERPL-MCNC: 9 MG/DL (ref 8.7–10.5)
CHLORIDE SERPL-SCNC: 103 MMOL/L (ref 95–110)
CLARITY UR: ABNORMAL
CO2 SERPL-SCNC: 22 MMOL/L (ref 23–29)
COLOR UR: YELLOW
CREAT SERPL-MCNC: 1.1 MG/DL (ref 0.5–1.4)
DIFFERENTIAL METHOD: ABNORMAL
EOSINOPHIL # BLD AUTO: 0.1 K/UL (ref 0–0.5)
EOSINOPHIL NFR BLD: 1.2 % (ref 0–8)
ERYTHROCYTE [DISTWIDTH] IN BLOOD BY AUTOMATED COUNT: 15.4 % (ref 11.5–14.5)
EST. GFR  (NO RACE VARIABLE): 50 ML/MIN/1.73 M^2
GLUCOSE SERPL-MCNC: 104 MG/DL (ref 70–110)
GLUCOSE UR QL STRIP: NEGATIVE
HCT VFR BLD AUTO: 35.3 % (ref 37–48.5)
HGB BLD-MCNC: 11.9 G/DL (ref 12–16)
HGB UR QL STRIP: NEGATIVE
HYALINE CASTS #/AREA URNS LPF: 5 /LPF
IMM GRANULOCYTES # BLD AUTO: 0.03 K/UL (ref 0–0.04)
IMM GRANULOCYTES NFR BLD AUTO: 0.4 % (ref 0–0.5)
KETONES UR QL STRIP: NEGATIVE
LEUKOCYTE ESTERASE UR QL STRIP: ABNORMAL
LYMPHOCYTES # BLD AUTO: 2.7 K/UL (ref 1–4.8)
LYMPHOCYTES NFR BLD: 34.6 % (ref 18–48)
MAGNESIUM SERPL-MCNC: 1.6 MG/DL (ref 1.6–2.6)
MCH RBC QN AUTO: 31.2 PG (ref 27–31)
MCHC RBC AUTO-ENTMCNC: 33.7 G/DL (ref 32–36)
MCV RBC AUTO: 92 FL (ref 82–98)
MICROSCOPIC COMMENT: ABNORMAL
MONOCYTES # BLD AUTO: 0.9 K/UL (ref 0.3–1)
MONOCYTES NFR BLD: 11.8 % (ref 4–15)
NEUTROPHILS # BLD AUTO: 4 K/UL (ref 1.8–7.7)
NEUTROPHILS NFR BLD: 51.6 % (ref 38–73)
NITRITE UR QL STRIP: NEGATIVE
NRBC BLD-RTO: 0 /100 WBC
PH UR STRIP: 5 [PH] (ref 5–8)
PLATELET # BLD AUTO: 278 K/UL (ref 150–450)
PMV BLD AUTO: 9.8 FL (ref 9.2–12.9)
POTASSIUM SERPL-SCNC: 4.1 MMOL/L (ref 3.5–5.1)
PROT SERPL-MCNC: 7.1 G/DL (ref 6–8.4)
PROT UR QL STRIP: NEGATIVE
RBC # BLD AUTO: 3.82 M/UL (ref 4–5.4)
RBC #/AREA URNS HPF: 5 /HPF (ref 0–4)
SODIUM SERPL-SCNC: 138 MMOL/L (ref 136–145)
SP GR UR STRIP: 1.01 (ref 1–1.03)
SQUAMOUS #/AREA URNS HPF: 2 /HPF
TROPONIN I SERPL DL<=0.01 NG/ML-MCNC: 0.01 NG/ML (ref 0–0.03)
TSH SERPL DL<=0.005 MIU/L-ACNC: 3.71 UIU/ML (ref 0.4–4)
URN SPEC COLLECT METH UR: ABNORMAL
UROBILINOGEN UR STRIP-ACNC: NEGATIVE EU/DL
WBC # BLD AUTO: 7.65 K/UL (ref 3.9–12.7)
WBC #/AREA URNS HPF: 61 /HPF (ref 0–5)

## 2023-06-01 PROCEDURE — 25000003 PHARM REV CODE 250: Performed by: EMERGENCY MEDICINE

## 2023-06-01 PROCEDURE — 93005 ELECTROCARDIOGRAM TRACING: CPT

## 2023-06-01 PROCEDURE — 63600175 PHARM REV CODE 636 W HCPCS: Performed by: EMERGENCY MEDICINE

## 2023-06-01 PROCEDURE — 96375 TX/PRO/DX INJ NEW DRUG ADDON: CPT

## 2023-06-01 PROCEDURE — 84484 ASSAY OF TROPONIN QUANT: CPT | Performed by: EMERGENCY MEDICINE

## 2023-06-01 PROCEDURE — 96361 HYDRATE IV INFUSION ADD-ON: CPT

## 2023-06-01 PROCEDURE — 81000 URINALYSIS NONAUTO W/SCOPE: CPT | Performed by: EMERGENCY MEDICINE

## 2023-06-01 PROCEDURE — 83735 ASSAY OF MAGNESIUM: CPT | Performed by: EMERGENCY MEDICINE

## 2023-06-01 PROCEDURE — 85025 COMPLETE CBC W/AUTO DIFF WBC: CPT | Performed by: EMERGENCY MEDICINE

## 2023-06-01 PROCEDURE — 93010 ELECTROCARDIOGRAM REPORT: CPT | Mod: ,,, | Performed by: INTERNAL MEDICINE

## 2023-06-01 PROCEDURE — 87086 URINE CULTURE/COLONY COUNT: CPT | Performed by: EMERGENCY MEDICINE

## 2023-06-01 PROCEDURE — 11000001 HC ACUTE MED/SURG PRIVATE ROOM

## 2023-06-01 PROCEDURE — 96365 THER/PROPH/DIAG IV INF INIT: CPT

## 2023-06-01 PROCEDURE — 93010 EKG 12-LEAD: ICD-10-PCS | Mod: ,,, | Performed by: INTERNAL MEDICINE

## 2023-06-01 PROCEDURE — 83880 ASSAY OF NATRIURETIC PEPTIDE: CPT | Performed by: EMERGENCY MEDICINE

## 2023-06-01 PROCEDURE — 84443 ASSAY THYROID STIM HORMONE: CPT | Performed by: EMERGENCY MEDICINE

## 2023-06-01 PROCEDURE — 99285 EMERGENCY DEPT VISIT HI MDM: CPT | Mod: 25

## 2023-06-01 PROCEDURE — 12000002 HC ACUTE/MED SURGE SEMI-PRIVATE ROOM

## 2023-06-01 PROCEDURE — 93010 ELECTROCARDIOGRAM REPORT: CPT | Mod: 76,,, | Performed by: INTERNAL MEDICINE

## 2023-06-01 PROCEDURE — 80053 COMPREHEN METABOLIC PANEL: CPT | Performed by: EMERGENCY MEDICINE

## 2023-06-01 RX ORDER — CETIRIZINE HYDROCHLORIDE 5 MG/1
10 TABLET ORAL DAILY
Status: DISCONTINUED | OUTPATIENT
Start: 2023-06-02 | End: 2023-06-03 | Stop reason: HOSPADM

## 2023-06-01 RX ORDER — ACETAMINOPHEN 325 MG/1
650 TABLET ORAL EVERY 8 HOURS PRN
Status: DISCONTINUED | OUTPATIENT
Start: 2023-06-01 | End: 2023-06-03 | Stop reason: HOSPADM

## 2023-06-01 RX ORDER — METOPROLOL SUCCINATE 50 MG/1
100 TABLET, EXTENDED RELEASE ORAL DAILY
Status: DISCONTINUED | OUTPATIENT
Start: 2023-06-02 | End: 2023-06-03 | Stop reason: HOSPADM

## 2023-06-01 RX ORDER — NALOXONE HCL 0.4 MG/ML
0.02 VIAL (ML) INJECTION
Status: DISCONTINUED | OUTPATIENT
Start: 2023-06-01 | End: 2023-06-03 | Stop reason: HOSPADM

## 2023-06-01 RX ORDER — ONDANSETRON 2 MG/ML
4 INJECTION INTRAMUSCULAR; INTRAVENOUS EVERY 8 HOURS PRN
Status: DISCONTINUED | OUTPATIENT
Start: 2023-06-01 | End: 2023-06-03 | Stop reason: HOSPADM

## 2023-06-01 RX ORDER — DEXTROSE 40 %
30 GEL (GRAM) ORAL
Status: DISCONTINUED | OUTPATIENT
Start: 2023-06-01 | End: 2023-06-03 | Stop reason: HOSPADM

## 2023-06-01 RX ORDER — ACETAMINOPHEN 325 MG/1
650 TABLET ORAL EVERY 4 HOURS PRN
Status: DISCONTINUED | OUTPATIENT
Start: 2023-06-01 | End: 2023-06-03 | Stop reason: HOSPADM

## 2023-06-01 RX ORDER — SIMETHICONE 80 MG
1 TABLET,CHEWABLE ORAL 4 TIMES DAILY PRN
Status: DISCONTINUED | OUTPATIENT
Start: 2023-06-01 | End: 2023-06-03 | Stop reason: HOSPADM

## 2023-06-01 RX ORDER — LACTULOSE 10 G/15ML
30 SOLUTION ORAL 3 TIMES DAILY PRN
Status: DISCONTINUED | OUTPATIENT
Start: 2023-06-01 | End: 2023-06-03 | Stop reason: HOSPADM

## 2023-06-01 RX ORDER — PRAVASTATIN SODIUM 40 MG/1
40 TABLET ORAL DAILY
Status: DISCONTINUED | OUTPATIENT
Start: 2023-06-02 | End: 2023-06-03 | Stop reason: HOSPADM

## 2023-06-01 RX ORDER — METOPROLOL TARTRATE 1 MG/ML
5 INJECTION, SOLUTION INTRAVENOUS
Status: DISCONTINUED | OUTPATIENT
Start: 2023-06-01 | End: 2023-06-01

## 2023-06-01 RX ORDER — DILTIAZEM HYDROCHLORIDE 5 MG/ML
20 INJECTION INTRAVENOUS
Status: COMPLETED | OUTPATIENT
Start: 2023-06-01 | End: 2023-06-01

## 2023-06-01 RX ORDER — METOPROLOL SUCCINATE 50 MG/1
100 TABLET, EXTENDED RELEASE ORAL
Status: DISCONTINUED | OUTPATIENT
Start: 2023-06-01 | End: 2023-06-01

## 2023-06-01 RX ORDER — METOPROLOL SUCCINATE 25 MG/1
25 TABLET, EXTENDED RELEASE ORAL
Status: COMPLETED | OUTPATIENT
Start: 2023-06-01 | End: 2023-06-01

## 2023-06-01 RX ORDER — DEXTROSE 40 %
15 GEL (GRAM) ORAL
Status: DISCONTINUED | OUTPATIENT
Start: 2023-06-01 | End: 2023-06-03 | Stop reason: HOSPADM

## 2023-06-01 RX ORDER — POLYETHYLENE GLYCOL 3350 17 G/17G
17 POWDER, FOR SOLUTION ORAL DAILY
Status: DISCONTINUED | OUTPATIENT
Start: 2023-06-02 | End: 2023-06-03 | Stop reason: HOSPADM

## 2023-06-01 RX ORDER — SODIUM CHLORIDE 0.9 % (FLUSH) 0.9 %
10 SYRINGE (ML) INJECTION EVERY 12 HOURS PRN
Status: DISCONTINUED | OUTPATIENT
Start: 2023-06-01 | End: 2023-06-03 | Stop reason: HOSPADM

## 2023-06-01 RX ORDER — GLUCAGON 1 MG
1 KIT INJECTION
Status: DISCONTINUED | OUTPATIENT
Start: 2023-06-01 | End: 2023-06-03 | Stop reason: HOSPADM

## 2023-06-01 RX ORDER — TALC
6 POWDER (GRAM) TOPICAL NIGHTLY PRN
Status: DISCONTINUED | OUTPATIENT
Start: 2023-06-01 | End: 2023-06-03 | Stop reason: HOSPADM

## 2023-06-01 RX ADMIN — CEFTRIAXONE 1 G: 1 INJECTION, POWDER, FOR SOLUTION INTRAMUSCULAR; INTRAVENOUS at 09:06

## 2023-06-01 RX ADMIN — METOPROLOL SUCCINATE 25 MG: 25 TABLET, EXTENDED RELEASE ORAL at 08:06

## 2023-06-01 RX ADMIN — SODIUM CHLORIDE 1000 ML: 0.9 INJECTION, SOLUTION INTRAVENOUS at 07:06

## 2023-06-01 RX ADMIN — DILTIAZEM HYDROCHLORIDE 20 MG: 5 INJECTION INTRAVENOUS at 09:06

## 2023-06-02 PROBLEM — R53.1 GENERALIZED WEAKNESS: Status: ACTIVE | Noted: 2023-06-02

## 2023-06-02 PROBLEM — T78.40XA ALLERGIES: Status: ACTIVE | Noted: 2023-06-02

## 2023-06-02 LAB
ANION GAP SERPL CALC-SCNC: 8 MMOL/L (ref 8–16)
AV INDEX (PROSTH): 0.32
AV MEAN GRADIENT: 6 MMHG
AV PEAK GRADIENT: 11 MMHG
AV VALVE AREA: 1.02 CM2
AV VELOCITY RATIO: 0.28
BASOPHILS # BLD AUTO: 0.03 K/UL (ref 0–0.2)
BASOPHILS NFR BLD: 0.4 % (ref 0–1.9)
BSA FOR ECHO PROCEDURE: 1.62 M2
BUN SERPL-MCNC: 10 MG/DL (ref 8–23)
CALCIUM SERPL-MCNC: 8.6 MG/DL (ref 8.7–10.5)
CHLORIDE SERPL-SCNC: 108 MMOL/L (ref 95–110)
CO2 SERPL-SCNC: 25 MMOL/L (ref 23–29)
CREAT SERPL-MCNC: 0.8 MG/DL (ref 0.5–1.4)
CV ECHO LV RWT: 0.51 CM
DIFFERENTIAL METHOD: ABNORMAL
DOP CALC AO PEAK VEL: 1.64 M/S
DOP CALC AO VTI: 33.4 CM
DOP CALC LVOT AREA: 3.1 CM2
DOP CALC LVOT DIAMETER: 2 CM
DOP CALC LVOT PEAK VEL: 0.46 M/S
DOP CALC LVOT STROKE VOLUME: 33.91 CM3
DOP CALCLVOT PEAK VEL VTI: 10.8 CM
E WAVE DECELERATION TIME: 217.34 MSEC
E/A RATIO: 2.87
ECHO LV POSTERIOR WALL: 0.98 CM (ref 0.6–1.1)
EJECTION FRACTION: 55 %
EOSINOPHIL # BLD AUTO: 0.2 K/UL (ref 0–0.5)
EOSINOPHIL NFR BLD: 2 % (ref 0–8)
ERYTHROCYTE [DISTWIDTH] IN BLOOD BY AUTOMATED COUNT: 15.5 % (ref 11.5–14.5)
EST. GFR  (NO RACE VARIABLE): >60 ML/MIN/1.73 M^2
FRACTIONAL SHORTENING: 58 % (ref 28–44)
GLUCOSE SERPL-MCNC: 98 MG/DL (ref 70–110)
HCT VFR BLD AUTO: 34.9 % (ref 37–48.5)
HGB BLD-MCNC: 11.3 G/DL (ref 12–16)
IMM GRANULOCYTES # BLD AUTO: 0.01 K/UL (ref 0–0.04)
IMM GRANULOCYTES NFR BLD AUTO: 0.1 % (ref 0–0.5)
INTERVENTRICULAR SEPTUM: 0.78 CM (ref 0.6–1.1)
IVC DIAMETER: 1.76 CM
LA MAJOR: 5.62 CM
LA MINOR: 5.71 CM
LA WIDTH: 4.1 CM
LEFT ATRIUM SIZE: 4.19 CM
LEFT ATRIUM VOLUME INDEX MOD: 36.4 ML/M2
LEFT ATRIUM VOLUME INDEX: 52.4 ML/M2
LEFT ATRIUM VOLUME MOD: 57.54 CM3
LEFT ATRIUM VOLUME: 82.72 CM3
LEFT INTERNAL DIMENSION IN SYSTOLE: 1.62 CM (ref 2.1–4)
LEFT VENTRICLE DIASTOLIC VOLUME INDEX: 40.36 ML/M2
LEFT VENTRICLE DIASTOLIC VOLUME: 63.77 ML
LEFT VENTRICLE MASS INDEX: 63 G/M2
LEFT VENTRICLE SYSTOLIC VOLUME INDEX: 4.7 ML/M2
LEFT VENTRICLE SYSTOLIC VOLUME: 7.41 ML
LEFT VENTRICULAR INTERNAL DIMENSION IN DIASTOLE: 3.85 CM (ref 3.5–6)
LEFT VENTRICULAR MASS: 100.02 G
LVOT MG: 0.54 MMHG
LVOT MV: 0.35 CM/S
LYMPHOCYTES # BLD AUTO: 2.7 K/UL (ref 1–4.8)
LYMPHOCYTES NFR BLD: 35.8 % (ref 18–48)
MCH RBC QN AUTO: 30.5 PG (ref 27–31)
MCHC RBC AUTO-ENTMCNC: 32.4 G/DL (ref 32–36)
MCV RBC AUTO: 94 FL (ref 82–98)
MONOCYTES # BLD AUTO: 0.8 K/UL (ref 0.3–1)
MONOCYTES NFR BLD: 10.7 % (ref 4–15)
MV PEAK A VEL: 0.31 M/S
MV PEAK E VEL: 0.89 M/S
MV STENOSIS PRESSURE HALF TIME: 63.03 MS
MV VALVE AREA P 1/2 METHOD: 3.49 CM2
NEUTROPHILS # BLD AUTO: 3.8 K/UL (ref 1.8–7.7)
NEUTROPHILS NFR BLD: 51 % (ref 38–73)
NRBC BLD-RTO: 0 /100 WBC
OHS LV EJECTION FRACTION SIMPSONS BIPLANE MOD: 6 %
PISA MRMAX VEL: 4.03 M/S
PISA TR MAX VEL: 2.9 M/S
PLATELET # BLD AUTO: 251 K/UL (ref 150–450)
PMV BLD AUTO: 9.6 FL (ref 9.2–12.9)
POTASSIUM SERPL-SCNC: 3.9 MMOL/L (ref 3.5–5.1)
RA MAJOR: 5.04 CM
RA PRESSURE: 3 MMHG
RBC # BLD AUTO: 3.71 M/UL (ref 4–5.4)
RV TISSUE DOPPLER FREE WALL SYSTOLIC VELOCITY 1 (APICAL 4 CHAMBER VIEW): 0.01 CM/S
SODIUM SERPL-SCNC: 141 MMOL/L (ref 136–145)
TR MAX PG: 34 MMHG
TV REST PULMONARY ARTERY PRESSURE: 37 MMHG
WBC # BLD AUTO: 7.41 K/UL (ref 3.9–12.7)

## 2023-06-02 PROCEDURE — 94761 N-INVAS EAR/PLS OXIMETRY MLT: CPT

## 2023-06-02 PROCEDURE — 99223 1ST HOSP IP/OBS HIGH 75: CPT | Mod: 25,,, | Performed by: INTERNAL MEDICINE

## 2023-06-02 PROCEDURE — 11000001 HC ACUTE MED/SURG PRIVATE ROOM

## 2023-06-02 PROCEDURE — 36415 COLL VENOUS BLD VENIPUNCTURE: CPT | Performed by: STUDENT IN AN ORGANIZED HEALTH CARE EDUCATION/TRAINING PROGRAM

## 2023-06-02 PROCEDURE — 99223 PR INITIAL HOSPITAL CARE,LEVL III: ICD-10-PCS | Mod: 25,,, | Performed by: INTERNAL MEDICINE

## 2023-06-02 PROCEDURE — 80048 BASIC METABOLIC PNL TOTAL CA: CPT | Performed by: STUDENT IN AN ORGANIZED HEALTH CARE EDUCATION/TRAINING PROGRAM

## 2023-06-02 PROCEDURE — 63600175 PHARM REV CODE 636 W HCPCS: Performed by: NURSE PRACTITIONER

## 2023-06-02 PROCEDURE — 93010 ELECTROCARDIOGRAM REPORT: CPT | Mod: ,,, | Performed by: INTERNAL MEDICINE

## 2023-06-02 PROCEDURE — 93010 EKG 12-LEAD: ICD-10-PCS | Mod: ,,, | Performed by: INTERNAL MEDICINE

## 2023-06-02 PROCEDURE — 25000003 PHARM REV CODE 250: Performed by: NURSE PRACTITIONER

## 2023-06-02 PROCEDURE — 93005 ELECTROCARDIOGRAM TRACING: CPT

## 2023-06-02 PROCEDURE — 25000003 PHARM REV CODE 250: Performed by: STUDENT IN AN ORGANIZED HEALTH CARE EDUCATION/TRAINING PROGRAM

## 2023-06-02 PROCEDURE — 85025 COMPLETE CBC W/AUTO DIFF WBC: CPT | Performed by: STUDENT IN AN ORGANIZED HEALTH CARE EDUCATION/TRAINING PROGRAM

## 2023-06-02 RX ORDER — DILTIAZEM HYDROCHLORIDE 30 MG/1
30 TABLET, FILM COATED ORAL EVERY 6 HOURS
Status: DISCONTINUED | OUTPATIENT
Start: 2023-06-02 | End: 2023-06-03 | Stop reason: HOSPADM

## 2023-06-02 RX ADMIN — CEFTRIAXONE SODIUM 1 G: 1 INJECTION, POWDER, FOR SOLUTION INTRAMUSCULAR; INTRAVENOUS at 05:06

## 2023-06-02 RX ADMIN — DILTIAZEM HYDROCHLORIDE 30 MG: 30 TABLET, FILM COATED ORAL at 05:06

## 2023-06-02 RX ADMIN — DILTIAZEM HYDROCHLORIDE 30 MG: 30 TABLET, FILM COATED ORAL at 12:06

## 2023-06-02 RX ADMIN — RIVAROXABAN 15 MG: 15 TABLET, FILM COATED ORAL at 05:06

## 2023-06-02 RX ADMIN — Medication 6 MG: at 02:06

## 2023-06-02 RX ADMIN — METOPROLOL SUCCINATE 100 MG: 50 TABLET, EXTENDED RELEASE ORAL at 09:06

## 2023-06-02 RX ADMIN — PRAVASTATIN SODIUM 40 MG: 40 TABLET ORAL at 09:06

## 2023-06-02 RX ADMIN — CETIRIZINE HYDROCHLORIDE 10 MG: 5 TABLET, FILM COATED ORAL at 09:06

## 2023-06-02 NOTE — ED PROVIDER NOTES
Encounter Date: 6/1/2023       History     Chief Complaint   Patient presents with    Dizziness     Pt states that she has been hypotensive at home and presents to the ed feeling faint. Pt did not pass out.  Pt presents to the ed Hypertensive at 184/113 at this time.      83 y/o w/ below pmh c/o generalized weakness and lightheadedness with standing/walking for 4 days. Has noticed repeated low BP readings (80s-90s systolic) using automated device on left upper arm. PCP instructed her to hold toprol xl. Last dose was 4 days ago. Denies fever, chills, nausea, vomiting, headache, syncope, chest pain, and shortness of breath. Has had intermittent palpitations.       Review of patient's allergies indicates:   Allergen Reactions    Codeine Other (See Comments)     Feel like (climbing the walls)    Hydrocodone Other (See Comments)     Restless and anxious similar to codeine. Tolerated oxycodone without issue.    Benadryl [diphenhydramine hcl] Anxiety     Past Medical History:   Diagnosis Date    *Atrial fibrillation     Breast cancer     Right breast cancer    Hypertension     Slow transit constipation 12/19/2013     Past Surgical History:   Procedure Laterality Date    ADENOIDECTOMY  70 years ago    At same time as tonsillectomy. .    APPENDECTOMY      ARTHROPLASTY OF HIP BY ANTERIOR APPROACH Right 1/31/2022    Procedure: ARTHROPLASTY, HIP, TOTAL, ANTERIOR APPROACH:RIGHT: DEPUY-C-STEM+PINNACLE;  Surgeon: Cisco Mcneal III, MD;  Location: [facility] OR;  Service: Orthopedics;  Laterality: Right;    BREAST BIOPSY      right    BREAST LUMPECTOMY Right 2013    EPIDURAL STEROID INJECTION INTO CERVICAL SPINE N/A 2/12/2020    Procedure: Injection-steroid-epidural-cervical--C7-T1 IL ELMIRA;  Surgeon: Alicia Proctor MD;  Location: [facility] PAIN [facility]GT;  Service: Pain Management;  Laterality: N/A;  sign consent at [facility]    EYE SURGERY  2018    HYSTERECTOMY      lymphnode removal      12 from Right axilla    PORTACATH PLACEMENT  2013     TONSILLECTOMY       Family History   Problem Relation Age of Onset    Cervical cancer Sister 79    Heart disease Mother     Hypertension Mother     Heart disease Father      Social History     Tobacco Use    Smoking status: Never    Smokeless tobacco: Never   Substance Use Topics    Alcohol use: Yes     Alcohol/week: 2.0 standard drinks     Types: 2 Glasses of wine per week     Comment: every 2 weeks    Drug use: No     Review of Systems  See HPI.  Remainder of 10 point systems review negative.    Physical Exam     Initial Vitals   BP Pulse Resp Temp SpO2   06/01/23 1823 06/01/23 1823 06/01/23 1823 06/01/23 1826 06/01/23 1823   (!) 184/113 (!) 130 18 98.1 °F (36.7 °C) 98 %      MAP       --                Physical Exam    Nursing note and vitals reviewed.  Constitutional: She is not diaphoretic. No distress.   HENT:   Mouth/Throat: Oropharynx is clear and moist.   Neck: No JVD present.   Cardiovascular:  Normal heart sounds. An irregularly irregular rhythm present.   Tachycardia present.   Exam reveals no gallop and no friction rub.       No murmur heard.  Pulses:       Radial pulses are 2+ on the right side and 2+ on the left side.        Dorsalis pedis pulses are 2+ on the right side and 2+ on the left side.   Pulmonary/Chest: Effort normal and breath sounds normal. No stridor. No respiratory distress. She has no decreased breath sounds. She has no wheezes. She has no rhonchi. She has no rales.   Abdominal: Abdomen is soft. She exhibits no distension. There is no abdominal tenderness.   Musculoskeletal:         General: No edema.     Neurological: She is alert and oriented to person, place, and time. GCS score is 15. GCS eye subscore is 4. GCS verbal subscore is 5. GCS motor subscore is 6.   Skin: Skin is warm and dry. No pallor.       ED Course   Procedures  Labs Reviewed   CBC W/ AUTO DIFFERENTIAL - Abnormal; Notable for the following components:       Result Value    RBC 3.82 (*)     Hemoglobin 11.9 (*)      Hematocrit 35.3 (*)     MCH 31.2 (*)     RDW 15.4 (*)     All other components within normal limits   COMPREHENSIVE METABOLIC PANEL - Abnormal; Notable for the following components:    CO2 22 (*)     eGFR 50 (*)     All other components within normal limits   URINALYSIS, REFLEX TO URINE CULTURE - Abnormal; Notable for the following components:    Appearance, UA Hazy (*)     Leukocytes, UA 3+ (*)     All other components within normal limits    Narrative:     Specimen Source->Urine   B-TYPE NATRIURETIC PEPTIDE - Abnormal; Notable for the following components:     (*)     All other components within normal limits   URINALYSIS MICROSCOPIC - Abnormal; Notable for the following components:    RBC, UA 5 (*)     WBC, UA 61 (*)     Hyaline Casts, UA 5 (*)     All other components within normal limits    Narrative:     Specimen Source->Urine   TROPONIN I   TSH   MAGNESIUM            Imaging Results    None          Medications   sodium chloride 0.9% bolus 1,000 mL 1,000 mL (0 mLs Intravenous Stopped 6/1/23 2023)   metoprolol succinate (TOPROL-XL) 24 hr tablet 25 mg (25 mg Oral Given 6/1/23 2003)   cefTRIAXone (ROCEPHIN) 1 g in dextrose 5 % in water (D5W) 5 % 50 mL IVPB (MB+) (0 g Intravenous Stopped 6/1/23 2219)   diltiaZEM injection 20 mg (20 mg Intravenous Given 6/1/23 2114)     Medical Decision Making:   Independently Interpreted Test(s):   I have ordered and independently interpreted EKG Reading(s) - see prior notes  Clinical Tests:   Lab Tests: Reviewed  Radiological Study: Reviewed  Medical Tests: Reviewed           ED Course as of 06/13/23 0736   Thu Jun 01, 2023   5480 EKG 12-lead  Independent interpretation.    Atrial fibrillation with rapid ventricular response.  Ventricular rate 123 beats per minute.  Normal axis.  Normal QRS and QT intervals.  No ST segment elevation or depression. [LP]   2120 EKG 12-lead  Interpretation.    Atrial fibrillation with slow ventricular response.  Ventricular rate 55 beats per  minute.  Normal axis.  Normal QRS and QT intervals.  No ST segment elevation or depression.  Normal T-wave morphology. [LP]      ED Course User Index  [LP] Kannan Thorpe III, MD                 Clinical Impression:   Final diagnoses:  [R42] Dizziness  [I48.91] Atrial fibrillation with rapid ventricular response (Primary)        ED Disposition Condition    Admit                 Kannan Thorpe III, MD  06/13/23 0720

## 2023-06-02 NOTE — CONSULTS
[facility] Medicine  Telemedicine Consult Note       Claudia Sierra has been accepted for transfer to [facility] and will be followed through telemedicine services beginning 06/03/23 at 7 AM.      Carmel Torres MD  [facility] Medicine Staff

## 2023-06-02 NOTE — CODE 44
"MEDICARE CONDITION 44    (Reference: Transmittal 200 of the Medicare Manual)    A Medicare "Inpatient Admission" may be changed to an "Outpatient" (includes  Outpatient Observation) status, if the following conditions exist:  The change in patient status from inpatient to outpatient is made prior to        discharge or release, while the patient is still a patient in the hospital.   The hospital has not submitted a claim for the inpatient admission.  A physician concurs with the Utilization Committee decision.   Physician concurrence with the Utilization Committee's decision is documented         in the patient's records.         IMPLEMENTATION OF CONDITION CODE 44    Inpatient status has been reviewed prior to discharge. Change to Outpatient Observation status, in accordance with Condition Code 44.     By entering this in the medical record, I verify that I have reviewed and concur with the findings of the Utilization Review Committee and the Utilization Review Physician, and that the identified Patient does not meet medical necessity for Inpatient status. This patient is appropriate for the downgrade in status because {reason:67678}. Outpatient Observation is the identified level of care appropriate for the Patient, and all of the above conditions for this status change have been met.     AIXA Quintero  "

## 2023-06-02 NOTE — ED NOTES
Pt is resting comfortably, respirations are even and unlabored, denies dizziness and lightheadedness.

## 2023-06-02 NOTE — HPI
Claudia Sierra is a 82-year-old female with atrial fibrillation, hypertension, HLD, history of breast cancer. Patient presented to the ED with generalized weakness and dizziness for the past 2 months. She attributed this to sinus congestion. Patient noted her symptoms were worsening over the past few weeks and noted her BP was in the 90s per home cuff. Patient did not note her HR until yesterday when a nurse call to check on her and her HR was in the 130s. This was communicated with her PCP who advised her to proceed to the ED. Patient denies any chest pain, palpitations, SOB, edema, diaphoresis, syncope. Patient reports symptoms improve with rest. She was noted to be in AF RVR in the ED. Patient reports an isolated event of AF while she was undergoing chemo for breat CA years ago. She has not followed up with cardiology since her cardiologist retired shortly after her initial AF event. Patient is not on blood thinners. She stopped her Toprol 3 days PTA due to hypotension. Patient now in SR and tolerating Toprol with SBP 130s-170s. Troponin negative. SR with frequent PACs on tele.   CHADSVASC 4 points  OAC initiated

## 2023-06-02 NOTE — HPI
Claudia Sierra is a 82-year-old female past medical history of atrial fibrillation, hypertension, history of breast cancer presents with generalized weakness and lightheadedness.    Patient's for the past few weeks she is been complaining of generalized weakness and lightheadedness.  Patient also noted to have low blood pressure the past few weeks.  After talking to her PCP, she was told to hold her Toprol-XL.  Her last dose was on Sunday.  This morning she noted to high blood pressure readings and she stated that she was feeling a bit lightheaded.  And so she decided to come to the ED for evaluation    Triage vitals were significant for tachycardia blood pressure is 184/113.  CBC was significant for normocytic anemia.  BNP was fairly unremarkable.  BNP was 194.  Troponin was unremarkable.  UA showed some WBCs and leukocytes but no bacteria.  Urine cultures pending.    EKG was significant for AFib with RVR    In the ED, patient was given a dose of metoprolol and then a dose of diltiazem with resolution of her RVR    Medicine was consulted for further evaluation and generalized weakness as well as her AFib with RVR.  Cardiology to be consulted in the a.m.

## 2023-06-02 NOTE — PLAN OF CARE
06/02/23 0022   Admission   Initial VN Admission Questions Complete   Communication Issues? None   Shift   Pain Management Interventions quiet environment facilitated;relaxation techniques promoted   Virtual Nurse - Patient Verbalized Approval Of VN Rounding;Camera Use   Type of Frequent Check   Type Telemetry Monitoring;Patient Rounds   Safety/Activity   Patient Rounds bed in low position;bed wheels locked;call light in patient/parent reach;clutter free environment maintained;ID band on;visualized patient;placement of personal items at bedside   Safety Promotion/Fall Prevention bed alarm set;assistive device/personal item within reach;side rails raised x 2;room near unit station;nonskid shoes/socks when out of bed;Fall Risk reviewed with patient/family;medications reviewed;instructed to call staff for mobility   Safety Precautions emergency equipment at bedside   Activity Management Ambulated in room - L4   Positioning   Body Position position changed independently   Head of Bed (HOB) Positioning HOB elevated   Positioning/Transfer Devices pillows;in use    VN cued into room to complete admit assessment. VIP model introduced; VN working alongside bedside treatment team.  Plan of care reviewed with patient. Patient informed of fall risk, fall precautions, call light within reach, side rails x2 elevated. Patient notified to ask staff for assistance. Patient verbalized complete understanding. Time allowed for questions. Will continue to monitor and intervene as needed.

## 2023-06-02 NOTE — ASSESSMENT & PLAN NOTE
Patient with Paroxysmal (<7 days) atrial fibrillation which is uncontrolled currently with Beta Blocker. Patient is currently in atrial fibrillation.[id number] Score: 3. HASBLED Score: 2+. Anticoagulation indicated. Anticoagulation done with not started, since patient refuses at this time.    Plan:  Consult Cardiology in AM  Hold home Troprolol 25mg   S/p diltliazem 20mg x1   C/w diltiazem 30mg every 6 hours

## 2023-06-02 NOTE — CARE UPDATE
She was noted with atrial fibrilliation on admission. Consulted cardiology. Readjusting medications. TTE ordered. Noted with UTI on admission. Cont with ceftriaxone and follow urine cultures.     Glenys Coronel NP

## 2023-06-02 NOTE — PLAN OF CARE
The sw met with the pt to complete the assessment. The pt lives alone in [place] butstates she's very active. The pt list her sister Jennie Fregoso [phone] and son Pradeep Simms [phone] who lives in [place] as her emergency contact. The pt's independent with her adl's,doesn't use dme but she has a rw,bsc,bp machine. The pt still drives but Jennie will transport her home at d/c. The sw completed the white board in the pt's room with her name and contact info. The sw will continue to follow the pt throughout her transitions of care and will assist with any d/c needs.        06/02/23 1401   Discharge Assessment   Assessment Type Discharge Planning Assessment   Confirmed/corrected address, phone number and insurance Yes   Confirmed Demographics Correct on Facesheet   Source of Information patient   When was your last doctors appointment?   (1/2023)   Communicated BASILIO with patient/caregiver Yes   Reason For Admission Afib with RVR   People in Home alone   Do you expect to return to your current living situation? Yes   Do you have help at home or someone to help you manage your care at home? Yes   Who are your caregiver(s) and their phone number(s)? Jennie Fregoso(sister)[phone]   Prior to hospitilization cognitive status: Alert/Oriented   Current cognitive status: Alert/Oriented   Home Accessibility wheelchair accessible   Home Layout Able to live on 1st floor   Equipment Currently Used at Home none   Readmission within 30 days? No   Do you currently have service(s) that help you manage your care at home? No   Do you take prescription medications? Yes   Do you have prescription coverage? Yes   Coverage Medicare A/B,BCBS   Do you have any problems affording any of your prescribed medications? No  (the pt receives her meds affordably at [facility] in [place])   Is the patient taking medications as prescribed? yes   Who is going to help you get home at discharge? Jennie Fregoso(sister)[phone]   How do you get to  doctors appointments? car, drives self   Are you on dialysis? No   Do you take coumadin? No   Discharge Plan A Home Health   Discharge Plan B Other  (TBD)   DME Needed Upon Discharge  none   Discharge Plan discussed with: Patient   Transition of Care Barriers None

## 2023-06-02 NOTE — SUBJECTIVE & OBJECTIVE
Past Medical History:   Diagnosis Date    *Atrial fibrillation     Breast cancer     Right breast cancer    Hypertension     Slow transit constipation 12/19/2013       Past Surgical History:   Procedure Laterality Date    ADENOIDECTOMY  70 years ago    At same time as tonsillectomy. .    APPENDECTOMY      ARTHROPLASTY OF HIP BY ANTERIOR APPROACH Right 1/31/2022    Procedure: ARTHROPLASTY, HIP, TOTAL, ANTERIOR APPROACH:RIGHT: DEPUY-C-STEM+PINNACLE;  Surgeon: Cisco Mcneal III, MD;  Location: [facility] OR;  Service: Orthopedics;  Laterality: Right;    BREAST BIOPSY      right    BREAST LUMPECTOMY Right 2013    EPIDURAL STEROID INJECTION INTO CERVICAL SPINE N/A 2/12/2020    Procedure: Injection-steroid-epidural-cervical--C7-T1 IL ELMIRA;  Surgeon: Alicia Proctor MD;  Location: [facility] PAIN T;  Service: Pain Management;  Laterality: N/A;  sign consent at [facility]    EYE SURGERY  2018    HYSTERECTOMY      lymphnode removal      12 from Right axilla    PORTACATH PLACEMENT  2013    TONSILLECTOMY         Review of patient's allergies indicates:   Allergen Reactions    Codeine Other (See Comments)     Feel like (climbing the walls)    Hydrocodone Other (See Comments)     Restless and anxious similar to codeine. Tolerated oxycodone without issue.    Benadryl [diphenhydramine hcl] Anxiety       No current facility-administered medications on file prior to encounter.     Current Outpatient Medications on File Prior to Encounter   Medication Sig    aspirin (ECOTRIN) 81 MG EC tablet Take 1 tablet (81 mg total) by mouth 2 (two) times daily. (Patient taking differently: Take 81 mg by mouth once daily.)    beta-carotene,A,-vits C,E/mins (OCUVITE ORAL) Take 1 tablet by mouth every evening.    gabapentin (NEURONTIN) 600 MG tablet TAKE 1 TABLET (600 MG TOTAL) BY MOUTH NIGHTLY AS NEEDED (PAIN).    loratadine (CLARITIN) 10 mg tablet Take 10 mg by mouth once daily.    pravastatin (PRAVACHOL) 40 MG tablet Take 1 tablet (40 mg total) by mouth once  daily.    acetaminophen (TYLENOL) 650 MG TbSR Take 1 tablet (650 mg total) by mouth every 6 to 8 hours as needed (pain).    azelastine (ASTELIN) 137 mcg (0.1 %) nasal spray 2 sprays (274 mcg total) by Nasal route 2 (two) times daily.    docusate sodium (COLACE) 100 MG capsule Take 1 capsule (100 mg total) by mouth 2 (two) times daily as needed for Constipation. (Patient not taking: Reported on 4/26/2022)    lactulose (CHRONULAC) 10 gram/15 mL solution TAKE 45 MLS (30 G TOTAL) BY MOUTH 3 (THREE) TIMES DAILY AS NEEDED (CONSTIPATION).    metoprolol succinate (TOPROL-XL) 100 MG 24 hr tablet Take 1 tablet (100 mg total) by mouth once daily.     Family History       Problem Relation (Age of Onset)    Cervical cancer Sister (79)    Heart disease Mother, Father    Hypertension Mother          Tobacco Use    Smoking status: Never    Smokeless tobacco: Never   Substance and Sexual Activity    Alcohol use: Yes     Alcohol/week: 2.0 standard drinks     Types: 2 Glasses of wine per week     Comment: every 2 weeks    Drug use: No    Sexual activity: Not Currently     Partners: Male     Birth control/protection: None     Review of Systems   Constitutional: Positive for malaise/fatigue.   HENT: Negative.     Eyes: Negative.    Cardiovascular:  Negative for chest pain, leg swelling, near-syncope, orthopnea, palpitations, paroxysmal nocturnal dyspnea and syncope.   Respiratory: Negative.  Negative for cough.    Endocrine: Negative.    Hematologic/Lymphatic: Negative.    Skin: Negative.    Musculoskeletal: Negative.    Gastrointestinal: Negative.  Negative for melena, nausea and vomiting.   Genitourinary: Negative.    Neurological:  Positive for dizziness and weakness.   Psychiatric/Behavioral: Negative.     Allergic/Immunologic: Negative.    Objective:     Vital Signs (Most Recent):  Temp: 97.5 °F (36.4 °C) (06/02/23 0721)  Pulse: 76 (06/02/23 0721)  Resp: 20 (06/02/23 0721)  BP: (!) 164/72 (06/02/23 0721)  SpO2: 96 % (06/02/23 0721)  Vital Signs (24h Range):  Temp:  [97.5 °F (36.4 °C)-98.3 °F (36.8 °C)] 97.5 °F (36.4 °C)  Pulse:  [] 76  Resp:  [18-22] 20  SpO2:  [94 %-98 %] 96 %  BP: (108-184)/() 164/72     Weight: 63.4 kg (139 lb 12.4 oz)  Body mass index is 28.23 kg/m².    SpO2: 96 %         Intake/Output Summary (Last 24 hours) at 6/2/2023 1015  Last data filed at 6/2/2023 0800  Gross per 24 hour   Intake 240 ml   Output --   Net 240 ml       Lines/Drains/Airways       Peripheral Intravenous Line  Duration                  Peripheral IV - Single Lumen 06/01/23 2200 20 G Anterior;Left Forearm <1 day                     Physical Exam  Constitutional:       General: She is not in acute distress.     Appearance: She is not diaphoretic.   HENT:      Head: Atraumatic.   Eyes:      General:         Right eye: No discharge.         Left eye: No discharge.   Cardiovascular:      Rate and Rhythm: Normal rate and regular rhythm. FrequentExtrasystoles (PACs) are present.  Pulmonary:      Effort: Pulmonary effort is normal.      Breath sounds: Normal breath sounds. No rales.   Abdominal:      General: Bowel sounds are normal.      Palpations: Abdomen is soft.   Musculoskeletal:      Right lower leg: No edema.      Left lower leg: No edema.   Skin:     General: Skin is warm and dry.   Neurological:      Mental Status: She is alert and oriented to person, place, and time.   Psychiatric:         Mood and Affect: Mood normal.         Behavior: Behavior normal.         Thought Content: Thought content normal.         Judgment: Judgment normal.        Significant Labs: BMP:   Recent Labs   Lab 06/01/23 1857 06/02/23  0438    98    141   K 4.1 3.9    108   CO2 22* 25   BUN 14 10   CREATININE 1.1 0.8   CALCIUM 9.0 8.6*   MG 1.6  --    , CMP   Recent Labs   Lab 06/01/23 1857 06/02/23  0438    141   K 4.1 3.9    108   CO2 22* 25    98   BUN 14 10   CREATININE 1.1 0.8   CALCIUM 9.0 8.6*   PROT 7.1  --    ALBUMIN  3.9  --    BILITOT 0.3  --    ALKPHOS 55  --    AST 30  --    ALT 22  --    ANIONGAP 13 8   , CBC   Recent Labs   Lab 06/01/23 1857 06/02/23  0438   WBC 7.65 7.41   HGB 11.9* 11.3*   HCT 35.3* 34.9*    251   , INR No results for input(s): INR, PROTIME in the last 48 hours., Lipid Panel No results for input(s): CHOL, HDL, LDLCALC, TRIG, CHOLHDL in the last 48 hours., Troponin   Recent Labs   Lab 06/01/23 1857   TROPONINI 0.009   , and All pertinent lab results from the last 24 hours have been reviewed.    Significant Imaging: Echocardiogram: Transthoracic echo (TTE) complete (Cupid Only): No results found for this or any previous visit.

## 2023-06-02 NOTE — SUBJECTIVE & OBJECTIVE
Past Medical History:   Diagnosis Date    *Atrial fibrillation     Breast cancer     Right breast cancer    Hypertension     Slow transit constipation 12/19/2013       Past Surgical History:   Procedure Laterality Date    ADENOIDECTOMY  70 years ago    At same time as tonsillectomy. .    APPENDECTOMY      ARTHROPLASTY OF HIP BY ANTERIOR APPROACH Right 1/31/2022    Procedure: ARTHROPLASTY, HIP, TOTAL, ANTERIOR APPROACH:RIGHT: DEPUY-C-STEM+PINNACLE;  Surgeon: Cisco Mcneal III, MD;  Location: [facility] OR;  Service: Orthopedics;  Laterality: Right;    BREAST BIOPSY      right    BREAST LUMPECTOMY Right 2013    EPIDURAL STEROID INJECTION INTO CERVICAL SPINE N/A 2/12/2020    Procedure: Injection-steroid-epidural-cervical--C7-T1 IL ELMIRA;  Surgeon: Alicia Proctor MD;  Location: [facility] PAIN T;  Service: Pain Management;  Laterality: N/A;  sign consent at [facility]    EYE SURGERY  2018    HYSTERECTOMY      lymphnode removal      12 from Right axilla    PORTACATH PLACEMENT  2013    TONSILLECTOMY         Review of patient's allergies indicates:   Allergen Reactions    Codeine Other (See Comments)     Feel like (climbing the walls)    Hydrocodone Other (See Comments)     Restless and anxious similar to codeine. Tolerated oxycodone without issue.    Benadryl [diphenhydramine hcl] Anxiety       No current facility-administered medications on file prior to encounter.     Current Outpatient Medications on File Prior to Encounter   Medication Sig    aspirin (ECOTRIN) 81 MG EC tablet Take 1 tablet (81 mg total) by mouth 2 (two) times daily. (Patient taking differently: Take 81 mg by mouth once daily.)    beta-carotene,A,-vits C,E/mins (OCUVITE ORAL) Take 1 tablet by mouth every evening.    gabapentin (NEURONTIN) 600 MG tablet TAKE 1 TABLET (600 MG TOTAL) BY MOUTH NIGHTLY AS NEEDED (PAIN).    loratadine (CLARITIN) 10 mg tablet Take 10 mg by mouth once daily.    pravastatin (PRAVACHOL) 40 MG tablet Take 1 tablet (40 mg total) by mouth once  daily.    acetaminophen (TYLENOL) 650 MG TbSR Take 1 tablet (650 mg total) by mouth every 6 to 8 hours as needed (pain).    azelastine (ASTELIN) 137 mcg (0.1 %) nasal spray 2 sprays (274 mcg total) by Nasal route 2 (two) times daily.    docusate sodium (COLACE) 100 MG capsule Take 1 capsule (100 mg total) by mouth 2 (two) times daily as needed for Constipation. (Patient not taking: Reported on 4/26/2022)    lactulose (CHRONULAC) 10 gram/15 mL solution TAKE 45 MLS (30 G TOTAL) BY MOUTH 3 (THREE) TIMES DAILY AS NEEDED (CONSTIPATION).    metoprolol succinate (TOPROL-XL) 100 MG 24 hr tablet Take 1 tablet (100 mg total) by mouth once daily.     Family History       Problem Relation (Age of Onset)    Cervical cancer Sister (79)    Heart disease Mother, Father    Hypertension Mother          Tobacco Use    Smoking status: Never    Smokeless tobacco: Never   Substance and Sexual Activity    Alcohol use: Yes     Alcohol/week: 2.0 standard drinks     Types: 2 Glasses of wine per week     Comment: every 2 weeks    Drug use: No    Sexual activity: Not Currently     Partners: Male     Birth control/protection: None     Review of Systems   Constitutional:  Positive for fatigue. Negative for chills and diaphoresis.   HENT:  Negative for dental problem and drooling.    Eyes:  Negative for photophobia and redness.   Respiratory:  Negative for cough and choking.    Cardiovascular:  Positive for palpitations. Negative for chest pain and leg swelling.   Gastrointestinal:  Negative for blood in stool and constipation.   Endocrine: Negative for polydipsia and polyphagia.   Genitourinary:  Negative for dysuria and enuresis.   Musculoskeletal:  Negative for gait problem and joint swelling.   Skin:  Negative for pallor and rash.   Allergic/Immunologic: Negative for immunocompromised state.   Neurological:  Negative for seizures and numbness.   Hematological:  Negative for adenopathy. Does not bruise/bleed easily.   Psychiatric/Behavioral:   Negative for behavioral problems and confusion.    Objective:     Vital Signs (Most Recent):  Temp: 98.3 °F (36.8 °C) (06/01/23 2322)  Pulse: (!) 57 (06/01/23 2322)  Resp: 20 (06/01/23 2322)  BP: 134/63 (06/01/23 2322)  SpO2: 97 % (06/01/23 2322) Vital Signs (24h Range):  Temp:  [98.1 °F (36.7 °C)-98.3 °F (36.8 °C)] 98.3 °F (36.8 °C)  Pulse:  [] 57  Resp:  [18-22] 20  SpO2:  [94 %-98 %] 97 %  BP: (108-184)/() 134/63     Weight: 63.4 kg (139 lb 12.4 oz)  Body mass index is 28.23 kg/m².     Physical Exam  Constitutional:       General: She is not in acute distress.     Appearance: Normal appearance. She is not ill-appearing.   HENT:      Head: Normocephalic and atraumatic.      Right Ear: There is no impacted cerumen.      Left Ear: There is no impacted cerumen.      Nose: No congestion or rhinorrhea.      Mouth/Throat:      Pharynx: No oropharyngeal exudate or posterior oropharyngeal erythema.   Eyes:      General:         Right eye: No discharge.         Left eye: No discharge.   Cardiovascular:      Rate and Rhythm: Bradycardia present. Rhythm irregular.      Heart sounds: No murmur heard.    No gallop.   Pulmonary:      Effort: No respiratory distress.   Abdominal:      Tenderness: There is no abdominal tenderness. There is no guarding.   Musculoskeletal:         General: No swelling or deformity.      Cervical back: No rigidity.   Lymphadenopathy:      Cervical: No cervical adenopathy.   Skin:     Coloration: Skin is not jaundiced.      Findings: No bruising.   Neurological:      Mental Status: She is alert.      Cranial Nerves: No cranial nerve deficit.      Motor: No weakness.   Psychiatric:         Mood and Affect: Mood normal.              Significant Labs: All pertinent labs within the past 24 hours have been reviewed.  A1C: No results for input(s): HGBA1C in the last 4320 hours.  ABGs: No results for input(s): PH, PCO2, HCO3, POCSATURATED, BE, TOTALHB, COHB, METHB, O2HB, POCFIO2, PO2 in the last  48 hours.  Bilirubin:   Recent Labs   Lab 06/01/23 1857   BILITOT 0.3     BMP:   Recent Labs   Lab 06/01/23 1857         K 4.1      CO2 22*   BUN 14   CREATININE 1.1   CALCIUM 9.0   MG 1.6     CBC:   Recent Labs   Lab 06/01/23 1857   WBC 7.65   HGB 11.9*   HCT 35.3*        CMP:   Recent Labs   Lab 06/01/23 1857      K 4.1      CO2 22*      BUN 14   CREATININE 1.1   CALCIUM 9.0   PROT 7.1   ALBUMIN 3.9   BILITOT 0.3   ALKPHOS 55   AST 30   ALT 22   ANIONGAP 13     Cardiac Markers:   Recent Labs   Lab 06/01/23 1857   *     Lactic Acid: No results for input(s): LACTATE in the last 48 hours.  Lipase: No results for input(s): LIPASE in the last 48 hours.  Lipid Panel: No results for input(s): CHOL, HDL, LDLCALC, TRIG, CHOLHDL in the last 48 hours.  Troponin:   Recent Labs   Lab 06/01/23 1857   TROPONINI 0.009     TSH:   Recent Labs   Lab 06/01/23 1857   TSH 3.713       Significant Imaging: I have reviewed all pertinent imaging results/findings within the past 24 hours.  I have reviewed and interpreted all pertinent imaging results/findings within the past 24 hours.

## 2023-06-02 NOTE — CONSULTS
[facility] Surg  Cardiology  Consult Note    Patient Name: Claudia Sierra  MRN: [id number]  Admission Date: 6/1/2023  Hospital Length of Stay: 1 days  Code Status: Full Code   Attending Provider: Shelia Monique MD   Consulting Provider: Tristan Hahn NP  Primary Care Physician: Sajan Curtis MD  Principal Problem:Atrial fibrillation with RVR    Patient information was obtained from patient, past medical records and ER records.     Inpatient consult to [facility]  Consult performed by: Tristan Hahn NP  Consult ordered by: Kizzy De La Rosa MD        Subjective:     Chief Complaint:  Hypotension     HPI:   Claudia Sierra is a 82-year-old female with atrial fibrillation, hypertension, HLD, history of breast cancer. Patient presented to the ED with generalized weakness and dizziness for the past 2 months. She attributed this to sinus congestion. Patient noted her symptoms were worsening over the past few weeks and noted her BP was in the 90s per home cuff. Patient did not note her HR until yesterday when a nurse call to check on her and her HR was in the 130s. This was communicated with her PCP who advised her to proceed to the ED. Patient denies any chest pain, palpitations, SOB, edema, diaphoresis, syncope. Patient reports symptoms improve with rest. She was noted to be in AF RVR in the ED. Patient reports an isolated event of AF while she was undergoing chemo for breat CA years ago. She has not followed up with cardiology since her cardiologist retired shortly after her initial AF event. Patient is not on blood thinners. She stopped her Toprol 3 days PTA due to hypotension. Patient now in SR and tolerating Toprol with SBP 130s-170s. Troponin negative. SR with frequent PACs on tele.   CHADSVASC 4 points  OAC initiated       Past Medical History:   Diagnosis Date    *Atrial fibrillation     Breast cancer     Right breast cancer    Hypertension     Slow transit constipation  12/19/2013       Past Surgical History:   Procedure Laterality Date    ADENOIDECTOMY  70 years ago    At same time as tonsillectomy. .    APPENDECTOMY      ARTHROPLASTY OF HIP BY ANTERIOR APPROACH Right 1/31/2022    Procedure: ARTHROPLASTY, HIP, TOTAL, ANTERIOR APPROACH:RIGHT: DEPUY-C-STEM+PINNACLE;  Surgeon: Cisco Mcneal III, MD;  Location: [facility] OR;  Service: Orthopedics;  Laterality: Right;    BREAST BIOPSY      right    BREAST LUMPECTOMY Right 2013    EPIDURAL STEROID INJECTION INTO CERVICAL SPINE N/A 2/12/2020    Procedure: Injection-steroid-epidural-cervical--C7-T1 IL ELMIRA;  Surgeon: Alicia Proctor MD;  Location: [facility] PAIN MGT;  Service: Pain Management;  Laterality: N/A;  sign consent at [facility]    EYE SURGERY  2018    HYSTERECTOMY      lymphnode removal      12 from Right axilla    PORTACATH PLACEMENT  2013    TONSILLECTOMY         Review of patient's allergies indicates:   Allergen Reactions    Codeine Other (See Comments)     Feel like (climbing the walls)    Hydrocodone Other (See Comments)     Restless and anxious similar to codeine. Tolerated oxycodone without issue.    Benadryl [diphenhydramine hcl] Anxiety       No current facility-administered medications on file prior to encounter.     Current Outpatient Medications on File Prior to Encounter   Medication Sig    aspirin (ECOTRIN) 81 MG EC tablet Take 1 tablet (81 mg total) by mouth 2 (two) times daily. (Patient taking differently: Take 81 mg by mouth once daily.)    beta-carotene,A,-vits C,E/mins (OCUVITE ORAL) Take 1 tablet by mouth every evening.    gabapentin (NEURONTIN) 600 MG tablet TAKE 1 TABLET (600 MG TOTAL) BY MOUTH NIGHTLY AS NEEDED (PAIN).    loratadine (CLARITIN) 10 mg tablet Take 10 mg by mouth once daily.    pravastatin (PRAVACHOL) 40 MG tablet Take 1 tablet (40 mg total) by mouth once daily.    acetaminophen (TYLENOL) 650 MG TbSR Take 1 tablet (650 mg total) by mouth every 6 to 8 hours as needed (pain).     azelastine (ASTELIN) 137 mcg (0.1 %) nasal spray 2 sprays (274 mcg total) by Nasal route 2 (two) times daily.    docusate sodium (COLACE) 100 MG capsule Take 1 capsule (100 mg total) by mouth 2 (two) times daily as needed for Constipation. (Patient not taking: Reported on 4/26/2022)    lactulose (CHRONULAC) 10 gram/15 mL solution TAKE 45 MLS (30 G TOTAL) BY MOUTH 3 (THREE) TIMES DAILY AS NEEDED (CONSTIPATION).    metoprolol succinate (TOPROL-XL) 100 MG 24 hr tablet Take 1 tablet (100 mg total) by mouth once daily.     Family History       Problem Relation (Age of Onset)    Cervical cancer Sister (79)    Heart disease Mother, Father    Hypertension Mother          Tobacco Use    Smoking status: Never    Smokeless tobacco: Never   Substance and Sexual Activity    Alcohol use: Yes     Alcohol/week: 2.0 standard drinks     Types: 2 Glasses of wine per week     Comment: every 2 weeks    Drug use: No    Sexual activity: Not Currently     Partners: Male     Birth control/protection: None     Review of Systems   Constitutional: Positive for malaise/fatigue.   HENT: Negative.     Eyes: Negative.    Cardiovascular:  Negative for chest pain, leg swelling, near-syncope, orthopnea, palpitations, paroxysmal nocturnal dyspnea and syncope.   Respiratory: Negative.  Negative for cough.    Endocrine: Negative.    Hematologic/Lymphatic: Negative.    Skin: Negative.    Musculoskeletal: Negative.    Gastrointestinal: Negative.  Negative for melena, nausea and vomiting.   Genitourinary: Negative.    Neurological:  Positive for dizziness and weakness.   Psychiatric/Behavioral: Negative.     Allergic/Immunologic: Negative.    Objective:     Vital Signs (Most Recent):  Temp: 97.5 °F (36.4 °C) (06/02/23 0721)  Pulse: 76 (06/02/23 0721)  Resp: 20 (06/02/23 0721)  BP: (!) 164/72 (06/02/23 0721)  SpO2: 96 % (06/02/23 0721) Vital Signs (24h Range):  Temp:  [97.5 °F (36.4 °C)-98.3 °F (36.8 °C)] 97.5 °F (36.4 °C)  Pulse:  []  76  Resp:  [18-22] 20  SpO2:  [94 %-98 %] 96 %  BP: (108-184)/() 164/72     Weight: 63.4 kg (139 lb 12.4 oz)  Body mass index is 28.23 kg/m².    SpO2: 96 %         Intake/Output Summary (Last 24 hours) at 6/2/2023 1015  Last data filed at 6/2/2023 0800  Gross per 24 hour   Intake 240 ml   Output --   Net 240 ml       Lines/Drains/Airways       Peripheral Intravenous Line  Duration                  Peripheral IV - Single Lumen 06/01/23 2200 20 G Anterior;Left Forearm <1 day                     Physical Exam  Constitutional:       General: She is not in acute distress.     Appearance: She is not diaphoretic.   HENT:      Head: Atraumatic.   Eyes:      General:         Right eye: No discharge.         Left eye: No discharge.   Cardiovascular:      Rate and Rhythm: Normal rate and regular rhythm. FrequentExtrasystoles (PACs) are present.  Pulmonary:      Effort: Pulmonary effort is normal.      Breath sounds: Normal breath sounds. No rales.   Abdominal:      General: Bowel sounds are normal.      Palpations: Abdomen is soft.   Musculoskeletal:      Right lower leg: No edema.      Left lower leg: No edema.   Skin:     General: Skin is warm and dry.   Neurological:      Mental Status: She is alert and oriented to person, place, and time.   Psychiatric:         Mood and Affect: Mood normal.         Behavior: Behavior normal.         Thought Content: Thought content normal.         Judgment: Judgment normal.        Significant Labs: BMP:   Recent Labs   Lab 06/01/23  1857 06/02/23  0438    98    141   K 4.1 3.9    108   CO2 22* 25   BUN 14 10   CREATININE 1.1 0.8   CALCIUM 9.0 8.6*   MG 1.6  --    , CMP   Recent Labs   Lab 06/01/23  1857 06/02/23  0438    141   K 4.1 3.9    108   CO2 22* 25    98   BUN 14 10   CREATININE 1.1 0.8   CALCIUM 9.0 8.6*   PROT 7.1  --    ALBUMIN 3.9  --    BILITOT 0.3  --    ALKPHOS 55  --    AST 30  --    ALT 22  --    ANIONGAP 13 8   , CBC   Recent  Labs   Lab 06/01/23  1857 06/02/23  0438   WBC 7.65 7.41   HGB 11.9* 11.3*   HCT 35.3* 34.9*    251   , INR No results for input(s): INR, PROTIME in the last 48 hours., Lipid Panel No results for input(s): CHOL, HDL, LDLCALC, TRIG, CHOLHDL in the last 48 hours., Troponin   Recent Labs   Lab 06/01/23 1857   TROPONINI 0.009   , and All pertinent lab results from the last 24 hours have been reviewed.    Significant Imaging: Echocardiogram: Transthoracic echo (TTE) complete (Cupid Only): No results found for this or any previous visit.    Assessment and Plan:     * Atrial fibrillation with RVR  RVR on admission with HR 130s  BB washed out PTA- converted to SR with FPACs on tele  Symptoms and hypotension likely due to AF RVR   Continue BB  TTE pending   CHADSVASC 4 points- discussed stroke risk with patient - she is willing to take DOAC- Xarelto initiated  Patient will need outpatient cardiology follow up     HLD (hyperlipidemia)  Continue statin         VTE Risk Mitigation (From admission, onward)         Ordered     rivaroxaban tablet 15 mg  with dinner         06/02/23 0912     IP VTE HIGH RISK PATIENT  Once         06/01/23 2216     Place sequential compression device  Until discontinued         06/01/23 2216                Thank you for your consult. I will follow-up with patient. Please contact us if you have any additional questions.    Tristan Hahn NP  Cardiology   [place] [facility]

## 2023-06-02 NOTE — PLAN OF CARE
Problem: Adult Inpatient Plan of Care  Goal: Plan of Care Review  Outcome: Ongoing, Progressing  Goal: Patient-Specific Goal (Individualized)  Outcome: Ongoing, Progressing  Goal: Absence of Hospital-Acquired Illness or Injury  Outcome: Ongoing, Progressing  Goal: Optimal Comfort and Wellbeing  Outcome: Ongoing, Progressing  Goal: Readiness for Transition of Care  Outcome: Ongoing, Progressing     Problem: Hypertension Comorbidity  Goal: Blood Pressure in Desired Range  Outcome: Ongoing, Progressing     Problem: Pain Chronic (Persistent) (Comorbidity Management)  Goal: Acceptable Pain Control and Functional Ability  Outcome: Ongoing, Progressing     Problem: Dysrhythmia  Goal: Normalized Cardiac Rhythm  Outcome: Ongoing, Progressing

## 2023-06-02 NOTE — PLAN OF CARE
The pt used [facility]  in the past. The sw called them spoke to Aldo [phone] and she states they can accept the pt again at d/c. The sw faxed the pt's info to her via [employer] and notified her the pt may possibly d/c as early as tomorrow.        06/02/23 1056   Post-Acute Status   Post-Acute Authorization Home Health   Home Health Status Referrals Sent   Discharge Plan   Discharge Plan A Home Health   Discharge Plan B Other  (TBD)

## 2023-06-02 NOTE — ASSESSMENT & PLAN NOTE
RVR on admission with HR 130s  BB washed out PTA- converted to SR with FPACs on tele  Symptoms and hypotension likely due to AF RVR   Continue BB  TTE pending  CHADSVASC 4 points- discussed stroke risk with patient - she is willing to take DOAC- Xarelto initiated  Patient will need outpatient cardiology follow up

## 2023-06-02 NOTE — H&P
[facility] Medicine  History & Physical    Patient Name: Claudia Sierra  MRN: [id number]  Patient Class: IP- Inpatient  Admission Date: 6/1/2023  Attending Physician: Shelia Monique MD   Primary Care Provider: Sajan Curtis MD         Patient information was obtained from patient and ER records.     Subjective:     Principal Problem:Atrial fibrillation with RVR    Chief Complaint:   Chief Complaint   Patient presents with    Dizziness     Pt states that she has been hypotensive at home and presents to the ed feeling faint. Pt did not pass out.  Pt presents to the ed Hypertensive at 184/113 at this time.         HPI: Claudia Sierra is a 82-year-old female past medical history of atrial fibrillation, hypertension, history of breast cancer presents with generalized weakness and lightheadedness.    Patient's for the past few weeks she is been complaining of generalized weakness and lightheadedness.  Patient also noted to have low blood pressure the past few weeks.  After talking to her PCP, she was told to hold her Toprol-XL.  Her last dose was on Sunday.  This morning she noted to high blood pressure readings and she stated that she was feeling a bit lightheaded.  And so she decided to come to the ED for evaluation    Triage vitals were significant for tachycardia blood pressure is 184/113.  CBC was significant for normocytic anemia.  BNP was fairly unremarkable.  BNP was 194.  Troponin was unremarkable.  UA showed some WBCs and leukocytes but no bacteria.  Urine cultures pending.    EKG was significant for AFib with RVR    In the ED, patient was given a dose of metoprolol and then a dose of diltiazem with resolution of her RVR    Medicine was consulted for further evaluation and generalized weakness as well as her AFib with RVR.  Cardiology to be consulted in the a.m.      Past Medical History:   Diagnosis Date    *Atrial fibrillation     Breast cancer     Right breast cancer     Hypertension     Slow transit constipation 12/19/2013       Past Surgical History:   Procedure Laterality Date    ADENOIDECTOMY  70 years ago    At same time as tonsillectomy. .    APPENDECTOMY      ARTHROPLASTY OF HIP BY ANTERIOR APPROACH Right 1/31/2022    Procedure: ARTHROPLASTY, HIP, TOTAL, ANTERIOR APPROACH:RIGHT: DEPUY-C-STEM+PINNACLE;  Surgeon: Cisco Mcneal III, MD;  Location: [facility] OR;  Service: Orthopedics;  Laterality: Right;    BREAST BIOPSY      right    BREAST LUMPECTOMY Right 2013    EPIDURAL STEROID INJECTION INTO CERVICAL SPINE N/A 2/12/2020    Procedure: Injection-steroid-epidural-cervical--C7-T1 IL ELMIRA;  Surgeon: Alicia Proctor MD;  Location: [facility] PAIN MGT;  Service: Pain Management;  Laterality: N/A;  sign consent at [facility]    EYE SURGERY  2018    HYSTERECTOMY      lymphnode removal      12 from Right axilla    PORTACATH PLACEMENT  2013    TONSILLECTOMY         Review of patient's allergies indicates:   Allergen Reactions    Codeine Other (See Comments)     Feel like (climbing the walls)    Hydrocodone Other (See Comments)     Restless and anxious similar to codeine. Tolerated oxycodone without issue.    Benadryl [diphenhydramine hcl] Anxiety       No current facility-administered medications on file prior to encounter.     Current Outpatient Medications on File Prior to Encounter   Medication Sig    aspirin (ECOTRIN) 81 MG EC tablet Take 1 tablet (81 mg total) by mouth 2 (two) times daily. (Patient taking differently: Take 81 mg by mouth once daily.)    beta-carotene,A,-vits C,E/mins (OCUVITE ORAL) Take 1 tablet by mouth every evening.    gabapentin (NEURONTIN) 600 MG tablet TAKE 1 TABLET (600 MG TOTAL) BY MOUTH NIGHTLY AS NEEDED (PAIN).    loratadine (CLARITIN) 10 mg tablet Take 10 mg by mouth once daily.    pravastatin (PRAVACHOL) 40 MG tablet Take 1 tablet (40 mg total) by mouth once daily.    acetaminophen (TYLENOL) 650 MG TbSR Take 1 tablet (650 mg total) by mouth every  6 to 8 hours as needed (pain).    azelastine (ASTELIN) 137 mcg (0.1 %) nasal spray 2 sprays (274 mcg total) by Nasal route 2 (two) times daily.    docusate sodium (COLACE) 100 MG capsule Take 1 capsule (100 mg total) by mouth 2 (two) times daily as needed for Constipation. (Patient not taking: Reported on 4/26/2022)    lactulose (CHRONULAC) 10 gram/15 mL solution TAKE 45 MLS (30 G TOTAL) BY MOUTH 3 (THREE) TIMES DAILY AS NEEDED (CONSTIPATION).    metoprolol succinate (TOPROL-XL) 100 MG 24 hr tablet Take 1 tablet (100 mg total) by mouth once daily.     Family History       Problem Relation (Age of Onset)    Cervical cancer Sister (79)    Heart disease Mother, Father    Hypertension Mother          Tobacco Use    Smoking status: Never    Smokeless tobacco: Never   Substance and Sexual Activity    Alcohol use: Yes     Alcohol/week: 2.0 standard drinks     Types: 2 Glasses of wine per week     Comment: every 2 weeks    Drug use: No    Sexual activity: Not Currently     Partners: Male     Birth control/protection: None     Review of Systems   Constitutional:  Positive for fatigue. Negative for chills and diaphoresis.   HENT:  Negative for dental problem and drooling.    Eyes:  Negative for photophobia and redness.   Respiratory:  Negative for cough and choking.    Cardiovascular:  Positive for palpitations. Negative for chest pain and leg swelling.   Gastrointestinal:  Negative for blood in stool and constipation.   Endocrine: Negative for polydipsia and polyphagia.   Genitourinary:  Negative for dysuria and enuresis.   Musculoskeletal:  Negative for gait problem and joint swelling.   Skin:  Negative for pallor and rash.   Allergic/Immunologic: Negative for immunocompromised state.   Neurological:  Negative for seizures and numbness.   Hematological:  Negative for adenopathy. Does not bruise/bleed easily.   Psychiatric/Behavioral:  Negative for behavioral problems and confusion.    Objective:     Vital Signs  (Most Recent):  Temp: 98.3 °F (36.8 °C) (06/01/23 2322)  Pulse: (!) 57 (06/01/23 2322)  Resp: 20 (06/01/23 2322)  BP: 134/63 (06/01/23 2322)  SpO2: 97 % (06/01/23 2322) Vital Signs (24h Range):  Temp:  [98.1 °F (36.7 °C)-98.3 °F (36.8 °C)] 98.3 °F (36.8 °C)  Pulse:  [] 57  Resp:  [18-22] 20  SpO2:  [94 %-98 %] 97 %  BP: (108-184)/() 134/63     Weight: 63.4 kg (139 lb 12.4 oz)  Body mass index is 28.23 kg/m².     Physical Exam  Constitutional:       General: She is not in acute distress.     Appearance: Normal appearance. She is not ill-appearing.   HENT:      Head: Normocephalic and atraumatic.      Right Ear: There is no impacted cerumen.      Left Ear: There is no impacted cerumen.      Nose: No congestion or rhinorrhea.      Mouth/Throat:      Pharynx: No oropharyngeal exudate or posterior oropharyngeal erythema.   Eyes:      General:         Right eye: No discharge.         Left eye: No discharge.   Cardiovascular:      Rate and Rhythm: Bradycardia present. Rhythm irregular.      Heart sounds: No murmur heard.    No gallop.   Pulmonary:      Effort: No respiratory distress.   Abdominal:      Tenderness: There is no abdominal tenderness. There is no guarding.   Musculoskeletal:         General: No swelling or deformity.      Cervical back: No rigidity.   Lymphadenopathy:      Cervical: No cervical adenopathy.   Skin:     Coloration: Skin is not jaundiced.      Findings: No bruising.   Neurological:      Mental Status: She is alert.      Cranial Nerves: No cranial nerve deficit.      Motor: No weakness.   Psychiatric:         Mood and Affect: Mood normal.              Significant Labs: All pertinent labs within the past 24 hours have been reviewed.  A1C: No results for input(s): HGBA1C in the last 4320 hours.  ABGs: No results for input(s): PH, PCO2, HCO3, POCSATURATED, BE, TOTALHB, COHB, METHB, O2HB, POCFIO2, PO2 in the last 48 hours.  Bilirubin:   Recent Labs   Lab 06/01/23  1857   BILITOT 0.3      BMP:   Recent Labs   Lab 06/01/23 1857         K 4.1      CO2 22*   BUN 14   CREATININE 1.1   CALCIUM 9.0   MG 1.6     CBC:   Recent Labs   Lab 06/01/23 1857   WBC 7.65   HGB 11.9*   HCT 35.3*        CMP:   Recent Labs   Lab 06/01/23 1857      K 4.1      CO2 22*      BUN 14   CREATININE 1.1   CALCIUM 9.0   PROT 7.1   ALBUMIN 3.9   BILITOT 0.3   ALKPHOS 55   AST 30   ALT 22   ANIONGAP 13     Cardiac Markers:   Recent Labs   Lab 06/01/23 1857   *     Lactic Acid: No results for input(s): LACTATE in the last 48 hours.  Lipase: No results for input(s): LIPASE in the last 48 hours.  Lipid Panel: No results for input(s): CHOL, HDL, LDLCALC, TRIG, CHOLHDL in the last 48 hours.  Troponin:   Recent Labs   Lab 06/01/23 1857   TROPONINI 0.009     TSH:   Recent Labs   Lab 06/01/23 1857   TSH 3.713       Significant Imaging: I have reviewed all pertinent imaging results/findings within the past 24 hours.  I have reviewed and interpreted all pertinent imaging results/findings within the past 24 hours.    Assessment/Plan:     * Atrial fibrillation with RVR  Patient with Paroxysmal (<7 days) atrial fibrillation which is uncontrolled currently with Beta Blocker. Patient is currently in atrial fibrillation.[id number] Score: 3. HASBLED Score: 2+. Anticoagulation indicated. Anticoagulation done with not started, since patient refuses at this time.    Plan:  Consult Cardiology in AM  Hold home Troprolol 25mg   S/p diltliazem 20mg x1   C/w diltiazem 30mg every 6 hours      Generalized weakness  Reports weakness but is able to move around well    Plan:  Consider PT/OT,        Allergies  C/w home medications      Normocytic anemia  Hgb: 11.9    Plan:  C/w CBC daily   Transfuse greater then Hgb 7         Constipation  C/w lactulose as needed         HLD (hyperlipidemia)  C/w statin 40mg daily         VTE Risk Mitigation (From admission, onward)         Ordered     IP VTE HIGH  RISK PATIENT  Once         06/01/23 2216     Place sequential compression device  Until discontinued         06/01/23 2216                           Kizzy De La Rosa MD  Department of [facility] Medicine  [facility]

## 2023-06-03 VITALS
TEMPERATURE: 98 F | OXYGEN SATURATION: 94 % | HEIGHT: 59 IN | WEIGHT: 139 LBS | HEART RATE: 53 BPM | DIASTOLIC BLOOD PRESSURE: 63 MMHG | RESPIRATION RATE: 18 BRPM | SYSTOLIC BLOOD PRESSURE: 141 MMHG | BODY MASS INDEX: 28.02 KG/M2

## 2023-06-03 LAB
ANION GAP SERPL CALC-SCNC: 7 MMOL/L (ref 8–16)
BACTERIA UR CULT: NORMAL
BACTERIA UR CULT: NORMAL
BASOPHILS # BLD AUTO: 0.03 K/UL (ref 0–0.2)
BASOPHILS NFR BLD: 0.5 % (ref 0–1.9)
BUN SERPL-MCNC: 12 MG/DL (ref 8–23)
CALCIUM SERPL-MCNC: 9 MG/DL (ref 8.7–10.5)
CHLORIDE SERPL-SCNC: 107 MMOL/L (ref 95–110)
CO2 SERPL-SCNC: 25 MMOL/L (ref 23–29)
CREAT SERPL-MCNC: 0.8 MG/DL (ref 0.5–1.4)
DIFFERENTIAL METHOD: ABNORMAL
EOSINOPHIL # BLD AUTO: 0.3 K/UL (ref 0–0.5)
EOSINOPHIL NFR BLD: 3.8 % (ref 0–8)
ERYTHROCYTE [DISTWIDTH] IN BLOOD BY AUTOMATED COUNT: 15.8 % (ref 11.5–14.5)
EST. GFR  (NO RACE VARIABLE): >60 ML/MIN/1.73 M^2
GLUCOSE SERPL-MCNC: 95 MG/DL (ref 70–110)
HCT VFR BLD AUTO: 36.5 % (ref 37–48.5)
HGB BLD-MCNC: 11.7 G/DL (ref 12–16)
IMM GRANULOCYTES # BLD AUTO: 0.02 K/UL (ref 0–0.04)
IMM GRANULOCYTES NFR BLD AUTO: 0.3 % (ref 0–0.5)
LYMPHOCYTES # BLD AUTO: 2.4 K/UL (ref 1–4.8)
LYMPHOCYTES NFR BLD: 36.7 % (ref 18–48)
MCH RBC QN AUTO: 30 PG (ref 27–31)
MCHC RBC AUTO-ENTMCNC: 32.1 G/DL (ref 32–36)
MCV RBC AUTO: 94 FL (ref 82–98)
MONOCYTES # BLD AUTO: 0.7 K/UL (ref 0.3–1)
MONOCYTES NFR BLD: 10.9 % (ref 4–15)
NEUTROPHILS # BLD AUTO: 3.2 K/UL (ref 1.8–7.7)
NEUTROPHILS NFR BLD: 47.8 % (ref 38–73)
NRBC BLD-RTO: 0 /100 WBC
PLATELET # BLD AUTO: 259 K/UL (ref 150–450)
PMV BLD AUTO: 9.9 FL (ref 9.2–12.9)
POTASSIUM SERPL-SCNC: 3.9 MMOL/L (ref 3.5–5.1)
RBC # BLD AUTO: 3.9 M/UL (ref 4–5.4)
SODIUM SERPL-SCNC: 139 MMOL/L (ref 136–145)
WBC # BLD AUTO: 6.63 K/UL (ref 3.9–12.7)

## 2023-06-03 PROCEDURE — 25000003 PHARM REV CODE 250: Performed by: STUDENT IN AN ORGANIZED HEALTH CARE EDUCATION/TRAINING PROGRAM

## 2023-06-03 PROCEDURE — 85025 COMPLETE CBC W/AUTO DIFF WBC: CPT | Performed by: STUDENT IN AN ORGANIZED HEALTH CARE EDUCATION/TRAINING PROGRAM

## 2023-06-03 PROCEDURE — 80048 BASIC METABOLIC PNL TOTAL CA: CPT | Performed by: STUDENT IN AN ORGANIZED HEALTH CARE EDUCATION/TRAINING PROGRAM

## 2023-06-03 PROCEDURE — 36415 COLL VENOUS BLD VENIPUNCTURE: CPT | Performed by: STUDENT IN AN ORGANIZED HEALTH CARE EDUCATION/TRAINING PROGRAM

## 2023-06-03 RX ORDER — DILTIAZEM HYDROCHLORIDE 30 MG/1
30 TABLET, FILM COATED ORAL EVERY 8 HOURS
Qty: 90 TABLET | Refills: 11 | Status: SHIPPED | OUTPATIENT
Start: 2023-06-03 | End: 2023-06-19

## 2023-06-03 RX ORDER — CIPROFLOXACIN 500 MG/1
500 TABLET ORAL 2 TIMES DAILY
Qty: 6 TABLET | Refills: 0 | Status: SHIPPED | OUTPATIENT
Start: 2023-06-03 | End: 2023-06-06

## 2023-06-03 RX ADMIN — DILTIAZEM HYDROCHLORIDE 30 MG: 30 TABLET, FILM COATED ORAL at 06:06

## 2023-06-03 RX ADMIN — METOPROLOL SUCCINATE 100 MG: 50 TABLET, EXTENDED RELEASE ORAL at 08:06

## 2023-06-03 RX ADMIN — PRAVASTATIN SODIUM 40 MG: 40 TABLET ORAL at 08:06

## 2023-06-03 RX ADMIN — CETIRIZINE HYDROCHLORIDE 10 MG: 5 TABLET, FILM COATED ORAL at 08:06

## 2023-06-03 NOTE — NURSING
VN cued into room and asked permission to move camera towards patient.  AVS reviewed with patient and her sister.  Patient and her sister verbalized understanding on the new medications, continuing and stopping medications, diet, activity, and follow-up appointments.  Patient is also instructed were to  medications.  Will place wheelchair per Transport when patient is ready to leave.

## 2023-06-03 NOTE — HOSPITAL COURSE
Atrial fibrillation with RVR  Patient with Paroxysmal (<7 days) atrial fibrillation which is uncontrolled currently with Beta Blocker. Patient is currently in atrial fibrillation.[id number] Score: 3. HASBLED Score: 2+. Anticoagulation indicated. Anticoagulation done with not started, since patient refuses at this time.    Consult Cardiology   RI on metoprolol and diltiazem. Started rivaroxiban at [place]  Outpatient f/u with cardiology, referral placed  RVR resolved.          Generalized weakness  Reports weakness but is able to move around well  HH at [place]         Allergies  C/w home medications        Normocytic anemia  Hgb: 11.9  OP monitoring            Constipation  C/w lactulose as needed    resolved        HLD (hyperlipidemia)        UTI:  Cultures with multiple organisms  Given ctx during admission  Transition to cipro at [place]  C/w statin 40mg daily

## 2023-06-03 NOTE — DISCHARGE SUMMARY
[facility] Medicine  Discharge Summary      Patient Name: Claudia Sierra  MRN: [id number]  Patient Class: IP- Inpatient  Admission Date: 6/1/2023  Hospital Length of Stay: 2 days  Discharge Date and Time: No discharge date for patient encounter.  Attending Physician: Carmel Torres MD   Discharging Provider: Carmel Torres MD  Primary Care Provider: Sajan Curtis MD      HPI:   Claudia Sierra is a 82-year-old female past medical history of atrial fibrillation, hypertension, history of breast cancer presents with generalized weakness and lightheadedness.    Patient's for the past few weeks she is been complaining of generalized weakness and lightheadedness.  Patient also noted to have low blood pressure the past few weeks.  After talking to her PCP, she was told to hold her Toprol-XL.  Her last dose was on Sunday.  This morning she noted to high blood pressure readings and she stated that she was feeling a bit lightheaded.  And so she decided to come to the ED for evaluation    Triage vitals were significant for tachycardia blood pressure is 184/113.  CBC was significant for normocytic anemia.  BNP was fairly unremarkable.  BNP was 194.  Troponin was unremarkable.  UA showed some WBCs and leukocytes but no bacteria.  Urine cultures pending.    EKG was significant for AFib with RVR    In the ED, patient was given a dose of metoprolol and then a dose of diltiazem with resolution of her RVR    Medicine was consulted for further evaluation and generalized weakness as well as her AFib with RVR.  Cardiology to be consulted in the a.m.      * No surgery found *      Hospital Course:   Atrial fibrillation with RVR  Patient with Paroxysmal (<7 days) atrial fibrillation which is uncontrolled currently with Beta Blocker. Patient is currently in atrial fibrillation.[id number] Score: 3. HASBLED Score: 2+. Anticoagulation indicated. Anticoagulation done with not started, since patient refuses at  this time.    Consult Cardiology   [facility] on metoprolol and diltiazem. Started rivaroxiban at [place]  Outpatient f/u with cardiology, referral placed  RVR resolved.          Generalized weakness  Reports weakness but is able to move around well  HH at [place]         Allergies  C/w home medications        Normocytic anemia  Hgb: 11.9  OP monitoring            Constipation  C/w lactulose as needed    resolved        HLD (hyperlipidemia)        UTI:  Cultures with multiple organisms  Given ctx during admission  Transition to cipro at [place]  C/w statin 40mg daily        Goals of Care Treatment Preferences:  Code Status: Full Code      Consults:   Consults (From admission, onward)        Status Ordering Provider     Inpatient virtual consult to Hospital Medicine  Once        Provider:  (Not yet assigned)    Completed ANA JONES     Inpatient consult to [facility]iology[facility]  Once        Provider:  Abran Becker MD    Completed MELIA SAENZ          No new Assessment & Plan notes have been filed under this hospital service since the last note was generated.  Service: Hospital Medicine    Final Active Diagnoses:    Diagnosis Date Noted POA    PRINCIPAL PROBLEM:  Atrial fibrillation with RVR [I48.91] 08/29/2013 Yes     Chronic    Allergies [T78.40XA] 06/02/2023 Yes    Generalized weakness [R53.1] 06/02/2023 Yes    Normocytic anemia [D64.9] 08/19/2021 Yes    Constipation [K59.00] 09/12/2019 Yes    HLD (hyperlipidemia) [E78.5] 08/14/2014 Yes      Problems Resolved During this Admission:       Discharged Condition: stable    Disposition: Home or Self Care    Follow Up:   Follow-up Information     [facility] Follow up.    Contact information:  [address] [address] [phone]           Sajan Curtis MD Follow up on 6/19/2023.    Specialty: Internal Medicine  Why: OUT PATIENT  SERVICES/PCP- Please report to the clinic at 2:30 to meet with  the MD  Contact information:  [address]  [address]  [place] [address]  [phone]             Abran Becker MD Follow up on 6/21/2023.    Specialties: Cardiology, Interventional Cardiology  Why: OUT PATIENT  SERVICES/Cardiology- please report to the clinic at 10:40 am to meet with the MD  Contact information:  [address]  [address]  [place] [address]  [phone]                       Patient Instructions:      Ambulatory referral/consult to Cardiology   Standing Status: Future   Referral Priority: Routine Referral Type: Consultation   Referral Reason: Specialty Services Required   Requested Specialty: Cardiology   Number of Visits Requested: 1     Diet Cardiac     Notify your health care provider if you experience any of the following:  temperature >100.4     Notify your health care provider if you experience any of the following:  persistent nausea and vomiting or diarrhea     Notify your health care provider if you experience any of the following:  severe uncontrolled pain     Notify your health care provider if you experience any of the following:  redness, tenderness, or signs of infection (pain, swelling, redness, odor or green/yellow discharge around incision site)     Notify your health care provider if you experience any of the following:  difficulty breathing or increased cough     Notify your health care provider if you experience any of the following:  severe persistent headache     Notify your health care provider if you experience any of the following:  worsening rash     Notify your health care provider if you experience any of the following:  persistent dizziness, light-headedness, or visual disturbances     Notify your health care provider if you experience any of the following:  increased confusion or weakness     Send follow up & questions to   Order Comments: Patient's primary care physician: Sajan Curtis MD     Activity as tolerated       Significant  Diagnostic Studies: Labs:   CMP   Recent Labs   Lab 06/01/23  1857 06/02/23  0438 06/03/23  0523    141 139   K 4.1 3.9 3.9    108 107   CO2 22* 25 25    98 95   BUN 14 10 12   CREATININE 1.1 0.8 0.8   CALCIUM 9.0 8.6* 9.0   PROT 7.1  --   --    ALBUMIN 3.9  --   --    BILITOT 0.3  --   --    ALKPHOS 55  --   --    AST 30  --   --    ALT 22  --   --    ANIONGAP 13 8 7*    and CBC   Recent Labs   Lab 06/01/23  1857 06/02/23  0438 06/03/23  0523   WBC 7.65 7.41 6.63   HGB 11.9* 11.3* 11.7*   HCT 35.3* 34.9* 36.5*    251 259       Pending Diagnostic Studies:     Procedure Component Value Units Date/Time    EKG 12-lead [[id number]] Collected: 06/01/23 2117    Order Status: Sent Lab Status: In process Updated: 06/02/23 1530    Narrative:      Test Reason : R42,    Vent. Rate : 055 BPM     Atrial Rate : 064 BPM     P-R Int : 000 ms          QRS Dur : 068 ms      QT Int : 440 ms       P-R-T Axes : 000 043 035 degrees     QTc Int : 420 ms    Atrial fibrillation with slow ventricular response  Cannot rule out Anterior infarct ,age undetermined  Abnormal ECG  When compared with ECG of 01-JUN-2023 18:42,  Atrial fibrillation has replaced Atrial flutter  Vent. rate has decreased BY  68 BPM    Referred By: AAAREFMIRNA   SELF           Confirmed By:          Medications:  Reconciled Home Medications:      Medication List      START taking these medications    ciprofloxacin HCl 500 MG tablet  Commonly known as: CIPRO  Take 1 tablet (500 mg total) by mouth 2 (two) times daily. for 3 days     diltiaZEM 30 MG tablet  Commonly known as: CARDIZEM  Take 1 tablet (30 mg total) by mouth every 8 (eight) hours.     rivaroxaban 15 mg Tab  Commonly known as: XARELTO  Take 1 tablet (15 mg total) by mouth before dinner.        CONTINUE taking these medications    azelastine 137 mcg (0.1 %) nasal spray  Commonly known as: ASTELIN  2 sprays (274 mcg total) by Nasal route 2 (two) times daily.     gabapentin 600 MG  tablet  Commonly known as: NEURONTIN  TAKE 1 TABLET (600 MG TOTAL) BY MOUTH NIGHTLY AS NEEDED (PAIN).     lactulose 10 gram/15 mL solution  Commonly known as: CHRONULAC  TAKE 45 MLS (30 G TOTAL) BY MOUTH 3 (THREE) TIMES DAILY AS NEEDED (CONSTIPATION).     loratadine 10 mg tablet  Commonly known as: CLARITIN  Take 10 mg by mouth once daily.     MAPAP ARTHRITIS PAIN 650 MG Tbsr  Generic drug: acetaminophen  Take 1 tablet (650 mg total) by mouth every 6 to 8 hours as needed (pain).     metoprolol succinate 100 MG 24 hr tablet  Commonly known as: TOPROL-XL  Take 1 tablet (100 mg total) by mouth once daily.     OCUVITE ORAL  Take 1 tablet by mouth every evening.     pravastatin 40 MG tablet  Commonly known as: PRAVACHOL  Take 1 tablet (40 mg total) by mouth once daily.        STOP taking these medications    aspirin 81 MG EC tablet  Commonly known as: ECOTRIN        ASK your doctor about these medications    docusate sodium 100 MG capsule  Commonly known as: COLACE  Take 1 capsule (100 mg total) by mouth 2 (two) times daily as needed for Constipation.            Indwelling Lines/Drains at time of discharge:   Lines/Drains/Airways     None                 Time spent on the discharge of patient: 39 minutes         The attending portion of this evaluation, treatment, and documentation was performed per Carmel Torres MD via Telemedicine AudioVisual using the secure Vidyo software platform with 2 way audio/video. The provider was located off-site and the patient is located in the hospital. The aforementioned video software was utilized to document the relevant history and physical exam    Carmel Torres MD  Department of Hospital Medicine  [facility]

## 2023-06-03 NOTE — PLAN OF CARE
[facility]    HOME HEALTH ORDERS  FACE TO FACE ENCOUNTER    Patient Name: Claudia Sierra  YOB: 1940    PCP: Sajan Curtis MD   PCP Address: [facility] [address] / [facility] [address]  PCP Phone Number: [phone]  PCP Fax: [phone]    Encounter Date: 06/03/2023    Admit to Home Health    Diagnoses:  Active Hospital Problems    Diagnosis  POA    *Atrial fibrillation with RVR [I48.91]  Yes     Chronic    Allergies [T78.40XA]  Yes    Generalized weakness [R53.1]  Yes    Normocytic anemia [D64.9]  Yes    Constipation [K59.00]  Yes     Lactulose prn is workign well       HLD (hyperlipidemia) [E78.5]  Yes     Stable on current regimen of pravachol .         Resolved Hospital Problems   No resolved problems to display.       Future Appointments   Date Time Provider Department Center   6/19/2023  2:30 PM Sajan Curtis MD [facility] PRICRE [place]   6/21/2023 10:40 AM Abran Becker MD [facility] CARDIO [facility]   1/30/2024  8:30 AM [facility] MAMMO1 [facility] MAMMO [facility]   1/30/2024  9:40 AM Mingo Wynne MD [facility] HEM ONC [facility]      Follow-up Information       [facility] Follow up.    Contact information:  [address] [address] [phone]                             I have seen and examined this patient face to face today. My clinical findings that support the need for the home health skilled services and home bound status are the following:  Weakness/numbness causing balance and gait disturbance due to Infection and Weakness/Debility making it taxing to leave home.    Allergies:  Review of patient's allergies indicates:   Allergen Reactions    Codeine Other (See Comments)     Feel like (climbing the walls)    Hydrocodone Other (See Comments)     Restless and anxious similar to codeine. Tolerated oxycodone without issue.    Benadryl [diphenhydramine hcl] Anxiety       Diet: cardiac  diet    Activities: activity as tolerated    Nursing:   SN to complete comprehensive assessment including routine vital signs. Instruct on disease process and s/s of complications to report to MD. Review/verify medication list sent home with the patient at time of discharge  and instruct patient/caregiver as needed. Frequency may be adjusted depending on start of care date.    Notify MD if SBP > 160 or < 90; DBP > 90 or < 50; HR > 120 or < 50; Temp > 101;       CONSULTS:    Physical Therapy to evaluate and treat. Evaluate for home safety and equipment needs; Establish/upgrade home exercise program. Perform / instruct on therapeutic exercises, gait training, transfer training, and Range of Motion.  Occupational Therapy to evaluate and treat. Evaluate home environment for safety and equipment needs. Perform/Instruct on transfers, ADL training, ROM, and therapeutic exercises.  Aide to provide assistance with personal care, ADLs, and vital signs.    MISCELLANEOUS CARE:  Routine Skin for Bedridden Patients: Instruct patient/caregiver to apply moisture barrier cream to all skin folds and wet areas in perineal area daily and after baths and all bowel movements.    WOUND CARE ORDERS  no      Medications: Review discharge medications with patient and family and provide education.      Current Discharge Medication List        START taking these medications    Details   ciprofloxacin HCl (CIPRO) 500 MG tablet Take 1 tablet (500 mg total) by mouth 2 (two) times daily. for 3 days  Qty: 6 tablet, Refills: 0      diltiaZEM (CARDIZEM) 30 MG tablet Take 1 tablet (30 mg total) by mouth every 8 (eight) hours.  Qty: 90 tablet, Refills: 11      rivaroxaban (XARELTO) 15 mg Tab Take 1 tablet (15 mg total) by mouth before dinner.  Qty: 30 tablet, Refills: 11           CONTINUE these medications which have NOT CHANGED    Details   beta-carotene,A,-vits C,E/mins (OCUVITE ORAL) Take 1 tablet by mouth every evening.      gabapentin (NEURONTIN)  600 MG tablet TAKE 1 TABLET (600 MG TOTAL) BY MOUTH NIGHTLY AS NEEDED (PAIN).  Qty: 90 tablet, Refills: 2    Associated Diagnoses: Post herpetic neuralgia      loratadine (CLARITIN) 10 mg tablet Take 10 mg by mouth once daily.      pravastatin (PRAVACHOL) 40 MG tablet Take 1 tablet (40 mg total) by mouth once daily.  Qty: 90 tablet, Refills: 3    Associated Diagnoses: Hypercholesterolemia      acetaminophen (TYLENOL) 650 MG TbSR Take 1 tablet (650 mg total) by mouth every 6 to 8 hours as needed (pain).  Qty: 120 tablet, Refills: 0    Associated Diagnoses: Status post hip replacement, unspecified laterality      azelastine (ASTELIN) 137 mcg (0.1 %) nasal spray 2 sprays (274 mcg total) by Nasal route 2 (two) times daily.  Qty: 90 mL, Refills: 2      docusate sodium (COLACE) 100 MG capsule Take 1 capsule (100 mg total) by mouth 2 (two) times daily as needed for Constipation.  Qty: 60 capsule, Refills: 0      lactulose (CHRONULAC) 10 gram/15 mL solution TAKE 45 MLS (30 G TOTAL) BY MOUTH 3 (THREE) TIMES DAILY AS NEEDED (CONSTIPATION).  Qty: 473 mL, Refills: 4    Associated Diagnoses: Slow transit constipation      metoprolol succinate (TOPROL-XL) 100 MG 24 hr tablet Take 1 tablet (100 mg total) by mouth once daily.  Qty: 90 tablet, Refills: 3    Comments: .  Associated Diagnoses: Encounter for antineoplastic chemotherapy; Essential hypertension; Chronic atrial fibrillation; Gastroesophageal reflux disease with esophagitis           STOP taking these medications       aspirin (ECOTRIN) 81 MG EC tablet Comments:   Reason for Stopping:               I certify that this patient is confined to her home and needs intermittent skilled nursing care, physical therapy and occupational therapy.    Carmel Torres MD  6/3/2023 12:51 PM  Department of Hospital medicine

## 2023-06-03 NOTE — NURSING
AVS reviewed with patient and sister. Verbalized understanding of upcoming appointments and home medications. IV and Cardiac monitoring removed. Prescriptions sent to preferred pharmacy. W/c transport to escort patient and belongings to main lobby. Voiced no concerns.

## 2023-06-03 NOTE — PLAN OF CARE
Pt AAO x4.  VSS.  Pt remained afebrile throughout this shift.   Pt remained free of falls this shift.   Pt with out pain this shift.  Plan of care reviewed. Patient verbalizes understanding.   Pt moving/turing/ ambulating in room. Frequent weight shifting encouraged.  Patient afib 80s on monitor. ,echo and ekg done. Meds per MAR   Bed low, side rails up x 2, wheels locked, call light in reach.   Bed alarm maintained for safety.  Family at bedside.   Patient instructed to call for assistance.   Problem: Adult Inpatient Plan of Care  Goal: Plan of Care Review  Outcome: Ongoing, Progressing  Goal: Patient-Specific Goal (Individualized)  Outcome: Ongoing, Progressing  Goal: Absence of Hospital-Acquired Illness or Injury  Outcome: Ongoing, Progressing  Goal: Optimal Comfort and Wellbeing  Outcome: Ongoing, Progressing  Goal: Readiness for Transition of Care  Outcome: Ongoing, Progressing     Problem: Hypertension Comorbidity  Goal: Blood Pressure in Desired Range  Outcome: Ongoing, Progressing     Problem: Pain Chronic (Persistent) (Comorbidity Management)  Goal: Acceptable Pain Control and Functional Ability  Outcome: Ongoing, Progressing     Problem: Dysrhythmia  Goal: Normalized Cardiac Rhythm  Outcome: Ongoing, Progressing     Will continue to monitor.

## 2023-06-03 NOTE — PROGRESS NOTES
[facility] - Med Surg  Discharge Final Note    Primary Care Provider: Sajan Curtis MD    Expected Discharge Date: 6/3/2023    Final Discharge Note (most recent)       Final Note - 06/02/23 1401          Final Note    Assessment Type Discharge Planning Assessment        Post-Acute Status    Coverage Medicare A/B,BCBS                     Important Message from Medicare             Contact Info       [facility]    [address] [place] [address]   Phone: [phone]       Next Steps: Follow up    Sajan Curtis MD   Specialty: Internal Medicine    [address]  [address]  [place] [address]   Phone: [phone]       Next Steps: Follow up on 6/19/2023    Instructions: OUT PATIENT  SERVICES/PCP- Please report to the clinic at 2:30 to meet with the MD Abran Becker MD   Specialty: Cardiology, Interventional Cardiology    [address]  [address]  [facility] [address]   Phone: [phone]       Next Steps: Follow up on 6/21/2023    Instructions: OUT PATIENT  SERVICES/Cardiology- please report to the clinic at 10:40 am to meet with the MD          Patient has discharge transportation home with her family !

## 2023-06-04 PROCEDURE — G0180 PR HOME HEALTH MD CERTIFICATION: ICD-10-PCS | Mod: ,,, | Performed by: HOSPITALIST

## 2023-06-04 PROCEDURE — G0180 MD CERTIFICATION HHA PATIENT: HCPCS | Mod: ,,, | Performed by: HOSPITALIST

## 2023-06-05 ENCOUNTER — PATIENT OUTREACH (OUTPATIENT)
Dept: ADMINISTRATIVE | Facility: CLINIC | Age: 83
End: 2023-06-05
Payer: MEDICARE

## 2023-06-05 NOTE — PROGRESS NOTES
C3 nurse spoke with Claudia Sierra for a TCC post hospital discharge follow up call. The patient has a scheduled HOSFU appointment with Patricia Saul on 06/12/23 @ 0800.

## 2023-06-05 NOTE — TELEPHONE ENCOUNTER
Fiber laxative by mouth once a day  B complex vitamin by mouth once a day  Vitamin C 1000mg by mouth once a day  Vitamin D3 100 I. U. By mouth once a day.

## 2023-06-12 ENCOUNTER — OFFICE VISIT (OUTPATIENT)
Dept: HOME HEALTH SERVICES | Facility: CLINIC | Age: 83
End: 2023-06-12
Payer: MEDICARE

## 2023-06-12 VITALS
DIASTOLIC BLOOD PRESSURE: 60 MMHG | OXYGEN SATURATION: 97 % | TEMPERATURE: 98 F | SYSTOLIC BLOOD PRESSURE: 110 MMHG | HEART RATE: 56 BPM

## 2023-06-12 DIAGNOSIS — I48.91 ATRIAL FIBRILLATION WITH RVR: Chronic | ICD-10-CM

## 2023-06-12 PROCEDURE — 99350 HOME/RES VST EST HIGH MDM 60: CPT | Mod: S$GLB,,, | Performed by: NURSE PRACTITIONER

## 2023-06-12 PROCEDURE — 99350 PR HOME VISIT,ESTAB PATIENT,LEVEL IV: ICD-10-PCS | Mod: S$GLB,,, | Performed by: NURSE PRACTITIONER

## 2023-06-12 NOTE — PROGRESS NOTES
[facility]  Transition of Care Home Visit    Visit Date: 6/12/2023  Encounter Provider: Patricia Saul   PCP:  Sajan Curtis MD    PRESENTING HISTORY      Patient ID: Claudia Sierra is a 82 y.o. female.    Consult Requested By:  No ref. provider found  Reason for Consult:  Hospital Follow Up.    Claudia is being seen at home due to being seen at home due to physical debility that presents a taxing effort to leave the home, to mitigate high risk of hospital readmission and/or due to the limited availability of reliable or safe options for transportation to the point of access to the provider. Prior to treatment on this visit the chart was reviewed and patient verbal consent was obtained.      Chief Complaint: Transitional Care        History of Present Illness: Ms. Claudia Sierra is a 82 y.o. female who was recently admitted to the hospital.      Hospital Course: 6/1/23 thru 6/3/23  Atrial fibrillation with RVR  Patient with Paroxysmal (<7 days) atrial fibrillation which is uncontrolled currently with Beta Blocker. Patient is currently in atrial fibrillation.[id number] Score: 3. HASBLED Score: 2+. Anticoagulation indicated. Anticoagulation done with not started, since patient refuses at this time.     Consult Cardiology   Dc on metoprolol and diltiazem. Started rivaroxiban at [place]  Outpatient f/u with cardiology, referral placed  RVR resolved    ___________________________________________________________________    Today:    HPI:  Pt is being seen in her home today for hospital follow up. See hospital course.    Ms Sierra is found in her home AAOx3. She is pleasant and agreeable to this visit. She reports she is not feeling well since her discharge. She is always feeling dizzy, has to rest frequently and has little energy. She believes this is due to her Cardizem which is taking three times a day. She is having to stay up late to take the last dose. In reviewing her log, she is  bradycardic in low 50s she is also hypotensive. Reports she feels dizzy when standing. Reviewed water intake and to increase water.    Instructed to hold Cardizem rest of the day, take B/p and HR in am and call provider for further instructions         Review of Systems   Constitutional:  Positive for fatigue. Negative for activity change and fever.   HENT: Negative.     Eyes: Negative.    Respiratory:  Negative for chest tightness.    Cardiovascular: Negative.  Negative for leg swelling.   Gastrointestinal: Negative.    Endocrine: Negative.    Genitourinary: Negative.    Musculoskeletal: Negative.    Skin: Negative.    Allergic/Immunologic: Negative.    Neurological:  Positive for dizziness and weakness.   Hematological: Negative.    Psychiatric/Behavioral: Negative.  Negative for agitation.    All other systems reviewed and are negative.    Assessments:  Environmental: lives alone in single family home, open paths, adequate light and temp  Functional Status: independent  Safety: fall risk  Nutritional: adequate  Home Health/DME/Supplies: [facility]    PAST HISTORY:     Past Medical History:   Diagnosis Date    *Atrial fibrillation     Breast cancer     Right breast cancer    Hypertension     Slow transit constipation 12/19/2013       Past Surgical History:   Procedure Laterality Date    ADENOIDECTOMY  70 years ago    At same time as tonsillectomy. .    APPENDECTOMY      ARTHROPLASTY OF HIP BY ANTERIOR APPROACH Right 1/31/2022    Procedure: ARTHROPLASTY, HIP, TOTAL, ANTERIOR APPROACH:RIGHT: DEPUY-C-STEM+PINNACLE;  Surgeon: Cisco cMneal III, MD;  Location: [facility] OR;  Service: Orthopedics;  Laterality: Right;    BREAST BIOPSY      right    BREAST LUMPECTOMY Right 2013    EPIDURAL STEROID INJECTION INTO CERVICAL SPINE N/A 2/12/2020    Procedure: Injection-steroid-epidural-cervical--C7-T1 IL ELMIRA;  Surgeon: Alicia Proctor MD;  Location: [facility];  Service: Pain Management;  Laterality: N/A;  sign consent at DOS     EYE SURGERY  2018    HYSTERECTOMY      lymphnode removal      12 from Right axilla    PORTACATH PLACEMENT  2013    TONSILLECTOMY         Family History   Problem Relation Age of Onset    Cervical cancer Sister 79    Heart disease Mother     Hypertension Mother     Heart disease Father        Social History     Socioeconomic History    Marital status:     Number of children: 1   Occupational History     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   Tobacco Use    Smoking status: Never    Smokeless tobacco: Never   Substance and Sexual Activity    Alcohol use: Yes     Alcohol/week: 2.0 standard drinks     Types: 2 Glasses of wine per week     Comment: every 2 weeks    Drug use: No    Sexual activity: Not Currently     Partners: Male     Birth control/protection: None   Social History Narrative    Sister will help at night    No stairs in her home       MEDICATIONS & ALLERGIES:     Current Outpatient Medications on File Prior to Visit   Medication Sig Dispense Refill    acetaminophen (TYLENOL) 650 MG TbSR Take 1 tablet (650 mg total) by mouth every 6 to 8 hours as needed (pain). 120 tablet 0    diltiaZEM (CARDIZEM) 30 MG tablet Take 1 tablet (30 mg total) by mouth every 8 (eight) hours. 90 tablet 11    metoprolol succinate (TOPROL-XL) 100 MG 24 hr tablet Take 1 tablet (100 mg total) by mouth once daily. 90 tablet 3    pravastatin (PRAVACHOL) 40 MG tablet Take 1 tablet (40 mg total) by mouth once daily. 90 tablet 3    rivaroxaban (XARELTO) 15 mg Tab Take 1 tablet (15 mg total) by mouth before dinner. 30 tablet 11    azelastine (ASTELIN) 137 mcg (0.1 %) nasal spray 2 sprays (274 mcg total) by Nasal route 2 (two) times daily. (Patient not taking: Reported on 6/5/2023) 90 mL 2    beta-carotene,A,-vits C,E/mins (OCUVITE ORAL) Take 1 tablet by mouth every evening.      docusate sodium (COLACE) 100 MG capsule Take 1 capsule (100 mg total) by mouth 2 (two) times daily as needed for Constipation. 60 capsule 0     gabapentin (NEURONTIN) 600 MG tablet TAKE 1 TABLET (600 MG TOTAL) BY MOUTH NIGHTLY AS NEEDED (PAIN). 90 tablet 2    lactulose (CHRONULAC) 10 gram/15 mL solution TAKE 45 MLS (30 G TOTAL) BY MOUTH 3 (THREE) TIMES DAILY AS NEEDED (CONSTIPATION). 473 mL 4    loratadine (CLARITIN) 10 mg tablet Take 10 mg by mouth once daily.       No current facility-administered medications on file prior to visit.        Review of patient's allergies indicates:   Allergen Reactions    Codeine Other (See Comments)     Feel like (climbing the walls)    Hydrocodone Other (See Comments)     Restless and anxious similar to codeine. Tolerated oxycodone without issue.    Benadryl [diphenhydramine hcl] Anxiety       OBJECTIVE:     Vital Signs:  Vitals:    06/12/23 0946   BP: 110/60   Pulse: (!) 56   Temp: 97.8 °F (36.6 °C)     There is no height or weight on file to calculate BMI.       Physical Exam:  Physical Exam  Vitals reviewed.   Constitutional:       General: She is not in acute distress.     Appearance: She is well-developed.   HENT:      Head: Normocephalic and atraumatic.      Nose: Nose normal.      Mouth/Throat:      Mouth: Mucous membranes are dry.   Eyes:      Pupils: Pupils are equal, round, and reactive to light.   Cardiovascular:      Rate and Rhythm: Regular rhythm. Bradycardia present.      Heart sounds: Normal heart sounds.   Pulmonary:      Effort: Pulmonary effort is normal.      Breath sounds: Normal breath sounds.   Abdominal:      General: Bowel sounds are normal.      Palpations: Abdomen is soft.   Musculoskeletal:         General: Normal range of motion.      Cervical back: Normal range of motion and neck supple.   Skin:     General: Skin is warm and dry.   Neurological:      Mental Status: She is alert and oriented to person, place, and time.      Cranial Nerves: No cranial nerve deficit.   Psychiatric:         Behavior: Behavior normal.         Thought Content: Thought content normal.         Judgment: Judgment  normal.       Laboratory  Lab Results   Component Value Date    WBC 6.63 06/03/2023    HGB 11.7 (L) 06/03/2023    HCT 36.5 (L) 06/03/2023    MCV 94 06/03/2023     06/03/2023     Lab Results   Component Value Date    INR 0.9 08/19/2021    INR 1.0 07/13/2013    INR 1.0 05/22/2013     No results found for: HGBA1C  No results for input(s): POCTGLUCOSE in the last 72 hours.    Diagnostic Results:      TRANSITION OF CARE:     [facility] Contact Note: 6/5/23    Family and/or Caretaker present at visit?  No.  Diagnostic tests reviewed/disposition: No diagnosic tests pending after this hospitalization.  Disease/illness education:   Home health/community services discussion/referrals: Patient has home health established at [place] .   Establishment or re-establishment of referral orders for community resources: No other necessary community resources.   Discussion with other health care providers:  Cardiology messaged .     Transition of Care Visit:  I have reviewed and updated the history and problem list.  I have reconciled the medication list.  I have discussed the hospitalization and current medical issues, prognosis and plans with the patient/family.  I  spent more than 50% of time discussing the care with the patient/family.  Total Face-to-Face Encounter: 60 minutes.    Medications Reconciliation:   I have reconciled the patient's home medications and discharge medications with the patient/family. I have updated all changes.  Refer to After-Visit Medication List.    ASSESSMENT & PLAN:     HIGH RISK CONDITION(S):      Problem List Items Addressed This Visit          Cardiac/Vascular    Atrial fibrillation with RVR (Chronic)    Current Assessment & Plan     Recent hospitalization for RVR  Today dizzy, log shows hypotension and bradycardia since d/c  Continue Metoprolol  Hold dialtiazem  Instructed to log b/p and HR.   Pt will call office in am to report vitals.  Fall precautions reviewed             Were  controlled substances prescribed?  No    Instructions for the patient:  - Continue all medications, treatments and therapies as ordered.   - Follow all instructions, recommendations as discussed.  - Maintain Safety Precautions at all times.  - Attend all medical appointments as scheduled.  - For worsening symptoms: call Primary Care Physician or Nurse Practitioner.  - For emergencies, call 911 or immediately report to the nearest emergency room.  - Limit Risks of environmental exposure to coronavirus/COVID-19 as discussed including: social distancing, hand hygiene, and limiting departures from the home for necessities only.     Questions elicited and answered.  Contact information provided for any changes in condition or concerns    Scheduled Follow-up :  Future Appointments   Date Time Provider Department Center   6/19/2023  2:30 PM Sajan Curtis MD [facility] PRICRE [place]   6/21/2023 10:40 AM Abran Becker MD [facility] CARDIO [facility]   1/30/2024  8:30 AM [facility] MAMMO1 [facility] MAMMO [facility]   1/30/2024  9:40 AM Mingo Wynne MD [facility] HEM ONC [facility]       After Visit Medication List :     Medication List            Accurate as of June 12, 2023  1:52 PM. If you have any questions, ask your nurse or doctor.                CONTINUE taking these medications      azelastine 137 mcg (0.1 %) nasal spray  Commonly known as: ASTELIN  2 sprays (274 mcg total) by Nasal route 2 (two) times daily.     diltiaZEM 30 MG tablet  Commonly known as: CARDIZEM  Take 1 tablet (30 mg total) by mouth every 8 (eight) hours.     docusate sodium 100 MG capsule  Commonly known as: COLACE  Take 1 capsule (100 mg total) by mouth 2 (two) times daily as needed for Constipation.     gabapentin 600 MG tablet  Commonly known as: NEURONTIN  TAKE 1 TABLET (600 MG TOTAL) BY MOUTH NIGHTLY AS NEEDED (PAIN).     lactulose 10 gram/15 mL solution  Commonly known as: CHRONULAC  TAKE 45 MLS (30 G TOTAL) BY MOUTH 3 (THREE) TIMES DAILY AS  NEEDED (CONSTIPATION).     loratadine 10 mg tablet  Commonly known as: CLARITIN     MAPAP ARTHRITIS PAIN 650 MG Tbsr  Generic drug: acetaminophen  Take 1 tablet (650 mg total) by mouth every 6 to 8 hours as needed (pain).     metoprolol succinate 100 MG 24 hr tablet  Commonly known as: TOPROL-XL  Take 1 tablet (100 mg total) by mouth once daily.     OCUVITE ORAL     pravastatin 40 MG tablet  Commonly known as: PRAVACHOL  Take 1 tablet (40 mg total) by mouth once daily.     rivaroxaban 15 mg Tab  Commonly known as: XARELTO  Take 1 tablet (15 mg total) by mouth before dinner.              Signature: Patricia Saul NP

## 2023-06-12 NOTE — ASSESSMENT & PLAN NOTE
Recent hospitalization for RVR  Today dizzy, log shows hypotension and bradycardia since d/c  Continue Metoprolol  Hold dialtiazem  Instructed to log b/p and HR.   Pt will call office in am to report vitals.  Fall precautions reviewed

## 2023-06-19 ENCOUNTER — OFFICE VISIT (OUTPATIENT)
Dept: PRIMARY CARE CLINIC | Facility: CLINIC | Age: 83
End: 2023-06-19
Payer: MEDICARE

## 2023-06-19 VITALS
BODY MASS INDEX: 27.69 KG/M2 | WEIGHT: 137.38 LBS | DIASTOLIC BLOOD PRESSURE: 80 MMHG | HEART RATE: 61 BPM | OXYGEN SATURATION: 100 % | HEIGHT: 59 IN | SYSTOLIC BLOOD PRESSURE: 130 MMHG

## 2023-06-19 DIAGNOSIS — I48.91 ATRIAL FIBRILLATION WITH RVR: Chronic | ICD-10-CM

## 2023-06-19 PROCEDURE — 99999 PR PBB SHADOW E&M-EST. PATIENT-LVL III: CPT | Mod: PBBFAC,,, | Performed by: INTERNAL MEDICINE

## 2023-06-19 PROCEDURE — 99214 PR OFFICE/OUTPT VISIT, EST, LEVL IV, 30-39 MIN: ICD-10-PCS | Mod: S$PBB,,, | Performed by: INTERNAL MEDICINE

## 2023-06-19 PROCEDURE — 99214 OFFICE O/P EST MOD 30 MIN: CPT | Mod: S$PBB,,, | Performed by: INTERNAL MEDICINE

## 2023-06-19 PROCEDURE — 99213 OFFICE O/P EST LOW 20 MIN: CPT | Mod: PBBFAC | Performed by: INTERNAL MEDICINE

## 2023-06-19 PROCEDURE — 99999 PR PBB SHADOW E&M-EST. PATIENT-LVL III: ICD-10-PCS | Mod: PBBFAC,,, | Performed by: INTERNAL MEDICINE

## 2023-06-19 RX ORDER — DILTIAZEM HYDROCHLORIDE 30 MG/1
30 TABLET, FILM COATED ORAL DAILY
COMMUNITY
End: 2023-06-21

## 2023-06-19 NOTE — PROGRESS NOTES
[facility] Note    Chief Complaint      Chief Complaint   Patient presents with    Follow-up     Check up and hospital f/u       History of Present Illness      Claudia Sierra is a 82 y.o. female who presents today for   Chief Complaint   Patient presents with    Follow-up     Check up and hospital f/u   .  Patient comes to appointment here for hops f/u for afib with rvr   Transitional Care Note    Family and/or Caretaker present at visit?  No.  Diagnostic tests reviewed/disposition: No diagnosic tests pending after this hospitalization.  Disease/illness education: yes   Home health/community services discussion/referrals: Patient has home health established at [place]  .   Establishment or re-establishment of referral orders for community resources: No other necessary community resources.   Discussion with other health care providers: No discussion with other health care providers necessary.   Admits date 6/1/23  D/c date 6/3/23   I have reviewed discharge summary . Had consult with Dr harris while admitted . Now on xarelto. Will be seeing him on Wednesday . Is now on cardizem 30 mg as well .   .    Problem List Items Addressed This Visit          Cardiac/Vascular    Atrial fibrillation with RVR (Chronic)    Overview     Now in sinus rhythm . Will be seeing dr harris wed is on xarelto now .              Past Medical History:  Past Medical History:   Diagnosis Date    *Atrial fibrillation     Breast cancer     Right breast cancer    Hypertension     Slow transit constipation 12/19/2013       Past Surgical History:  Past Surgical History:   Procedure Laterality Date    ADENOIDECTOMY  70 years ago    At same time as tonsillectomy. .    APPENDECTOMY      ARTHROPLASTY OF HIP BY ANTERIOR APPROACH Right 01/31/2022    Procedure: ARTHROPLASTY, HIP, TOTAL, ANTERIOR APPROACH:RIGHT: DEPUY-C-STEM+PINNACLE;  Surgeon: Cisco Mcneal III, MD;  Location: [facility];  Service: Orthopedics;  Laterality:  Right;    BREAST BIOPSY      right    BREAST LUMPECTOMY Right 2013    BREAST SURGERY  2013    EPIDURAL STEROID INJECTION INTO CERVICAL SPINE N/A 02/12/2020    Procedure: Injection-steroid-epidural-cervical--C7-T1 IL ELMIRA;  Surgeon: Alicia Proctor MD;  Location: [facility];  Service: Pain Management;  Laterality: N/A;  sign consent at [facility]    EYE SURGERY  2018    HYSTERECTOMY      JOINT REPLACEMENT  Hip, 2022    lymphnode removal      12 from Right axilla    PORTACATH PLACEMENT  2013    TONSILLECTOMY         Family History:  family history includes Cancer in her father and sister; Cervical cancer (age of onset: 79) in her sister; Diabetes in her mother and son; Heart disease in her father, mother, and son; Hypertension in her mother and son.    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   HENT:  Negative for congestion, hearing loss and sore throat.    Eyes:  Negative for blurred vision.   Respiratory:  Negative for cough and shortness of breath.    Cardiovascular:  Negative for chest pain, palpitations, claudication and leg swelling.   Gastrointestinal:  Negative for abdominal pain, constipation, diarrhea and heartburn.   Genitourinary:  Negative for dysuria.   Musculoskeletal:  Negative for back pain and myalgias.   Skin:  Negative for rash.   Neurological:  Negative for focal  weakness and headaches.   Psychiatric/Behavioral:  Negative for depression and suicidal ideas. The patient is not nervous/anxious.        Medications:  Outpatient Encounter Medications as of 2023   Medication Sig Note Dispense Refill    acetaminophen (TYLENOL) 650 MG TbSR Take 1 tablet (650 mg total) by mouth every 6 to 8 hours as needed (pain).  120 tablet 0    beta-carotene,A,-vits C,E/mins (OCUVITE ORAL) Take 1 tablet by mouth every evening.       diltiaZEM (CARDIZEM) 30 MG tablet Take 30 mg by mouth once daily.       lactulose (CHRONULAC) 10 gram/15 mL solution TAKE 45 MLS (30 G TOTAL) BY MOUTH 3 (THREE) TIMES DAILY AS NEEDED (CONSTIPATION).  473 mL 4    loratadine (CLARITIN) 10 mg tablet Take 10 mg by mouth once daily. 2023: PRN allergies      metoprolol succinate (TOPROL-XL) 100 MG 24 hr tablet Take 1 tablet (100 mg total) by mouth once daily.  90 tablet 3    pravastatin (PRAVACHOL) 40 MG tablet Take 1 tablet (40 mg total) by mouth once daily.  90 tablet 3    rivaroxaban (XARELTO) 15 mg Tab Take 1 tablet (15 mg total) by mouth before dinner.  30 tablet 11    [DISCONTINUED] diltiaZEM (CARDIZEM) 30 MG tablet Take 1 tablet (30 mg total) by mouth every 8 (eight) hours. (Patient taking differently: Take 30 mg by mouth once daily.)  90 tablet 11    [] ciprofloxacin HCl (CIPRO) 500 MG tablet Take 1 tablet (500 mg total) by mouth 2 (two) times daily. for 3 days  6 tablet 0    gabapentin (NEURONTIN) 600 MG tablet TAKE 1 TABLET (600 MG TOTAL) BY MOUTH NIGHTLY AS NEEDED (PAIN).  90 tablet 2    [DISCONTINUED] aspirin (ECOTRIN) 81 MG EC tablet Take 1 tablet (81 mg total) by mouth 2 (two) times daily. (Patient taking differently: Take 81 mg by mouth once daily.)  60 tablet 0    [DISCONTINUED] azelastine (ASTELIN) 137 mcg (0.1 %) nasal spray 2 sprays (274 mcg total) by Nasal route 2 (two) times daily.  90 mL 2    [DISCONTINUED] docusate sodium (COLACE) 100 MG capsule Take 1 capsule (100 mg total) by mouth 2  (two) times daily as needed for Constipation.  60 capsule 0    [DISCONTINUED] polycarbophil (FIBERCON) 625 mg tablet Take 625 mg by mouth every evening. PT TAKES 3 TABLETS DAILY 8/19/2021: Take as sched PM      [DISCONTINUED] acetaminophen tablet 650 mg        [DISCONTINUED] acetaminophen tablet 650 mg        [DISCONTINUED] cefTRIAXone (ROCEPHIN) 1 g in dextrose 5 % in water (D5W) 5 % 50 mL IVPB (MB+)        [DISCONTINUED] cetirizine tablet 10 mg        [DISCONTINUED] dextrose 10% bolus 125 mL 125 mL        [DISCONTINUED] dextrose 10% bolus 250 mL 250 mL        [DISCONTINUED] dextrose 40 % gel 15,000 mg        [DISCONTINUED] dextrose 40 % gel 30,000 mg        [DISCONTINUED] diltiaZEM tablet 30 mg        [DISCONTINUED] glucagon (human recombinant) injection 1 mg        [DISCONTINUED] lactulose 20 gram/30 mL solution Soln 30 g        [DISCONTINUED] melatonin tablet 6 mg        [DISCONTINUED] metoprolol succinate (TOPROL-XL) 24 hr tablet 100 mg        [DISCONTINUED] naloxone 0.4 mg/mL injection 0.02 mg        [DISCONTINUED] ondansetron injection 4 mg        [DISCONTINUED] polyethylene glycol packet 17 g        [DISCONTINUED] pravastatin tablet 40 mg        [DISCONTINUED] rivaroxaban tablet 15 mg        [DISCONTINUED] simethicone chewable tablet 80 mg        [DISCONTINUED] sodium chloride 0.9% flush 10 mL         No facility-administered encounter medications on file as of 6/19/2023.        Allergies:  Review of patient's allergies indicates:   Allergen Reactions    Codeine Other (See Comments)     Feel like (climbing the walls)    Hydrocodone Other (See Comments)     Restless and anxious similar to codeine. Tolerated oxycodone without issue.    Benadryl [diphenhydramine hcl] Anxiety         Physical Exam         Vitals:    06/19/23 1409   BP: 130/80   Pulse: 61         Physical Exam  Constitutional:       Appearance: She is well-developed.   Eyes:      Pupils: Pupils are equal, round, and reactive to light.   Neck:       Thyroid: No thyromegaly.   Cardiovascular:      Rate and Rhythm: Normal rate. Rhythm irregular.      Heart sounds: Normal heart sounds. No murmur heard.    No friction rub. No gallop.   Pulmonary:      Breath sounds: Normal breath sounds.   Abdominal:      General: Bowel sounds are normal.      Palpations: Abdomen is soft.   Musculoskeletal:         General: Normal range of motion.      Cervical back: Normal range of motion.   Lymphadenopathy:      Cervical: No cervical adenopathy.   Skin:     General: Skin is warm.      Findings: No rash.   Neurological:      Mental Status: She is alert and oriented to person, place, and time.      Cranial Nerves: No cranial nerve deficit.   Psychiatric:         Behavior: Behavior normal.        Laboratory:  CBC:  Recent Labs   Lab Result Units 06/01/23 1857 06/02/23 0438 06/03/23  0523   WBC K/uL 7.65 7.41 6.63   RBC M/uL 3.82* 3.71* 3.90*   Hemoglobin g/dL 11.9* 11.3* 11.7*   Hematocrit % 35.3* 34.9* 36.5*   Platelets K/uL 278 251 259   MCV fL 92 94 94   MCH pg 31.2* 30.5 30.0   MCHC g/dL 33.7 32.4 32.1     CMP:  Recent Labs   Lab Result Units 06/01/23 1857 06/02/23 0438 06/03/23  0523   Glucose mg/dL 104   < > 95   Calcium mg/dL 9.0   < > 9.0   Albumin g/dL 3.9  --   --    Total Protein g/dL 7.1  --   --    Sodium mmol/L 138   < > 139   Potassium mmol/L 4.1   < > 3.9   CO2 mmol/L 22*   < > 25   Chloride mmol/L 103   < > 107   BUN mg/dL 14   < > 12   Alkaline Phosphatase U/L 55  --   --    ALT U/L 22  --   --    AST U/L 30  --   --    Total Bilirubin mg/dL 0.3  --   --     < > = values in this interval not displayed.     URINALYSIS:  Recent Labs   Lab Result Units 06/01/23  1923   Color, UA  Yellow   Specific Gravity, UA  1.010   pH, UA  5.0   Protein, UA  Negative   Bacteria /hpf Rare   Nitrite, UA  Negative   Leukocytes, UA  3+*   Urobilinogen, UA EU/dL Negative   Hyaline Casts, UA /lpf 5*      LIPIDS:  Recent Labs   Lab Result Units 06/01/23  1857   TSH uIU/mL 3.713      TSH:  Recent Labs   Lab Result Units 06/01/23  1857   TSH uIU/mL 3.713     A1C:  No results for input(s): HGBA1C in the last 2160 hours.    Radiology:        Assessment:     Claudia Sierra is a 82 y.o.female with:    Atrial fibrillation with RVR          Plan:     Problem List Items Addressed This Visit          Cardiac/Vascular    Atrial fibrillation with RVR (Chronic)    Overview     Now in sinus rhythm . Will be seeing dr harris wed is on xarelto now .            As above, continue current medications and maintain follow up with specialists.  Return to clinic in 6 months.      Frederick W Dantagnan [facility]

## 2023-06-19 NOTE — PROGRESS NOTES
Subjective:   @Patient ID:  Claudia Sierra is a 82 y.o. female who presents for evaluation of PAF      HPI:   6/21/2023: Here for initial evaluation.  Admitted 6/2/2023 with a.fib with RVR. IT was reported that she converted to SR with BB. Started on Xarelto. I couldn't find EKG with SR post the a.fib . Repeat EKG today is a.fib rate controlled. She gets  low BP readings. She was discharged on  Cardizem and Toprol. Cardizem dose reduced to 30 mg night due to hypotension and bradycardia. No CVA or TIA           Prior cardiovascular  Hx  --------------------------------           - ECHO 6/2/2023  The left ventricle is normal in size with concentric remodeling and normal systolic function.  Normal right ventricular size with normal right ventricular systolic function.  The estimated ejection fraction is 55%.  Severe left atrial enlargement.  Indeterminate left ventricular diastolic function.  The estimated PA systolic pressure is 37 mmHg.  Normal central venous pressure (3 mmHg).  Mild tricuspid regurgitation.  Mild pulmonic regurgitation.  There is mild-to-moderate aortic valve stenosis.  Aortic valve area is 1.02 cm2; peak velocity is 1.64 m/s; mean gradient is 6 mmHg.    - EKG 6/21/2023 A.fib. Rate controlled          Patient Active Problem List    Diagnosis Date Noted    Nonrheumatic aortic valve stenosis 06/21/2023    Allergies 06/02/2023    Generalized weakness 06/02/2023    Physical deconditioning 03/31/2023    Impaired mobility and activities of daily living 03/31/2023    Muscle weakness (generalized) 03/31/2023    Primary osteoarthritis of right hip 01/31/2022    Preop exam for internal medicine 12/28/2021     Has good functional status   ekg reviewed patient is cleared for procedure with low risk cardiac complications      History of breast cancer 12/28/2021    Vitamin D deficiency 12/21/2021    At risk for osteoporosis 12/21/2021    Normocytic anemia 08/19/2021    Right hip pain 08/16/2021     Will be  getting right hip replacement with Dr condon .  preop assessment done today       Chronic pain of right knee 2021     Needs referral to orhto for eval and treat       Chronic pain 2020    Cervical radiculopathy 2020     Stable       Post herpetic neuralgia 2020     Is still using gabapentin as she is still having some itching sensation        Disorder of cartilage, unspecified  2019    Constipation 2019     Lactulose prn is workign well       HLD (hyperlipidemia) 2014     Stable on current regimen of pravachol .       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 Will be seeing dr harris wed is on xarelto now .      GERD (gastroesophageal reflux disease) 2013     Stable       Essential hypertension 07/15/2013     bp well controlled on current regimen   ekg looks good               Left Arm BP - Sittin/66        LAST HbA1c  No results found for: HGBA1C    Lipid panel  Lab Results   Component Value Date    CHOL 194 10/25/2018    CHOL 162 2018    CHOL 178 2017     Lab Results   Component Value Date    HDL 74 10/25/2018    HDL 65 2018    HDL 69 2017     Lab Results   Component Value Date    LDLCALC 102.6 10/25/2018    LDLCALC 83.6 2018    LDLCALC 86.8 2017     Lab Results   Component Value Date    TRIG 87 10/25/2018    TRIG 67 2018    TRIG 111 2017     Lab Results   Component Value Date    CHOLHDL 38.1 10/25/2018    CHOLHDL 40.1 2018    CHOLHDL 38.8 2017            Review of Systems   Constitutional: Negative for chills and fever.   HENT:  Negative for hearing loss and nosebleeds.    Eyes:  Negative for blurred vision.   Cardiovascular:  Negative for chest pain, leg swelling and palpitations.   Respiratory:   Negative for hemoptysis and shortness of breath.    Hematologic/Lymphatic: Negative for bleeding problem.   Skin:  Negative for itching.   Musculoskeletal:  Negative for falls.   Gastrointestinal:  Negative for abdominal pain and hematochezia.   Genitourinary:  Negative for hematuria.   Neurological:  Negative for dizziness and loss of balance.   Psychiatric/Behavioral:  Negative for altered mental status and depression.      Objective:   Physical Exam  Constitutional:       Appearance: She is well-developed.   HENT:      Head: Normocephalic and atraumatic.   Eyes:      Conjunctiva/sclera: Conjunctivae normal.   Neck:      Vascular: No carotid bruit or JVD.   Cardiovascular:      Rate and Rhythm: Normal rate. Rhythm irregular.      Pulses:           Carotid pulses are 2+ on the right side and 2+ on the left side.       Radial pulses are 2+ on the right side and 2+ on the left side.      Heart sounds: Normal heart sounds. No murmur heard.    No friction rub. No gallop.   Pulmonary:      Effort: Pulmonary effort is normal. No respiratory distress.      Breath sounds: Normal breath sounds. No stridor. No wheezing.   Musculoskeletal:      Cervical back: Neck supple.   Skin:     General: Skin is warm and dry.   Neurological:      Mental Status: She is alert and oriented to person, place, and time.   Psychiatric:         Behavior: Behavior normal.       Assessment:     1. Longstanding persistent atrial fibrillation    2. Atrial fibrillation with rapid ventricular response    3. Essential hypertension    4. Mixed hyperlipidemia    5. Nonrheumatic aortic valve stenosis        Plan:   - Current rate controlled a.fib. She is asymptomatic  - Will continue rate control strategy   - Will d/c low dose Cardizem given hypotension episodes  - Continue Xarelto 20 mg daily. Normal renal function. GFR >51  - Benefits and risks discussed. Bleeding risks discussed.   - Monitor for the AS. Repeat Echo after 1 year    Pertinent cardiac  images and EKG reviewed independently.    Continue with current medical plan and lifestyle changes.  Return sooner for concerns or questions. If symptoms persist go to the ED  I have reviewed all pertinent data including patient's medical history in detail and updated the computerized patient record.     No orders of the defined types were placed in this encounter.      Follow up as scheduled.     She expressed verbal understanding and agreed with the plan    Patient's Medications   New Prescriptions    No medications on file   Previous Medications    ACETAMINOPHEN (TYLENOL) 650 MG TBSR    Take 1 tablet (650 mg total) by mouth every 6 to 8 hours as needed (pain).    ASCORBIC ACID, VITAMIN C, (VITAMIN C) 1000 MG TABLET    Take 1,000 mg by mouth once daily.    B COMPLEX VITAMINS TABLET    Take 1 tablet by mouth once daily.    BETA-CAROTENE,A,-VITS C,E/MINS (OCUVITE ORAL)    Take 1 tablet by mouth every evening.    GABAPENTIN (NEURONTIN) 600 MG TABLET    TAKE 1 TABLET (600 MG TOTAL) BY MOUTH NIGHTLY AS NEEDED (PAIN).    LACTULOSE (CHRONULAC) 10 GRAM/15 ML SOLUTION    TAKE 45 MLS (30 G TOTAL) BY MOUTH 3 (THREE) TIMES DAILY AS NEEDED (CONSTIPATION).    LORATADINE (CLARITIN) 10 MG TABLET    Take 10 mg by mouth once daily.    METOPROLOL SUCCINATE (TOPROL-XL) 100 MG 24 HR TABLET    Take 1 tablet (100 mg total) by mouth once daily.    POLYCARBOPHIL (FIBERCON) 625 MG TABLET    Take 625 mg by mouth once daily.    PRAVASTATIN (PRAVACHOL) 40 MG TABLET    Take 1 tablet (40 mg total) by mouth once daily.    VITAMIN D (VITAMIN D3) 1000 UNITS TAB    Take 1,000 Units by mouth once daily.   Modified Medications    Modified Medication Previous Medication    RIVAROXABAN (XARELTO) 20 MG TAB rivaroxaban (XARELTO) 15 mg Tab       Take 1 tablet (20 mg total) by mouth before dinner.    Take 1 tablet (15 mg total) by mouth before dinner.   Discontinued Medications    DILTIAZEM (CARDIZEM) 30 MG TABLET    Take 30 mg by mouth once daily.

## 2023-06-21 ENCOUNTER — TELEPHONE (OUTPATIENT)
Dept: PRIMARY CARE CLINIC | Facility: CLINIC | Age: 83
End: 2023-06-21
Payer: MEDICARE

## 2023-06-21 ENCOUNTER — OFFICE VISIT (OUTPATIENT)
Dept: CARDIOLOGY | Facility: CLINIC | Age: 83
End: 2023-06-21
Payer: MEDICARE

## 2023-06-21 ENCOUNTER — EXTERNAL HOME HEALTH (OUTPATIENT)
Dept: HOME HEALTH SERVICES | Facility: HOSPITAL | Age: 83
End: 2023-06-21
Payer: MEDICARE

## 2023-06-21 VITALS
BODY MASS INDEX: 27.12 KG/M2 | HEIGHT: 59 IN | DIASTOLIC BLOOD PRESSURE: 66 MMHG | OXYGEN SATURATION: 96 % | HEART RATE: 68 BPM | WEIGHT: 134.5 LBS | SYSTOLIC BLOOD PRESSURE: 117 MMHG

## 2023-06-21 DIAGNOSIS — I48.91 ATRIAL FIBRILLATION WITH RAPID VENTRICULAR RESPONSE: ICD-10-CM

## 2023-06-21 DIAGNOSIS — R82.998 DARK URINE: Primary | ICD-10-CM

## 2023-06-21 DIAGNOSIS — I48.11 LONGSTANDING PERSISTENT ATRIAL FIBRILLATION: Primary | Chronic | ICD-10-CM

## 2023-06-21 DIAGNOSIS — I48.91 ATRIAL FIBRILLATION, UNSPECIFIED TYPE: ICD-10-CM

## 2023-06-21 DIAGNOSIS — I10 ESSENTIAL HYPERTENSION: ICD-10-CM

## 2023-06-21 DIAGNOSIS — E78.2 MIXED HYPERLIPIDEMIA: ICD-10-CM

## 2023-06-21 DIAGNOSIS — I35.0 NONRHEUMATIC AORTIC VALVE STENOSIS: Chronic | ICD-10-CM

## 2023-06-21 PROCEDURE — 99204 OFFICE O/P NEW MOD 45 MIN: CPT | Mod: S$PBB,,, | Performed by: INTERNAL MEDICINE

## 2023-06-21 PROCEDURE — 99214 OFFICE O/P EST MOD 30 MIN: CPT | Mod: PBBFAC,PO | Performed by: INTERNAL MEDICINE

## 2023-06-21 PROCEDURE — 93010 ELECTROCARDIOGRAM REPORT: CPT | Mod: S$PBB,,, | Performed by: INTERNAL MEDICINE

## 2023-06-21 PROCEDURE — 99999 PR PBB SHADOW E&M-EST. PATIENT-LVL IV: CPT | Mod: PBBFAC,,, | Performed by: INTERNAL MEDICINE

## 2023-06-21 PROCEDURE — 99204 PR OFFICE/OUTPT VISIT, NEW, LEVL IV, 45-59 MIN: ICD-10-PCS | Mod: S$PBB,,, | Performed by: INTERNAL MEDICINE

## 2023-06-21 PROCEDURE — 93010 EKG 12-LEAD: ICD-10-PCS | Mod: S$PBB,,, | Performed by: INTERNAL MEDICINE

## 2023-06-21 PROCEDURE — 99999 PR PBB SHADOW E&M-EST. PATIENT-LVL IV: ICD-10-PCS | Mod: PBBFAC,,, | Performed by: INTERNAL MEDICINE

## 2023-06-21 PROCEDURE — 93005 ELECTROCARDIOGRAM TRACING: CPT | Mod: PBBFAC,PO | Performed by: INTERNAL MEDICINE

## 2023-06-21 RX ORDER — CHOLECALCIFEROL (VITAMIN D3) 25 MCG
1000 TABLET ORAL DAILY
COMMUNITY

## 2023-06-21 RX ORDER — MULTIVIT WITH MINERALS/HERBS
1 TABLET ORAL DAILY
COMMUNITY

## 2023-06-21 RX ORDER — IBUPROFEN 100 MG/5ML
1000 SUSPENSION, ORAL (FINAL DOSE FORM) ORAL DAILY
COMMUNITY

## 2023-06-21 NOTE — TELEPHONE ENCOUNTER
----- Message from Abrahan Nunez sent at 6/21/2023  3:28 PM CDT -----  [phone]  She wants an order for them to collect urine per the cardiologist her urine is dark. Fax it to [facility] [phone].    Thank you

## 2023-06-23 DIAGNOSIS — N39.0 URINARY TRACT INFECTION WITHOUT HEMATURIA, SITE UNSPECIFIED: Primary | ICD-10-CM

## 2023-06-23 DIAGNOSIS — I48.91 ATRIAL FIBRILLATION, UNSPECIFIED TYPE: Primary | ICD-10-CM

## 2023-06-23 RX ORDER — CIPROFLOXACIN 500 MG/1
500 TABLET ORAL 2 TIMES DAILY
Qty: 6 TABLET | Refills: 0 | Status: SHIPPED | OUTPATIENT
Start: 2023-06-23 | End: 2024-01-30

## 2023-06-30 ENCOUNTER — EXTERNAL CHRONIC CARE MANAGEMENT (OUTPATIENT)
Dept: PRIMARY CARE CLINIC | Facility: CLINIC | Age: 83
End: 2023-06-30
Payer: MEDICARE

## 2023-06-30 PROCEDURE — 99439 CHRNC CARE MGMT STAF EA ADDL: CPT | Mod: S$PBB,,, | Performed by: INTERNAL MEDICINE

## 2023-06-30 PROCEDURE — 99439 CHRNC CARE MGMT STAF EA ADDL: CPT | Mod: PBBFAC,27 | Performed by: INTERNAL MEDICINE

## 2023-06-30 PROCEDURE — 99490 CHRNC CARE MGMT STAFF 1ST 20: CPT | Mod: PBBFAC | Performed by: INTERNAL MEDICINE

## 2023-06-30 PROCEDURE — 99490 PR CHRONIC CARE MGMT, 1ST 20 MIN: ICD-10-PCS | Mod: S$PBB,,, | Performed by: INTERNAL MEDICINE

## 2023-06-30 PROCEDURE — 99490 CHRNC CARE MGMT STAFF 1ST 20: CPT | Mod: S$PBB,,, | Performed by: INTERNAL MEDICINE

## 2023-06-30 PROCEDURE — 99439 PR CHRONIC CARE MGMT, EA ADDTL 20 MIN: ICD-10-PCS | Mod: S$PBB,,, | Performed by: INTERNAL MEDICINE

## 2023-07-11 ENCOUNTER — DOCUMENT SCAN (OUTPATIENT)
Dept: HOME HEALTH SERVICES | Facility: HOSPITAL | Age: 83
End: 2023-07-11
Payer: MEDICARE

## 2023-07-13 ENCOUNTER — DOCUMENT SCAN (OUTPATIENT)
Dept: HOME HEALTH SERVICES | Facility: HOSPITAL | Age: 83
End: 2023-07-13
Payer: MEDICARE

## 2023-07-13 ENCOUNTER — TELEPHONE (OUTPATIENT)
Dept: FAMILY MEDICINE | Facility: CLINIC | Age: 83
End: 2023-07-13
Payer: MEDICARE

## 2023-07-13 NOTE — TELEPHONE ENCOUNTER
Patient requested a call back in October to be scheduled for an EAWV appointment.  Patient verbally understands.

## 2023-07-19 DIAGNOSIS — B02.29 POST HERPETIC NEURALGIA: ICD-10-CM

## 2023-07-19 RX ORDER — GABAPENTIN 600 MG/1
600 TABLET ORAL NIGHTLY PRN
Qty: 90 TABLET | Refills: 2 | Status: SHIPPED | OUTPATIENT
Start: 2023-07-19 | End: 2024-04-01

## 2023-07-24 ENCOUNTER — TELEPHONE (OUTPATIENT)
Dept: PRIMARY CARE CLINIC | Facility: CLINIC | Age: 83
End: 2023-07-24
Payer: MEDICARE

## 2023-07-24 DIAGNOSIS — K59.01 SLOW TRANSIT CONSTIPATION: ICD-10-CM

## 2023-07-24 RX ORDER — LACTULOSE 10 G/15ML
30 SOLUTION ORAL; RECTAL 3 TIMES DAILY PRN
Qty: 473 ML | Refills: 4 | Status: SHIPPED | OUTPATIENT
Start: 2023-07-24

## 2023-07-24 NOTE — TELEPHONE ENCOUNTER
Jennifer Tanruben [facility] Staff  Phone Number: [phone]     MRN: [id number]     Pt: Claudia Sierra     Phone: [phone] (cell)               [phone] (home)     During a monthly health call today, patient stated that her blood pressure has been normal in the mornings and elevated at night. Patient takes metoprolol succinate (TOPROL-XL) 50 MG 24 hr tablet at night. Slight headaches in the mornings that resolves without the use of medication and patient stated that she had one episode of lightheadedness and fatigue while out.     Most recent Blood pressures & HR results     Mornings 117/65 HR 63 & Night 168/72 HR 59   Mornings 139/75 HR 66 & Night 164/84 HR 82   Mornings 100/62 HR 76 & Night 168/87 HR 71   Mornings 110/63 HR 96 & Night 168/87 HR 71     Thank you,   Jennifer DIAZ, [facility] Care Coordinator   [phone] ext. [phone]     I may be contacted per Epic messages or at the number listed if any questions/ concerns.

## 2023-07-25 ENCOUNTER — PATIENT MESSAGE (OUTPATIENT)
Dept: PRIMARY CARE CLINIC | Facility: CLINIC | Age: 83
End: 2023-07-25
Payer: MEDICARE

## 2023-07-31 ENCOUNTER — EXTERNAL CHRONIC CARE MANAGEMENT (OUTPATIENT)
Dept: PRIMARY CARE CLINIC | Facility: CLINIC | Age: 83
End: 2023-07-31
Payer: MEDICARE

## 2023-07-31 PROCEDURE — 99487 CPLX CHRNC CARE 1ST 60 MIN: CPT | Mod: PBBFAC,25 | Performed by: INTERNAL MEDICINE

## 2023-07-31 PROCEDURE — 99487 PR COMPLX CHRON CARE MGMT, 1ST HR, PER MONTH: ICD-10-PCS | Mod: S$PBB,,, | Performed by: INTERNAL MEDICINE

## 2023-07-31 PROCEDURE — 99489 CPLX CHRNC CARE EA ADDL 30: CPT | Mod: S$PBB,,, | Performed by: INTERNAL MEDICINE

## 2023-07-31 PROCEDURE — 99489 PR COMPLX CHRON CARE MGMT, EA ADDTL 30 MIN, PER MONTH: ICD-10-PCS | Mod: S$PBB,,, | Performed by: INTERNAL MEDICINE

## 2023-07-31 PROCEDURE — 99487 CPLX CHRNC CARE 1ST 60 MIN: CPT | Mod: S$PBB,,, | Performed by: INTERNAL MEDICINE

## 2023-07-31 PROCEDURE — 99489 CPLX CHRNC CARE EA ADDL 30: CPT | Mod: PBBFAC,27 | Performed by: INTERNAL MEDICINE

## 2023-08-31 ENCOUNTER — EXTERNAL CHRONIC CARE MANAGEMENT (OUTPATIENT)
Dept: PRIMARY CARE CLINIC | Facility: CLINIC | Age: 83
End: 2023-08-31
Payer: MEDICARE

## 2023-08-31 PROCEDURE — 99490 CHRNC CARE MGMT STAFF 1ST 20: CPT | Mod: S$PBB,,, | Performed by: INTERNAL MEDICINE

## 2023-08-31 PROCEDURE — 99490 CHRNC CARE MGMT STAFF 1ST 20: CPT | Mod: PBBFAC | Performed by: INTERNAL MEDICINE

## 2023-08-31 PROCEDURE — 99490 PR CHRONIC CARE MGMT, 1ST 20 MIN: ICD-10-PCS | Mod: S$PBB,,, | Performed by: INTERNAL MEDICINE

## 2023-09-30 ENCOUNTER — EXTERNAL CHRONIC CARE MANAGEMENT (OUTPATIENT)
Dept: PRIMARY CARE CLINIC | Facility: CLINIC | Age: 83
End: 2023-09-30
Payer: MEDICARE

## 2023-09-30 PROCEDURE — 99490 CHRNC CARE MGMT STAFF 1ST 20: CPT | Mod: S$PBB,,, | Performed by: INTERNAL MEDICINE

## 2023-09-30 PROCEDURE — 99490 CHRNC CARE MGMT STAFF 1ST 20: CPT | Mod: PBBFAC | Performed by: INTERNAL MEDICINE

## 2023-09-30 PROCEDURE — 99490 PR CHRONIC CARE MGMT, 1ST 20 MIN: ICD-10-PCS | Mod: S$PBB,,, | Performed by: INTERNAL MEDICINE

## 2023-10-03 ENCOUNTER — TELEPHONE (OUTPATIENT)
Dept: FAMILY MEDICINE | Facility: CLINIC | Age: 83
End: 2023-10-03
Payer: MEDICARE

## 2023-10-03 NOTE — TELEPHONE ENCOUNTER
Left a voicemail message to inform the Patient about the EAWV appointment and to schedule.  Requested the Patient to call the office back to be schedule.  Sent a detailed message thru [facility] as well.

## 2023-10-03 NOTE — TELEPHONE ENCOUNTER
----- Message from Serge Lopez MA sent at 7/13/2023  1:48 PM CDT -----  Regarding: Patient requested a call back in October for EAWV  Spoke with the Patient and she requested a call back in October to be scheduled for an EAWV appointment.

## 2023-10-31 ENCOUNTER — EXTERNAL CHRONIC CARE MANAGEMENT (OUTPATIENT)
Dept: PRIMARY CARE CLINIC | Facility: CLINIC | Age: 83
End: 2023-10-31
Payer: MEDICARE

## 2023-10-31 PROCEDURE — 99439 PR CHRONIC CARE MGMT, EA ADDTL 20 MIN: ICD-10-PCS | Mod: S$PBB,,, | Performed by: INTERNAL MEDICINE

## 2023-10-31 PROCEDURE — 99439 CHRNC CARE MGMT STAF EA ADDL: CPT | Mod: PBBFAC,27 | Performed by: INTERNAL MEDICINE

## 2023-10-31 PROCEDURE — 99490 PR CHRONIC CARE MGMT, 1ST 20 MIN: ICD-10-PCS | Mod: S$PBB,,, | Performed by: INTERNAL MEDICINE

## 2023-10-31 PROCEDURE — 99490 CHRNC CARE MGMT STAFF 1ST 20: CPT | Mod: PBBFAC | Performed by: INTERNAL MEDICINE

## 2023-10-31 PROCEDURE — 99490 CHRNC CARE MGMT STAFF 1ST 20: CPT | Mod: S$PBB,,, | Performed by: INTERNAL MEDICINE

## 2023-10-31 PROCEDURE — 99439 CHRNC CARE MGMT STAF EA ADDL: CPT | Mod: S$PBB,,, | Performed by: INTERNAL MEDICINE

## 2023-11-09 ENCOUNTER — PATIENT MESSAGE (OUTPATIENT)
Dept: CARDIOLOGY | Facility: CLINIC | Age: 83
End: 2023-11-09
Payer: MEDICARE

## 2023-11-30 ENCOUNTER — EXTERNAL CHRONIC CARE MANAGEMENT (OUTPATIENT)
Dept: PRIMARY CARE CLINIC | Facility: CLINIC | Age: 83
End: 2023-11-30
Payer: MEDICARE

## 2023-11-30 PROCEDURE — 99490 CHRNC CARE MGMT STAFF 1ST 20: CPT | Mod: S$PBB,,, | Performed by: INTERNAL MEDICINE

## 2023-11-30 PROCEDURE — 99490 PR CHRONIC CARE MGMT, 1ST 20 MIN: ICD-10-PCS | Mod: S$PBB,,, | Performed by: INTERNAL MEDICINE

## 2023-11-30 PROCEDURE — 99490 CHRNC CARE MGMT STAFF 1ST 20: CPT | Mod: PBBFAC | Performed by: INTERNAL MEDICINE

## 2023-11-30 PROCEDURE — 99439 PR CHRONIC CARE MGMT, EA ADDTL 20 MIN: ICD-10-PCS | Mod: S$PBB,,, | Performed by: INTERNAL MEDICINE

## 2023-11-30 PROCEDURE — 99439 CHRNC CARE MGMT STAF EA ADDL: CPT | Mod: S$PBB,,, | Performed by: INTERNAL MEDICINE

## 2023-11-30 PROCEDURE — 99439 CHRNC CARE MGMT STAF EA ADDL: CPT | Mod: PBBFAC | Performed by: INTERNAL MEDICINE

## 2023-12-05 ENCOUNTER — OFFICE VISIT (OUTPATIENT)
Dept: CARDIOLOGY | Facility: CLINIC | Age: 83
End: 2023-12-05
Payer: MEDICARE

## 2023-12-05 VITALS
HEIGHT: 59 IN | HEART RATE: 72 BPM | BODY MASS INDEX: 26.4 KG/M2 | SYSTOLIC BLOOD PRESSURE: 165 MMHG | DIASTOLIC BLOOD PRESSURE: 92 MMHG | WEIGHT: 130.94 LBS

## 2023-12-05 DIAGNOSIS — I35.0 NONRHEUMATIC AORTIC VALVE STENOSIS: Chronic | ICD-10-CM

## 2023-12-05 DIAGNOSIS — I48.11 LONGSTANDING PERSISTENT ATRIAL FIBRILLATION: Chronic | ICD-10-CM

## 2023-12-05 DIAGNOSIS — I10 ESSENTIAL HYPERTENSION: Primary | ICD-10-CM

## 2023-12-05 DIAGNOSIS — E78.2 MIXED HYPERLIPIDEMIA: ICD-10-CM

## 2023-12-05 PROCEDURE — 99214 OFFICE O/P EST MOD 30 MIN: CPT | Mod: S$PBB,,, | Performed by: INTERNAL MEDICINE

## 2023-12-05 PROCEDURE — 99214 PR OFFICE/OUTPT VISIT, EST, LEVL IV, 30-39 MIN: ICD-10-PCS | Mod: S$PBB,,, | Performed by: INTERNAL MEDICINE

## 2023-12-05 PROCEDURE — 99213 OFFICE O/P EST LOW 20 MIN: CPT | Mod: PBBFAC,PN | Performed by: INTERNAL MEDICINE

## 2023-12-05 PROCEDURE — 99999 PR PBB SHADOW E&M-EST. PATIENT-LVL III: CPT | Mod: PBBFAC,,, | Performed by: INTERNAL MEDICINE

## 2023-12-05 PROCEDURE — 99999 PR PBB SHADOW E&M-EST. PATIENT-LVL III: ICD-10-PCS | Mod: PBBFAC,,, | Performed by: INTERNAL MEDICINE

## 2023-12-05 NOTE — PROGRESS NOTES
Subjective:   @Patient ID:  Claudia Sierra is a 83 y.o. female who presents for evaluation of PAF      HPI:   12/2023: F/U. Last visit Cardizem stopped and metoprolol continued. She is doing well. No issues since last visit. No chest pain, shortness of breath, or palpitations. She is compliant with meds. BP at home is better controlled. Today BP is elevated, but she did walk a lot and didn't take the Toprol yet. Tolerating Xarelto         6/21/2023: Here for initial evaluation.  Admitted 6/2/2023 with a.fib with RVR. IT was reported that she converted to SR with BB. Started on Xarelto. I couldn't find EKG with SR post the a.fib . Repeat EKG today is a.fib rate controlled. She gets  low BP readings. She was discharged on  Cardizem and Toprol. Cardizem dose reduced to 30 mg night due to hypotension and bradycardia. No CVA or TIA           Prior cardiovascular  Hx  --------------------------------           - ECHO 6/2/2023  The left ventricle is normal in size with concentric remodeling and normal systolic function.  Normal right ventricular size with normal right ventricular systolic function.  The estimated ejection fraction is 55%.  Severe left atrial enlargement.  Indeterminate left ventricular diastolic function.  The estimated PA systolic pressure is 37 mmHg.  Normal central venous pressure (3 mmHg).  Mild tricuspid regurgitation.  Mild pulmonic regurgitation.  There is mild-to-moderate aortic valve stenosis.  Aortic valve area is 1.02 cm2; peak velocity is 1.64 m/s; mean gradient is 6 mmHg.    - EKG 6/21/2023 A.fib. Rate controlled          Patient Active Problem List    Diagnosis Date Noted    Nonrheumatic aortic valve stenosis 06/21/2023    Allergies 06/02/2023    Generalized weakness 06/02/2023    Physical deconditioning 03/31/2023    Impaired mobility and activities of daily living 03/31/2023    Muscle weakness (generalized) 03/31/2023    Primary osteoarthritis of right hip 01/31/2022    Preop exam for  "internal medicine 12/28/2021     Has good functional status   ekg reviewed patient is cleared for procedure with low risk cardiac complications      History of breast cancer 12/28/2021    Vitamin D deficiency 12/21/2021    At risk for osteoporosis 12/21/2021    Normocytic anemia 08/19/2021    Right hip pain 08/16/2021     Will be getting right hip replacement with Dr condon .  preop assessment done today       Chronic pain of right knee 03/11/2021     Needs referral to orhto for eval and treat       Chronic pain 02/12/2020    Cervical radiculopathy 02/12/2020     Stable       Post herpetic neuralgia 01/14/2020     Is still using gabapentin as she is still having some itching sensation        Disorder of cartilage, unspecified  11/08/2019    Constipation 09/12/2019     Lactulose prn is workign well       HLD (hyperlipidemia) 08/14/2014     Stable on current regimen of pravachol .       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 Will be seeing dr harris wed is on xarelto now .      GERD (gastroesophageal reflux disease) 08/29/2013     Stable       Essential hypertension 07/15/2013     bp well controlled on current regimen   ekg looks good 8/21                       LAST HbA1c  No results found for: "HGBA1C"    Lipid panel  Lab Results   Component Value Date    CHOL 194 10/25/2018    CHOL 162 01/11/2018    CHOL 178 05/30/2017     Lab Results   Component Value Date    HDL 74 10/25/2018    HDL 65 01/11/2018    HDL 69 05/30/2017     Lab Results   Component Value Date    LDLCALC 102.6 10/25/2018    LDLCALC 83.6 01/11/2018    LDLCALC 86.8 05/30/2017     Lab Results   Component Value Date    TRIG 87 10/25/2018    TRIG 67 01/11/2018    TRIG 111 05/30/2017     Lab Results   Component Value Date    CHOLHDL 38.1 10/25/2018    " CHOLHDL 40.1 01/11/2018    CHOLHDL 38.8 05/30/2017            Review of Systems   Constitutional: Negative for chills and fever.   HENT:  Negative for hearing loss and nosebleeds.    Eyes:  Negative for blurred vision.   Cardiovascular:  Negative for chest pain, leg swelling and palpitations.   Respiratory:  Negative for hemoptysis and shortness of breath.    Hematologic/Lymphatic: Negative for bleeding problem.   Skin:  Negative for itching.   Musculoskeletal:  Negative for falls.   Gastrointestinal:  Negative for abdominal pain and hematochezia.   Genitourinary:  Negative for hematuria.   Neurological:  Negative for dizziness and loss of balance.   Psychiatric/Behavioral:  Negative for altered mental status and depression.        Objective:   Physical Exam  Constitutional:       Appearance: She is well-developed.   HENT:      Head: Normocephalic and atraumatic.   Eyes:      Conjunctiva/sclera: Conjunctivae normal.   Neck:      Vascular: No carotid bruit or JVD.   Cardiovascular:      Rate and Rhythm: Normal rate. Rhythm irregular.      Pulses:           Carotid pulses are 2+ on the right side and 2+ on the left side.       Radial pulses are 2+ on the right side and 2+ on the left side.      Heart sounds: Normal heart sounds. No murmur heard.     No friction rub. No gallop.   Pulmonary:      Effort: Pulmonary effort is normal. No respiratory distress.      Breath sounds: Normal breath sounds. No stridor. No wheezing.   Musculoskeletal:      Cervical back: Neck supple.   Skin:     General: Skin is warm and dry.   Neurological:      Mental Status: She is alert and oriented to person, place, and time.   Psychiatric:         Behavior: Behavior normal.         Assessment:     1. Essential hypertension    2. Mixed hyperlipidemia    3. Longstanding persistent atrial fibrillation    4. Nonrheumatic aortic valve stenosis        Plan:   - Current rate controlled a.fib. She is asymptomatic  - Will continue rate control  strategy   - Enroll in digital htn   - Continue Xarelto 20 mg daily.   - Benefits and risks discussed. Bleeding risks discussed.   - Monitor for the AS. Repeat Echo after 1 year    Pertinent cardiac images and EKG reviewed independently.    Continue with current medical plan and lifestyle changes.  Return sooner for concerns or questions. If symptoms persist go to the ED  I have reviewed all pertinent data including patient's medical history in detail and updated the computerized patient record.     No orders of the defined types were placed in this encounter.      Follow up as scheduled.     She expressed verbal understanding and agreed with the plan    Patient's Medications   New Prescriptions    No medications on file   Previous Medications    ACETAMINOPHEN (TYLENOL) 650 MG TBSR    Take 1 tablet (650 mg total) by mouth every 6 to 8 hours as needed (pain).    ASCORBIC ACID, VITAMIN C, (VITAMIN C) 1000 MG TABLET    Take 1,000 mg by mouth once daily.    B COMPLEX VITAMINS TABLET    Take 1 tablet by mouth once daily.    BETA-CAROTENE,A,-VITS C,E/MINS (OCUVITE ORAL)    Take 1 tablet by mouth every evening.    CIPROFLOXACIN HCL (CIPRO) 500 MG TABLET    Take 1 tablet (500 mg total) by mouth 2 (two) times daily.    GABAPENTIN (NEURONTIN) 600 MG TABLET    TAKE 1 TABLET (600 MG TOTAL) BY MOUTH NIGHTLY AS NEEDED (PAIN).    LACTULOSE (CHRONULAC) 10 GRAM/15 ML SOLUTION    Take 45 mLs (30 g total) by mouth 3 (three) times daily as needed (constipation).    LORATADINE (CLARITIN) 10 MG TABLET    Take 10 mg by mouth once daily.    METOPROLOL SUCCINATE (TOPROL-XL) 100 MG 24 HR TABLET    Take 1 tablet (100 mg total) by mouth once daily.    POLYCARBOPHIL (FIBERCON) 625 MG TABLET    Take 625 mg by mouth once daily.    PRAVASTATIN (PRAVACHOL) 40 MG TABLET    Take 1 tablet (40 mg total) by mouth once daily.    RIVAROXABAN (XARELTO) 20 MG TAB    Take 1 tablet (20 mg total) by mouth before dinner.    VITAMIN D (VITAMIN D3) 1000 UNITS TAB     Take 1,000 Units by mouth once daily.   Modified Medications    No medications on file   Discontinued Medications    No medications on file

## 2023-12-31 ENCOUNTER — EXTERNAL CHRONIC CARE MANAGEMENT (OUTPATIENT)
Dept: PRIMARY CARE CLINIC | Facility: CLINIC | Age: 83
End: 2023-12-31
Payer: MEDICARE

## 2023-12-31 PROCEDURE — 99439 CHRNC CARE MGMT STAF EA ADDL: CPT | Mod: PBBFAC | Performed by: INTERNAL MEDICINE

## 2023-12-31 PROCEDURE — 99490 CHRNC CARE MGMT STAFF 1ST 20: CPT | Mod: PBBFAC,25 | Performed by: INTERNAL MEDICINE

## 2023-12-31 PROCEDURE — 99439 CHRNC CARE MGMT STAF EA ADDL: CPT | Mod: S$PBB,,, | Performed by: INTERNAL MEDICINE

## 2023-12-31 PROCEDURE — 99490 CHRNC CARE MGMT STAFF 1ST 20: CPT | Mod: S$PBB,,, | Performed by: INTERNAL MEDICINE

## 2024-01-11 DIAGNOSIS — Z00.00 ENCOUNTER FOR MEDICARE ANNUAL WELLNESS EXAM: ICD-10-CM

## 2024-01-27 NOTE — PROGRESS NOTES
Subjective:       Patient ID: Claudia Sierra is a 83 y.o. female.    Chief Complaint: history of breast cancer    HPI      Ms. 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    Relevant oncologic history: She felt a mass in the right armpit in March 2013.  Mammogram showed a 5 mm right breast mass with 2 enlarged right axillary lymph nodes.  Biopsy of right breast mass showed fibroadenoma.  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  HER-2/agustín was negative.  Breast MRI did not show an actual primary.  A PET scan done in April showed hypermetabolic axillary and subpectoral nodes. She received 4 cycles of neoadjuvant Adriamycin/Cytoxan chemotherapy followed by one cycle of docetaxel chemotherapy.  She then underwent a right axillary lymph node dissection on 10/8/13. 12 lymph nodes were removed. None were involved with malignancy. She had a pathological complete response.  She then completed 9 weekly doses of paclitaxel from November 2013 to January 2014 in an adjuvant fashion. Following this she underwent 28 radiation treatments to the right breast starting 2/10/14 completing 3/20/14. She then developed lymphedema of the right breast. Nothing in the right arm. She was evaluated in the lymphedema clinic and underwent some physical therapy.    Took Arimidex July 2015 - Sept 2020    Neuropathy stable, she is on neurontin 300 mg QHS.   She wants to stay on this dose and take it at bedtime.  Taking it at the daytime makes her groggy.    Her chest port was removed in June 2015.    Underwent Right Hip MICHAEL Jan 31, 2022    Interval history:  - she presents for a follow-up appointment for her history of breast cancer. She had previously seen Dr. Mascorro.  - she  underwent mammogram today.  - she had been doing well with physical therapy due to having atrial fibrillation.  - today, she is doing well. She notes mild dyspnea upon exertion. She denies chest pain, nausea, vomiting, diarrhea, constipation.    Review of Systems   Constitutional:  Negative for fatigue.   HENT:  Negative for sore throat.    Eyes:  Negative for visual disturbance.   Respiratory:  Positive for shortness of breath. Negative for cough.    Cardiovascular:  Negative for chest pain.   Gastrointestinal:  Negative for abdominal pain, constipation, diarrhea, nausea and vomiting.   Genitourinary:  Negative for dysuria.   Musculoskeletal:  Negative for back pain.   Skin:  Negative for rash.   Neurological:  Negative for headaches.   Hematological:  Negative for adenopathy.   Psychiatric/Behavioral:  The patient is not nervous/anxious.            Objective:     Vitals:    01/30/24 0922   BP: (!) 144/71   BP Location: Left arm   Patient Position: Sitting   BP Method: Medium (Automatic)   Pulse: 74   Resp: 16   SpO2: 96%   Weight: 59.9 kg (132 lb 0.9 oz)         BMI: Body mass index is 26.67 kg/m².    ECOG 0    Physical Exam  Vitals and nursing note reviewed.   Constitutional:       General: She is not in acute distress.     Appearance: She is well-developed.   HENT:      Head: Normocephalic and atraumatic.   Eyes:      Pupils: Pupils are equal, round, and reactive to light.   Cardiovascular:      Rate and Rhythm: Normal rate and regular rhythm.   Pulmonary:      Effort: Pulmonary effort is normal.      Breath sounds: Normal breath sounds.   Abdominal:      General: Bowel sounds are normal.      Palpations: Abdomen is soft.   Musculoskeletal:         General: Normal range of motion.      Cervical back: Normal range of motion and neck supple.   Skin:     General: Skin is warm and dry.   Neurological:      Mental Status: She is alert and oriented to person, place, and time. Mental status is at baseline.    Psychiatric:         Behavior: Behavior normal.         Thought Content: Thought content normal.         Judgment: Judgment normal.       Labs have been reviewed.    Lab Results   Component Value Date    WBC 6.63 06/03/2023    HGB 11.7 (L) 06/03/2023    HCT 36.5 (L) 06/03/2023    MCV 94 06/03/2023     06/03/2023       Imaging:  Mammogram (1/30/24): official radiology report is pending      Mammogram (1/26/23):   Impression:   No mammographic evidence of malignancy.     BI-RADS Category 1: Negative     Recommendation:  Routine screening mammogram in 1 year, or as clinically indicated.    Assessment:     1. History of breast cancer    2. Encounter for screening mammogram for malignant neoplasm of breast    3. Chemotherapy-induced peripheral neuropathy    4. History of chemotherapy    5. History of radiation therapy    6. Longstanding persistent atrial fibrillation    7. Chronic anticoagulation       Plan:     History of cancer of the right breast  - she had previously seen Dr. Mascorro.  - diagnosed in 2013  - she received 4 cycles of neoadjuvant Adriamycin/Cytoxan chemotherapy followed by one cycle of docetaxel chemotherapy.  - She then underwent a right axillary lymph node dissection on 10/8/13. 12 lymph nodes were removed. None were involved with malignancy. She had a pathological complete response.    - She then completed 9 weekly doses of paclitaxel from November 2013 to January 2014 in an adjuvant fashion.   - Following this she underwent 28 radiation treatments to the right breast starting 2/10/14 completing 3/20/14.  - She then developed lymphedema of the right breast. Nothing in the right arm. She was evaluated in the lymphedema clinic and underwent some physical therapy.  - she took Arimidex July 2015 - Sept 2020  - mammogram (1/26/23) was negative/normal  - she underwent mammogram today. Follow up official radiology report. Assuming negative/normal result, we will have her return to clinic in one year  with repeat mammogram.     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  - she now is on xarelto  - defer management to cardiology      - she underwent mammogram today. Follow up official radiology report. Assuming negative/normal result, we will have her return to clinic in one year with repeat mammogram.     Mingo Wynne M.D.  Hematology/Oncology  [facility] - [address], [address]  [place] [address]  Phone: [phone]  Fax: [phone]

## 2024-01-30 ENCOUNTER — HOSPITAL ENCOUNTER (OUTPATIENT)
Dept: RADIOLOGY | Facility: HOSPITAL | Age: 84
Discharge: HOME | End: 2024-01-30
Attending: INTERNAL MEDICINE
Payer: MEDICARE

## 2024-01-30 ENCOUNTER — OFFICE VISIT (OUTPATIENT)
Dept: HEMATOLOGY/ONCOLOGY | Facility: CLINIC | Age: 84
End: 2024-01-30
Payer: MEDICARE

## 2024-01-30 VITALS
SYSTOLIC BLOOD PRESSURE: 144 MMHG | BODY MASS INDEX: 26.67 KG/M2 | HEART RATE: 74 BPM | OXYGEN SATURATION: 96 % | RESPIRATION RATE: 16 BRPM | DIASTOLIC BLOOD PRESSURE: 71 MMHG | WEIGHT: 132.06 LBS

## 2024-01-30 DIAGNOSIS — I48.11 LONGSTANDING PERSISTENT ATRIAL FIBRILLATION: Chronic | ICD-10-CM

## 2024-01-30 DIAGNOSIS — Z92.3 HISTORY OF RADIATION THERAPY: ICD-10-CM

## 2024-01-30 DIAGNOSIS — Z12.31 ENCOUNTER FOR SCREENING MAMMOGRAM FOR MALIGNANT NEOPLASM OF BREAST: ICD-10-CM

## 2024-01-30 DIAGNOSIS — Z85.3 HISTORY OF BREAST CANCER: Primary | ICD-10-CM

## 2024-01-30 DIAGNOSIS — Z92.21 HISTORY OF CHEMOTHERAPY: ICD-10-CM

## 2024-01-30 DIAGNOSIS — T45.1X5A CHEMOTHERAPY-INDUCED PERIPHERAL NEUROPATHY: ICD-10-CM

## 2024-01-30 DIAGNOSIS — Z85.3 HISTORY OF BREAST CANCER: ICD-10-CM

## 2024-01-30 DIAGNOSIS — G62.0 CHEMOTHERAPY-INDUCED PERIPHERAL NEUROPATHY: ICD-10-CM

## 2024-01-30 DIAGNOSIS — Z79.01 CHRONIC ANTICOAGULATION: ICD-10-CM

## 2024-01-30 PROCEDURE — 77067 SCR MAMMO BI INCL CAD: CPT | Mod: TC

## 2024-01-30 PROCEDURE — 99214 OFFICE O/P EST MOD 30 MIN: CPT | Mod: S$PBB,,, | Performed by: INTERNAL MEDICINE

## 2024-01-30 PROCEDURE — 77067 SCR MAMMO BI INCL CAD: CPT | Mod: 26,,, | Performed by: RADIOLOGY

## 2024-01-30 PROCEDURE — 99214 OFFICE O/P EST MOD 30 MIN: CPT | Mod: PBBFAC,PO | Performed by: INTERNAL MEDICINE

## 2024-01-30 PROCEDURE — 77063 BREAST TOMOSYNTHESIS BI: CPT | Mod: 26,,, | Performed by: RADIOLOGY

## 2024-01-30 PROCEDURE — 99999 PR PBB SHADOW E&M-EST. PATIENT-LVL IV: CPT | Mod: PBBFAC,,, | Performed by: INTERNAL MEDICINE

## 2024-01-31 ENCOUNTER — EXTERNAL CHRONIC CARE MANAGEMENT (OUTPATIENT)
Dept: PRIMARY CARE CLINIC | Facility: CLINIC | Age: 84
End: 2024-01-31
Payer: MEDICARE

## 2024-01-31 PROCEDURE — 99490 CHRNC CARE MGMT STAFF 1ST 20: CPT | Mod: S$PBB,,, | Performed by: INTERNAL MEDICINE

## 2024-01-31 PROCEDURE — 99490 CHRNC CARE MGMT STAFF 1ST 20: CPT | Mod: PBBFAC | Performed by: INTERNAL MEDICINE

## 2024-02-05 ENCOUNTER — TELEPHONE (OUTPATIENT)
Dept: HEMATOLOGY/ONCOLOGY | Facility: CLINIC | Age: 84
End: 2024-02-05
Payer: MEDICARE

## 2024-02-15 ENCOUNTER — OFFICE VISIT (OUTPATIENT)
Dept: FAMILY MEDICINE | Facility: CLINIC | Age: 84
End: 2024-02-15
Payer: MEDICARE

## 2024-02-15 VITALS
WEIGHT: 134.5 LBS | HEART RATE: 74 BPM | OXYGEN SATURATION: 99 % | HEIGHT: 59 IN | DIASTOLIC BLOOD PRESSURE: 74 MMHG | BODY MASS INDEX: 27.12 KG/M2 | SYSTOLIC BLOOD PRESSURE: 140 MMHG

## 2024-02-15 DIAGNOSIS — Z00.00 ENCOUNTER FOR PREVENTIVE HEALTH EXAMINATION: Primary | ICD-10-CM

## 2024-02-15 DIAGNOSIS — I70.0 AORTIC ATHEROSCLEROSIS: ICD-10-CM

## 2024-02-15 DIAGNOSIS — T45.1X5A CHEMOTHERAPY-INDUCED PERIPHERAL NEUROPATHY: ICD-10-CM

## 2024-02-15 DIAGNOSIS — Z23 NEED FOR PNEUMOCOCCAL 20-VALENT CONJUGATE VACCINATION: ICD-10-CM

## 2024-02-15 DIAGNOSIS — Z00.00 ENCOUNTER FOR MEDICARE ANNUAL WELLNESS EXAM: ICD-10-CM

## 2024-02-15 DIAGNOSIS — I48.11 LONGSTANDING PERSISTENT ATRIAL FIBRILLATION: Chronic | ICD-10-CM

## 2024-02-15 DIAGNOSIS — G62.0 CHEMOTHERAPY-INDUCED PERIPHERAL NEUROPATHY: ICD-10-CM

## 2024-02-15 DIAGNOSIS — Z74.09 OTHER REDUCED MOBILITY: ICD-10-CM

## 2024-02-15 PROBLEM — E78.5 HYPERLIPIDEMIA, UNSPECIFIED: Status: ACTIVE | Noted: 2023-06-02

## 2024-02-15 PROBLEM — Z85.3 PERSONAL HISTORY OF MALIGNANT NEOPLASM OF BREAST: Status: ACTIVE | Noted: 2023-06-04

## 2024-02-15 PROBLEM — I48.20 CHRONIC ATRIAL FIBRILLATION: Status: ACTIVE | Noted: 2023-06-02

## 2024-02-15 PROBLEM — Z79.01 LONG TERM (CURRENT) USE OF ANTICOAGULANTS: Status: ACTIVE | Noted: 2023-06-04

## 2024-02-15 PROCEDURE — 99999PBSHW PNEUMOCOCCAL CONJUGATE VACCINE 20-VALENT: Mod: PBBFAC,,,

## 2024-02-15 PROCEDURE — 99215 OFFICE O/P EST HI 40 MIN: CPT | Mod: PBBFAC,PO,25 | Performed by: NURSE PRACTITIONER

## 2024-02-15 PROCEDURE — 99999 PR PBB SHADOW E&M-EST. PATIENT-LVL V: CPT | Mod: PBBFAC,,, | Performed by: NURSE PRACTITIONER

## 2024-02-15 PROCEDURE — 90677 PCV20 VACCINE IM: CPT | Mod: PBBFAC,PO

## 2024-02-15 PROCEDURE — G0439 PPPS, SUBSEQ VISIT: HCPCS | Mod: ,,, | Performed by: NURSE PRACTITIONER

## 2024-02-15 NOTE — PROGRESS NOTES
"  Claudia Sierra presented for a  Medicare AWV and comprehensive Health Risk Assessment today. The following components were reviewed and updated:    Medical history  Family History  Social history  Allergies and Current Medications  Health Risk Assessment  Health Maintenance  Care Team         ** See Completed Assessments for Annual Wellness Visit within the encounter summary.**         The following assessments were completed:  Living Situation  CAGE  Depression Screening  Timed Get Up and Go  Whisper Test  Cognitive Function Screening - had difficulty with clock drawing test; asked to spell "world" backwards, done correctly  Nutrition Screening  ADL Screening  PAQ Screening        Vitals:    02/15/24 1432   BP: (!) 140/74   BP Location: Left arm   Patient Position: Sitting   Pulse: 74   SpO2: 99%   Weight: 61 kg (134 lb 7.7 oz)   Height: 4' 11" (1.499 m)     Body mass index is 27.16 kg/m².    Physical Exam  Vitals reviewed.   Constitutional:       General: She is not in acute distress.     Appearance: Normal appearance. She is well-developed and well-groomed.   HENT:      Head: Normocephalic.   Eyes:      General:         Right eye: No discharge.         Left eye: No discharge.   Cardiovascular:      Rate and Rhythm: Normal rate.   Pulmonary:      Effort: Pulmonary effort is normal. No respiratory distress.   Abdominal:      General: There is no distension.   Skin:     Coloration: Skin is not pale.   Neurological:      Mental Status: She is alert and oriented to person, place, and time.      Coordination: Coordination normal.   Psychiatric:         Attention and Perception: Attention normal.         Mood and Affect: Mood and affect normal.         Speech: Speech normal.         Behavior: Behavior normal. Behavior is cooperative.         Thought Content: Thought content normal.             Diagnoses and health risks identified today and associated recommendations/orders:    1. Encounter for preventive health " examination  Pain level assessed and reviewed. Not taking any opioids; at low risk for opioid use disorder. Follow up with PCP.    2. Chemotherapy-induced peripheral neuropathy  Hx of breast cancer; stable on medication. Followed by Hem/Onc.    3. Aortic atherosclerosis - seen on CXR 8/28/13  Chronic; stable on medication. Followed by Cardiology.    4. Longstanding persistent atrial fibrillation  Chronic; stable on medication. Followed by Cardiology.    5. Need for pneumococcal 20-valent conjugate vaccination  - (In Office Administered) Pneumococcal Conjugate Vaccine (20 Valent) (IM) (Preferred)    6. Other reduced mobility  Ambulates independently; slightly slowed but stable gait. Follow up with PCP.    7. Encounter for Medicare annual wellness exam  - Ambulatory Referral/Consult to Enhanced Annual Wellness Visit (eAWV)      Provided Claudia with a 5-10 year written screening schedule and personal prevention plan. Recommendations were developed using the USPSTF age appropriate recommendations. Education, counseling, and referrals were provided as needed. After Visit Summary given to patient which includes a list of additional screenings/tests needed.    Follow up if symptoms worsen or fail to improve.    Krissy Benites NP      Advance Care Planning     I offered to discuss advanced care planning, including how to pick a person who would make decisions for you if you were unable to make them for yourself, called a health care power of , and what kind of decisions you might make such as use of life sustaining treatments such as ventilators and tube feeding when faced with a life limiting illness recorded on a living will that they will need to know. (How you want to be cared for as you near the end of your natural life)     X Patient is interested in learning more about how to make advanced directives. I provided them paperwork and offered to discuss this with them.

## 2024-02-16 PROBLEM — I70.0 AORTIC ATHEROSCLEROSIS: Status: ACTIVE | Noted: 2024-02-16

## 2024-02-20 ENCOUNTER — OFFICE VISIT (OUTPATIENT)
Dept: PRIMARY CARE CLINIC | Facility: CLINIC | Age: 84
End: 2024-02-20
Payer: MEDICARE

## 2024-02-20 VITALS
DIASTOLIC BLOOD PRESSURE: 84 MMHG | TEMPERATURE: 98 F | OXYGEN SATURATION: 95 % | SYSTOLIC BLOOD PRESSURE: 120 MMHG | BODY MASS INDEX: 26.48 KG/M2 | WEIGHT: 131.38 LBS | RESPIRATION RATE: 18 BRPM | HEART RATE: 68 BPM | HEIGHT: 59 IN

## 2024-02-20 DIAGNOSIS — I70.0 AORTIC ATHEROSCLEROSIS: Primary | ICD-10-CM

## 2024-02-20 DIAGNOSIS — I10 ESSENTIAL HYPERTENSION: ICD-10-CM

## 2024-02-20 DIAGNOSIS — I48.11 LONGSTANDING PERSISTENT ATRIAL FIBRILLATION: Chronic | ICD-10-CM

## 2024-02-20 DIAGNOSIS — E78.2 MIXED HYPERLIPIDEMIA: ICD-10-CM

## 2024-02-20 DIAGNOSIS — Z85.3 PERSONAL HISTORY OF MALIGNANT NEOPLASM OF BREAST: ICD-10-CM

## 2024-02-20 DIAGNOSIS — K59.00 CONSTIPATION, UNSPECIFIED CONSTIPATION TYPE: ICD-10-CM

## 2024-02-20 DIAGNOSIS — R41.3 SHORT-TERM MEMORY LOSS: ICD-10-CM

## 2024-02-20 DIAGNOSIS — K21.9 GASTROESOPHAGEAL REFLUX DISEASE WITHOUT ESOPHAGITIS: ICD-10-CM

## 2024-02-20 PROCEDURE — 99999 PR PBB SHADOW E&M-EST. PATIENT-LVL IV: CPT | Mod: PBBFAC,,, | Performed by: INTERNAL MEDICINE

## 2024-02-20 PROCEDURE — 99214 OFFICE O/P EST MOD 30 MIN: CPT | Mod: S$PBB,,, | Performed by: INTERNAL MEDICINE

## 2024-02-20 PROCEDURE — 99214 OFFICE O/P EST MOD 30 MIN: CPT | Mod: PBBFAC | Performed by: INTERNAL MEDICINE

## 2024-02-20 NOTE — PROGRESS NOTES
[facility] Note    Chief Complaint      Chief Complaint   Patient presents with    Follow-up     6m       History of Present Illness      Claudia Sierra is a 83 y.o. female who presents today for   Chief Complaint   Patient presents with    Follow-up     6m   .  Patient comes to appointment here for 6 m checkup for chronic issues as below . She has been having some mild short term memory loss . She complains of some neuropathic pain in legs . She is compliant with her xarelto .     Problem List Items Addressed This Visit          Neuro    Short-term memory loss    Overview     Otc folic acid at this time             Cardiac/Vascular    Longstanding persistent atrial fibrillation (Chronic)    Overview     Now in sinus rhythm . Will be seeing dr harris wed is on xarelto now .         Essential hypertension    Overview     bp well controlled on current regimen   ekg looks good 8/21          HLD (hyperlipidemia)    Overview     Stable on current regimen of pravachol .          Aortic atherosclerosis - seen on CXR 8/28/13 - Primary    Overview     Stable             Oncology    Personal history of malignant neoplasm of breast    Overview     Cont current surveillance             GI    GERD (gastroesophageal reflux disease)    Overview     Stable          Constipation    Overview     Lactulose prn is workign well               Past Medical History:  Past Medical History:   Diagnosis Date    *Atrial fibrillation     Breast cancer     Right breast cancer    Hypertension     Slow transit constipation 12/19/2013       Past Surgical History:  Past Surgical History:   Procedure Laterality Date    ADENOIDECTOMY  70 years ago    At same time as tonsillectomy. .    APPENDECTOMY      ARTHROPLASTY OF HIP BY ANTERIOR APPROACH Right 01/31/2022    Procedure: ARTHROPLASTY, HIP, TOTAL, ANTERIOR APPROACH:RIGHT: DEPUY-C-STEM+PINNACLE;  Surgeon: Cisco Mcneal III, MD;  Location: [facility] OR;  Service:  Orthopedics;  Laterality: Right;    BREAST BIOPSY      right    BREAST LUMPECTOMY Right 2013    BREAST SURGERY  2013    EPIDURAL STEROID INJECTION INTO CERVICAL SPINE N/A 02/12/2020    Procedure: Injection-steroid-epidural-cervical--C7-T1 IL ELMIRA;  Surgeon: Alicia Proctor MD;  Location: [facility];  Service: Pain Management;  Laterality: N/A;  sign consent at [facility]    EYE SURGERY  2018    HYSTERECTOMY      JOINT REPLACEMENT  Hip, 2022    lymphnode removal      12 from Right axilla    PORTACATH PLACEMENT  2013    TONSILLECTOMY         Family History:  family history includes Cancer in her father and sister; Cervical cancer (age of onset: 79) in her sister; Diabetes in her mother and son; Heart disease in her father, mother, and son; Hypertension in her mother, sister, and son.    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   Tobacco Use    Smoking status: Never    Smokeless tobacco: Never   Substance and Sexual Activity    Alcohol use: Yes     Alcohol/week: 3.0 standard drinks of alcohol     Types: 3 Glasses of wine per week     Comment: every 2 weeks    Drug use: No    Sexual activity: Not Currently     Partners: Male     Birth control/protection: None   Social History Narrative    Sister will help at night    No stairs in her home     Social Determinants of Health     Financial Resource Strain: Low Risk  (2/8/2024)    Overall Financial Resource Strain (CARDIA)     Difficulty of Paying Living Expenses: Not hard at all   Food Insecurity: No Food Insecurity (2/8/2024)    Hunger Vital Sign     Worried About Running Out of Food in the Last Year: Never true     Ran Out of Food in the Last Year: Never true   Transportation Needs: No Transportation Needs (2/8/2024)    PRAPARE - Transportation     Lack of Transportation (Medical): No     Lack of Transportation (Non-Medical): No    Physical Activity: Unknown (2/8/2024)    Exercise Vital Sign     Days of Exercise per Week: 0 days   Stress: No Stress Concern Present (2/8/2024)    [employer] [place] [employer] - Occupational Stress Questionnaire     Feeling of Stress : Only a little   Social Connections: Unknown (2/8/2024)    Social Connection and Isolation Panel [NHANES]     Frequency of Communication with Friends and Family: More than three times a week     Frequency of Social Gatherings with Friends and Family: More than three times a week     Active Member of Clubs or Organizations: Yes     Attends Club or Organization Meetings: More than 4 times per year     Marital Status:    Housing Stability: Unknown (2/8/2024)    Housing Stability Vital Sign     Unable to Pay for Housing in the Last Year: No     Unstable Housing in the Last Year: No       Review of Systems:   Review of Systems   Constitutional:  Negative for fever and weight loss.   HENT:  Negative for congestion, hearing loss and sore throat.    Eyes:  Negative for blurred vision.   Respiratory:  Negative for cough and shortness of breath.    Cardiovascular:  Negative for chest pain, palpitations, claudication and leg swelling.   Gastrointestinal:  Negative for abdominal pain, constipation, diarrhea and heartburn.   Genitourinary:  Negative for dysuria.   Musculoskeletal:  Negative for back pain and myalgias.   Skin:  Negative for rash.   Neurological:  Negative for focal weakness and headaches.   Psychiatric/Behavioral:  Negative for depression, memory loss and suicidal ideas. The patient is not nervous/anxious.          Medications:  Outpatient Encounter Medications as of 2/20/2024   Medication Sig Note Dispense Refill    acetaminophen (TYLENOL) 650 MG TbSR Take 1 tablet (650 mg total) by mouth every 6 to 8 hours as needed (pain).  120 tablet 0    ascorbic acid, vitamin C, (VITAMIN C) 1000 MG tablet Take 1,000 mg by mouth once daily.       b complex vitamins tablet  Take 1 tablet by mouth once daily.       beta-carotene,A,-vits C,E/mins (OCUVITE ORAL) Take 1 tablet by mouth every evening.       gabapentin (NEURONTIN) 600 MG tablet TAKE 1 TABLET (600 MG TOTAL) BY MOUTH NIGHTLY AS NEEDED (PAIN).  90 tablet 2    lactulose (CHRONULAC) 10 gram/15 mL solution Take 45 mLs (30 g total) by mouth 3 (three) times daily as needed (constipation).  473 mL 4    loratadine (CLARITIN) 10 mg tablet Take 10 mg by mouth once daily. 6/5/2023: PRN allergies      metoprolol succinate (TOPROL-XL) 100 MG 24 hr tablet Take 1 tablet (100 mg total) by mouth once daily.  90 tablet 3    polycarbophil (FIBERCON) 625 mg tablet Take 625 mg by mouth once daily.       pravastatin (PRAVACHOL) 40 MG tablet Take 1 tablet (40 mg total) by mouth once daily.  90 tablet 3    rivaroxaban (XARELTO) 20 mg Tab Take 1 tablet (20 mg total) by mouth before dinner.  90 tablet 3    vitamin D (VITAMIN D3) 1000 units Tab Take 1,000 Units by mouth once daily.       [DISCONTINUED] ciprofloxacin HCl (CIPRO) 500 MG tablet Take 1 tablet (500 mg total) by mouth 2 (two) times daily.  6 tablet 0     No facility-administered encounter medications on file as of 2/20/2024.        Allergies:  Review of patient's allergies indicates:   Allergen Reactions    Codeine Other (See Comments)     Feel like (climbing the walls)    Hydrocodone Other (See Comments)     Restless and anxious similar to codeine. Tolerated oxycodone without issue.    Benadryl [diphenhydramine hcl] Anxiety         Physical Exam         Vitals:    02/20/24 1438   BP: 120/84   Pulse: 68   Resp: 18   Temp: 98 °F (36.7 °C)         Physical Exam  Constitutional:       Appearance: She is well-developed.   Eyes:      Pupils: Pupils are equal, round, and reactive to light.   Neck:      Thyroid: No thyromegaly.   Cardiovascular:      Rate and Rhythm: Normal rate. Rhythm irregularly irregular.      Heart sounds: Normal heart sounds. No murmur heard.     No friction rub. No gallop.  "  Pulmonary:      Breath sounds: Normal breath sounds.   Abdominal:      General: Bowel sounds are normal.      Palpations: Abdomen is soft.   Musculoskeletal:         General: Normal range of motion.      Cervical back: Normal range of motion.   Lymphadenopathy:      Cervical: No cervical adenopathy.   Skin:     General: Skin is warm.      Findings: No rash.   Neurological:      Mental Status: She is alert and oriented to person, place, and time.      Cranial Nerves: No cranial nerve deficit.   Psychiatric:         Behavior: Behavior normal.          Laboratory:  CBC:  No results for input(s): "WBC", "RBC", "HGB", "HCT", "PLT", "MCV", "MCH", "MCHC" in the last 2160 hours.  CMP:  No results for input(s): "GLU", "CALCIUM", "ALBUMIN", "PROT", "NA", "K", "CO2", "CL", "BUN", "ALKPHOS", "ALT", "AST", "BILITOT" in the last 2160 hours.    Invalid input(s): "CREATININ"  URINALYSIS:  No results for input(s): "COLORU", "CLARITYU", "SPECGRAV", "PHUR", "PROTEINUA", "GLUCOSEU", "BILIRUBINCON", "BLOODU", "WBCU", "RBCU", "BACTERIA", "MUCUS", "NITRITE", "LEUKOCYTESUR", "UROBILINOGEN", "HYALINECASTS" in the last 2160 hours.   LIPIDS:  No results for input(s): "TSH", "HDL", "CHOL", "TRIG", "LDLCALC", "CHOLHDL", "NONHDLCHOL", "TOTALCHOLEST" in the last 2160 hours.  TSH:  No results for input(s): "TSH" in the last 2160 hours.  A1C:  No results for input(s): "HGBA1C" in the last 2160 hours.    Radiology:        Assessment:     Claudia Sierra is a 83 y.o.female with:    Aortic atherosclerosis - seen on CXR 8/28/13    Longstanding persistent atrial fibrillation    Personal history of malignant neoplasm of breast    Short-term memory loss    Mixed hyperlipidemia  -     Comprehensive Metabolic Panel; Future; Expected date: 02/20/2024  -     Lipid Panel; Future; Expected date: 02/20/2024    Gastroesophageal reflux disease without esophagitis    Essential hypertension    Constipation, unspecified constipation type          Plan: "     Problem List Items Addressed This Visit          Neuro    Short-term memory loss    Overview     Otc folic acid at this time             Cardiac/Vascular    Longstanding persistent atrial fibrillation (Chronic)    Overview     Now in sinus rhythm . Will be seeing dr harris wed is on xarelto now .         Essential hypertension    Overview     bp well controlled on current regimen   ekg looks good 8/21          HLD (hyperlipidemia)    Overview     Stable on current regimen of pravachol .          Aortic atherosclerosis - seen on CXR 8/28/13 - Primary    Overview     Stable             Oncology    Personal history of malignant neoplasm of breast    Overview     Cont current surveillance             GI    GERD (gastroesophageal reflux disease)    Overview     Stable          Constipation    Overview     Lactulose prn is workign well             As above, continue current medications and maintain follow up with specialists.  Return to clinic in 6 months.      Frederick W Dantagnan [facility]

## 2024-02-29 ENCOUNTER — EXTERNAL CHRONIC CARE MANAGEMENT (OUTPATIENT)
Dept: PRIMARY CARE CLINIC | Facility: CLINIC | Age: 84
End: 2024-02-29
Payer: MEDICARE

## 2024-02-29 PROCEDURE — 99490 CHRNC CARE MGMT STAFF 1ST 20: CPT | Mod: S$PBB,,, | Performed by: INTERNAL MEDICINE

## 2024-02-29 PROCEDURE — 99490 CHRNC CARE MGMT STAFF 1ST 20: CPT | Mod: PBBFAC | Performed by: INTERNAL MEDICINE

## 2024-03-31 ENCOUNTER — EXTERNAL CHRONIC CARE MANAGEMENT (OUTPATIENT)
Dept: PRIMARY CARE CLINIC | Facility: CLINIC | Age: 84
End: 2024-03-31
Payer: MEDICARE

## 2024-03-31 PROCEDURE — 99490 CHRNC CARE MGMT STAFF 1ST 20: CPT | Mod: S$PBB,,, | Performed by: INTERNAL MEDICINE

## 2024-03-31 PROCEDURE — 99490 CHRNC CARE MGMT STAFF 1ST 20: CPT | Mod: PBBFAC | Performed by: INTERNAL MEDICINE

## 2024-04-01 DIAGNOSIS — K21.00 GASTROESOPHAGEAL REFLUX DISEASE WITH ESOPHAGITIS: ICD-10-CM

## 2024-04-01 DIAGNOSIS — I48.91 ATRIAL FIBRILLATION WITH RAPID VENTRICULAR RESPONSE: ICD-10-CM

## 2024-04-01 DIAGNOSIS — Z51.11 ENCOUNTER FOR ANTINEOPLASTIC CHEMOTHERAPY: ICD-10-CM

## 2024-04-01 DIAGNOSIS — I10 ESSENTIAL HYPERTENSION: ICD-10-CM

## 2024-04-01 DIAGNOSIS — B02.29 POST HERPETIC NEURALGIA: ICD-10-CM

## 2024-04-01 DIAGNOSIS — I48.20 CHRONIC ATRIAL FIBRILLATION: ICD-10-CM

## 2024-04-01 DIAGNOSIS — E78.00 HYPERCHOLESTEROLEMIA: ICD-10-CM

## 2024-04-01 RX ORDER — GABAPENTIN 600 MG/1
600 TABLET ORAL NIGHTLY PRN
Qty: 90 TABLET | Refills: 2 | Status: SHIPPED | OUTPATIENT
Start: 2024-04-01 | End: 2024-12-27

## 2024-04-01 RX ORDER — METOPROLOL SUCCINATE 100 MG/1
100 TABLET, EXTENDED RELEASE ORAL
Qty: 90 TABLET | Refills: 3 | Status: SHIPPED | OUTPATIENT
Start: 2024-04-01

## 2024-04-01 RX ORDER — PRAVASTATIN SODIUM 40 MG/1
40 TABLET ORAL
Qty: 90 TABLET | Refills: 3 | Status: SHIPPED | OUTPATIENT
Start: 2024-04-01

## 2024-04-01 RX ORDER — RIVAROXABAN 20 MG/1
TABLET, FILM COATED ORAL
Qty: 90 TABLET | Refills: 3 | Status: SHIPPED | OUTPATIENT
Start: 2024-04-01

## 2024-04-03 ENCOUNTER — TELEPHONE (OUTPATIENT)
Dept: CARDIOLOGY | Facility: CLINIC | Age: 84
End: 2024-04-03
Payer: MEDICARE

## 2024-04-03 DIAGNOSIS — E78.2 MIXED HYPERLIPIDEMIA: ICD-10-CM

## 2024-04-03 DIAGNOSIS — R07.9 CHEST PAIN, UNSPECIFIED TYPE: Primary | ICD-10-CM

## 2024-04-03 DIAGNOSIS — I10 ESSENTIAL HYPERTENSION: ICD-10-CM

## 2024-04-03 NOTE — TELEPHONE ENCOUNTER
I  reached out  and spoke to patient ,    And we schedule her an appointment to See .    Appointment also mailed to patient home.    Patient confirmed appointment too.      Barbara Chadwick (rita).                            ----- Message from Case Hanna sent at 4/3/2024  3:16 PM CDT -----  Contact: Pt   .Type:  Sooner Apoointment Request  Caller is requesting a sooner appointment.  Caller declined first available appointment listed below.  Caller will not accept being placed on the waitlist and is requesting a message be sent to doctor.  Name of Caller:pt  When is the first available appointment?  Symptoms: heart murmur  Would the patient rather a call back or a response via [facility]?  Call back  Best Call Back Number: [phone]  Additional Information:  Pt. Is calling to get a sooner appt.

## 2024-04-19 ENCOUNTER — PATIENT MESSAGE (OUTPATIENT)
Dept: ADMINISTRATIVE | Facility: HOSPITAL | Age: 84
End: 2024-04-19
Payer: MEDICARE

## 2024-04-22 ENCOUNTER — PATIENT MESSAGE (OUTPATIENT)
Dept: ADMINISTRATIVE | Facility: HOSPITAL | Age: 84
End: 2024-04-22
Payer: MEDICARE

## 2024-04-22 ENCOUNTER — TELEPHONE (OUTPATIENT)
Dept: HEMATOLOGY/ONCOLOGY | Facility: CLINIC | Age: 84
End: 2024-04-22
Payer: MEDICARE

## 2024-04-22 ENCOUNTER — PATIENT OUTREACH (OUTPATIENT)
Dept: ADMINISTRATIVE | Facility: HOSPITAL | Age: 84
End: 2024-04-22
Payer: MEDICARE

## 2024-04-22 DIAGNOSIS — Z78.0 POSTMENOPAUSAL: Primary | ICD-10-CM

## 2024-04-22 NOTE — TELEPHONE ENCOUNTER
"    ----- Message -----  From: Oren Roberts  Sent: 4/22/2024   9:55 AM CDT  To: Ricci Aguila Staff    Consult/Advisory      Name Of Caller: Self    Contact Preference?: [phone]     Provider Name: Ricci    Does patient feel the need to be seen today? No    What is the nature of the call?: Requesting orders to have a bone density test on 4/25      Additional Notes:  "Thank you for all that you do for our patients"  "

## 2024-04-22 NOTE — PROGRESS NOTES
Health Maintenance Due   Topic Date Due    TETANUS VACCINE  Never done    RSV Vaccine (Age 60+ and Pregnant patients) (1 - 1-dose 60+ series) Never done    COVID-19 Vaccine (6 - 2023-24 season) 09/01/2023    Lipid Panel  10/25/2023    DEXA Scan  03/30/2024     Chart review completed.  Updated. Triggered Links. Immunizations reviewed and updated. Care Everywhere Updated. Care Team Updated.  Placed DXA order and scheduled appt.

## 2024-04-25 ENCOUNTER — HOSPITAL ENCOUNTER (OUTPATIENT)
Dept: CARDIOLOGY | Facility: HOSPITAL | Age: 84
Discharge: HOME | End: 2024-04-25
Attending: INTERNAL MEDICINE
Payer: MEDICARE

## 2024-04-25 ENCOUNTER — HOSPITAL ENCOUNTER (OUTPATIENT)
Dept: RADIOLOGY | Facility: HOSPITAL | Age: 84
Discharge: HOME | End: 2024-04-25
Attending: INTERNAL MEDICINE
Payer: MEDICARE

## 2024-04-25 ENCOUNTER — CLINICAL SUPPORT (OUTPATIENT)
Dept: LAB | Facility: HOSPITAL | Age: 84
End: 2024-04-25
Attending: INTERNAL MEDICINE
Payer: MEDICARE

## 2024-04-25 VITALS — BODY MASS INDEX: 26.41 KG/M2 | WEIGHT: 131 LBS | HEIGHT: 59 IN

## 2024-04-25 DIAGNOSIS — I10 ESSENTIAL HYPERTENSION: ICD-10-CM

## 2024-04-25 DIAGNOSIS — R07.9 CHEST PAIN, UNSPECIFIED TYPE: ICD-10-CM

## 2024-04-25 DIAGNOSIS — Z78.0 POSTMENOPAUSAL: ICD-10-CM

## 2024-04-25 LAB
ASCENDING AORTA: 2.8 CM
AV INDEX (PROSTH): 0.33
AV MEAN GRADIENT: 7 MMHG
AV PEAK GRADIENT: 11 MMHG
AV VALVE AREA BY VELOCITY RATIO: 1.02 CM²
AV VALVE AREA: 0.99 CM²
AV VELOCITY RATIO: 0.34
BSA FOR ECHO PROCEDURE: 1.57 M2
CV ECHO LV RWT: 0.47 CM
DOP CALC AO PEAK VEL: 1.66 M/S
DOP CALC AO VTI: 37.8 CM
DOP CALC LVOT AREA: 3 CM2
DOP CALC LVOT DIAMETER: 1.95 CM
DOP CALC LVOT PEAK VEL: 0.57 M/S
DOP CALC LVOT STROKE VOLUME: 37.61 CM3
DOP CALC MV VTI: 22.7 CM
DOP CALCLVOT PEAK VEL VTI: 12.6 CM
E WAVE DECELERATION TIME: 128.35 MSEC
E/A RATIO: 3.09
E/E' RATIO: 10.74 M/S
ECHO LV POSTERIOR WALL: 0.88 CM (ref 0.6–1.1)
FRACTIONAL SHORTENING: 27 % (ref 28–44)
INTERVENTRICULAR SEPTUM: 0.75 CM (ref 0.6–1.1)
IVC DIAMETER: 1.75 CM
LA MAJOR: 5.83 CM
LA MINOR: 4.54 CM
LA WIDTH: 4 CM
LEFT ATRIUM SIZE: 3.2 CM
LEFT ATRIUM VOLUME INDEX MOD: 36.6 ML/M2
LEFT ATRIUM VOLUME INDEX: 36.1 ML/M2
LEFT ATRIUM VOLUME MOD: 56.44 CM3
LEFT ATRIUM VOLUME: 55.54 CM3
LEFT INTERNAL DIMENSION IN SYSTOLE: 2.76 CM (ref 2.1–4)
LEFT VENTRICLE DIASTOLIC VOLUME INDEX: 39.42 ML/M2
LEFT VENTRICLE DIASTOLIC VOLUME: 60.7 ML
LEFT VENTRICLE MASS INDEX: 57 G/M2
LEFT VENTRICLE SYSTOLIC VOLUME INDEX: 18.6 ML/M2
LEFT VENTRICLE SYSTOLIC VOLUME: 28.62 ML
LEFT VENTRICULAR INTERNAL DIMENSION IN DIASTOLE: 3.77 CM (ref 3.5–6)
LEFT VENTRICULAR MASS: 87.03 G
LV LATERAL E/E' RATIO: 10.2 M/S
LV SEPTAL E/E' RATIO: 11.33 M/S
LVOT MG: 0.73 MMHG
LVOT MV: 0.41 CM/S
MV MEAN GRADIENT: 1 MMHG
MV PEAK A VEL: 0.33 M/S
MV PEAK E VEL: 1.02 M/S
MV PEAK GRADIENT: 4 MMHG
MV STENOSIS PRESSURE HALF TIME: 37.22 MS
MV VALVE AREA BY CONTINUITY EQUATION: 1.66 CM2
MV VALVE AREA P 1/2 METHOD: 5.91 CM2
OHS CV RV/LV RATIO: 0.59 CM
OHS LV EJECTION FRACTION SIMPSONS BIPLANE MOD: 59 %
OHS QRS DURATION: 68 MS
OHS QTC CALCULATION: 439 MS
PISA TR MAX VEL: 3.37 M/S
RA MAJOR: 5.28 CM
RA PRESSURE ESTIMATED: 8 MMHG
RA WIDTH: 3.69 CM
RIGHT VENTRICULAR END-DIASTOLIC DIMENSION: 2.23 CM
RV TB RVSP: 11 MMHG
RV TISSUE DOPPLER FREE WALL SYSTOLIC VELOCITY 1 (APICAL 4 CHAMBER VIEW): 7.94 CM/S
SINUS: 3.01 CM
STJ: 2.49 CM
TDI LATERAL: 0.1 M/S
TDI SEPTAL: 0.09 M/S
TDI: 0.1 M/S
TR MAX PG: 45 MMHG
TRICUSPID ANNULAR PLANE SYSTOLIC EXCURSION: 1.05 CM
TV REST PULMONARY ARTERY PRESSURE: 53 MMHG
Z-SCORE OF LEFT VENTRICULAR DIMENSION IN END DIASTOLE: -1.71
Z-SCORE OF LEFT VENTRICULAR DIMENSION IN END SYSTOLE: -0.05

## 2024-04-25 PROCEDURE — 93306 TTE W/DOPPLER COMPLETE: CPT | Mod: 26,,, | Performed by: STUDENT IN AN ORGANIZED HEALTH CARE EDUCATION/TRAINING PROGRAM

## 2024-04-25 PROCEDURE — 93010 ELECTROCARDIOGRAM REPORT: CPT | Mod: ,,, | Performed by: INTERNAL MEDICINE

## 2024-04-25 PROCEDURE — 93005 ELECTROCARDIOGRAM TRACING: CPT

## 2024-04-25 PROCEDURE — 93306 TTE W/DOPPLER COMPLETE: CPT

## 2024-04-25 PROCEDURE — 77080 DXA BONE DENSITY AXIAL: CPT | Mod: TC

## 2024-04-25 PROCEDURE — 77080 DXA BONE DENSITY AXIAL: CPT | Mod: 26,,, | Performed by: RADIOLOGY

## 2024-04-25 NOTE — PROGRESS NOTES
Echocardiogram showed stable pumping function of the heart.  No major abnormalities as compared to prior.  Moderate tightness of the aortic valve.  No significant changes.  We will discuss the results in detail during next appointment  Sincerely,  Abran Becker MD.   Interventional Cardiologist  [facility] [place]

## 2024-04-26 ENCOUNTER — TELEPHONE (OUTPATIENT)
Dept: CARDIOLOGY | Facility: CLINIC | Age: 84
End: 2024-04-26
Payer: MEDICARE

## 2024-04-26 NOTE — TELEPHONE ENCOUNTER
Sent patient a my chart message explaining her Echocardiogram showed stable pumping function of the heart.  No major abnormalities as compared to prior.  Moderate tightness of the aortic valve.  No significant changes.  We will discuss the results in detail during next appointment

## 2024-04-29 ENCOUNTER — TELEPHONE (OUTPATIENT)
Dept: CARDIOLOGY | Facility: CLINIC | Age: 84
End: 2024-04-29
Payer: MEDICARE

## 2024-04-29 NOTE — TELEPHONE ENCOUNTER
Reached out to patient and spoke to her regarding her Echo,    Her echo came out normal. Patient then stated that she feels SOB,    I advised her to go to the ED so that she can get evaluated.    Pt stated she will go now.      Nw                                   ----- Message from Kay Yarbrough sent at 4/29/2024  9:19 AM CDT -----  Type:  Needs Medical Advice    Who Called: Caller   Would the patient rather a call back or a response via [facility]? Callback  Best Call Back Number: [phone]  Additional Information: Caller is requesting a callback

## 2024-04-30 ENCOUNTER — EXTERNAL CHRONIC CARE MANAGEMENT (OUTPATIENT)
Dept: PRIMARY CARE CLINIC | Facility: CLINIC | Age: 84
End: 2024-04-30
Payer: MEDICARE

## 2024-04-30 PROCEDURE — 99439 CHRNC CARE MGMT STAF EA ADDL: CPT | Mod: PBBFAC,27 | Performed by: INTERNAL MEDICINE

## 2024-04-30 PROCEDURE — 99439 CHRNC CARE MGMT STAF EA ADDL: CPT | Mod: S$PBB,,, | Performed by: INTERNAL MEDICINE

## 2024-04-30 PROCEDURE — 99490 CHRNC CARE MGMT STAFF 1ST 20: CPT | Mod: PBBFAC | Performed by: INTERNAL MEDICINE

## 2024-04-30 PROCEDURE — 99490 CHRNC CARE MGMT STAFF 1ST 20: CPT | Mod: S$PBB,,, | Performed by: INTERNAL MEDICINE

## 2024-05-02 ENCOUNTER — TELEPHONE (OUTPATIENT)
Dept: CARDIOLOGY | Facility: CLINIC | Age: 84
End: 2024-05-02
Payer: MEDICARE

## 2024-05-02 NOTE — TELEPHONE ENCOUNTER
Sent patient a message on my chart that EKG results is okay.  Showed Afib with controlled heart rate.  Stable as it was before.  We will discuss the results of his EKG and echocardiogram during her next appointment.  If she needs sooner appointment please try to get her sooner appointment with RUSTAM Woodard.  thanks

## 2024-05-31 ENCOUNTER — EXTERNAL CHRONIC CARE MANAGEMENT (OUTPATIENT)
Dept: PRIMARY CARE CLINIC | Facility: CLINIC | Age: 84
End: 2024-05-31
Payer: MEDICARE

## 2024-05-31 PROCEDURE — 99490 CHRNC CARE MGMT STAFF 1ST 20: CPT | Mod: S$PBB,,, | Performed by: INTERNAL MEDICINE

## 2024-05-31 PROCEDURE — 99439 CHRNC CARE MGMT STAF EA ADDL: CPT | Mod: S$PBB,,, | Performed by: INTERNAL MEDICINE

## 2024-05-31 PROCEDURE — 99439 CHRNC CARE MGMT STAF EA ADDL: CPT | Mod: PBBFAC | Performed by: INTERNAL MEDICINE

## 2024-05-31 PROCEDURE — 99490 CHRNC CARE MGMT STAFF 1ST 20: CPT | Mod: PBBFAC | Performed by: INTERNAL MEDICINE

## 2024-06-30 ENCOUNTER — EXTERNAL CHRONIC CARE MANAGEMENT (OUTPATIENT)
Dept: PRIMARY CARE CLINIC | Facility: CLINIC | Age: 84
End: 2024-06-30
Payer: MEDICARE

## 2024-06-30 PROCEDURE — 99490 CHRNC CARE MGMT STAFF 1ST 20: CPT | Mod: S$PBB,,, | Performed by: INTERNAL MEDICINE

## 2024-06-30 PROCEDURE — 99490 CHRNC CARE MGMT STAFF 1ST 20: CPT | Mod: PBBFAC | Performed by: INTERNAL MEDICINE

## 2024-07-02 NOTE — PROGRESS NOTES
Cardiology Clinic note    Subjective:   Patient ID:  Claudia Sierra is a 83 y.o. female who presents for follow-up of A. Fib, HTN    HPI:     7/3/2024: Previous patient of Dr. Becker as below, intial visit for me. Here for F/U A. Fib, HTN. Seen in clinic with friend reports overall doing okay. In May had reports of some intermittent MERCEDES, reportedly improving over the past couple of months with more frequent exercise. Patient denies CP, SOB, orthopnea, syncope, palpitations, LE edema. Reports intermittent light-headedness, fatigue, and insomnia. Reports compliance and tolerance with all medications.  -Grossly euvolemic on exam  -Echo reviewed with patient as below.      12/2023: F/U. Last visit Cardizem stopped and metoprolol continued. She is doing well. No issues since last visit. No chest pain, shortness of breath, or palpitations. She is compliant with meds. BP at home is better controlled. Today BP is elevated, but she did walk a lot and didn't take the Toprol yet. Tolerating Xarelto      6/21/2023: Here for initial evaluation.  Admitted 6/2/2023 with a.fib with RVR. IT was reported that she converted to SR with BB. Started on Xarelto. I couldn't find EKG with SR post the a.fib . Repeat EKG today is a.fib rate controlled. She gets  low BP readings. She was discharged on  Cardizem and Toprol. Cardizem dose reduced to 30 mg night due to hypotension and bradycardia. No CVA or TIA               Prior cardiovascular  Hx  --------------------------------   Echo     Left Ventricle: The left ventricle is normal in size. There is concentric remodeling. There is normal systolic function with a visually estimated ejection fraction of 55 - 60%. Biplane (2D) method of discs ejection fraction is 59%. Unable to assess diastolic function due to atrial fibrillation.    Right Ventricle: Normal right ventricular cavity size. Systolic function is reduced.TAPSE is 1.05 cm.    Left Atrium: Left atrium is mildly dilated. The  left atrium volume index is 36.1 mL/m2.    Right Atrium: Right atrium is mildly dilated.    Aortic Valve: There is moderate aortic valve sclerosis. Mildly restricted motion. There is mild to moderate stenosis. Aortic valve area by VTI is 0.99 cm². Aortic valve area by velocity is 1.02 cm². Aortic valve peak velocity is 1.66 m/s. Mean gradient is 7 mmHg. The dimensionless index is 0.33.    Tricuspid Valve: There is moderate regurgitation.    Pulmonic Valve: There is mild regurgitation.    Pulmonary Artery: The estimated pulmonary artery systolic pressure is 53 mmHg.    IVC/SVC: Intermediate venous pressure at 8 mmHg.       - ECHO 6/2/2023  The left ventricle is normal in size with concentric remodeling and normal systolic function.  Normal right ventricular size with normal right ventricular systolic function.  The estimated ejection fraction is 55%.  Severe left atrial enlargement.  Indeterminate left ventricular diastolic function.  The estimated PA systolic pressure is 37 mmHg.  Normal central venous pressure (3 mmHg).  Mild tricuspid regurgitation.  Mild pulmonic regurgitation.  There is mild-to-moderate aortic valve stenosis.  Aortic valve area is 1.02 cm2; peak velocity is 1.64 m/s; mean gradient is 6 mmHg.     - EKG 6/21/2023 A.fib. Rate controlled        Past Medical History:   Diagnosis Date    *Atrial fibrillation     Breast cancer     Right breast cancer    Hypertension     Slow transit constipation 12/19/2013          Patient Active Problem List    Diagnosis Date Noted    Short-term memory loss 02/20/2024     Otc folic acid at this time       Aortic atherosclerosis - seen on CXR 8/28/13 02/16/2024     Stable       Nonrheumatic aortic valve stenosis 06/21/2023    Long term (current) use of anticoagulants 06/04/2023    Personal history of malignant neoplasm of breast 06/04/2023     Cont current surveillance       Allergies 06/02/2023    Chronic fatigue 06/02/2023    Hyperlipidemia, unspecified 06/02/2023     Physical deconditioning 03/31/2023    Impaired mobility and activities of daily living 03/31/2023    Muscle weakness (generalized) 03/31/2023    Primary osteoarthritis of right hip 01/31/2022    History of breast cancer 12/28/2021    Vitamin D deficiency 12/21/2021    At risk for osteoporosis 12/21/2021    Normocytic anemia 08/19/2021    Right hip pain 08/16/2021     Will be getting right hip replacement with Dr condon .  preop assessment done today       Chronic pain of right knee 03/11/2021     Needs referral to orhto for eval and treat       Chronic pain 02/12/2020    Cervical radiculopathy 02/12/2020     Stable       Post herpetic neuralgia 01/14/2020     Is still using gabapentin as she is still having some itching sensation        Disorder of cartilage, unspecified  11/08/2019    Constipation 09/12/2019     Lactulose prn is workign well       HLD (hyperlipidemia) 08/14/2014     Stable on current regimen of pravachol .       Lymphedema 05/28/2014    Edema of breast 05/08/2014    Chemotherapy-induced peripheral neuropathy 01/02/2014    Other insomnia 11/19/2013     Is on Restoril currently is working well       Longstanding persistent atrial fibrillation 08/29/2013     Now in sinus rhythm . Will be seeing dr harris wed is on xarelto now .      GERD (gastroesophageal reflux disease) 08/29/2013     Stable       Essential hypertension 07/15/2013     bp well controlled on current regimen   ekg looks good 8/21          Patient's Medications   New Prescriptions    No medications on file   Previous Medications    ACETAMINOPHEN (TYLENOL) 650 MG TBSR    Take 1 tablet (650 mg total) by mouth every 6 to 8 hours as needed (pain).    ASCORBIC ACID, VITAMIN C, (VITAMIN C) 1000 MG TABLET    Take 1,000 mg by mouth once daily.    B COMPLEX VITAMINS TABLET    Take 1 tablet by mouth once daily.    BETA-CAROTENE,A,-VITS C,E/MINS (OCUVITE ORAL)    Take 1 tablet by mouth every evening.    GABAPENTIN (NEURONTIN) 600 MG TABLET    TAKE 1  TABLET (600 MG TOTAL) BY MOUTH NIGHTLY AS NEEDED (PAIN).    LACTULOSE (CHRONULAC) 10 GRAM/15 ML SOLUTION    Take 45 mLs (30 g total) by mouth 3 (three) times daily as needed (constipation).    LORATADINE (CLARITIN) 10 MG TABLET    Take 10 mg by mouth once daily.    METOPROLOL SUCCINATE (TOPROL-XL) 100 MG 24 HR TABLET    TAKE 1 TABLET BY MOUTH EVERY DAY    POLYCARBOPHIL (FIBERCON) 625 MG TABLET    Take 625 mg by mouth once daily.    PRAVASTATIN (PRAVACHOL) 40 MG TABLET    TAKE 1 TABLET BY MOUTH EVERY DAY    VITAMIN D (VITAMIN D3) 1000 UNITS TAB    Take 1,000 Units by mouth once daily.    XARELTO 20 MG TAB    TAKE 1 TABLET (20 MG TOTAL) BY MOUTH BEFORE DINNER.   Modified Medications    No medications on file   Discontinued Medications    No medications on file        Review of Systems   Constitutional: Positive for malaise/fatigue. Negative for chills, decreased appetite, diaphoresis, weight gain and weight loss.   Cardiovascular:  Positive for dyspnea on exertion. Negative for chest pain, claudication, irregular heartbeat, leg swelling, near-syncope, orthopnea, palpitations, paroxysmal nocturnal dyspnea and syncope.   Respiratory:  Negative for cough, hemoptysis, shortness of breath and snoring.    Gastrointestinal:  Negative for bloating, abdominal pain, nausea and vomiting.   Neurological:  Negative for light-headedness and weakness.   Psychiatric/Behavioral:  The patient has insomnia.      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   Tobacco Use    Smoking status: Never    Smokeless tobacco: Never   Substance and Sexual Activity    Alcohol use: Yes     Alcohol/week: 3.0 standard drinks of alcohol     Types: 3 Glasses of wine per week     Comment: every 2 weeks    Drug use: No    Sexual activity: Not Currently     Partners: Male     Birth control/protection: None   Social History Narrative    Sister will help at night    No stairs in her home     Social Determinants of Health     Financial Resource Strain: Low Risk  (2/8/2024)    Overall Financial Resource Strain (CARDIA)     Difficulty of Paying Living Expenses: Not hard at all   Food Insecurity: No Food Insecurity (2/8/2024)    Hunger Vital Sign     Worried About Running Out of Food in the Last Year: Never true     Ran Out of Food in the Last Year: Never true   Transportation Needs: No Transportation Needs (2/8/2024)    PRAPARE - Transportation     Lack of Transportation (Medical): No     Lack of Transportation (Non-Medical): No   Physical Activity: Unknown (2/8/2024)    Exercise Vital Sign     Days of Exercise per Week: 0 days   Stress: No Stress Concern Present (2/8/2024)    [employer] [place] [employer] - Occupational Stress Questionnaire     Feeling of Stress : Only a little   Housing Stability: Unknown (2/8/2024)    Housing Stability Vital Sign     Unable to Pay for Housing in the Last Year: No     Unstable Housing in the Last Year: No            Objective:   Vitals  Vitals:    07/03/24 1326   BP: 137/78   Pulse: 76   SpO2: 97%   Weight: 57.9 kg (127 lb 8.6 oz)   Height: 4' 11" (1.499 m)          Physical Exam  Vitals and nursing note reviewed.   Constitutional:       Appearance: Normal appearance.   Cardiovascular:      Rate and Rhythm: Normal rate. Rhythm irregular.      Heart sounds: No murmur heard.     No gallop.   Pulmonary:      Effort: Pulmonary effort is normal.      Breath sounds: Normal breath sounds.   Abdominal:      General: Bowel sounds are " normal. There is no distension.      Palpations: Abdomen is soft.      Tenderness: There is no abdominal tenderness.   Skin:     General: Skin is warm and dry.   Neurological:      Mental Status: She is alert and oriented to person, place, and time.       Lab Results    Lab Results   Component Value Date    WBC 6.63 06/03/2023    HGB 11.7 (L) 06/03/2023    HCT 36.5 (L) 06/03/2023    HCT 32 (L) 02/01/2022    MCV 94 06/03/2023       Lab Results   Component Value Date     06/03/2023    INR 0.9 08/19/2021       Lab Results   Component Value Date    K 3.9 04/25/2024    MG 1.6 06/01/2023    BUN 10 04/25/2024    CREATININE 0.9 04/25/2024       Lab Results   Component Value Date    GLU 93 04/25/2024       Lab Results   Component Value Date    AST 40 04/25/2024    ALT 25 04/25/2024    ALBUMIN 3.8 04/25/2024    PROT 7.3 04/25/2024       Lab Results   Component Value Date    CHOL 124 04/25/2024    HDL 50 04/25/2024    LDLCALC 63.6 04/25/2024    TRIG 52 04/25/2024       Lab Results   Component Value Date     (H) 06/01/2023         Assessment:     1. Other insomnia    2. Physical deconditioning    3. Chronic fatigue    4. Longstanding persistent atrial fibrillation    5. Nonrheumatic aortic valve stenosis    6. Essential hypertension    7. Mixed hyperlipidemia    8. Aortic atherosclerosis - seen on CXR 8/28/13        Plan:     A. Fib  -stable  -rate control strategy  -continue BB, Xarelto 20mg daily  -Risks vs benefits discussed. Rec continuing OAC, patient agrees with plan.    2. Fatigue, insomnia  -will refer to sleep med for sleep study    3. AS  -stable, will continue monitoring with yearly Echos    4. HTN  -well controlled, continue BB  -digital HTN          The ASCVD Risk score (Dalton GONZALEZ, et al., 2019) failed to calculate for the following reasons:    The 2019 ASCVD risk score is only valid for ages 40 to 79    Orders Placed This Encounter   Procedures    Ambulatory referral/consult to Sleep Disorders      Standing Status:   Future     Standing Expiration Date:   8/3/2025     Referral Priority:   Routine     Referral Type:   Consultation     Requested Specialty:   Sleep Medicine     Number of Visits Requested:   1       She expressed verbal understanding and agreed with the plan    Follow up in 6m    Pertinent cardiac images and EKG reviewed independently.    Continue with current medical plan and lifestyle changes.  Return sooner for concerns or questions. If symptoms persist go to the ED.  I have reviewed all pertinent data including patient's medical history in detail and updated the computerized patient record.     Counseling included discussion regarding imaging findings, diagnosis, possibilities, treatment options, risks and benefits.    Thank you for the opportunity to care for this patient. Will be available for questions if needed.        Signed:  Vance Casillas DNP  07/03/2024

## 2024-07-03 ENCOUNTER — OFFICE VISIT (OUTPATIENT)
Dept: CARDIOLOGY | Facility: CLINIC | Age: 84
End: 2024-07-03
Payer: MEDICARE

## 2024-07-03 VITALS
HEIGHT: 59 IN | HEART RATE: 76 BPM | BODY MASS INDEX: 25.72 KG/M2 | DIASTOLIC BLOOD PRESSURE: 78 MMHG | OXYGEN SATURATION: 97 % | SYSTOLIC BLOOD PRESSURE: 137 MMHG | WEIGHT: 127.56 LBS

## 2024-07-03 DIAGNOSIS — E78.2 MIXED HYPERLIPIDEMIA: ICD-10-CM

## 2024-07-03 DIAGNOSIS — I35.0 NONRHEUMATIC AORTIC VALVE STENOSIS: Chronic | ICD-10-CM

## 2024-07-03 DIAGNOSIS — I48.11 LONGSTANDING PERSISTENT ATRIAL FIBRILLATION: Chronic | ICD-10-CM

## 2024-07-03 DIAGNOSIS — R53.82 CHRONIC FATIGUE: ICD-10-CM

## 2024-07-03 DIAGNOSIS — R53.81 PHYSICAL DECONDITIONING: ICD-10-CM

## 2024-07-03 DIAGNOSIS — I10 ESSENTIAL HYPERTENSION: ICD-10-CM

## 2024-07-03 DIAGNOSIS — G47.09 OTHER INSOMNIA: Primary | ICD-10-CM

## 2024-07-03 DIAGNOSIS — I70.0 AORTIC ATHEROSCLEROSIS: ICD-10-CM

## 2024-07-03 PROCEDURE — 99214 OFFICE O/P EST MOD 30 MIN: CPT | Mod: PBBFAC,PN

## 2024-07-03 PROCEDURE — 99999 PR PBB SHADOW E&M-EST. PATIENT-LVL IV: CPT | Mod: PBBFAC,,,

## 2024-07-31 ENCOUNTER — EXTERNAL CHRONIC CARE MANAGEMENT (OUTPATIENT)
Dept: PRIMARY CARE CLINIC | Facility: CLINIC | Age: 84
End: 2024-07-31
Payer: MEDICARE

## 2024-07-31 PROCEDURE — 99490 CHRNC CARE MGMT STAFF 1ST 20: CPT | Mod: S$PBB,,, | Performed by: INTERNAL MEDICINE

## 2024-07-31 PROCEDURE — 99490 CHRNC CARE MGMT STAFF 1ST 20: CPT | Mod: PBBFAC | Performed by: INTERNAL MEDICINE

## 2024-08-13 DIAGNOSIS — I10 ESSENTIAL HYPERTENSION: ICD-10-CM

## 2024-08-13 DIAGNOSIS — B02.29 POST HERPETIC NEURALGIA: ICD-10-CM

## 2024-08-13 DIAGNOSIS — I48.11 LONGSTANDING PERSISTENT ATRIAL FIBRILLATION: Primary | ICD-10-CM

## 2024-08-13 RX ORDER — GABAPENTIN 600 MG/1
600 TABLET ORAL NIGHTLY PRN
Qty: 90 TABLET | Refills: 2 | Status: SHIPPED | OUTPATIENT
Start: 2024-08-13 | End: 2025-05-10

## 2024-08-27 ENCOUNTER — LAB VISIT (OUTPATIENT)
Dept: LAB | Facility: HOSPITAL | Age: 84
End: 2024-08-27
Attending: INTERNAL MEDICINE
Payer: MEDICARE

## 2024-08-27 ENCOUNTER — OFFICE VISIT (OUTPATIENT)
Dept: PRIMARY CARE CLINIC | Facility: CLINIC | Age: 84
End: 2024-08-27
Payer: MEDICARE

## 2024-08-27 VITALS
TEMPERATURE: 98 F | SYSTOLIC BLOOD PRESSURE: 160 MMHG | HEIGHT: 59 IN | BODY MASS INDEX: 26.85 KG/M2 | OXYGEN SATURATION: 95 % | WEIGHT: 133.19 LBS | RESPIRATION RATE: 18 BRPM | DIASTOLIC BLOOD PRESSURE: 94 MMHG | HEART RATE: 80 BPM

## 2024-08-27 DIAGNOSIS — I48.11 LONGSTANDING PERSISTENT ATRIAL FIBRILLATION: Chronic | ICD-10-CM

## 2024-08-27 DIAGNOSIS — I70.0 AORTIC ATHEROSCLEROSIS: Primary | ICD-10-CM

## 2024-08-27 DIAGNOSIS — Z85.3 PERSONAL HISTORY OF MALIGNANT NEOPLASM OF BREAST: ICD-10-CM

## 2024-08-27 DIAGNOSIS — I35.0 NONRHEUMATIC AORTIC VALVE STENOSIS: Chronic | ICD-10-CM

## 2024-08-27 DIAGNOSIS — R42 DIZZINESS: ICD-10-CM

## 2024-08-27 DIAGNOSIS — G62.0 CHEMOTHERAPY-INDUCED PERIPHERAL NEUROPATHY: ICD-10-CM

## 2024-08-27 DIAGNOSIS — E78.2 MIXED HYPERLIPIDEMIA: ICD-10-CM

## 2024-08-27 DIAGNOSIS — T45.1X5A CHEMOTHERAPY-INDUCED PERIPHERAL NEUROPATHY: ICD-10-CM

## 2024-08-27 DIAGNOSIS — G47.09 OTHER INSOMNIA: ICD-10-CM

## 2024-08-27 DIAGNOSIS — I89.0 LYMPHEDEMA: ICD-10-CM

## 2024-08-27 LAB
BASOPHILS # BLD AUTO: 0.04 K/UL (ref 0–0.2)
BASOPHILS NFR BLD: 0.6 % (ref 0–1.9)
DIFFERENTIAL METHOD BLD: ABNORMAL
EOSINOPHIL # BLD AUTO: 0.2 K/UL (ref 0–0.5)
EOSINOPHIL NFR BLD: 2.4 % (ref 0–8)
ERYTHROCYTE [DISTWIDTH] IN BLOOD BY AUTOMATED COUNT: 17.1 % (ref 11.5–14.5)
HCT VFR BLD AUTO: 37.1 % (ref 37–48.5)
HGB BLD-MCNC: 12.1 G/DL (ref 12–16)
IMM GRANULOCYTES # BLD AUTO: 0.02 K/UL (ref 0–0.04)
IMM GRANULOCYTES NFR BLD AUTO: 0.3 % (ref 0–0.5)
LYMPHOCYTES # BLD AUTO: 1.7 K/UL (ref 1–4.8)
LYMPHOCYTES NFR BLD: 26.8 % (ref 18–48)
MCH RBC QN AUTO: 31.8 PG (ref 27–31)
MCHC RBC AUTO-ENTMCNC: 32.6 G/DL (ref 32–36)
MCV RBC AUTO: 98 FL (ref 82–98)
MONOCYTES # BLD AUTO: 0.6 K/UL (ref 0.3–1)
MONOCYTES NFR BLD: 9.9 % (ref 4–15)
NEUTROPHILS # BLD AUTO: 3.7 K/UL (ref 1.8–7.7)
NEUTROPHILS NFR BLD: 60 % (ref 38–73)
NRBC BLD-RTO: 0 /100 WBC
PLATELET # BLD AUTO: 256 K/UL (ref 150–450)
PMV BLD AUTO: 10 FL (ref 9.2–12.9)
RBC # BLD AUTO: 3.8 M/UL (ref 4–5.4)
WBC # BLD AUTO: 6.16 K/UL (ref 3.9–12.7)

## 2024-08-27 PROCEDURE — 99214 OFFICE O/P EST MOD 30 MIN: CPT | Mod: PBBFAC | Performed by: INTERNAL MEDICINE

## 2024-08-27 PROCEDURE — 36415 COLL VENOUS BLD VENIPUNCTURE: CPT | Performed by: INTERNAL MEDICINE

## 2024-08-27 PROCEDURE — 99214 OFFICE O/P EST MOD 30 MIN: CPT | Mod: S$PBB,,, | Performed by: INTERNAL MEDICINE

## 2024-08-27 PROCEDURE — 85025 COMPLETE CBC W/AUTO DIFF WBC: CPT | Performed by: INTERNAL MEDICINE

## 2024-08-27 PROCEDURE — 99999 PR PBB SHADOW E&M-EST. PATIENT-LVL IV: CPT | Mod: PBBFAC,,, | Performed by: INTERNAL MEDICINE

## 2024-08-27 NOTE — PROGRESS NOTES
[facility] Note    Chief Complaint      Chief Complaint   Patient presents with    Follow-up     6m       History of Present Illness      Claudia Sierra is a 84 y.o. female who presents today for   Chief Complaint   Patient presents with    Follow-up     6m   .  Patient comes to appointment here for 6 m checkup for chronic issues as below . Her main issue current is with orthostatic hypotension . This is complicated by her Toprol, which she takes for her afib. She is seeing cardiology currently but would like to change to new cardiologist     Problem List Items Addressed This Visit          Neuro    Chemotherapy-induced peripheral neuropathy    Overview     Stable on gabapentin             Cardiac/Vascular    Longstanding persistent atrial fibrillation (Chronic)    Overview     Now in sinus rhythm . Will be seeing dr harris wed is on xarelto now .         Nonrheumatic aortic valve stenosis (Chronic)    Overview     Cardiology managing surveillance          HLD (hyperlipidemia)    Overview     Stable on current regimen of pravachol .          Aortic atherosclerosis - seen on CXR 8/28/13 - Primary    Overview     Stable             Oncology    Personal history of malignant neoplasm of breast    Overview     Cont current surveillance             Other    Other insomnia    Overview     Is on Restoril currently is working well  reviewed          Lymphedema    Overview     Lasix 20 mg as needed            Dizziness    Overview     Check cbc today              Past Medical History:  Past Medical History:   Diagnosis Date    *Atrial fibrillation     Breast cancer     Right breast cancer    Hypertension     Slow transit constipation 12/19/2013       Past Surgical History:  Past Surgical History:   Procedure Laterality Date    ADENOIDECTOMY  70 years ago    At same time as tonsillectomy. .    APPENDECTOMY      ARTHROPLASTY OF HIP BY ANTERIOR APPROACH Right 01/31/2022    Procedure:  ARTHROPLASTY, HIP, TOTAL, ANTERIOR APPROACH:RIGHT: DEPUY-C-STEM+PINNACLE;  Surgeon: Cisco Mcneal III, MD;  Location: [facility] OR;  Service: Orthopedics;  Laterality: Right;    BREAST BIOPSY      right    BREAST LUMPECTOMY Right 2013    BREAST SURGERY  2013    EPIDURAL STEROID INJECTION INTO CERVICAL SPINE N/A 02/12/2020    Procedure: Injection-steroid-epidural-cervical--C7-T1 IL ELMIRA;  Surgeon: Alicia Proctor MD;  [facility];  Service: Pain Management;  Laterality: N/A;  sign consent at [facility]    EYE SURGERY  2018    HYSTERECTOMY      JOINT REPLACEMENT  Hip, 2022    lymphnode removal      12 from Right axilla    PORTACATH PLACEMENT  2013    TONSILLECTOMY         Family History:  family history includes Cancer in her father and sister; Cervical cancer (age of onset: 79) in her sister; Diabetes in her mother and son; Heart disease in her father, mother, and son; Hypertension in her mother, sister, and son.    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   Tobacco Use    Smoking status: Never    Smokeless tobacco: Never   Substance and Sexual Activity    Alcohol use: Yes     Alcohol/week: 3.0 standard drinks of alcohol     Types: 3 Glasses of wine per week     Comment: every 2 weeks    Drug use: No    Sexual activity: Not Currently     Partners: Male     Birth control/protection: None   Social History Narrative    Sister will help at night    No stairs in her home     Social Determinants of Health     Financial Resource Strain: Low Risk  (2/8/2024)    Overall Financial Resource Strain (CARDIA)     Difficulty of Paying Living Expenses: Not hard at all   Food Insecurity: No Food Insecurity (2/8/2024)    Hunger Vital Sign     Worried About Running Out of Food in the Last Year: Never true     Ran Out of Food in the Last Year: Never true   Transportation Needs: No Transportation  Needs (2/8/2024)    PRAPARE - Transportation     Lack of Transportation (Medical): No     Lack of Transportation (Non-Medical): No   Physical Activity: Unknown (2/8/2024)    Exercise Vital Sign     Days of Exercise per Week: 0 days   Stress: No Stress Concern Present (2/8/2024)    [employer] [place] [employer] - Occupational Stress Questionnaire     Feeling of Stress : Only a little   Housing Stability: Unknown (2/8/2024)    Housing Stability Vital Sign     Unable to Pay for Housing in the Last Year: No     Unstable Housing in the Last Year: No       Review of Systems:   Review of Systems   Constitutional:  Negative for fever and weight loss.   HENT:  Negative for congestion, hearing loss and sore throat.    Eyes:  Negative for blurred vision.   Respiratory:  Negative for cough and shortness of breath.    Cardiovascular:  Positive for leg swelling. Negative for chest pain, palpitations and claudication.   Gastrointestinal:  Negative for abdominal pain, constipation, diarrhea and heartburn.   Genitourinary:  Negative for dysuria.   Musculoskeletal:  Negative for back pain and myalgias.   Skin:  Negative for rash.   Neurological:  Positive for dizziness. Negative for focal weakness and headaches.   Psychiatric/Behavioral:  Negative for depression and suicidal ideas. The patient is not nervous/anxious.          Medications:  Outpatient Encounter Medications as of 8/27/2024   Medication Sig Note Dispense Refill    acetaminophen (TYLENOL) 650 MG TbSR Take 1 tablet (650 mg total) by mouth every 6 to 8 hours as needed (pain).  120 tablet 0    ascorbic acid, vitamin C, (VITAMIN C) 1000 MG tablet Take 1,000 mg by mouth once daily.       b complex vitamins tablet Take 1 tablet by mouth once daily.       beta-carotene,A,-vits C,E/mins (OCUVITE ORAL) Take 1 tablet by mouth every evening.       gabapentin (NEURONTIN) 600 MG tablet Take 1 tablet (600 mg total) by mouth nightly as needed (pain).  90 tablet 2     lactulose (CHRONULAC) 10 gram/15 mL solution Take 45 mLs (30 g total) by mouth 3 (three) times daily as needed (constipation).  473 mL 4    loratadine (CLARITIN) 10 mg tablet Take 10 mg by mouth once daily. 6/5/2023: PRN allergies      metoprolol succinate (TOPROL-XL) 100 MG 24 hr tablet TAKE 1 TABLET BY MOUTH EVERY DAY  90 tablet 3    polycarbophil (FIBERCON) 625 mg tablet Take 625 mg by mouth once daily.       pravastatin (PRAVACHOL) 40 MG tablet TAKE 1 TABLET BY MOUTH EVERY DAY  90 tablet 3    vitamin D (VITAMIN D3) 1000 units Tab Take 1,000 Units by mouth once daily.       XARELTO 20 mg Tab TAKE 1 TABLET (20 MG TOTAL) BY MOUTH BEFORE DINNER.  90 tablet 3    [DISCONTINUED] gabapentin (NEURONTIN) 600 MG tablet TAKE 1 TABLET (600 MG TOTAL) BY MOUTH NIGHTLY AS NEEDED (PAIN).  90 tablet 2     No facility-administered encounter medications on file as of 8/27/2024.        Allergies:  Review of patient's allergies indicates:   Allergen Reactions    Codeine Other (See Comments)     Feel like (climbing the walls)    Hydrocodone Other (See Comments)     Restless and anxious similar to codeine. Tolerated oxycodone without issue.    Benadryl [diphenhydramine hcl] Anxiety         Physical Exam         Vitals:    08/27/24 1314   BP: (!) 160/94   Pulse: 80   Resp: 18   Temp: 98 °F (36.7 °C)         Physical Exam  Constitutional:       Appearance: She is well-developed.   Eyes:      Pupils: Pupils are equal, round, and reactive to light.   Neck:      Thyroid: No thyromegaly.   Cardiovascular:      Rate and Rhythm: Normal rate. Rhythm irregularly irregular.      Heart sounds: Murmur heard.      Systolic murmur is present with a grade of 2/6.      No friction rub. No gallop.   Pulmonary:      Breath sounds: Normal breath sounds.   Abdominal:      General: Bowel sounds are normal.      Palpations: Abdomen is soft.   Musculoskeletal:         General: Normal range of motion.      Cervical back: Normal range of motion.  "  Lymphadenopathy:      Cervical: No cervical adenopathy.   Skin:     General: Skin is warm.      Findings: No rash.   Neurological:      Mental Status: She is alert and oriented to person, place, and time.      Cranial Nerves: No cranial nerve deficit.   Psychiatric:         Behavior: Behavior normal.          Laboratory:  CBC:  No results for input(s): "WBC", "RBC", "HGB", "HCT", "PLT", "MCV", "MCH", "MCHC" in the last 2160 hours.  CMP:  No results for input(s): "GLU", "CALCIUM", "ALBUMIN", "PROT", "NA", "K", "CO2", "CL", "BUN", "ALKPHOS", "ALT", "AST", "BILITOT" in the last 2160 hours.    Invalid input(s): "CREATININ"  URINALYSIS:  No results for input(s): "COLORU", "CLARITYU", "SPECGRAV", "PHUR", "PROTEINUA", "GLUCOSEU", "BILIRUBINCON", "BLOODU", "WBCU", "RBCU", "BACTERIA", "MUCUS", "NITRITE", "LEUKOCYTESUR", "UROBILINOGEN", "HYALINECASTS" in the last 2160 hours.   LIPIDS:  No results for input(s): "TSH", "HDL", "CHOL", "TRIG", "LDLCALC", "CHOLHDL", "NONHDLCHOL", "TOTALCHOLEST" in the last 2160 hours.  TSH:  No results for input(s): "TSH" in the last 2160 hours.  A1C:  No results for input(s): "HGBA1C" in the last 2160 hours.    Radiology:        Assessment:     Claudia Sierra is a 84 y.o.female with:    Aortic atherosclerosis - seen on CXR 8/28/13    Longstanding persistent atrial fibrillation  -     Ambulatory referral/consult to Cardiology; Future; Expected date: 09/03/2024    Lymphedema    Nonrheumatic aortic valve stenosis  -     Ambulatory referral/consult to Cardiology; Future; Expected date: 09/03/2024    Other insomnia    Personal history of malignant neoplasm of breast    Mixed hyperlipidemia    Dizziness  -     CBC Auto Differential; Future; Expected date: 08/27/2024    Chemotherapy-induced peripheral neuropathy          Plan:     Problem List Items Addressed This Visit          Neuro    Chemotherapy-induced peripheral neuropathy    Overview     Stable on gabapentin             Cardiac/Vascular "    Longstanding persistent atrial fibrillation (Chronic)    Overview     Now in sinus rhythm . Will be seeing dr harris wed is on xarelto now .         Nonrheumatic aortic valve stenosis (Chronic)    Overview     Cardiology managing surveillance          HLD (hyperlipidemia)    Overview     Stable on current regimen of pravachol .          Aortic atherosclerosis - seen on CXR 8/28/13 - Primary    Overview     Stable             Oncology    Personal history of malignant neoplasm of breast    Overview     Cont current surveillance             Other    Other insomnia    Overview     Is on Restoril currently is working well  reviewed          Lymphedema    Overview     Lasix 20 mg as needed            Dizziness    Overview     Check cbc today            As above, continue current medications and maintain follow up with specialists.  Return to clinic in 6 months.      Frederick W Dantagnan [facility]

## 2024-08-31 ENCOUNTER — EXTERNAL CHRONIC CARE MANAGEMENT (OUTPATIENT)
Dept: PRIMARY CARE CLINIC | Facility: CLINIC | Age: 84
End: 2024-08-31
Payer: MEDICARE

## 2024-08-31 PROCEDURE — 99490 CHRNC CARE MGMT STAFF 1ST 20: CPT | Mod: PBBFAC | Performed by: INTERNAL MEDICINE

## 2024-08-31 PROCEDURE — 99439 CHRNC CARE MGMT STAF EA ADDL: CPT | Mod: PBBFAC | Performed by: INTERNAL MEDICINE

## 2024-08-31 PROCEDURE — 99439 CHRNC CARE MGMT STAF EA ADDL: CPT | Mod: S$PBB,,, | Performed by: INTERNAL MEDICINE

## 2024-08-31 PROCEDURE — 99490 CHRNC CARE MGMT STAFF 1ST 20: CPT | Mod: S$PBB,,, | Performed by: INTERNAL MEDICINE

## 2024-09-30 ENCOUNTER — EXTERNAL CHRONIC CARE MANAGEMENT (OUTPATIENT)
Dept: PRIMARY CARE CLINIC | Facility: CLINIC | Age: 84
End: 2024-09-30
Payer: MEDICARE

## 2024-09-30 PROCEDURE — 99490 CHRNC CARE MGMT STAFF 1ST 20: CPT | Mod: S$PBB,,, | Performed by: INTERNAL MEDICINE

## 2024-09-30 PROCEDURE — 99490 CHRNC CARE MGMT STAFF 1ST 20: CPT | Mod: PBBFAC | Performed by: INTERNAL MEDICINE

## 2024-10-02 ENCOUNTER — OFFICE VISIT (OUTPATIENT)
Dept: CARDIOLOGY | Facility: CLINIC | Age: 84
End: 2024-10-02
Payer: MEDICARE

## 2024-10-02 ENCOUNTER — PATIENT MESSAGE (OUTPATIENT)
Dept: CARDIOLOGY | Facility: CLINIC | Age: 84
End: 2024-10-02

## 2024-10-02 VITALS
HEART RATE: 71 BPM | DIASTOLIC BLOOD PRESSURE: 81 MMHG | SYSTOLIC BLOOD PRESSURE: 135 MMHG | HEIGHT: 59 IN | BODY MASS INDEX: 26.45 KG/M2 | WEIGHT: 131.19 LBS

## 2024-10-02 DIAGNOSIS — I48.11 LONGSTANDING PERSISTENT ATRIAL FIBRILLATION: Chronic | ICD-10-CM

## 2024-10-02 DIAGNOSIS — Z51.11 ENCOUNTER FOR ANTINEOPLASTIC CHEMOTHERAPY: ICD-10-CM

## 2024-10-02 DIAGNOSIS — I48.20 CHRONIC ATRIAL FIBRILLATION: ICD-10-CM

## 2024-10-02 DIAGNOSIS — K21.00 GASTROESOPHAGEAL REFLUX DISEASE WITH ESOPHAGITIS: ICD-10-CM

## 2024-10-02 DIAGNOSIS — I35.0 NONRHEUMATIC AORTIC VALVE STENOSIS: Chronic | ICD-10-CM

## 2024-10-02 DIAGNOSIS — I10 ESSENTIAL HYPERTENSION: ICD-10-CM

## 2024-10-02 DIAGNOSIS — I50.32 CHRONIC DIASTOLIC CONGESTIVE HEART FAILURE: Primary | ICD-10-CM

## 2024-10-02 PROCEDURE — 99999 PR PBB SHADOW E&M-EST. PATIENT-LVL III: CPT | Mod: PBBFAC,,, | Performed by: INTERNAL MEDICINE

## 2024-10-02 PROCEDURE — 99213 OFFICE O/P EST LOW 20 MIN: CPT | Mod: PBBFAC | Performed by: INTERNAL MEDICINE

## 2024-10-02 PROCEDURE — 93005 ELECTROCARDIOGRAM TRACING: CPT | Mod: PBBFAC | Performed by: INTERNAL MEDICINE

## 2024-10-02 RX ORDER — METOPROLOL SUCCINATE 100 MG/1
TABLET, EXTENDED RELEASE ORAL
Qty: 90 TABLET | Refills: 3 | Status: SHIPPED | OUTPATIENT
Start: 2024-10-02

## 2024-10-02 NOTE — PROGRESS NOTES
Subjective:   10/02/2024     Patient ID:  Claudia Sierra is a 84 y.o. female who presents for evaulation of Shortness of Breath and Atrial Fibrillation      Patient in atrial fibrillation, appears to be accepted as rhythm of choice, but comes in with increasing dyspnea on exertion and low blood pressure with standing making her lightheaded.  She is on metoprolol succinate 100 mg daily.  She does not take diuretics.  An echocardiogram in April of 2024 did show pulmonary hypertension with normal left ventricular systolic function.  She continues to have dyspnea and occasionally wakes from sleep short of breath.        Past Medical History:   Diagnosis Date    *Atrial fibrillation     Breast cancer     Right breast cancer    Hypertension     Slow transit constipation 12/19/2013       Review of patient's allergies indicates:   Allergen Reactions    Codeine Other (See Comments)     Feel like (climbing the walls)    Hydrocodone Other (See Comments)     Restless and anxious similar to codeine. Tolerated oxycodone without issue.    Benadryl [diphenhydramine hcl] Anxiety         Current Outpatient Medications:     acetaminophen (TYLENOL) 650 MG TbSR, Take 1 tablet (650 mg total) by mouth every 6 to 8 hours as needed (pain)., Disp: 120 tablet, Rfl: 0    ascorbic acid, vitamin C, (VITAMIN C) 1000 MG tablet, Take 1,000 mg by mouth once daily., Disp: , Rfl:     b complex vitamins tablet, Take 1 tablet by mouth once daily., Disp: , Rfl:     beta-carotene,A,-vits C,E/mins (OCUVITE ORAL), Take 1 tablet by mouth every evening., Disp: , Rfl:     gabapentin (NEURONTIN) 600 MG tablet, Take 1 tablet (600 mg total) by mouth nightly as needed (pain)., Disp: 90 tablet, Rfl: 2    lactulose (CHRONULAC) 10 gram/15 mL solution, Take 45 mLs (30 g total) by mouth 3 (three) times daily as needed (constipation)., Disp: 473 mL, Rfl: 4    loratadine (CLARITIN) 10 mg tablet, Take 10 mg by mouth once daily., Disp: , Rfl:     polycarbophil  (FIBERCON) 625 mg tablet, Take 625 mg by mouth once daily., Disp: , Rfl:     pravastatin (PRAVACHOL) 40 MG tablet, TAKE 1 TABLET BY MOUTH EVERY DAY, Disp: 90 tablet, Rfl: 3    vitamin D (VITAMIN D3) 1000 units Tab, Take 1,000 Units by mouth once daily., Disp: , Rfl:     XARELTO 20 mg Tab, TAKE 1 TABLET (20 MG TOTAL) BY MOUTH BEFORE DINNER., Disp: 90 tablet, Rfl: 3    empagliflozin (JARDIANCE) 10 mg tablet, Take 1 tablet (10 mg total) by mouth once daily., Disp: 30 tablet, Rfl: 11    metoprolol succinate (TOPROL-XL) 100 MG 24 hr tablet, 1/2 daily, Disp: 90 tablet, Rfl: 3     Objective:   Review of Systems   Cardiovascular:  Positive for dyspnea on exertion, orthopnea and paroxysmal nocturnal dyspnea. Negative for chest pain, claudication, cyanosis, irregular heartbeat, leg swelling, near-syncope, palpitations and syncope.         Vitals:    10/02/24 1409   BP: 135/81   Pulse: 71     Wt Readings from Last 3 Encounters:   10/02/24 59.5 kg (131 lb 2.8 oz)   08/27/24 60.4 kg (133 lb 2.5 oz)   07/03/24 57.9 kg (127 lb 8.6 oz)     Temp Readings from Last 3 Encounters:   08/27/24 98 °F (36.7 °C) (Oral)   02/20/24 98 °F (36.7 °C) (Oral)   06/12/23 97.8 °F (36.6 °C)     BP Readings from Last 3 Encounters:   10/02/24 135/81   08/27/24 (!) 160/94   07/03/24 137/78     Pulse Readings from Last 3 Encounters:   10/02/24 71   08/27/24 80   07/03/24 76             Physical Exam  Vitals reviewed.   Constitutional:       General: She is not in acute distress.     Appearance: She is well-developed.   HENT:      Head: Normocephalic and atraumatic.      Nose: Nose normal.   Eyes:      Conjunctiva/sclera: Conjunctivae normal.      Pupils: Pupils are equal, round, and reactive to light.   Neck:      Vascular: No carotid bruit or JVD.   Cardiovascular:      Rate and Rhythm: Normal rate. Rhythm irregular.      Pulses: Intact distal pulses.      Heart sounds: Normal heart sounds. No murmur heard.     No friction rub. No gallop.   Pulmonary:       Effort: Pulmonary effort is normal. No respiratory distress.      Breath sounds: Normal breath sounds. No wheezing or rales.   Chest:      Chest wall: No tenderness.   Abdominal:      General: Bowel sounds are normal. There is no distension.      Palpations: Abdomen is soft.      Tenderness: There is no abdominal tenderness.   Musculoskeletal:         General: No tenderness or deformity. Normal range of motion.      Cervical back: Normal range of motion and neck supple.      Right lower leg: Edema (trace) present.      Left lower leg: Edema (Trace) present.   Skin:     General: Skin is warm and dry.      Findings: No erythema or rash.   Neurological:      Mental Status: She is alert and oriented to person, place, and time.      Cranial Nerves: No cranial nerve deficit.      Motor: No abnormal muscle tone.      Coordination: Coordination normal.   Psychiatric:         Behavior: Behavior normal.         Thought Content: Thought content normal.         Judgment: Judgment normal.           Lab Results   Component Value Date    CHOL 124 04/25/2024    CHOL 194 10/25/2018    CHOL 162 01/11/2018     Lab Results   Component Value Date    HDL 50 04/25/2024    HDL 74 10/25/2018    HDL 65 01/11/2018     Lab Results   Component Value Date    LDLCALC 63.6 04/25/2024    LDLCALC 102.6 10/25/2018    LDLCALC 83.6 01/11/2018     Lab Results   Component Value Date    ALT 25 04/25/2024    AST 40 04/25/2024    AST 30 06/01/2023    AST 30 01/26/2023     Lab Results   Component Value Date    CREATININE 0.9 04/25/2024    BUN 10 04/25/2024     04/25/2024    K 3.9 04/25/2024    CO2 26 04/25/2024    CO2 25 06/03/2023    CO2 25 06/02/2023     Lab Results   Component Value Date    HGB 12.1 08/27/2024    HCT 37.1 08/27/2024    HCT 36.5 (L) 06/03/2023    HCT 34.9 (L) 06/02/2023               EKG shows atrial fibrillation with controlled ventricular response          Assessment and Plan:     Chronic diastolic congestive heart failure  -      empagliflozin (JARDIANCE) 10 mg tablet; Take 1 tablet (10 mg total) by mouth once daily.  Dispense: 30 tablet; Refill: 11  -     CBC Auto Differential; Future; Expected date: 10/09/2024  -     Basic Metabolic Panel; Future; Expected date: 10/09/2024  -     NT-Pro Natriuretic Peptide; Future; Expected date: 10/02/2024  -     Echo; Future; Expected date: 10/09/2024    Longstanding persistent atrial fibrillation  -     Ambulatory referral/consult to Cardiology  -     IN OFFICE EKG 12-LEAD (to [facility])    Nonrheumatic aortic valve stenosis  -     Ambulatory referral/consult to Cardiology    Encounter for antineoplastic chemotherapy    Essential hypertension    Chronic atrial fibrillation  -     metoprolol succinate (TOPROL-XL) 100 MG 24 hr tablet; 1/2 daily  Dispense: 90 tablet; Refill: 3    Gastroesophageal reflux disease with esophagitis     Patient with pulmonary hypertension and normal left ventricular systolic function, will accept atrial fibrillation as rhythm of choice.  She will have blood work soon, echocardiogram will be repeated.  I will add SG LT 2 inhibitor therapy for pulmonary hypertension and dyspnea, because of lightheadedness associated low blood pressures, decrease metoprolol.  Office visit on return to clinic in 2 weeks after lab in ECHO    No follow-ups on file.          Future Appointments   Date Time Provider Department Center   12/4/2024  2:00 PM Tiana Rao MD [facility] SLEEP [facility]   2/4/2025  3:30 PM [facility] MAMMO1 [facility] MAMMO [facility]   2/11/2025 11:20 AM Mingo Wynne MD [facility] HEM ONC [facility]   2/28/2025  1:15 PM Sajan Curtis MD [facility]

## 2024-10-04 LAB
OHS QRS DURATION: 66 MS
OHS QTC CALCULATION: 430 MS

## 2024-10-07 ENCOUNTER — TELEPHONE (OUTPATIENT)
Dept: PRIMARY CARE CLINIC | Facility: CLINIC | Age: 84
End: 2024-10-07
Payer: MEDICARE

## 2024-10-07 DIAGNOSIS — B02.29 POST HERPETIC NEURALGIA: ICD-10-CM

## 2024-10-07 DIAGNOSIS — I48.20 CHRONIC ATRIAL FIBRILLATION: ICD-10-CM

## 2024-10-07 RX ORDER — GABAPENTIN 600 MG/1
1200 TABLET ORAL DAILY
Qty: 180 TABLET | Refills: 0 | Status: SHIPPED | OUTPATIENT
Start: 2024-10-07 | End: 2025-01-05

## 2024-10-07 RX ORDER — METOPROLOL SUCCINATE 50 MG/1
50 TABLET, EXTENDED RELEASE ORAL DAILY
Qty: 90 TABLET | Refills: 3 | Status: SHIPPED | OUTPATIENT
Start: 2024-10-07

## 2024-10-10 ENCOUNTER — HOSPITAL ENCOUNTER (OUTPATIENT)
Dept: CARDIOLOGY | Facility: HOSPITAL | Age: 84
Discharge: HOME | End: 2024-10-10
Attending: INTERNAL MEDICINE
Payer: MEDICARE

## 2024-10-10 VITALS
HEIGHT: 59 IN | HEART RATE: 80 BPM | DIASTOLIC BLOOD PRESSURE: 70 MMHG | WEIGHT: 131 LBS | BODY MASS INDEX: 26.41 KG/M2 | SYSTOLIC BLOOD PRESSURE: 115 MMHG

## 2024-10-10 DIAGNOSIS — I50.32 CHRONIC DIASTOLIC CONGESTIVE HEART FAILURE: ICD-10-CM

## 2024-10-10 LAB
ASCENDING AORTA: 2.89 CM
AV AREA BY CONTINUOUS VTI: 2.7 CM2
AV INDEX (PROSTH): 0.92
AV LVOT MEAN GRADIENT: 7 MMHG
AV LVOT PEAK GRADIENT: 8 MMHG
AV MEAN GRADIENT: 7.3 MMHG
AV PEAK GRADIENT: 13 MMHG
AV VALVE AREA BY VELOCITY RATIO: 2.2 CM²
AV VALVE AREA: 2.6 CM2
AV VELOCITY RATIO: 0.78
BSA FOR ECHO PROCEDURE: 1.57 M2
CV ECHO LV RWT: 0.44 CM
DOP CALC AO PEAK VEL: 1.8 M/S
DOP CALC AO VTI: 42 CM
DOP CALC LVOT AREA: 2.8 CM2
DOP CALC LVOT DIAMETER: 1.9 CM
DOP CALC LVOT PEAK VEL: 1.4 M/S
DOP CALC LVOT STROKE VOLUME: 109.7 CM3
DOP CALC RVOT AREA: 2.54 CM2
DOP CALC RVOT DIAMETER: 1.8 CM
DOP CALCLVOT PEAK VEL VTI: 38.7 CM
E/E' RATIO: 13.38 M/S
ECHO EF ESTIMATED: 53 %
ECHO LV POSTERIOR WALL: 0.7 CM (ref 0.6–1.1)
FRACTIONAL SHORTENING: 25 % (ref 28–44)
HR MV ECHO: 80 BPM
INTERVENTRICULAR SEPTUM: 0.6 CM (ref 0.6–1.1)
IVC DIAMETER: 2 CM
LA MAJOR: 4.77 CM
LA MINOR: 4.06 CM
LA WIDTH: 3.65 CM
LEFT ATRIUM SIZE: 3.68 CM
LEFT ATRIUM VOLUME INDEX MOD: 32.3 ML/M2
LEFT ATRIUM VOLUME INDEX: 32.5 ML/M2
LEFT ATRIUM VOLUME MOD: 49.7 ML
LEFT ATRIUM VOLUME: 50.08 CM3
LEFT INTERNAL DIMENSION IN SYSTOLE: 2.4 CM (ref 2.1–4)
LEFT VENTRICLE DIASTOLIC VOLUME INDEX: 26.46 ML/M2
LEFT VENTRICLE DIASTOLIC VOLUME: 40.75 ML
LEFT VENTRICLE MASS INDEX: 31.9 G/M2
LEFT VENTRICLE SYSTOLIC VOLUME INDEX: 12.5 ML/M2
LEFT VENTRICLE SYSTOLIC VOLUME: 19.28 ML
LEFT VENTRICULAR INTERNAL DIMENSION IN DIASTOLE: 3.2 CM (ref 3.5–6)
LEFT VENTRICULAR MASS: 49.2 G
LV LATERAL E/E' RATIO: 13.38 M/S
LV SEPTAL E/E' RATIO: 13.38 M/S
MV A" WAVE DURATION": 148.43 MS
MV PEAK E VEL: 1.07 M/S
MV PEAK GRADIENT: 2 MMHG
OHS CV RV/LV RATIO: 0.94 CM
PISA TR MAX VEL: 3.5 M/S
PULM VEIN A" WAVE DURATION": 148.43 MS
PULM VEIN S/D RATIO: 1.19
PULMONIC VEIN PEAK A VELOCITY: 0.4 M/S
PV PEAK D VEL: 0.37 M/S
PV PEAK S VEL: 0.44 M/S
RA MAJOR: 4.21 CM
RA PRESSURE ESTIMATED: 15 MMHG
RA WIDTH: 3.03 CM
RIGHT ATRIAL AREA: 15.3 CM2
RIGHT VENTRICLE DIASTOLIC BASEL DIMENSION: 3 CM
RV TB RVSP: 19 MMHG
RV TISSUE DOPPLER FREE WALL SYSTOLIC VELOCITY 1 (APICAL 4 CHAMBER VIEW): 14.85 CM/S
SINUS: 2.87 CM
STJ: 2.91 CM
TDI LATERAL: 0.08 M/S
TDI SEPTAL: 0.08 M/S
TDI: 0.08 M/S
TR MAX PG: 49 MMHG
TRICUSPID ANNULAR PLANE SYSTOLIC EXCURSION: 1.56 CM
TV PEAK GRADIENT: 64 MMHG
TV REST PULMONARY ARTERY PRESSURE: 64 MMHG
Z-SCORE OF LEFT VENTRICULAR DIMENSION IN END DIASTOLE: -3.35
Z-SCORE OF LEFT VENTRICULAR DIMENSION IN END SYSTOLE: -1.15

## 2024-10-10 PROCEDURE — 93306 TTE W/DOPPLER COMPLETE: CPT | Mod: 26,,, | Performed by: INTERNAL MEDICINE

## 2024-10-10 PROCEDURE — 93306 TTE W/DOPPLER COMPLETE: CPT

## 2024-10-16 ENCOUNTER — OFFICE VISIT (OUTPATIENT)
Dept: CARDIOLOGY | Facility: CLINIC | Age: 84
End: 2024-10-16
Payer: MEDICARE

## 2024-10-16 VITALS
SYSTOLIC BLOOD PRESSURE: 163 MMHG | DIASTOLIC BLOOD PRESSURE: 81 MMHG | WEIGHT: 130.75 LBS | BODY MASS INDEX: 26.4 KG/M2 | HEART RATE: 77 BPM

## 2024-10-16 DIAGNOSIS — I35.0 NONRHEUMATIC AORTIC VALVE STENOSIS: Chronic | ICD-10-CM

## 2024-10-16 DIAGNOSIS — I10 ESSENTIAL HYPERTENSION: ICD-10-CM

## 2024-10-16 DIAGNOSIS — I48.11 LONGSTANDING PERSISTENT ATRIAL FIBRILLATION: Chronic | ICD-10-CM

## 2024-10-16 DIAGNOSIS — I50.33 ACUTE ON CHRONIC DIASTOLIC CONGESTIVE HEART FAILURE: Primary | ICD-10-CM

## 2024-10-16 PROCEDURE — 99213 OFFICE O/P EST LOW 20 MIN: CPT | Mod: PBBFAC | Performed by: INTERNAL MEDICINE

## 2024-10-16 PROCEDURE — 99999 PR PBB SHADOW E&M-EST. PATIENT-LVL III: CPT | Mod: PBBFAC,,, | Performed by: INTERNAL MEDICINE

## 2024-10-16 PROCEDURE — 99213 OFFICE O/P EST LOW 20 MIN: CPT | Mod: S$PBB,,, | Performed by: INTERNAL MEDICINE

## 2024-10-16 RX ORDER — FUROSEMIDE 20 MG/1
20 TABLET ORAL DAILY
Qty: 30 TABLET | Refills: 11 | Status: SHIPPED | OUTPATIENT
Start: 2024-10-16 | End: 2025-10-16

## 2024-10-16 RX ORDER — SPIRONOLACTONE 25 MG/1
25 TABLET ORAL DAILY
Qty: 30 TABLET | Refills: 11 | Status: SHIPPED | OUTPATIENT
Start: 2024-10-16 | End: 2025-10-16

## 2024-10-16 NOTE — PROGRESS NOTES
Subjective:   10/16/2024     Patient ID:  Claudia Sierra is a 84 y.o. female who presents for evaulation of Follow-up      Here for follow-up, echocardiography did show pulmonary hypertension with normal left ventricular systolic function and significant tricuspid regurgitation.  There was elevated central venous pressure.  She continues to have dyspnea on exertion.  Mild lower extremity edema is present.  She does feel that she has urinated more since initiation of SG LT 2 inhibitor therapy, but continues to have shortness of breath.  She is not having chest pains or tightness.    Her sodium was 133, renal function normal, BNP 15 50.            Prior note reviewed goal  Patient in atrial fibrillation, appears to be accepted as rhythm of choice, but comes in with increasing dyspnea on exertion and low blood pressure with standing making her lightheaded.  She is on metoprolol succinate 100 mg daily.  She does not take diuretics.  An echocardiogram in April of 2024 did show pulmonary hypertension with normal left ventricular systolic function.  She continues to have dyspnea and occasionally wakes from sleep short of breath.      Assessment and Plan:    Chronic diastolic congestive heart failure  -     empagliflozin (JARDIANCE) 10 mg tablet; Take 1 tablet (10 mg total) by mouth once daily.  Dispense: 30 tablet; Refill: 11  -     CBC Auto Differential; Future; Expected date: 10/09/2024  -     Basic Metabolic Panel; Future; Expected date: 10/09/2024  -     NT-Pro Natriuretic Peptide; Future; Expected date: 10/02/2024  -     Echo; Future; Expected date: 10/09/2024    Longstanding persistent atrial fibrillation  -     Ambulatory referral/consult to Cardiology  -     IN OFFICE EKG 12-LEAD (to Muse)    Nonrheumatic aortic valve stenosis  -     Ambulatory referral/consult to Cardiology    Encounter for antineoplastic chemotherapy    Essential hypertension    Chronic atrial fibrillation  -     metoprolol succinate  (TOPROL-XL) 100 MG 24 hr tablet; 1/2 daily  Dispense: 90 tablet; Refill: 3    Gastroesophageal reflux disease with esophagitis     Patient with pulmonary hypertension and normal left ventricular systolic function, will accept atrial fibrillation as rhythm of choice.  She will have blood work soon, echocardiogram will be repeated.  I will add SG LT 2 inhibitor therapy for pulmonary hypertension and dyspnea, because of lightheadedness associated low blood pressures, decrease metoprolol.  Office visit on return to clinic in 2 weeks after lab in ECHO        Past Medical History:   Diagnosis Date    *Atrial fibrillation     Breast cancer     Right breast cancer    Hypertension     Slow transit constipation 12/19/2013       Review of patient's allergies indicates:   Allergen Reactions    Codeine Other (See Comments)     Feel like (climbing the walls)    Hydrocodone Other (See Comments)     Restless and anxious similar to codeine. Tolerated oxycodone without issue.    Benadryl [diphenhydramine hcl] Anxiety         Current Outpatient Medications:     acetaminophen (TYLENOL) 650 MG TbSR, Take 1 tablet (650 mg total) by mouth every 6 to 8 hours as needed (pain)., Disp: 120 tablet, Rfl: 0    ascorbic acid, vitamin C, (VITAMIN C) 1000 MG tablet, Take 1,000 mg by mouth once daily., Disp: , Rfl:     b complex vitamins tablet, Take 1 tablet by mouth once daily., Disp: , Rfl:     beta-carotene,A,-vits C,E/mins (OCUVITE ORAL), Take 1 tablet by mouth every evening., Disp: , Rfl:     empagliflozin (JARDIANCE) 10 mg tablet, Take 1 tablet (10 mg total) by mouth once daily., Disp: 30 tablet, Rfl: 11    gabapentin (NEURONTIN) 600 MG tablet, Take 2 tablets (1,200 mg total) by mouth once daily., Disp: 180 tablet, Rfl: 0    lactulose (CHRONULAC) 10 gram/15 mL solution, Take 45 mLs (30 g total) by mouth 3 (three) times daily as needed (constipation)., Disp: 473 mL, Rfl: 4    loratadine (CLARITIN) 10 mg tablet, Take 10 mg by mouth once daily.,  Disp: , Rfl:     metoprolol succinate (TOPROL-XL) 50 MG 24 hr tablet, Take 1 tablet (50 mg total) by mouth once daily., Disp: 90 tablet, Rfl: 3    polycarbophil (FIBERCON) 625 mg tablet, Take 625 mg by mouth once daily., Disp: , Rfl:     pravastatin (PRAVACHOL) 40 MG tablet, TAKE 1 TABLET BY MOUTH EVERY DAY, Disp: 90 tablet, Rfl: 3    vitamin D (VITAMIN D3) 1000 units Tab, Take 1,000 Units by mouth once daily., Disp: , Rfl:     XARELTO 20 mg Tab, TAKE 1 TABLET (20 MG TOTAL) BY MOUTH BEFORE DINNER., Disp: 90 tablet, Rfl: 3    furosemide (LASIX) 20 MG tablet, Take 1 tablet (20 mg total) by mouth once daily., Disp: 30 tablet, Rfl: 11    spironolactone (ALDACTONE) 25 MG tablet, Take 1 tablet (25 mg total) by mouth once daily., Disp: 30 tablet, Rfl: 11     Objective:   Review of Systems   Cardiovascular:  Positive for dyspnea on exertion, leg swelling, orthopnea and paroxysmal nocturnal dyspnea. Negative for chest pain, claudication, cyanosis, irregular heartbeat, near-syncope, palpitations and syncope.         Vitals:    10/16/24 1340   BP: (!) 163/81   Pulse: 77     Wt Readings from Last 3 Encounters:   10/16/24 59.3 kg (130 lb 11.7 oz)   10/10/24 59.4 kg (131 lb)   10/02/24 59.5 kg (131 lb 2.8 oz)     Temp Readings from Last 3 Encounters:   08/27/24 98 °F (36.7 °C) (Oral)   02/20/24 98 °F (36.7 °C) (Oral)   06/12/23 97.8 °F (36.6 °C)     BP Readings from Last 3 Encounters:   10/16/24 (!) 163/81   10/10/24 115/70   10/02/24 135/81     Pulse Readings from Last 3 Encounters:   10/16/24 77   10/10/24 80   10/02/24 71             Physical Exam  Vitals reviewed.   Constitutional:       General: She is not in acute distress.     Appearance: She is well-developed.   HENT:      Head: Normocephalic and atraumatic.      Nose: Nose normal.   Eyes:      Conjunctiva/sclera: Conjunctivae normal.      Pupils: Pupils are equal, round, and reactive to light.   Neck:      Vascular: No carotid bruit or JVD.   Cardiovascular:      Rate  and Rhythm: Normal rate. Rhythm irregular.      Pulses: Intact distal pulses.      Heart sounds: Normal heart sounds. No murmur heard.     No friction rub. No gallop.   Pulmonary:      Effort: Pulmonary effort is normal. No respiratory distress.      Breath sounds: Normal breath sounds. No wheezing or rales.   Chest:      Chest wall: No tenderness.   Abdominal:      General: Bowel sounds are normal. There is no distension.      Palpations: Abdomen is soft.      Tenderness: There is no abdominal tenderness.   Musculoskeletal:         General: No tenderness or deformity. Normal range of motion.      Cervical back: Normal range of motion and neck supple.      Right lower leg: Edema (trace) present.      Left lower leg: Edema (Trace) present.   Skin:     General: Skin is warm and dry.      Findings: No erythema or rash.   Neurological:      Mental Status: She is alert and oriented to person, place, and time.      Cranial Nerves: No cranial nerve deficit.      Motor: No abnormal muscle tone.      Coordination: Coordination normal.   Psychiatric:         Behavior: Behavior normal.         Thought Content: Thought content normal.         Judgment: Judgment normal.           Lab Results   Component Value Date    CHOL 124 04/25/2024    CHOL 194 10/25/2018    CHOL 162 01/11/2018     Lab Results   Component Value Date    HDL 50 04/25/2024    HDL 74 10/25/2018    HDL 65 01/11/2018     Lab Results   Component Value Date    LDLCALC 63.6 04/25/2024    LDLCALC 102.6 10/25/2018    LDLCALC 83.6 01/11/2018     Lab Results   Component Value Date    ALT 25 04/25/2024    AST 40 04/25/2024    AST 30 06/01/2023    AST 30 01/26/2023     Lab Results   Component Value Date    CREATININE 0.8 10/10/2024    BUN 8 10/10/2024     (L) 10/10/2024    K 4.0 10/10/2024    CO2 25 10/10/2024    CO2 26 04/25/2024    CO2 25 06/03/2023     Lab Results   Component Value Date    HGB 11.8 (L) 10/10/2024    HCT 37.5 10/10/2024    HCT 37.1 08/27/2024     HCT 36.5 (L) 06/03/2023               EKG shows atrial fibrillation with controlled ventricular response          Assessment and Plan:     Acute on chronic diastolic congestive heart failure  Comments:  Continue empagliflozin  Add furosemide 20 and spironolactone 25 daily  Weigh daily  Sodium low, avoid excess fluid  Orders:  -     Basic Metabolic Panel; Future; Expected date: 10/23/2024  -     NT-Pro Natriuretic Peptide; Future; Expected date: 10/23/2024  -     spironolactone (ALDACTONE) 25 MG tablet; Take 1 tablet (25 mg total) by mouth once daily.  Dispense: 30 tablet; Refill: 11  -     furosemide (LASIX) 20 MG tablet; Take 1 tablet (20 mg total) by mouth once daily.  Dispense: 30 tablet; Refill: 11    Essential hypertension  Comments:  Should improve with diuresis    Longstanding persistent atrial fibrillation  Comments:  Accept as rhythm of choice    Nonrheumatic aortic valve stenosis  Comments:  Not severe          Follow up in about 2 weeks (around 10/30/2024).          Future Appointments   Date Time Provider Department Center   12/4/2024  2:00 PM Tiana Rao MD [facility] SLEEP [facility]   2/4/2025  3:30 PM [facility] MAMMO1 [facility] MAMMO [facility]   2/11/2025 11:20 AM Mingo Wynne MD [facility] HEM ONC [facility]   2/28/2025  1:15 PM Sajan Curtis MD [facility]

## 2024-10-21 ENCOUNTER — HOSPITAL ENCOUNTER (EMERGENCY)
Facility: HOSPITAL | Age: 84
Discharge: HOME | End: 2024-10-21
Attending: EMERGENCY MEDICINE
Payer: MEDICARE

## 2024-10-21 ENCOUNTER — TELEPHONE (OUTPATIENT)
Dept: CARDIOLOGY | Facility: CLINIC | Age: 84
End: 2024-10-21
Payer: MEDICARE

## 2024-10-21 VITALS
HEIGHT: 59 IN | DIASTOLIC BLOOD PRESSURE: 76 MMHG | RESPIRATION RATE: 18 BRPM | TEMPERATURE: 97 F | BODY MASS INDEX: 24.58 KG/M2 | OXYGEN SATURATION: 98 % | HEART RATE: 77 BPM | WEIGHT: 121.94 LBS | SYSTOLIC BLOOD PRESSURE: 174 MMHG

## 2024-10-21 DIAGNOSIS — I95.9 TRANSIENT HYPOTENSION: Primary | ICD-10-CM

## 2024-10-21 LAB
ALBUMIN SERPL BCP-MCNC: 3.9 G/DL (ref 3.5–5.2)
ALP SERPL-CCNC: 83 U/L (ref 40–150)
ALT SERPL W/O P-5'-P-CCNC: 21 U/L (ref 10–44)
ANION GAP SERPL CALC-SCNC: 12 MMOL/L (ref 8–16)
AST SERPL-CCNC: 35 U/L (ref 10–40)
BASOPHILS # BLD AUTO: 0.05 K/UL (ref 0–0.2)
BASOPHILS NFR BLD: 0.7 % (ref 0–1.9)
BILIRUB SERPL-MCNC: 0.8 MG/DL (ref 0.1–1)
BNP SERPL-MCNC: 299 PG/ML (ref 0–99)
BUN SERPL-MCNC: 19 MG/DL (ref 8–23)
CALCIUM SERPL-MCNC: 10.9 MG/DL (ref 8.7–10.5)
CHLORIDE SERPL-SCNC: 98 MMOL/L (ref 95–110)
CO2 SERPL-SCNC: 27 MMOL/L (ref 23–29)
CREAT SERPL-MCNC: 1.4 MG/DL (ref 0.5–1.4)
DIFFERENTIAL METHOD BLD: ABNORMAL
EOSINOPHIL # BLD AUTO: 0.1 K/UL (ref 0–0.5)
EOSINOPHIL NFR BLD: 1.9 % (ref 0–8)
ERYTHROCYTE [DISTWIDTH] IN BLOOD BY AUTOMATED COUNT: 15.4 % (ref 11.5–14.5)
EST. GFR  (NO RACE VARIABLE): 37 ML/MIN/1.73 M^2
GLUCOSE SERPL-MCNC: 127 MG/DL (ref 70–110)
HCT VFR BLD AUTO: 41.8 % (ref 37–48.5)
HGB BLD-MCNC: 14 G/DL (ref 12–16)
IMM GRANULOCYTES # BLD AUTO: 0.01 K/UL (ref 0–0.04)
IMM GRANULOCYTES NFR BLD AUTO: 0.1 % (ref 0–0.5)
LYMPHOCYTES # BLD AUTO: 1.6 K/UL (ref 1–4.8)
LYMPHOCYTES NFR BLD: 24.2 % (ref 18–48)
MCH RBC QN AUTO: 31.6 PG (ref 27–31)
MCHC RBC AUTO-ENTMCNC: 33.5 G/DL (ref 32–36)
MCV RBC AUTO: 94 FL (ref 82–98)
MONOCYTES # BLD AUTO: 0.9 K/UL (ref 0.3–1)
MONOCYTES NFR BLD: 13 % (ref 4–15)
NEUTROPHILS # BLD AUTO: 4 K/UL (ref 1.8–7.7)
NEUTROPHILS NFR BLD: 60.1 % (ref 38–73)
NRBC BLD-RTO: 0 /100 WBC
PLATELET # BLD AUTO: 294 K/UL (ref 150–450)
PMV BLD AUTO: 9.6 FL (ref 9.2–12.9)
POTASSIUM SERPL-SCNC: 4 MMOL/L (ref 3.5–5.1)
PROT SERPL-MCNC: 7.7 G/DL (ref 6–8.4)
RBC # BLD AUTO: 4.43 M/UL (ref 4–5.4)
SODIUM SERPL-SCNC: 137 MMOL/L (ref 136–145)
TROPONIN I SERPL DL<=0.01 NG/ML-MCNC: 0.01 NG/ML (ref 0–0.03)
TROPONIN I SERPL DL<=0.01 NG/ML-MCNC: 0.01 NG/ML (ref 0–0.03)
TROPONIN I SERPL DL<=0.01 NG/ML-MCNC: <0.006 NG/ML (ref 0–0.03)
WBC # BLD AUTO: 6.69 K/UL (ref 3.9–12.7)

## 2024-10-21 PROCEDURE — 93005 ELECTROCARDIOGRAM TRACING: CPT

## 2024-10-21 PROCEDURE — 85025 COMPLETE CBC W/AUTO DIFF WBC: CPT | Performed by: EMERGENCY MEDICINE

## 2024-10-21 PROCEDURE — 99285 EMERGENCY DEPT VISIT HI MDM: CPT | Mod: 25

## 2024-10-21 PROCEDURE — 83880 ASSAY OF NATRIURETIC PEPTIDE: CPT | Performed by: EMERGENCY MEDICINE

## 2024-10-21 PROCEDURE — 25000003 PHARM REV CODE 250: Performed by: EMERGENCY MEDICINE

## 2024-10-21 PROCEDURE — 93010 ELECTROCARDIOGRAM REPORT: CPT | Mod: ,,, | Performed by: INTERNAL MEDICINE

## 2024-10-21 PROCEDURE — 84484 ASSAY OF TROPONIN QUANT: CPT | Mod: 91 | Performed by: EMERGENCY MEDICINE

## 2024-10-21 PROCEDURE — 80053 COMPREHEN METABOLIC PANEL: CPT | Performed by: EMERGENCY MEDICINE

## 2024-10-21 PROCEDURE — 96360 HYDRATION IV INFUSION INIT: CPT

## 2024-10-21 RX ADMIN — SODIUM CHLORIDE 250 ML: 9 INJECTION, SOLUTION INTRAVENOUS at 03:10

## 2024-10-21 NOTE — DISCHARGE INSTRUCTIONS
Please do not take your furosemide or spironolactone until he can discuss with your primary care doctor or your cardiologist.  Please follow-up with your PCP or Cardiology later this week.  Please continue to check your blood pressure daily.  If you are having low blood pressures feeling lightheaded having chest pain or shortness of breath please come back to the emergency department to be re-evaluated.        Thank you for choosing [facility]! We appreciate you trusting us with your medical care.     It is important to remember that some problems are difficult to diagnose and may not be found during your first visit. Be sure to follow up with your primary care doctor and review any labs/imaging that was performed during your visit with them. If you do not have a primary care doctor, you may contact the one listed on your discharge paperwork, or you may also call the [facility] Appointment Desk at [phone] to schedule an appointment.     All medications may potentially have side effects and it is impossible to predict which medications may give you side effects. If you feel that you are having a negative effect of any medication you should immediately stop taking them and seek medical attention. Do not drive or make any important decisions for 24 hours if you have received any pain medications, sedatives or mood altering drugs during your ER visit.    We will be happy to take care of you for all of your future medical needs. You may return to the ER at any time for any new/concerning symptoms, worsening condition, or failure to improve. We hope you feel better soon.     Moise Villarreal MD MPH  Emergency Medicine

## 2024-10-21 NOTE — ED NOTES
Pt laying in bed watching tv. Chest rising and falling. Family member at bedside. Pt denies any other complaints at this time

## 2024-10-21 NOTE — ED PROVIDER NOTES
Encounter Date: 10/21/2024       History     Chief Complaint   Patient presents with    Hypotension     Patient presents to the ED complaining of hypotension that started a few days ago. Patient pressure at home 58/40. Patient endorses near syncope PTA. NAD noted. 112/76 in triage. States recently started 3 new Rx.     Claudia Sierra is a 84 y.o. female who  has a past medical history of *Atrial fibrillation, Breast cancer, Hypertension, and Slow transit constipation (12/19/2013).    The patient presents to the ED due to low blood pressure readings at home.  Patient has been having systolic blood pressures in the 50s today.  She checked her blood pressure 4 times and received similar readings.  She states she saw her cardiologist a few days ago who prescribed additional medications for blood pressure/congestive heart failure.  She denies any symptoms at this time but she felt very lightheaded and woozy prior to checking her blood pressure.  She did not take her blood pressure medicine today.  Last night she took her metoprolol which she has taken before and because she was feeling bad with the medication she broke upper medications that she was recently prescribe and took them 3 times yesterday.        Review of patient's allergies indicates:   Allergen Reactions    Codeine Other (See Comments)     Feel like (climbing the walls)    Hydrocodone Other (See Comments)     Restless and anxious similar to codeine. Tolerated oxycodone without issue.    Benadryl [diphenhydramine hcl] Anxiety     Past Medical History:   Diagnosis Date    *Atrial fibrillation     Breast cancer     Right breast cancer    Hypertension     Slow transit constipation 12/19/2013     Past Surgical History:   Procedure Laterality Date    ADENOIDECTOMY  70 years ago    At same time as tonsillectomy. .    APPENDECTOMY      ARTHROPLASTY OF HIP BY ANTERIOR APPROACH Right 01/31/2022    Procedure: ARTHROPLASTY, HIP, TOTAL, ANTERIOR APPROACH:RIGHT:  DEPUY-C-STEM+PINNACLE;  Surgeon: Cisco Mcneal III, MD;  Location: [facility] OR;  Service: Orthopedics;  Laterality: Right;    BREAST BIOPSY      right    BREAST LUMPECTOMY Right 2013    BREAST SURGERY  2013    EPIDURAL STEROID INJECTION INTO CERVICAL SPINE N/A 02/12/2020    Procedure: Injection-steroid-epidural-cervical--C7-T1 IL ELMIRA;  Surgeon: Alicia Proctor MD;  Location: [facility] PAIN MGT;  Service: Pain Management;  Laterality: N/A;  sign consent at [facility]    EYE SURGERY  2018    HYSTERECTOMY      JOINT REPLACEMENT  Hip, 2022    lymphnode removal      12 from Right axilla    PORTACATH PLACEMENT  2013    TONSILLECTOMY       Family History   Problem Relation Name Age of Onset    Heart disease Mother Candi     Hypertension Mother Candi     Diabetes Mother Candi     Heart disease Father Huang     Cancer Father Huang     Cervical cancer Sister Qing 79    Cancer Sister Qing     Hypertension Sister Pamella     Diabetes Son luis manuel     Heart disease Son luis manuel     Hypertension Son luis manuel      Social History     Tobacco Use    Smoking status: Never    Smokeless tobacco: Never   Substance Use Topics    Alcohol use: Yes     Alcohol/week: 3.0 standard drinks of alcohol     Types: 3 Glasses of wine per week     Comment: every 2 weeks    Drug use: No     Review of Systems   Constitutional:  Negative for chills and fever.   HENT:  Negative for sore throat.    Respiratory:  Negative for cough and shortness of breath.    Cardiovascular:  Negative for chest pain.   Gastrointestinal:  Negative for nausea and vomiting.   Genitourinary:  Negative for dysuria, frequency and urgency.   Musculoskeletal:  Negative for back pain, neck pain and neck stiffness.   Skin:  Negative for rash and wound.   Neurological:  Positive for weakness and light-headedness. Negative for syncope.   Hematological:  Does not bruise/bleed easily.   Psychiatric/Behavioral:  Negative for agitation.        Physical Exam     Initial Vitals [10/21/24 1128]    BP Pulse Resp Temp SpO2   112/76 84 16 97.3 °F (36.3 °C) 99 %      MAP       --         Physical Exam    Constitutional:  Non-toxic appearance. No distress.   HENT:   Head: Normocephalic and atraumatic.   Cardiovascular:  Regular rhythm, S1 normal and S2 normal.           Murmur heard.  Pulmonary/Chest: No respiratory distress.   Abdominal: Abdomen is soft. She exhibits no distension. There is no abdominal tenderness.   Musculoskeletal:         General: Edema present.     Neurological: She is alert. No cranial nerve deficit.   CN 2-12 intact  Finger to nose within normal limits  Equal strength to bilateral upper extremities, SILT  Equal strength to bilateral lower extremities, SILT     Skin: Skin is warm. No rash noted.   Psychiatric: She has a normal mood and affect.         ED Course   Procedures  Labs Reviewed   CBC W/ AUTO DIFFERENTIAL - Abnormal       Result Value    WBC 6.69      RBC 4.43      Hemoglobin 14.0      Hematocrit 41.8      MCV 94      MCH 31.6 (*)     MCHC 33.5      RDW 15.4 (*)     Platelets 294      MPV 9.6      Immature Granulocytes 0.1      Gran # (ANC) 4.0      Immature Grans (Abs) 0.01      Lymph # 1.6      Mono # 0.9      Eos # 0.1      Baso # 0.05      nRBC 0      Gran % 60.1      Lymph % 24.2      Mono % 13.0      Eosinophil % 1.9      Basophil % 0.7      Differential Method Automated     COMPREHENSIVE METABOLIC PANEL - Abnormal    Sodium 137      Potassium 4.0      Chloride 98      CO2 27      Glucose 127 (*)     BUN 19      Creatinine 1.4      Calcium 10.9 (*)     Total Protein 7.7      Albumin 3.9      Total Bilirubin 0.8      Alkaline Phosphatase 83      AST 35      ALT 21      eGFR 37 (*)     Anion Gap 12     B-TYPE NATRIURETIC PEPTIDE - Abnormal     (*)    TROPONIN I    Troponin I <0.006     TROPONIN I    Troponin I 0.006     TROPONIN I    Troponin I 0.013          ECG Results              EKG 12-lead (Final result)        Collection Time Result Time QRS Duration OHS QTC  Calculation    10/21/24 12:05:58 10/22/24 08:28:11 64 432                     Final result by Interface, Lab In [facility] (10/22/24 08:28:15)                   Narrative:    Test Reason : R07.9,    Vent. Rate : 083 BPM     Atrial Rate : 258 BPM     P-R Int : 000 ms          QRS Dur : 064 ms      QT Int : 368 ms       P-R-T Axes : 000 024 017 degrees     QTc Int : 432 ms    Atrial fibrillation  Nonspecific T wave abnormality  Abnormal ECG  When compared with ECG of 02-OCT-2024 14:20,  No significant change was found  Confirmed by Yung Washington MD ([id number]) on 10/22/2024 8:28:09 AM    Referred By: ANNA   SELF           Confirmed By:Yung Washington MD                                  Imaging Results              X-Ray Chest AP Portable (Final result)  Result time 10/21/24 12:43:23      Final result by Piero Turcios III, MD (10/21/24 12:43:23)                   Impression:      No acute process seen.      Electronically signed by: Piero Turcios MD  Date:    10/21/2024  Time:    12:43               Narrative:    EXAMINATION:  XR CHEST AP PORTABLE    CLINICAL HISTORY:  lightheadedness;    FINDINGS:  Chest one view portable.    Heart size is normal.  There is scoliosis aortic plaque and DJD.  Lungs are clear.                                    X-Rays:   Independently Interpreted Readings:   Chest X-Ray: No acute abnormalities.     Medications   sodium chloride 0.9% bolus 250 mL 250 mL (0 mLs Intravenous Stopped 10/21/24 1545)     Medical Decision Making  Differential Diagnosis includes, but is not limited to:  CVA/TIA, vertigo, anemia/blood loss, cardiogenic shock, arrhythmia, orthostatic hypotension, dehydration, medication side effect, vitamin deficiency, liver disease, hypothyroidism, drug intoxication/withdrawal, metabolic derangement.      Amount and/or Complexity of Data Reviewed  External Data Reviewed: notes.     Details: Patient's saw her cardiologist on 10/18/2024.  He started her on furosemide  25 mg daily as well as spironolactone 25 mg daily and Jardiance.   Labs: ordered.  Radiology: ordered.  Discussion of management or test interpretation with external provider(s): Spoke with Dr. Flores regarding medcations and symptoms, stable for DC from cardiology standpoint     Risk  Decision regarding hospitalization.  Risk Details: Labs demonstrated normal Cr although mildly elevated from baseline. Addressed with gentle fluid bolus. She was able to ambulate without feeling dizzy, orthostatic BP are negative. She would like to go home.     After taking into careful account the historical factors and physical exam findings of the patient's presentation today, in conjunction with the empirical and objective data obtained on ED workup, no acute emergent medical condition has been identified. The patient appears to be low risk for an emergent medical condition and I feel it is safe and appropriate at this time for the patient to be discharged to follow-up as detailed in their discharge instructions for reevaluation and possible continued outpatient workup and management. I have discussed the specifics of the workup with the patient and the patient has verbalized understanding of the details of the workup, the diagnosis, the treatment plan, and the need for outpatient follow-up.  Although the patient has no emergent etiology today this does not preclude the development of an emergent condition so in addition, I have advised the patient that they can return to the ED and/or activate EMS at any time with worsening of their symptoms, change of their symptoms, or with any other medical complaint.  The patient remained comfortable and stable during their visit in the ED.  Discharge and follow-up instructions discussed with the patient who expressed understanding and willingness to comply with my recommendations.                 ED Course as of 10/24/24 1153   Mon Oct 21, 2024   1207 EKG: Rate 83.  Atrial fibrillation.  No  STEMI.  QTC is not prolonged [RN]   1613 Patient has not been hypotensive here.  Shared decision-making regarding further admission/observation for symptoms.  She would like to go home.  Will hold her spironolactone and Lasix.  Discussed follow-up with PCP/cardiology for symptoms and to discuss medications going forward.  Return precautions discussed for worsening symptoms or new symptoms of concern especially for persistent low blood pressure fevers shortness of breath or dizziness. [RN]      ED Course User Index  [RN] Moise Villarreal Jr., MD                             Clinical Impression:  Final diagnoses:  [I95.9] Transient hypotension (Primary)     Portions of this note were dictated using voice recognition software and may contain dictation related errors in spelling/grammar/syntax not found on text review       ED Disposition Condition    Discharge Stable          ED Prescriptions    None       Follow-up Information       Follow up With Specialties Details Why Contact Info    Sajan Curtis MD Internal Medicine In 3 days  [address]  [facility] [address], [address]th [address]  [facility] [address]  [phone]      Keith Alexander Jr., MD Interventional Cardiology, Cardiology Schedule an appointment as soon as possible for a visit  To discuss your medications [address] [facility]  [address]  [place] [id number] [phone]               Moise Villarreal Jr., MD  10/24/24 1151       Moise Villarreal Jr., MD  10/24/24 2891

## 2024-10-21 NOTE — ED NOTES
Patient presents to the ED with c/o hypotension at home. Patient explains that she recently had an Echo performed and was dx with CHF. States she was placed on diuretics and already takes metoprolol. Patient explains she took her meds with a low BP yesterday; however, held the medications today. Patient normotensive at this time. Denies any CP or SOB. Denies pain or further complaints. Patient alert and oriented. Placed onto continuous cardiac, BP, and O2 monitoring. Vitals stable. Safety intact. Call light in reach. Awaiting orders. Care ongoing.

## 2024-10-21 NOTE — TELEPHONE ENCOUNTER
----- Message from Judy sent at 10/21/2024 11:00 AM CDT -----  Type:  Patient Returning Call    Who Called:Erwin with [facility]   Who Left Message for Patient:pt  Does the patient know what this is regarding?:Erwin was calling pt was light headed b/p low 58/40   Would the patient rather a call back or a response via [employer]? call  Best Call Back Number: [phone]  Additional Information: call r pt want family to taker her to ED  pt didn't want to call 911   Message left to return call

## 2024-10-22 ENCOUNTER — PATIENT OUTREACH (OUTPATIENT)
Dept: EMERGENCY MEDICINE | Facility: HOSPITAL | Age: 84
End: 2024-10-22
Payer: MEDICARE

## 2024-10-22 ENCOUNTER — TELEPHONE (OUTPATIENT)
Dept: CARDIOLOGY | Facility: CLINIC | Age: 84
End: 2024-10-22
Payer: MEDICARE

## 2024-10-22 DIAGNOSIS — I50.33 ACUTE ON CHRONIC DIASTOLIC CONGESTIVE HEART FAILURE: ICD-10-CM

## 2024-10-22 LAB
OHS QRS DURATION: 64 MS
OHS QTC CALCULATION: 432 MS

## 2024-10-22 RX ORDER — FUROSEMIDE 20 MG/1
20 TABLET ORAL DAILY PRN
Qty: 30 TABLET | Refills: 11 | Status: SHIPPED | OUTPATIENT
Start: 2024-10-22 | End: 2025-10-22

## 2024-10-22 RX ORDER — SPIRONOLACTONE 25 MG/1
25 TABLET ORAL DAILY PRN
Qty: 30 TABLET | Refills: 11 | Status: SHIPPED | OUTPATIENT
Start: 2024-10-22 | End: 2025-10-22

## 2024-10-22 NOTE — PROGRESS NOTES
Patient was seen in the ED on 10/21/24 for transient hypotension. Patient was contacted for post ED discharge navigation. They have an appointment scheduled with Dr. Keith Alexander on 11/6/24 at 1:40. ED navigator will remind patient of appointment.

## 2024-10-22 NOTE — TELEPHONE ENCOUNTER
On her last office visit, she appeared to have diastolic heart failure, I added Lasix 20+ spironolactone 25 to her medications which included also Jardiance 10 mg daily.  She did feel less short of breath, but eventually developed low blood pressures felt to be due to dehydration.  This was evaluated in the emergency room.  Lasix and furosemide were stopped.  She did notice a significant decrease in weight also.      I would recommend that she take furosemide 20+ spironolactone 25 only as needed for weight greater than 125 lb at this time.  Will follow-up in the office.

## 2024-10-23 ENCOUNTER — LAB VISIT (OUTPATIENT)
Dept: LAB | Facility: HOSPITAL | Age: 84
End: 2024-10-23
Attending: INTERNAL MEDICINE
Payer: MEDICARE

## 2024-10-23 DIAGNOSIS — I50.33 ACUTE ON CHRONIC DIASTOLIC CONGESTIVE HEART FAILURE: ICD-10-CM

## 2024-10-23 LAB
ANION GAP SERPL CALC-SCNC: 14 MMOL/L (ref 8–16)
BUN SERPL-MCNC: 19 MG/DL (ref 8–23)
CALCIUM SERPL-MCNC: 10.9 MG/DL (ref 8.7–10.5)
CHLORIDE SERPL-SCNC: 101 MMOL/L (ref 95–110)
CO2 SERPL-SCNC: 24 MMOL/L (ref 23–29)
CREAT SERPL-MCNC: 1.2 MG/DL (ref 0.5–1.4)
EST. GFR  (NO RACE VARIABLE): 44 ML/MIN/1.73 M^2
GLUCOSE SERPL-MCNC: 96 MG/DL (ref 70–110)
POTASSIUM SERPL-SCNC: 4.3 MMOL/L (ref 3.5–5.1)
SODIUM SERPL-SCNC: 139 MMOL/L (ref 136–145)

## 2024-10-23 PROCEDURE — 36415 COLL VENOUS BLD VENIPUNCTURE: CPT | Performed by: INTERNAL MEDICINE

## 2024-10-23 PROCEDURE — 80048 BASIC METABOLIC PNL TOTAL CA: CPT | Performed by: INTERNAL MEDICINE

## 2024-10-24 LAB — NT-PROBNP SERPL IA-MCNC: 1725 PG/ML

## 2024-10-31 ENCOUNTER — EXTERNAL CHRONIC CARE MANAGEMENT (OUTPATIENT)
Dept: PRIMARY CARE CLINIC | Facility: CLINIC | Age: 84
End: 2024-10-31
Payer: MEDICARE

## 2024-10-31 PROCEDURE — 99487 CPLX CHRNC CARE 1ST 60 MIN: CPT | Mod: PBBFAC | Performed by: INTERNAL MEDICINE

## 2024-10-31 PROCEDURE — 99487 CPLX CHRNC CARE 1ST 60 MIN: CPT | Mod: S$PBB,,, | Performed by: INTERNAL MEDICINE

## 2024-10-31 PROCEDURE — 99489 CPLX CHRNC CARE EA ADDL 30: CPT | Mod: PBBFAC | Performed by: INTERNAL MEDICINE

## 2024-10-31 PROCEDURE — 99489 CPLX CHRNC CARE EA ADDL 30: CPT | Mod: S$PBB,,, | Performed by: INTERNAL MEDICINE

## 2024-11-05 ENCOUNTER — PATIENT OUTREACH (OUTPATIENT)
Dept: EMERGENCY MEDICINE | Facility: HOSPITAL | Age: 84
End: 2024-11-05
Payer: MEDICARE

## 2024-11-06 ENCOUNTER — OFFICE VISIT (OUTPATIENT)
Dept: CARDIOLOGY | Facility: CLINIC | Age: 84
End: 2024-11-06
Payer: MEDICARE

## 2024-11-06 VITALS
WEIGHT: 128.5 LBS | SYSTOLIC BLOOD PRESSURE: 131 MMHG | BODY MASS INDEX: 25.96 KG/M2 | HEART RATE: 84 BPM | DIASTOLIC BLOOD PRESSURE: 85 MMHG

## 2024-11-06 DIAGNOSIS — I48.11 LONGSTANDING PERSISTENT ATRIAL FIBRILLATION: Chronic | ICD-10-CM

## 2024-11-06 DIAGNOSIS — N18.31 CHRONIC RENAL FAILURE, STAGE 3A: Chronic | ICD-10-CM

## 2024-11-06 DIAGNOSIS — E78.2 MIXED HYPERLIPIDEMIA: ICD-10-CM

## 2024-11-06 DIAGNOSIS — I50.32 CHRONIC DIASTOLIC CONGESTIVE HEART FAILURE: Primary | ICD-10-CM

## 2024-11-06 DIAGNOSIS — Z79.01 CHRONIC ANTICOAGULATION: ICD-10-CM

## 2024-11-06 DIAGNOSIS — I10 ESSENTIAL HYPERTENSION: Chronic | ICD-10-CM

## 2024-11-06 DIAGNOSIS — I48.91 ATRIAL FIBRILLATION WITH RAPID VENTRICULAR RESPONSE: ICD-10-CM

## 2024-11-06 DIAGNOSIS — K59.01 SLOW TRANSIT CONSTIPATION: ICD-10-CM

## 2024-11-06 DIAGNOSIS — I35.0 NONRHEUMATIC AORTIC VALVE STENOSIS: Chronic | ICD-10-CM

## 2024-11-06 PROCEDURE — 99999 PR PBB SHADOW E&M-EST. PATIENT-LVL III: CPT | Mod: PBBFAC,,, | Performed by: INTERNAL MEDICINE

## 2024-11-06 PROCEDURE — 99214 OFFICE O/P EST MOD 30 MIN: CPT | Mod: S$PBB,,, | Performed by: INTERNAL MEDICINE

## 2024-11-06 PROCEDURE — 99213 OFFICE O/P EST LOW 20 MIN: CPT | Mod: PBBFAC | Performed by: INTERNAL MEDICINE

## 2024-11-06 RX ORDER — LACTULOSE 10 G/15ML
30 SOLUTION ORAL; RECTAL 3 TIMES DAILY PRN
Qty: 473 ML | Refills: 4 | Status: SHIPPED | OUTPATIENT
Start: 2024-11-06

## 2024-11-06 NOTE — PROGRESS NOTES
Subjective:   11/06/2024     Patient ID:  Claudia Sierra is a 84 y.o. female who presents for evaulation of Follow-up and Results      Patient seems relatively stable without increasing shortness of breath now on Jardiance 10 mg daily.  She does complain of constipation.  She has lactulose to try.  She has not been taking Lasix or spironolactone, does have take if her weight would be greater than 125 lb.  She has otherwise been stable.  Blood pressure appears well controlled.    Subsequent message:   On her last office visit, she appeared to have diastolic heart failure, I added Lasix 20+ spironolactone 25 to her medications which included also Jardiance 10 mg daily.  She did feel less short of breath, but eventually developed low blood pressures felt to be due to dehydration.  This was evaluated in the emergency room.  Lasix and furosemide were stopped.  She did notice a significant decrease in weight also.       I would recommend that she take furosemide 20+ spironolactone 25 only as needed for weight greater than 125 lb at this time.  Will follow-up in the office.    Prior note reviewed:  Here for follow-up, echocardiography did show pulmonary hypertension with normal left ventricular systolic function and significant tricuspid regurgitation.  There was elevated central venous pressure.  She continues to have dyspnea on exertion.  Mild lower extremity edema is present.  She does feel that she has urinated more since initiation of SG LT 2 inhibitor therapy, but continues to have shortness of breath.  She is not having chest pains or tightness.    Her sodium was 133, renal function normal, BNP 15 50.    Assessment and Plan:     Acute on chronic diastolic congestive heart failure  Comments:  Continue empagliflozin  Add furosemide 20 and spironolactone 25 daily  Weigh daily  Sodium low, avoid excess fluid  Orders:  -     Basic Metabolic Panel; Future; Expected date: 10/23/2024  -     NT-Pro Natriuretic Peptide;  Future; Expected date: 10/23/2024  -     spironolactone (ALDACTONE) 25 MG tablet; Take 1 tablet (25 mg total) by mouth once daily.  Dispense: 30 tablet; Refill: 11  -     furosemide (LASIX) 20 MG tablet; Take 1 tablet (20 mg total) by mouth once daily.  Dispense: 30 tablet; Refill: 11     Essential hypertension  Comments:  Should improve with diuresis     Longstanding persistent atrial fibrillation  Comments:  Accept as rhythm of choice     Nonrheumatic aortic valve stenosis  Comments:  Not severe              Follow up in about 2 weeks (around 10/30/2024).             Prior note reviewed goal  Patient in atrial fibrillation, appears to be accepted as rhythm of choice, but comes in with increasing dyspnea on exertion and low blood pressure with standing making her lightheaded.  She is on metoprolol succinate 100 mg daily.  She does not take diuretics.  An echocardiogram in April of 2024 did show pulmonary hypertension with normal left ventricular systolic function.  She continues to have dyspnea and occasionally wakes from sleep short of breath.      Assessment and Plan:    Chronic diastolic congestive heart failure  -     empagliflozin (JARDIANCE) 10 mg tablet; Take 1 tablet (10 mg total) by mouth once daily.  Dispense: 30 tablet; Refill: 11  -     CBC Auto Differential; Future; Expected date: 10/09/2024  -     Basic Metabolic Panel; Future; Expected date: 10/09/2024  -     NT-Pro Natriuretic Peptide; Future; Expected date: 10/02/2024  -     Echo; Future; Expected date: 10/09/2024    Longstanding persistent atrial fibrillation  -     Ambulatory referral/consult to Cardiology  -     IN OFFICE EKG 12-LEAD (to [facility])    Nonrheumatic aortic valve stenosis  -     Ambulatory referral/consult to Cardiology    Encounter for antineoplastic chemotherapy    Essential hypertension    Chronic atrial fibrillation  -     metoprolol succinate (TOPROL-XL) 100 MG 24 hr tablet; 1/2 daily  Dispense: 90 tablet; Refill:  3    Gastroesophageal reflux disease with esophagitis     Patient with pulmonary hypertension and normal left ventricular systolic function, will accept atrial fibrillation as rhythm of choice.  She will have blood work soon, echocardiogram will be repeated.  I will add SG LT 2 inhibitor therapy for pulmonary hypertension and dyspnea, because of lightheadedness associated low blood pressures, decrease metoprolol.  Office visit on return to clinic in 2 weeks after lab in ECHO        Past Medical History:   Diagnosis Date    *Atrial fibrillation     Breast cancer     Right breast cancer    Hypertension     Slow transit constipation 12/19/2013       Review of patient's allergies indicates:   Allergen Reactions    Codeine Other (See Comments)     Feel like (climbing the walls)    Hydrocodone Other (See Comments)     Restless and anxious similar to codeine. Tolerated oxycodone without issue.    Benadryl [diphenhydramine hcl] Anxiety         Current Outpatient Medications:     acetaminophen (TYLENOL) 650 MG TbSR, Take 1 tablet (650 mg total) by mouth every 6 to 8 hours as needed (pain)., Disp: 120 tablet, Rfl: 0    ascorbic acid, vitamin C, (VITAMIN C) 1000 MG tablet, Take 1,000 mg by mouth once daily., Disp: , Rfl:     b complex vitamins tablet, Take 1 tablet by mouth once daily., Disp: , Rfl:     beta-carotene,A,-vits C,E/mins (OCUVITE ORAL), Take 1 tablet by mouth every evening., Disp: , Rfl:     empagliflozin (JARDIANCE) 10 mg tablet, Take 1 tablet (10 mg total) by mouth once daily., Disp: 30 tablet, Rfl: 11    furosemide (LASIX) 20 MG tablet, Take 1 tablet (20 mg total) by mouth daily as needed (Weight greater than 125 lb)., Disp: 30 tablet, Rfl: 11    gabapentin (NEURONTIN) 600 MG tablet, Take 2 tablets (1,200 mg total) by mouth once daily., Disp: 180 tablet, Rfl: 0    lactulose (CHRONULAC) 10 gram/15 mL solution, Take 45 mLs (30 g total) by mouth 3 (three) times daily as needed (constipation)., Disp: 473 mL, Rfl:  4    loratadine (CLARITIN) 10 mg tablet, Take 10 mg by mouth once daily., Disp: , Rfl:     metoprolol succinate (TOPROL-XL) 50 MG 24 hr tablet, Take 1 tablet (50 mg total) by mouth once daily., Disp: 90 tablet, Rfl: 3    polycarbophil (FIBERCON) 625 mg tablet, Take 625 mg by mouth once daily., Disp: , Rfl:     pravastatin (PRAVACHOL) 40 MG tablet, TAKE 1 TABLET BY MOUTH EVERY DAY, Disp: 90 tablet, Rfl: 3    spironolactone (ALDACTONE) 25 MG tablet, Take 1 tablet (25 mg total) by mouth daily as needed (Weight greater than 125 lb)., Disp: 30 tablet, Rfl: 11    vitamin D (VITAMIN D3) 1000 units Tab, Take 1,000 Units by mouth once daily., Disp: , Rfl:     rivaroxaban (XARELTO) 15 mg Tab, Take 1 tablet (15 mg total) by mouth Daily., Disp: 30 tablet, Rfl: 11     Objective:   Review of Systems   Cardiovascular:  Negative for chest pain, claudication, cyanosis, dyspnea on exertion, irregular heartbeat, leg swelling, near-syncope, orthopnea, palpitations, paroxysmal nocturnal dyspnea and syncope.         Vitals:    11/06/24 1337   BP: 131/85   Pulse: 84       Wt Readings from Last 3 Encounters:   11/06/24 58.3 kg (128 lb 8.5 oz)   10/21/24 55.3 kg (121 lb 14.6 oz)   10/16/24 59.3 kg (130 lb 11.7 oz)     Temp Readings from Last 3 Encounters:   10/21/24 97.4 °F (36.3 °C) (Oral)   08/27/24 98 °F (36.7 °C) (Oral)   02/20/24 98 °F (36.7 °C) (Oral)     BP Readings from Last 3 Encounters:   11/06/24 131/85   10/21/24 (!) 174/76   10/16/24 (!) 163/81     Pulse Readings from Last 3 Encounters:   11/06/24 84   10/21/24 77   10/16/24 77             Physical Exam  Vitals reviewed.   Constitutional:       General: She is not in acute distress.     Appearance: She is well-developed.   HENT:      Head: Normocephalic and atraumatic.      Nose: Nose normal.   Eyes:      Conjunctiva/sclera: Conjunctivae normal.      Pupils: Pupils are equal, round, and reactive to light.   Neck:      Vascular: No carotid bruit or JVD.   Cardiovascular:       Rate and Rhythm: Normal rate. Rhythm irregular.      Pulses: Intact distal pulses.      Heart sounds: Normal heart sounds. No murmur heard.     No friction rub. No gallop.   Pulmonary:      Effort: Pulmonary effort is normal. No respiratory distress.      Breath sounds: Normal breath sounds. No wheezing or rales.   Chest:      Chest wall: No tenderness.   Abdominal:      General: Bowel sounds are normal. There is no distension.      Palpations: Abdomen is soft.      Tenderness: There is no abdominal tenderness.   Musculoskeletal:         General: No tenderness or deformity. Normal range of motion.      Cervical back: Normal range of motion and neck supple.      Right lower leg: No edema.      Left lower leg: No edema.   Skin:     General: Skin is warm and dry.      Findings: No erythema or rash.   Neurological:      Mental Status: She is alert and oriented to person, place, and time.      Cranial Nerves: No cranial nerve deficit.      Motor: No abnormal muscle tone.      Coordination: Coordination normal.   Psychiatric:         Behavior: Behavior normal.         Thought Content: Thought content normal.         Judgment: Judgment normal.           Lab Results   Component Value Date    CHOL 124 04/25/2024    CHOL 194 10/25/2018    CHOL 162 01/11/2018     Lab Results   Component Value Date    HDL 50 04/25/2024    HDL 74 10/25/2018    HDL 65 01/11/2018     Lab Results   Component Value Date    LDLCALC 63.6 04/25/2024    LDLCALC 102.6 10/25/2018    LDLCALC 83.6 01/11/2018     Lab Results   Component Value Date    ALT 21 10/21/2024    AST 35 10/21/2024    AST 40 04/25/2024    AST 30 06/01/2023     Lab Results   Component Value Date    CREATININE 1.2 10/23/2024    BUN 19 10/23/2024     10/23/2024    K 4.3 10/23/2024    CO2 24 10/23/2024    CO2 27 10/21/2024    CO2 25 10/10/2024     Lab Results   Component Value Date    HGB 14.0 10/21/2024    HCT 41.8 10/21/2024    HCT 37.5 10/10/2024    HCT 37.1 08/27/2024                EKG showed atrial fibrillation with controlled ventricular response          Assessment and Plan:     Chronic diastolic congestive heart failure  Comments:  Continue Jardiance and PRN Lasix/spironolactone  Orders:  -     Basic Metabolic Panel; Future; Expected date: 02/06/2025    Essential hypertension  Comments:  Well controlled    Longstanding persistent atrial fibrillation  Comments:  Accepted as rhythm of choice    Nonrheumatic aortic valve stenosis  Comments:  Mild    Chronic renal failure, stage 3a  Comments:  Stable    Mixed hyperlipidemia    Chronic anticoagulation  Comments:  GFR calculates to be 31.  Correct dose for Xarelto 15 mg daily.  Will change.    Atrial fibrillation with rapid ventricular response  -     rivaroxaban (XARELTO) 15 mg Tab; Take 1 tablet (15 mg total) by mouth Daily.  Dispense: 30 tablet; Refill: 11            Follow up in about 3 months (around 2/6/2025).          Future Appointments   Date Time Provider Department Center   12/4/2024  2:00 PM Tiana Rao MD [facility] SLEEP [place] [place] [facility]   2/4/2025  3:30 PM [facility] MAMMO1 [facility] MAMMO [place] [facility]   2/6/2025 10:30 AM [facility]POINT[facility] LAB [facility]   2/11/2025 11:20 AM Mingo Wynne MD [facility] HEM ONC [place]   2/11/2025  1:20 PM Keith Alexander Jr., MD [facility] CARDIO [place]   2/28/2025  1:15 PM Sajan Curtis MD [facility] PRICRE [place]

## 2024-11-30 ENCOUNTER — EXTERNAL CHRONIC CARE MANAGEMENT (OUTPATIENT)
Dept: PRIMARY CARE CLINIC | Facility: CLINIC | Age: 84
End: 2024-11-30
Payer: MEDICARE

## 2024-11-30 PROCEDURE — 99490 CHRNC CARE MGMT STAFF 1ST 20: CPT | Mod: S$PBB,,, | Performed by: INTERNAL MEDICINE

## 2024-11-30 PROCEDURE — 99490 CHRNC CARE MGMT STAFF 1ST 20: CPT | Mod: PBBFAC | Performed by: INTERNAL MEDICINE

## 2024-11-30 PROCEDURE — 99439 CHRNC CARE MGMT STAF EA ADDL: CPT | Mod: PBBFAC | Performed by: INTERNAL MEDICINE

## 2024-11-30 PROCEDURE — 99439 CHRNC CARE MGMT STAF EA ADDL: CPT | Mod: S$PBB,,, | Performed by: INTERNAL MEDICINE

## 2024-12-04 ENCOUNTER — OFFICE VISIT (OUTPATIENT)
Dept: SLEEP MEDICINE | Facility: CLINIC | Age: 84
End: 2024-12-04
Attending: PSYCHIATRY & NEUROLOGY
Payer: MEDICARE

## 2024-12-04 VITALS
DIASTOLIC BLOOD PRESSURE: 69 MMHG | WEIGHT: 132.63 LBS | HEART RATE: 63 BPM | SYSTOLIC BLOOD PRESSURE: 142 MMHG | BODY MASS INDEX: 26.78 KG/M2

## 2024-12-04 DIAGNOSIS — I48.11 LONGSTANDING PERSISTENT ATRIAL FIBRILLATION: Chronic | ICD-10-CM

## 2024-12-04 DIAGNOSIS — G47.30 SLEEP APNEA, UNSPECIFIED TYPE: Primary | ICD-10-CM

## 2024-12-04 DIAGNOSIS — G47.09 OTHER INSOMNIA: ICD-10-CM

## 2024-12-04 DIAGNOSIS — R53.82 CHRONIC FATIGUE: ICD-10-CM

## 2024-12-04 DIAGNOSIS — G47.00 INSOMNIA, UNSPECIFIED TYPE: ICD-10-CM

## 2024-12-04 PROCEDURE — 99999 PR PBB SHADOW E&M-EST. PATIENT-LVL IV: CPT | Mod: PBBFAC,,, | Performed by: PSYCHIATRY & NEUROLOGY

## 2024-12-04 PROCEDURE — 99204 OFFICE O/P NEW MOD 45 MIN: CPT | Mod: S$PBB,,, | Performed by: PSYCHIATRY & NEUROLOGY

## 2024-12-04 PROCEDURE — 99214 OFFICE O/P EST MOD 30 MIN: CPT | Mod: PBBFAC | Performed by: PSYCHIATRY & NEUROLOGY

## 2024-12-04 RX ORDER — TRAZODONE HYDROCHLORIDE 50 MG/1
TABLET ORAL
Qty: 30 TABLET | Refills: 5 | Status: SHIPPED | OUTPATIENT
Start: 2024-12-04

## 2024-12-04 NOTE — PROGRESS NOTES
Claudia Sierra is a 84 y.o. female is here to be evaluated for a sleep disorder; referred by Vance Casillas DNP.      The patient  reports difficulty with falling asleep for many years;  Currently sleeps better in a recliner as compared to her  bed,    Sleeps on several pillows    Not sure if she is snoring - sleeps along.  Used to experience  shortness of breath in the past, until her CHF and AFIB was diagnosed and managed    Used to take Restoril and Ambien in remote past.   Denied significant excessive daytime sleepiness      The patient does not feel rested upon awakening. Does not take naps.     The patient  reports occasional morning headaches and reports occasional  dry mouth on awakening.   Claudia Sierra denies  nasal congestion.    The patient never had tonsillectomy, adenoidectomy or UPPP      Claudia Sierra  denies symptoms concerning for parasomnia except for occasional somniloquy.  The patient  denies auxiliary symptoms of narcolepsy including sleep onset paralysis, hypnagogic hallucinations, sleep attacks and cataplexy.    Claudia Sierra denied symptoms concerning for RLS; nocturnal leg movements have not been noticed.   The patient does not experience sleep related leg cramps.       1 cup of decaf coffee AM; but drinks decaf with her sister when she visits most evenings    Medications pertinent to sleep  disorders taken currently: -  Previous  medications taken  for sleep disorders:  Restoril, Ambien    Sleep studies  no      Occupation:involved in many clubs  Bed partner: passed  Exercise routine: active  Caffeine:  - beverages per day  Alcohol: -  Smoking:-      12/1/2024    12:58 PM   EPWORTH SLEEPINESS SCALE TOTAL SCORE    Total score 4        Patient-reported             12/1/2024    12:58 PM   EPWORTH SLEEPINESS SCALE   Sitting and reading 1    Watching TV 1    Sitting, inactive in a public place (e.g. a theatre or a meeting) 0    As a passenger in a car for an  hour without a break 0    Lying down to rest in the afternoon when circumstances permit 2    Sitting and talking to someone 0    Sitting quietly after a lunch without alcohol 0    In a car, while stopped for a few minutes in traffic 0    Total score 4        Patient-reported           8/27/2024   PHQ-9 Depression Patient Health Questionnaire   Over the last two weeks how often have you been bothered by little interest or pleasure in doing things 0   Over the last two weeks how often have you been bothered by feeling down, depressed or hopeless 0          9/12/2019     2:40 PM   BLAKE-7   1. Feeling nervous, anxious, or on edge? Not at all   2. Not being able to stop or control worrying? Not at all   Number answered (out of first 7) 2           12/1/2024    12:58 PM   EPWORTH SLEEPINESS SCALE   Sitting and reading 1    Watching TV 1    Sitting, inactive in a public place (e.g. a theatre or a meeting) 0    As a passenger in a car for an hour without a break 0    Lying down to rest in the afternoon when circumstances permit 2    Sitting and talking to someone 0    Sitting quietly after a lunch without alcohol 0    In a car, while stopped for a few minutes in traffic 0    Total score 4        Patient-reported         12/1/2024     1:26 PM   Sleep Clinic New Patient   What time do you go to bed on a week day? (Give a range) 11-12 pm   What time do you go to bed on a day off? (Give a range) 11-12 pm   How long does it take you to fall asleep? (Give a range) 20 - 30 minutes   On average, how many times per night do you wake up? 2 to use bathroom   How long does it take you to fall back into sleep? (Give a range) 5 minutes   What time do you wake up to start your day on a week day? (Give a range) 6-8 am   What time do you wake up to start your day on a day off? (Give a range) 7 -8 am   What time do you get out of bed? (Give a range) 7-8am   On average, how many hours do you sleep? 6-7 hours   On average, how many naps do you  take per day? None   Rate your sleep quality from 0 to 5 (0-poor, 5-great). 3   Have you experienced:  N/a   How much weight have you lost or gained (in lbs.) in the last year? 0   On average, how many times per night do you go to the bathroom?  2   Have you ever had a sleep study/CPAP machine/surgery for sleep apnea? No   Have you ever had a CPAP machine for sleep apnea? No   Have you ever had surgery for sleep apnea? No           12/1/2024     1:26 PM   Sleep Clinic ROS    Difficulty breathing through the nose?  No   Sore throat or dry mouth in the morning? Sometimes   Irregular or very fast heart beat?  Sometimes   Shortness of breath?  Sometimes   Acid reflux? No   Body aches and pains?  Yes   Morning headaches? No   Dizziness? Sometimes   Mood changes?  No   Do you exercise?  No   Do you feel like moving your legs a lot?  No       DME:         PAST MEDICAL HISTORY:    Active Ambulatory Problems     Diagnosis Date Noted    Essential hypertension 07/15/2013    Longstanding persistent atrial fibrillation 08/29/2013    GERD (gastroesophageal reflux disease) 08/29/2013    Other insomnia 11/19/2013    Chemotherapy-induced peripheral neuropathy 01/02/2014    Edema of breast 05/08/2014    Lymphedema 05/28/2014    HLD (hyperlipidemia) 08/14/2014    Constipation 09/12/2019    Disorder of cartilage, unspecified  11/08/2019    Post herpetic neuralgia 01/14/2020    Chronic pain 02/12/2020    Cervical radiculopathy 02/12/2020    Chronic pain of right knee 03/11/2021    Right hip pain 08/16/2021    Normocytic anemia 08/19/2021    Vitamin D deficiency 12/21/2021    At risk for osteoporosis 12/21/2021    History of breast cancer 12/28/2021    Primary osteoarthritis of right hip 01/31/2022    Physical deconditioning 03/31/2023    Impaired mobility and activities of daily living 03/31/2023    Muscle weakness (generalized) 03/31/2023    Allergies 06/02/2023    Chronic fatigue 06/02/2023    Nonrheumatic aortic valve stenosis  06/21/2023    Long term (current) use of anticoagulants 06/04/2023    Personal history of malignant neoplasm of breast 06/04/2023    Hyperlipidemia, unspecified 06/02/2023    Aortic atherosclerosis - seen on CXR 8/28/13 02/16/2024    Short-term memory loss 02/20/2024    Dizziness 08/27/2024    Chronic diastolic congestive heart failure 10/16/2024    Chronic renal failure, stage 3a 11/06/2024     Resolved Ambulatory Problems     Diagnosis Date Noted    Mass of right breast 03/08/2013    Hypertension 03/08/2013    Lymphadenitis 03/25/2013    Breast cancer 04/09/2013    Encounter for antineoplastic chemotherapy 04/16/2013    Bronchitis 04/30/2013    Sepsis(995.91) 05/22/2013    Leukopenia 05/23/2013    Pneumonia 05/23/2013    Chemotherapy-induced neutropenia 05/23/2013    Febrile neutropenia 05/23/2013    Anemia due to antineoplastic chemotherapy 05/23/2013    Atrial fibrillation 05/28/2013    Atrial fibrillation 07/01/2013    *Atrial fibrillation     UTI (lower urinary tract infection) 07/14/2013    Sepsis(995.91) 07/14/2013    Atrial fibrillation 07/15/2013    Hypoalbuminemia 07/15/2013    Ear fullness 07/31/2013    Fever of unknown origin 08/28/2013    Fever 08/29/2013    Body aches 08/29/2013    Thrush 08/29/2013    Thrombocytopenia 08/29/2013    History of atrial fibrillation 10/02/2013    Pre-op testing 10/08/2013    Pain in right axilla 10/28/2013    Slow transit constipation 12/19/2013    Constipation 01/02/2014    Chemotherapy follow-up examination 01/16/2014    Breast cancer, right breast 07/10/2014    Aromatase inhibitor use 08/14/2014    Malfunction of device 06/16/2015    Weight gain due to medication 08/31/2015    Malignant neoplasm of upper-outer quadrant of right breast in female, estrogen receptor positive 09/06/2016     No Additional Past Medical History                PAST SURGICAL HISTORY:    Past Surgical History:   Procedure Laterality Date    ADENOIDECTOMY  70 years ago    At same time as  tonsillectomy. .    APPENDECTOMY      ARTHROPLASTY OF HIP BY ANTERIOR APPROACH Right 01/31/2022    Procedure: ARTHROPLASTY, HIP, TOTAL, ANTERIOR APPROACH:RIGHT: DEPUY-C-STEM+PINNACLE;  Surgeon: Cisco Mcneal III, MD;  Location: [facility] OR;  Service: Orthopedics;  Laterality: Right;    BREAST BIOPSY      right    BREAST LUMPECTOMY Right 2013    BREAST SURGERY  2013    EPIDURAL STEROID INJECTION INTO CERVICAL SPINE N/A 02/12/2020    Procedure: Injection-steroid-epidural-cervical--C7-T1 IL ELMIRA;  Surgeon: Alicia Proctor MD;  Location: [facility] PAIN MGT;  Service: Pain Management;  Laterality: N/A;  sign consent at [facility]    EYE SURGERY  2018    HYSTERECTOMY      JOINT REPLACEMENT  Hip, 2022    lymphnode removal      12 from Right axilla    PORTACATH PLACEMENT  2013    TONSILLECTOMY           FAMILY HISTORY:                Family History   Problem Relation Name Age of Onset    Heart disease Mother Candi     Hypertension Mother Candi     Diabetes Mother Candi     Heart disease Father Huang     Cancer Father Huang     Cervical cancer Sister Qing 79    Cancer Sister Qing     Hypertension Sister Pamella     Diabetes Son luis manuel     Heart disease Son luis manuel     Hypertension Son luis manuel        SOCIAL HISTORY:          Tobacco:   Social History     Tobacco Use   Smoking Status Never   Smokeless Tobacco Never       alcohol use:    Social History     Substance and Sexual Activity   Alcohol Use Yes    Alcohol/week: 3.0 standard drinks of alcohol    Types: 3 Glasses of wine per week    Comment: every 2 weeks                   ALLERGIES:    Review of patient's allergies indicates:   Allergen Reactions    Codeine Other (See Comments)     Feel like (climbing the walls)    Hydrocodone Other (See Comments)     Restless and anxious similar to codeine. Tolerated oxycodone without issue.    Benadryl [diphenhydramine hcl] Anxiety       CURRENT MEDICATIONS:    Current Outpatient Medications   Medication Sig Dispense Refill     acetaminophen (TYLENOL) 650 MG TbSR Take 1 tablet (650 mg total) by mouth every 6 to 8 hours as needed (pain). 120 tablet 0    ascorbic acid, vitamin C, (VITAMIN C) 1000 MG tablet Take 1,000 mg by mouth once daily.      b complex vitamins tablet Take 1 tablet by mouth once daily.      beta-carotene,A,-vits C,E/mins (OCUVITE ORAL) Take 1 tablet by mouth every evening.      empagliflozin (JARDIANCE) 10 mg tablet Take 1 tablet (10 mg total) by mouth once daily. 30 tablet 11    furosemide (LASIX) 20 MG tablet Take 1 tablet (20 mg total) by mouth daily as needed (Weight greater than 125 lb). 30 tablet 11    gabapentin (NEURONTIN) 600 MG tablet Take 2 tablets (1,200 mg total) by mouth once daily. 180 tablet 0    lactulose (CHRONULAC) 10 gram/15 mL solution Take 45 mLs (30 g total) by mouth 3 (three) times daily as needed (constipation). 473 mL 4    loratadine (CLARITIN) 10 mg tablet Take 10 mg by mouth once daily.      metoprolol succinate (TOPROL-XL) 50 MG 24 hr tablet Take 1 tablet (50 mg total) by mouth once daily. 90 tablet 3    polycarbophil (FIBERCON) 625 mg tablet Take 625 mg by mouth once daily.      pravastatin (PRAVACHOL) 40 MG tablet TAKE 1 TABLET BY MOUTH EVERY DAY 90 tablet 3    rivaroxaban (XARELTO) 15 mg Tab Take 1 tablet (15 mg total) by mouth Daily. 30 tablet 11    spironolactone (ALDACTONE) 25 MG tablet Take 1 tablet (25 mg total) by mouth daily as needed (Weight greater than 125 lb). 30 tablet 11    vitamin D (VITAMIN D3) 1000 units Tab Take 1,000 Units by mouth once daily.       No current facility-administered medications for this visit.                        Otherwise, a balance of 10 systems reviewed is negative.    PHYSICAL EXAM:  BP (!) 142/69 (BP Location: Left arm, Patient Position: Sitting)   Pulse 63   Wt 60.1 kg (132 lb 9.6 oz)   LMP  (LMP Unknown)   BMI 26.78 kg/m²   GENERAL: Normal development, well groomed.  HEENT:   HEENT:  Conjunctivae are non-erythematous; Pupils equal, round, and  reactive to light; Nose is symmetrical; Nasal mucosa is pink and moist; Septum is midline; Inferior turbinates are normal; Nasal airflow is normal; Posterior pharynx is pink; Modified Mallampati:III; Posterior palate is normal; Tonsils not visualized; Uvula is normal and pink;Tongue is normal; Dentition is fair; No TMJ tenderness; Jaw opening and protrusion without click and without discomfort.  NECK: Supple. Neck circumference is 14 inches. No thyromegaly. No palpable nodes.     SKIN: On face and neck: No abrasions, no rashes, no lesions.  No subcutaneous nodules are palpable.  RESPIRATORY: Chest is clear to auscultation.  Normal chest expansion and non-labored breathing at rest.  CARDIOVASCULAR: Normal S1, S2.  No murmurs, gallops or rubs. No carotid bruits bilaterally.  No edema. No clubbing. No cyanosis.    NEURO: Oriented to time, place and person. Normal attention span and concentration. Gait normal.    PSYCH: Affect is full. Mood is normal  MUSCULOSKELETAL: Moves 4 extremities. Gait normal.           ASSESSMENT:    Insomnia NEC. Multi-factorial -  excess time in bed, poor sleep hygiene, and likely paradoxical insomnia play a role      2. Sleep Apnea NEC. The patient symptomatically has  snoring, interrupted sleep, and excessive daytime sleepiness  with exam findings of crowded airway and elevated BMI. The patient has medical co-morbidities of atrial fibrillation and CHF  which can be worsened by BHUMIKA. This warrants further investigation for possible obstructive sleep apnea.              PLAN:    Diagnostic: Home Sleep Study. The nature of this procedure and its indication was discussed with the patient. We will notify the patient about sleep study resuts via My Chart.    Turned blue light filter on your phone  Suggested  to have no coffee - caffeine   or decaf after noon       Will order Trazodone. [facility] in [place].      Sleep hygiene was discussed in detail + brochure was provided.      During our discussion  today, we talked about the etiology of  BHUMIKA as well as the potential ramifications of untreated sleep apnea, which could include daytime sleepiness, hypertension, heart disease and/or stroke.  We discussed potential treatment options, which could include weight loss, body positioning, continuous positive airway pressure (CPAP), or referral for surgical consideration. Meanwhile, she  is urged to avoid supine sleep, weight gain and alcoholic beverages since all of these can worsen BHUMIKA.     The patient was given open opportunity to ask questions and/or express concerns about treatment plan. Two point patient identifier confirmed.       Precautions: The patient was advised to abstain from driving should he feel sleepy or drowsy.    Follow up: MD after the sleep study has been completed.     ESS ([place] Sleepiness Scale) and other sleep medicine related questionnaires were reviewed with the patient.      46-minute visit. >50% spent counseling patient and coordination of care.  The patient was  cautioned against drowsy driving.

## 2024-12-04 NOTE — PATIENT INSTRUCTIONS
No coffee after noon time - caf or decaf  Try to put TV on auto shut off   Try to wear blue light filters when using electronics    SLEEP LAB ([facility]) will contact you to schedulethe sleep study. Their number is [phone] (ext 2). Please call them if you do not hear from them in 2 weeks from now.  The [facility] Sleep Lab is located on 7th floor of the [facility]; [facility] is located in [facility] ( 3rd floor [facility]).    SLEEP CLINIC (my assistant) will call you when the sleep study results are ready or I will message you through the portal with the results as we have discussed - if you have not heard from us by 2 weeks from the date of the study, or you can use [facility] to contact me.    Our clinic phone number is [phone] (ext 1)       You are advised to abstain from driving should you feel sleepy or drowsy.    _______________________      SLEEP LAB ([facility]) will contact you to schedulethe sleep study. Their number is [phone] (ext 2). Please call them if you do not hear from them in 2 weeks from now.  The [facility] Sleep Lab is located on 7th floor of the [facility]; [facility] Lab is located in [facility] ( 3rd [facility]).    SLEEP CLINIC (my assistant) will call you when the sleep study results are ready or I will message you through the portal with the results as we have discussed - if you have not heard from us by 2 weeks from the date of the study, or you can use My [employer] to contact me.    Our clinic phone number is [phone] (ext 1)       You are advised to abstain from driving should you feel sleepy or drowsy.

## 2024-12-05 ENCOUNTER — TELEPHONE (OUTPATIENT)
Dept: SLEEP MEDICINE | Facility: OTHER | Age: 84
End: 2024-12-05
Payer: MEDICARE

## 2024-12-20 ENCOUNTER — TELEPHONE (OUTPATIENT)
Dept: SLEEP MEDICINE | Facility: OTHER | Age: 84
End: 2024-12-20
Payer: MEDICARE

## 2024-12-31 ENCOUNTER — EXTERNAL CHRONIC CARE MANAGEMENT (OUTPATIENT)
Dept: PRIMARY CARE CLINIC | Facility: CLINIC | Age: 84
End: 2024-12-31
Payer: MEDICARE

## 2024-12-31 PROCEDURE — 99490 CHRNC CARE MGMT STAFF 1ST 20: CPT | Mod: PBBFAC | Performed by: INTERNAL MEDICINE

## 2024-12-31 PROCEDURE — 99439 CHRNC CARE MGMT STAF EA ADDL: CPT | Mod: PBBFAC | Performed by: INTERNAL MEDICINE

## 2025-01-15 ENCOUNTER — TELEPHONE (OUTPATIENT)
Dept: SLEEP MEDICINE | Facility: OTHER | Age: 85
End: 2025-01-15
Payer: MEDICARE

## 2025-01-31 ENCOUNTER — EXTERNAL CHRONIC CARE MANAGEMENT (OUTPATIENT)
Dept: PRIMARY CARE CLINIC | Facility: CLINIC | Age: 85
End: 2025-01-31
Payer: MEDICARE

## 2025-01-31 PROCEDURE — 99490 CHRNC CARE MGMT STAFF 1ST 20: CPT | Mod: S$PBB,,, | Performed by: INTERNAL MEDICINE

## 2025-01-31 PROCEDURE — 99490 CHRNC CARE MGMT STAFF 1ST 20: CPT | Mod: PBBFAC | Performed by: INTERNAL MEDICINE

## 2025-02-04 ENCOUNTER — HOSPITAL ENCOUNTER (OUTPATIENT)
Dept: RADIOLOGY | Facility: HOSPITAL | Age: 85
Discharge: HOME | End: 2025-02-04
Attending: INTERNAL MEDICINE
Payer: MEDICARE

## 2025-02-04 DIAGNOSIS — Z12.31 ENCOUNTER FOR SCREENING MAMMOGRAM FOR MALIGNANT NEOPLASM OF BREAST: ICD-10-CM

## 2025-02-04 PROCEDURE — 77067 SCR MAMMO BI INCL CAD: CPT | Mod: 26,,, | Performed by: RADIOLOGY

## 2025-02-04 PROCEDURE — 77063 BREAST TOMOSYNTHESIS BI: CPT | Mod: 26,,, | Performed by: RADIOLOGY

## 2025-02-04 PROCEDURE — 77063 BREAST TOMOSYNTHESIS BI: CPT | Mod: TC

## 2025-02-06 ENCOUNTER — LAB VISIT (OUTPATIENT)
Dept: LAB | Facility: HOSPITAL | Age: 85
End: 2025-02-06
Attending: INTERNAL MEDICINE
Payer: MEDICARE

## 2025-02-06 DIAGNOSIS — I50.32 CHRONIC DIASTOLIC CONGESTIVE HEART FAILURE: ICD-10-CM

## 2025-02-06 LAB
ANION GAP SERPL CALC-SCNC: 9 MMOL/L (ref 8–16)
BUN SERPL-MCNC: 15 MG/DL (ref 8–23)
CALCIUM SERPL-MCNC: 10.1 MG/DL (ref 8.7–10.5)
CHLORIDE SERPL-SCNC: 100 MMOL/L (ref 95–110)
CO2 SERPL-SCNC: 28 MMOL/L (ref 23–29)
CREAT SERPL-MCNC: 1.1 MG/DL (ref 0.5–1.4)
EST. GFR  (NO RACE VARIABLE): 49 ML/MIN/1.73 M^2
GLUCOSE SERPL-MCNC: 111 MG/DL (ref 70–110)
POTASSIUM SERPL-SCNC: 4.6 MMOL/L (ref 3.5–5.1)
SODIUM SERPL-SCNC: 137 MMOL/L (ref 136–145)

## 2025-02-06 PROCEDURE — 36415 COLL VENOUS BLD VENIPUNCTURE: CPT | Performed by: INTERNAL MEDICINE

## 2025-02-06 PROCEDURE — 80048 BASIC METABOLIC PNL TOTAL CA: CPT | Performed by: INTERNAL MEDICINE

## 2025-02-06 NOTE — PROGRESS NOTES
Subjective:       Patient ID: Claudia Sierra is a 84 y.o. female.    Chief Complaint: history of breast cancer    HPI      Ms. 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    Relevant oncologic history: She felt a mass in the right armpit in March 2013.  Mammogram showed a 5 mm right breast mass with 2 enlarged right axillary lymph nodes.  Biopsy of right breast mass showed fibroadenoma.  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  HER-2/agustín was negative.  Breast MRI did not show an actual primary.  A PET scan done in April showed hypermetabolic axillary and subpectoral nodes. She received 4 cycles of neoadjuvant Adriamycin/Cytoxan chemotherapy followed by one cycle of docetaxel chemotherapy.  She then underwent a right axillary lymph node dissection on 10/8/13. 12 lymph nodes were removed. None were involved with malignancy. She had a pathological complete response.  She then completed 9 weekly doses of paclitaxel from November 2013 to January 2014 in an adjuvant fashion. Following this she underwent 28 radiation treatments to the right breast starting 2/10/14 completing 3/20/14. She then developed lymphedema of the right breast. Nothing in the right arm. She was evaluated in the lymphedema clinic and underwent some physical therapy.    Took Arimidex July 2015 - Sept 2020    Neuropathy stable, she is on neurontin 300 mg QHS.   She wants to stay on this dose and take it at bedtime.  Taking it at the daytime makes her groggy.    Her chest port was removed in June 2015.    Underwent Right Hip MICHAEL Jan 31, 2022    - she underwent mammogram on 1/30/24.    Interval history:  - she presents for a follow-up appointment for her history of breast cancer. She  had previously seen Dr. Mascorro.  - she underwent mammogram on 2/4/25.  - her sister passed away in January 2025.  - she endorses fatigue, mild dyspnea upon exertion. She denies chest pain, nausea, vomiting, diarrhea, constipation.    Review of Systems   Constitutional:  Positive for fatigue.   HENT:  Negative for sore throat.    Eyes:  Negative for visual disturbance.   Respiratory:  Positive for shortness of breath. Negative for cough.    Cardiovascular:  Negative for chest pain.   Gastrointestinal:  Negative for abdominal pain, constipation, diarrhea, nausea and vomiting.   Genitourinary:  Negative for dysuria.   Musculoskeletal:  Negative for back pain.   Skin:  Negative for rash.   Neurological:  Negative for headaches.   Hematological:  Negative for adenopathy.   Psychiatric/Behavioral:  The patient is not nervous/anxious.            Objective:     Vitals:    02/11/25 1121   BP: 108/73   BP Location: Left arm   Patient Position: Sitting   Pulse: 74   Resp: 18   SpO2: 99%   Weight: 55.3 kg (121 lb 14.6 oz)       BMI: Body mass index is 24.62 kg/m².    ECOG 0    Physical Exam  Vitals and nursing note reviewed.   Constitutional:       General: She is not in acute distress.     Appearance: She is well-developed.   HENT:      Head: Normocephalic and atraumatic.   Eyes:      Pupils: Pupils are equal, round, and reactive to light.   Cardiovascular:      Rate and Rhythm: Normal rate and regular rhythm.   Pulmonary:      Effort: Pulmonary effort is normal.      Breath sounds: Normal breath sounds.   Abdominal:      General: Bowel sounds are normal.      Palpations: Abdomen is soft.   Musculoskeletal:         General: Normal range of motion.      Cervical back: Normal range of motion and neck supple.   Skin:     General: Skin is warm and dry.   Neurological:      Mental Status: She is alert and oriented to person, place, and time. Mental status is at baseline.   Psychiatric:         Behavior: Behavior normal.          Thought Content: Thought content normal.         Judgment: Judgment normal.       Labs have been reviewed.    Lab Results   Component Value Date    WBC 6.69 10/21/2024    HGB 14.0 10/21/2024    HCT 41.8 10/21/2024    MCV 94 10/21/2024     10/21/2024       Imaging:  Mammogram (2/4/25):   Bilateral  There is no mammographic evidence of malignancy.      Mammogram (1/30/24):   Bilateral  There is no mammographic evidence of malignancy.      Mammogram (1/26/23):   Impression:   No mammographic evidence of malignancy.     BI-RADS Category 1: Negative     Recommendation:  Routine screening mammogram in 1 year, or as clinically indicated.    Assessment:     1. History of breast cancer    2. Encounter for screening mammogram for malignant neoplasm of breast    3. Chemotherapy-induced peripheral neuropathy    4. History of chemotherapy    5. History of radiation therapy    6. Chronic atrial fibrillation    7. Chronic anticoagulation       Plan:     History of cancer of the right breast / history of chemo / history of radiation  - she had previously seen Dr. Mascorro.  - diagnosed in 2013  - she received 4 cycles of neoadjuvant Adriamycin/Cytoxan chemotherapy followed by one cycle of docetaxel chemotherapy.  - She then underwent a right axillary lymph node dissection on 10/8/13. 12 lymph nodes were removed. None were involved with malignancy. She had a pathological complete response.    - She then completed 9 weekly doses of paclitaxel from November 2013 to January 2014 in an adjuvant fashion.   - Following this she underwent 28 radiation treatments to the right breast starting 2/10/14 completing 3/20/14.  - She then developed lymphedema of the right breast. Nothing in the right arm. She was evaluated in the lymphedema clinic and underwent some physical therapy.  - she took Arimidex July 2015 - Sept 2020  - mammogram (1/26/23) was negative/normal  - mammogram (1/30/24): negative/normal  - Mammogram (2/4/25): there is no  mammographic evidence of malignancy.  - return to clinic in one year with repeat mammogram.     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  - she now is on xarelto  - defer management to cardiology    - return to clinic in one year with repeat mammogram.     Mingo Wynne M.D.  Hematology/Oncology  [facility] - [address], [address]  [place] [address]  Phone: [phone]  Fax: [phone]

## 2025-02-11 ENCOUNTER — OFFICE VISIT (OUTPATIENT)
Dept: CARDIOLOGY | Facility: CLINIC | Age: 85
End: 2025-02-11
Payer: MEDICARE

## 2025-02-11 ENCOUNTER — OFFICE VISIT (OUTPATIENT)
Dept: HEMATOLOGY/ONCOLOGY | Facility: CLINIC | Age: 85
End: 2025-02-11
Payer: MEDICARE

## 2025-02-11 VITALS
BODY MASS INDEX: 24.62 KG/M2 | HEART RATE: 74 BPM | SYSTOLIC BLOOD PRESSURE: 108 MMHG | WEIGHT: 121.94 LBS | DIASTOLIC BLOOD PRESSURE: 73 MMHG | RESPIRATION RATE: 18 BRPM | OXYGEN SATURATION: 99 %

## 2025-02-11 VITALS
WEIGHT: 122.56 LBS | HEART RATE: 76 BPM | HEIGHT: 59 IN | DIASTOLIC BLOOD PRESSURE: 70 MMHG | BODY MASS INDEX: 24.71 KG/M2 | SYSTOLIC BLOOD PRESSURE: 118 MMHG

## 2025-02-11 DIAGNOSIS — I70.0 AORTIC ATHEROSCLEROSIS: ICD-10-CM

## 2025-02-11 DIAGNOSIS — G62.0 CHEMOTHERAPY-INDUCED PERIPHERAL NEUROPATHY: ICD-10-CM

## 2025-02-11 DIAGNOSIS — T45.1X5A CHEMOTHERAPY-INDUCED PERIPHERAL NEUROPATHY: ICD-10-CM

## 2025-02-11 DIAGNOSIS — I50.32 CHRONIC DIASTOLIC CONGESTIVE HEART FAILURE: ICD-10-CM

## 2025-02-11 DIAGNOSIS — I48.11 LONGSTANDING PERSISTENT ATRIAL FIBRILLATION: ICD-10-CM

## 2025-02-11 DIAGNOSIS — Z85.3 HISTORY OF BREAST CANCER: Primary | ICD-10-CM

## 2025-02-11 DIAGNOSIS — Z79.01 CHRONIC ANTICOAGULATION: ICD-10-CM

## 2025-02-11 DIAGNOSIS — I35.0 NONRHEUMATIC AORTIC VALVE STENOSIS: Chronic | ICD-10-CM

## 2025-02-11 DIAGNOSIS — I10 ESSENTIAL HYPERTENSION: Primary | ICD-10-CM

## 2025-02-11 DIAGNOSIS — Z92.3 HISTORY OF RADIATION THERAPY: ICD-10-CM

## 2025-02-11 DIAGNOSIS — I48.20 CHRONIC ATRIAL FIBRILLATION: ICD-10-CM

## 2025-02-11 DIAGNOSIS — Z12.31 ENCOUNTER FOR SCREENING MAMMOGRAM FOR MALIGNANT NEOPLASM OF BREAST: ICD-10-CM

## 2025-02-11 DIAGNOSIS — Z92.21 HISTORY OF CHEMOTHERAPY: ICD-10-CM

## 2025-02-11 PROCEDURE — 99214 OFFICE O/P EST MOD 30 MIN: CPT | Mod: PBBFAC,PO | Performed by: INTERNAL MEDICINE

## 2025-02-11 PROCEDURE — 99999 PR PBB SHADOW E&M-EST. PATIENT-LVL IV: CPT | Mod: PBBFAC,,, | Performed by: INTERNAL MEDICINE

## 2025-02-11 PROCEDURE — 99999 PR PBB SHADOW E&M-EST. PATIENT-LVL III: CPT | Mod: PBBFAC,,, | Performed by: INTERNAL MEDICINE

## 2025-02-11 PROCEDURE — 99213 OFFICE O/P EST LOW 20 MIN: CPT | Mod: PBBFAC,27 | Performed by: INTERNAL MEDICINE

## 2025-02-11 PROCEDURE — 99214 OFFICE O/P EST MOD 30 MIN: CPT | Mod: S$PBB,,, | Performed by: INTERNAL MEDICINE

## 2025-02-11 NOTE — PROGRESS NOTES
Subjective:   02/11/2025     Patient ID:  Claudia Sierra is a 84 y.o. female who presents for evaulation of Congestive Heart Failure and Hypertension      Back here for follow-up.  Recent lab okay, renal function stable.  She continues to take Jardiance 10 mg a day and PRN Lasix and spironolactone.  She is grieving the loss of her younger sister.    Has chronic atrial fibrillation, anticoagulated dose appropriate.      Prior note reviewed:  Patient seems relatively stable without increasing shortness of breath now on Jardiance 10 mg daily.  She does complain of constipation.  She has lactulose to try.  She has not been taking Lasix or spironolactone, does have take if her weight would be greater than 125 lb.  She has otherwise been stable.  Blood pressure appears well controlled.      Assessment and Plan:    Chronic diastolic congestive heart failure  Comments:  Continue Jardiance and PRN Lasix/spironolactone  Orders:  -     Basic Metabolic Panel; Future; Expected date: 02/06/2025    Essential hypertension  Comments:  Well controlled    Longstanding persistent atrial fibrillation  Comments:  Accepted as rhythm of choice    Nonrheumatic aortic valve stenosis  Comments:  Mild    Chronic renal failure, stage 3a  Comments:  Stable    Mixed hyperlipidemia    Chronic anticoagulation  Comments:  GFR calculates to be 31.  Correct dose for Xarelto 15 mg daily.  Will change.    Atrial fibrillation with rapid ventricular response  -     rivaroxaban (XARELTO) 15 mg Tab; Take 1 tablet (15 mg total) by mouth Daily.  Dispense: 30 tablet; Refill: 11            Follow up in about 3 months (around 2/6/2025).    Subsequent message:   On her last office visit, she appeared to have diastolic heart failure, I added Lasix 20+ spironolactone 25 to her medications which included also Jardiance 10 mg daily.  She did feel less short of breath, but eventually developed low blood pressures felt to be due to dehydration.  This was  evaluated in the emergency room.  Lasix and furosemide were stopped.  She did notice a significant decrease in weight also.       I would recommend that she take furosemide 20+ spironolactone 25 only as needed for weight greater than 125 lb at this time.  Will follow-up in the office.    Prior note reviewed:  Here for follow-up, echocardiography did show pulmonary hypertension with normal left ventricular systolic function and significant tricuspid regurgitation.  There was elevated central venous pressure.  She continues to have dyspnea on exertion.  Mild lower extremity edema is present.  She does feel that she has urinated more since initiation of SG LT 2 inhibitor therapy, but continues to have shortness of breath.  She is not having chest pains or tightness.    Her sodium was 133, renal function normal, BNP 15 50.    Assessment and Plan:     Acute on chronic diastolic congestive heart failure  Comments:  Continue empagliflozin  Add furosemide 20 and spironolactone 25 daily  Weigh daily  Sodium low, avoid excess fluid  Orders:  -     Basic Metabolic Panel; Future; Expected date: 10/23/2024  -     NT-Pro Natriuretic Peptide; Future; Expected date: 10/23/2024  -     spironolactone (ALDACTONE) 25 MG tablet; Take 1 tablet (25 mg total) by mouth once daily.  Dispense: 30 tablet; Refill: 11  -     furosemide (LASIX) 20 MG tablet; Take 1 tablet (20 mg total) by mouth once daily.  Dispense: 30 tablet; Refill: 11     Essential hypertension  Comments:  Should improve with diuresis     Longstanding persistent atrial fibrillation  Comments:  Accept as rhythm of choice     Nonrheumatic aortic valve stenosis  Comments:  Not severe              Follow up in about 2 weeks (around 10/30/2024).             Prior note reviewed goal  Patient in atrial fibrillation, appears to be accepted as rhythm of choice, but comes in with increasing dyspnea on exertion and low blood pressure with standing making her lightheaded.  She is on  metoprolol succinate 100 mg daily.  She does not take diuretics.  An echocardiogram in April of 2024 did show pulmonary hypertension with normal left ventricular systolic function.  She continues to have dyspnea and occasionally wakes from sleep short of breath.      Assessment and Plan:    Chronic diastolic congestive heart failure  -     empagliflozin (JARDIANCE) 10 mg tablet; Take 1 tablet (10 mg total) by mouth once daily.  Dispense: 30 tablet; Refill: 11  -     CBC Auto Differential; Future; Expected date: 10/09/2024  -     Basic Metabolic Panel; Future; Expected date: 10/09/2024  -     NT-Pro Natriuretic Peptide; Future; Expected date: 10/02/2024  -     Echo; Future; Expected date: 10/09/2024    Longstanding persistent atrial fibrillation  -     Ambulatory referral/consult to Cardiology  -     IN OFFICE EKG 12-LEAD (to [facility])    Nonrheumatic aortic valve stenosis  -     Ambulatory referral/consult to Cardiology    Encounter for antineoplastic chemotherapy    Essential hypertension    Chronic atrial fibrillation  -     metoprolol succinate (TOPROL-XL) 100 MG 24 hr tablet; 1/2 daily  Dispense: 90 tablet; Refill: 3    Gastroesophageal reflux disease with esophagitis     Patient with pulmonary hypertension and normal left ventricular systolic function, will accept atrial fibrillation as rhythm of choice.  She will have blood work soon, echocardiogram will be repeated.  I will add SG LT 2 inhibitor therapy for pulmonary hypertension and dyspnea, because of lightheadedness associated low blood pressures, decrease metoprolol.  Office visit on return to clinic in 2 weeks after lab in ECHO        Past Medical History:   Diagnosis Date    *Atrial fibrillation     Breast cancer     Right breast cancer    Hypertension     Slow transit constipation 12/19/2013       Review of patient's allergies indicates:   Allergen Reactions    Codeine Other (See Comments)     Feel like (climbing the walls)    Hydrocodone Other (See  Comments)     Restless and anxious similar to codeine. Tolerated oxycodone without issue.    Benadryl [diphenhydramine hcl] Anxiety         Current Outpatient Medications:     acetaminophen (TYLENOL) 650 MG TbSR, Take 1 tablet (650 mg total) by mouth every 6 to 8 hours as needed (pain)., Disp: 120 tablet, Rfl: 0    ascorbic acid, vitamin C, (VITAMIN C) 1000 MG tablet, Take 1,000 mg by mouth once daily., Disp: , Rfl:     b complex vitamins tablet, Take 1 tablet by mouth once daily., Disp: , Rfl:     beta-carotene,A,-vits C,E/mins (OCUVITE ORAL), Take 1 tablet by mouth every evening., Disp: , Rfl:     empagliflozin (JARDIANCE) 10 mg tablet, Take 1 tablet (10 mg total) by mouth once daily., Disp: 30 tablet, Rfl: 11    furosemide (LASIX) 20 MG tablet, Take 1 tablet (20 mg total) by mouth daily as needed (Weight greater than 125 lb)., Disp: 30 tablet, Rfl: 11    gabapentin (NEURONTIN) 600 MG tablet, Take 2 tablets (1,200 mg total) by mouth once daily., Disp: 180 tablet, Rfl: 0    lactulose (CHRONULAC) 10 gram/15 mL solution, Take 45 mLs (30 g total) by mouth 3 (three) times daily as needed (constipation)., Disp: 473 mL, Rfl: 4    loratadine (CLARITIN) 10 mg tablet, Take 10 mg by mouth once daily., Disp: , Rfl:     metoprolol succinate (TOPROL-XL) 50 MG 24 hr tablet, Take 1 tablet (50 mg total) by mouth once daily., Disp: 90 tablet, Rfl: 3    polycarbophil (FIBERCON) 625 mg tablet, Take 625 mg by mouth once daily., Disp: , Rfl:     pravastatin (PRAVACHOL) 40 MG tablet, TAKE 1 TABLET BY MOUTH EVERY DAY, Disp: 90 tablet, Rfl: 3    rivaroxaban (XARELTO) 15 mg Tab, Take 1 tablet (15 mg total) by mouth Daily., Disp: 30 tablet, Rfl: 11    spironolactone (ALDACTONE) 25 MG tablet, Take 1 tablet (25 mg total) by mouth daily as needed (Weight greater than 125 lb)., Disp: 30 tablet, Rfl: 11    traZODone (DESYREL) 50 MG tablet, 1 pill PO PRN for insomnia at bedtime. If it does not help fall asleep fast enough, can take 30-40 minutes  before bedtime., Disp: 30 tablet, Rfl: 5    vitamin D (VITAMIN D3) 1000 units Tab, Take 1,000 Units by mouth once daily., Disp: , Rfl:      Objective:   Review of Systems   Cardiovascular:  Negative for chest pain, claudication, cyanosis, dyspnea on exertion, irregular heartbeat, leg swelling, near-syncope, orthopnea, palpitations, paroxysmal nocturnal dyspnea and syncope.         Vitals:    02/11/25 1304   BP: 118/70   Pulse: 76       Wt Readings from Last 3 Encounters:   02/11/25 55.6 kg (122 lb 9.2 oz)   02/11/25 55.3 kg (121 lb 14.6 oz)   12/04/24 60.1 kg (132 lb 9.6 oz)     Temp Readings from Last 3 Encounters:   10/21/24 97.4 °F (36.3 °C) (Oral)   08/27/24 98 °F (36.7 °C) (Oral)   02/20/24 98 °F (36.7 °C) (Oral)     BP Readings from Last 3 Encounters:   02/11/25 118/70   02/11/25 108/73   12/04/24 (!) 142/69     Pulse Readings from Last 3 Encounters:   02/11/25 76   02/11/25 74   12/04/24 63             Physical Exam  Vitals reviewed.   Constitutional:       General: She is not in acute distress.     Appearance: She is well-developed.   HENT:      Head: Normocephalic and atraumatic.      Nose: Nose normal.   Eyes:      Conjunctiva/sclera: Conjunctivae normal.      Pupils: Pupils are equal, round, and reactive to light.   Neck:      Vascular: No carotid bruit or JVD.   Cardiovascular:      Rate and Rhythm: Normal rate. Rhythm irregular.      Pulses: Intact distal pulses.      Heart sounds: Normal heart sounds. No murmur heard.     No friction rub. No gallop.   Pulmonary:      Effort: Pulmonary effort is normal. No respiratory distress.      Breath sounds: Normal breath sounds. No wheezing or rales.   Chest:      Chest wall: No tenderness.   Abdominal:      General: Bowel sounds are normal. There is no distension.      Palpations: Abdomen is soft.      Tenderness: There is no abdominal tenderness.   Musculoskeletal:         General: No tenderness or deformity. Normal range of motion.      Cervical back: Normal  range of motion and neck supple.      Right lower leg: No edema.      Left lower leg: No edema.   Skin:     General: Skin is warm and dry.      Findings: No erythema or rash.   Neurological:      Mental Status: She is alert and oriented to person, place, and time.      Cranial Nerves: No cranial nerve deficit.      Motor: No abnormal muscle tone.      Coordination: Coordination normal.   Psychiatric:         Behavior: Behavior normal.         Thought Content: Thought content normal.         Judgment: Judgment normal.           Lab Results   Component Value Date    CHOL 124 04/25/2024    CHOL 194 10/25/2018    CHOL 162 01/11/2018     Lab Results   Component Value Date    HDL 50 04/25/2024    HDL 74 10/25/2018    HDL 65 01/11/2018     Lab Results   Component Value Date    LDLCALC 63.6 04/25/2024    LDLCALC 102.6 10/25/2018    LDLCALC 83.6 01/11/2018     Lab Results   Component Value Date    ALT 21 10/21/2024    AST 35 10/21/2024    AST 40 04/25/2024    AST 30 06/01/2023     Lab Results   Component Value Date    CREATININE 1.1 02/06/2025    BUN 15 02/06/2025     02/06/2025    K 4.6 02/06/2025    CO2 28 02/06/2025    CO2 24 10/23/2024    CO2 27 10/21/2024     Lab Results   Component Value Date    HGB 14.0 10/21/2024    HCT 41.8 10/21/2024    HCT 37.5 10/10/2024    HCT 37.1 08/27/2024               EKG showed atrial fibrillation with controlled ventricular response          Assessment and Plan:     Essential hypertension  Comments:  Currently well controlled    Chronic diastolic congestive heart failure  Comments:  Appears euvolemic on Jardiance, PRN diuretics  Orders:  -     CBC Auto Differential; Future; Expected date: 08/11/2025  -     Basic Metabolic Panel; Future; Expected date: 08/11/2025  -     Echo; Future; Expected date: 08/11/2025    Longstanding persistent atrial fibrillation  Comments:  Accepted as rhythm of choice    Aortic atherosclerosis    Nonrheumatic aortic valve stenosis  Comments:  Moderate    Repeat echo 6 months            Follow up in about 6 months (around 8/11/2025).          Future Appointments   Date Time Provider Department Center   2/28/2025  1:15 PM Sajan Curtis MD [facility]   2/6/2026  1:00 PM [facility] MAMMO1 [facility]   2/13/2026 11:40 AM Mingo Wynne MD [facility] HEM ONC [facility]

## 2025-02-13 DIAGNOSIS — E78.00 HYPERCHOLESTEROLEMIA: ICD-10-CM

## 2025-02-13 RX ORDER — PRAVASTATIN SODIUM 40 MG/1
40 TABLET ORAL
Qty: 90 TABLET | Refills: 3 | Status: SHIPPED | OUTPATIENT
Start: 2025-02-13

## 2025-02-18 DIAGNOSIS — B02.29 POST HERPETIC NEURALGIA: ICD-10-CM

## 2025-02-18 RX ORDER — GABAPENTIN 600 MG/1
1200 TABLET ORAL DAILY
Qty: 180 TABLET | Refills: 0 | Status: SHIPPED | OUTPATIENT
Start: 2025-02-18 | End: 2025-05-19

## 2025-02-18 NOTE — TELEPHONE ENCOUNTER
No care due was identified.  [facility] Embedded Care Due Messages. Reference number: [id number].   2/18/2025 2:15:08 PM CST

## 2025-02-24 DIAGNOSIS — Z00.00 ENCOUNTER FOR MEDICARE ANNUAL WELLNESS EXAM: ICD-10-CM

## 2025-02-28 ENCOUNTER — EXTERNAL CHRONIC CARE MANAGEMENT (OUTPATIENT)
Dept: PRIMARY CARE CLINIC | Facility: CLINIC | Age: 85
End: 2025-02-28
Payer: MEDICARE

## 2025-02-28 ENCOUNTER — OFFICE VISIT (OUTPATIENT)
Dept: PRIMARY CARE CLINIC | Facility: CLINIC | Age: 85
End: 2025-02-28
Payer: MEDICARE

## 2025-02-28 VITALS
BODY MASS INDEX: 25.34 KG/M2 | DIASTOLIC BLOOD PRESSURE: 84 MMHG | WEIGHT: 125.69 LBS | RESPIRATION RATE: 18 BRPM | HEIGHT: 59 IN | OXYGEN SATURATION: 95 % | SYSTOLIC BLOOD PRESSURE: 138 MMHG | HEART RATE: 64 BPM

## 2025-02-28 DIAGNOSIS — N18.31 CHRONIC RENAL FAILURE, STAGE 3A: ICD-10-CM

## 2025-02-28 DIAGNOSIS — I10 ESSENTIAL HYPERTENSION: ICD-10-CM

## 2025-02-28 DIAGNOSIS — Z85.3 HISTORY OF BREAST CANCER: ICD-10-CM

## 2025-02-28 DIAGNOSIS — I35.0 NONRHEUMATIC AORTIC VALVE STENOSIS: Chronic | ICD-10-CM

## 2025-02-28 DIAGNOSIS — I48.11 LONGSTANDING PERSISTENT ATRIAL FIBRILLATION: Chronic | ICD-10-CM

## 2025-02-28 DIAGNOSIS — F43.21 GRIEF: Primary | ICD-10-CM

## 2025-02-28 PROCEDURE — 99999 PR PBB SHADOW E&M-EST. PATIENT-LVL IV: CPT | Mod: PBBFAC,,, | Performed by: INTERNAL MEDICINE

## 2025-02-28 PROCEDURE — 99490 CHRNC CARE MGMT STAFF 1ST 20: CPT | Mod: S$PBB,,, | Performed by: INTERNAL MEDICINE

## 2025-02-28 PROCEDURE — 99490 CHRNC CARE MGMT STAFF 1ST 20: CPT | Mod: PBBFAC | Performed by: INTERNAL MEDICINE

## 2025-02-28 PROCEDURE — 99214 OFFICE O/P EST MOD 30 MIN: CPT | Mod: PBBFAC | Performed by: INTERNAL MEDICINE

## 2025-02-28 NOTE — PROGRESS NOTES
[facility] Note    Chief Complaint      Chief Complaint   Patient presents with    Follow-up     6m       History of Present Illness      Claudia Sierra is a 84 y.o. female who presents today for   Chief Complaint   Patient presents with    Follow-up     6m   .  Patient comes to appointment here for 6m checkup for chronc issues as below . She is current seeing dr ho . She is complaint with all medications . She is currently grieving the loss of a sibling but is griveing approprately    Problem List Items Addressed This Visit       Essential hypertension    Overview   bp well controlled on current regimen   ekg looks good 8/21          Longstanding persistent atrial fibrillation (Chronic)    Overview   Now in sinus rhythm . Is seeing dr curran wed is on xarelto now .         History of breast cancer    Overview   Stable          Nonrheumatic aortic valve stenosis (Chronic)    Overview   Cardiology managing surveillance          Chronic renal failure, stage 3a    Overview   Stable          Grief - Primary    Overview   Is grieving approprately currently after loss of sibling               Past Medical History:  Past Medical History:   Diagnosis Date    *Atrial fibrillation     Breast cancer     Right breast cancer    Hypertension     Slow transit constipation 12/19/2013       Past Surgical History:  Past Surgical History:   Procedure Laterality Date    ADENOIDECTOMY  70 years ago    At same time as tonsillectomy. .    APPENDECTOMY      ARTHROPLASTY OF HIP BY ANTERIOR APPROACH Right 01/31/2022    Procedure: ARTHROPLASTY, HIP, TOTAL, ANTERIOR APPROACH:RIGHT: DEPUY-C-STEM+PINNACLE;  Surgeon: Cisco Mcneal III, MD;  Location: [facility];  Service: Orthopedics;  Laterality: Right;    BREAST BIOPSY      right    BREAST LUMPECTOMY Right 2013    BREAST SURGERY  2013    EPIDURAL STEROID INJECTION INTO CERVICAL SPINE N/A 02/12/2020    Procedure:  Injection-steroid-epidural-cervical--C7-T1 IL ELMIRA;  Surgeon: Alicia Proctor MD;  Location: [facility];  Service: Pain Management;  Laterality: N/A;  sign consent at [facility]    EYE SURGERY  2018    HYSTERECTOMY      JOINT REPLACEMENT  Hip, 2022    lymphnode removal      12 from Right axilla    PORTACATH PLACEMENT  2013    TONSILLECTOMY         Family History:  family history includes Cancer in her father and sister; Cervical cancer (age of onset: 79) in her sister; Diabetes in her mother, son, and son; Heart disease in her father, mother, son, and son; Hypertension in her mother, sister, son, and son.    Social History:  Social History[1]    Review of Systems:   Review of Systems   Constitutional:  Negative for fever and weight loss.   HENT:  Negative for congestion, hearing loss and sore throat.    Eyes:  Negative for blurred vision.   Respiratory:  Negative for cough and shortness of breath.    Cardiovascular:  Negative for chest pain, palpitations, claudication and leg swelling.   Gastrointestinal:  Negative for abdominal pain, constipation, diarrhea and heartburn.   Genitourinary:  Negative for dysuria.   Musculoskeletal:  Negative for back pain and myalgias.   Skin:  Negative for rash.   Neurological:  Negative for focal weakness and headaches.   Psychiatric/Behavioral:  Negative for depression, memory loss and suicidal ideas. The patient is nervous/anxious.          Medications:  Encounter Medications[2]     Allergies:  Review of patient's allergies indicates:   Allergen Reactions    Codeine Other (See Comments)     Feel like (climbing the walls)    Hydrocodone Other (See Comments)     Restless and anxious similar to codeine. Tolerated oxycodone without issue.    Benadryl [diphenhydramine hcl] Anxiety         Physical Exam         Vitals:    02/28/25 1303   BP: 138/84   Pulse: 64   Resp: 18         Physical Exam  Constitutional:       Appearance: She is well-developed.   Eyes:      Pupils: Pupils are equal,  "round, and reactive to light.   Neck:      Thyroid: No thyromegaly.   Cardiovascular:      Rate and Rhythm: Normal rate. Rhythm irregularly irregular.      Heart sounds: Murmur heard.      Systolic murmur is present with a grade of 2/6.      No friction rub. No gallop.   Pulmonary:      Breath sounds: Normal breath sounds.   Abdominal:      General: Bowel sounds are normal.      Palpations: Abdomen is soft.   Musculoskeletal:         General: Normal range of motion.      Cervical back: Normal range of motion.   Lymphadenopathy:      Cervical: No cervical adenopathy.   Skin:     General: Skin is warm.      Findings: No rash.   Neurological:      Mental Status: She is alert and oriented to person, place, and time.      Cranial Nerves: No cranial nerve deficit.   Psychiatric:         Behavior: Behavior normal.          Laboratory:  CBC:  No results for input(s): "WBC", "RBC", "HGB", "HCT", "PLT", "MCV", "MCH", "MCHC" in the last 2160 hours.  CMP:  Recent Labs   Lab Result Units 02/06/25  1031   Glucose mg/dL 111*   Calcium mg/dL 10.1   Sodium mmol/L 137   Potassium mmol/L 4.6   CO2 mmol/L 28   Chloride mmol/L 100   BUN mg/dL 15     URINALYSIS:  No results for input(s): "COLORU", "CLARITYU", "SPECGRAV", "PHUR", "PROTEINUA", "GLUCOSEU", "BILIRUBINCON", "BLOODU", "WBCU", "RBCU", "BACTERIA", "MUCUS", "NITRITE", "LEUKOCYTESUR", "UROBILINOGEN", "HYALINECASTS" in the last 2160 hours.   LIPIDS:  No results for input(s): "TSH", "HDL", "CHOL", "TRIG", "LDLCALC", "CHOLHDL", "NONHDLCHOL", "TOTALCHOLEST" in the last 2160 hours.  TSH:  No results for input(s): "TSH" in the last 2160 hours.  A1C:  No results for input(s): "HGBA1C" in the last 2160 hours.    Radiology:        Assessment:     Claudia Sierra is a 84 y.o.female with:    Grief    History of breast cancer    Essential hypertension    Chronic renal failure, stage 3a    Longstanding persistent atrial fibrillation    Nonrheumatic aortic valve stenosis          Plan: "     Problem List Items Addressed This Visit       Essential hypertension    Overview   bp well controlled on current regimen   ekg looks good 8/21          Longstanding persistent atrial fibrillation (Chronic)    Overview   Now in sinus rhythm . Is seeing dr curran wed is on xarelto now .         History of breast cancer    Overview   Stable          Nonrheumatic aortic valve stenosis (Chronic)    Overview   Cardiology managing surveillance          Chronic renal failure, stage 3a    Overview   Stable          Grief - Primary    Overview   Is grieving approprately currently after loss of sibling             As above, continue current medications and maintain follow up with specialists.  Return to clinic in 6 months.      Frederick W Dantagnan 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   Tobacco Use    Smoking status: Never    Smokeless tobacco: Never   Substance and Sexual Activity    Alcohol use: Yes     Alcohol/week: 3.0 standard drinks of alcohol     Types: 3 Glasses of wine per week     Comment: every 2 weeks    Drug use: No    Sexual activity: Not Currently     Partners: Male     Birth control/protection: None   Social History Narrative    Sister will help at night    No stairs in her home     Social Drivers of Health     Financial Resource Strain: Low Risk  (2/5/2025)    Overall Financial Resource Strain (CARDIA)     Difficulty of Paying Living Expenses: Not hard at all   Food Insecurity: No Food Insecurity (2/5/2025)    Hunger Vital Sign     Worried About Running Out of Food in the Last Year: Never true     Ran Out of Food in the Last Year: Never true   Transportation Needs: No Transportation Needs (2/8/2024)    PRAPARE - Transportation     Lack of Transportation (Medical): No     Lack of Transportation  (Non-Medical): No   Physical Activity: Unknown (2/5/2025)    Exercise Vital Sign     Days of Exercise per Week: 0 days   Stress: Stress Concern Present (2/5/2025)    [employer] [place] [employer] - Occupational Stress Questionnaire     Feeling of Stress : To some extent   Housing Stability: Unknown (2/8/2024)    Housing Stability Vital Sign     Unable to Pay for Housing in the Last Year: No     Unstable Housing in the Last Year: No   [2]   Outpatient Encounter Medications as of 2/28/2025   Medication Sig Note Dispense Refill    acetaminophen (TYLENOL) 650 MG TbSR Take 1 tablet (650 mg total) by mouth every 6 to 8 hours as needed (pain).  120 tablet 0    ascorbic acid, vitamin C, (VITAMIN C) 1000 MG tablet Take 1,000 mg by mouth once daily.       b complex vitamins tablet Take 1 tablet by mouth once daily.       beta-carotene,A,-vits C,E/mins (OCUVITE ORAL) Take 1 tablet by mouth every evening.       empagliflozin (JARDIANCE) 10 mg tablet Take 1 tablet (10 mg total) by mouth once daily.  30 tablet 11    furosemide (LASIX) 20 MG tablet Take 1 tablet (20 mg total) by mouth daily as needed (Weight greater than 125 lb).  30 tablet 11    gabapentin (NEURONTIN) 600 MG tablet Take 2 tablets (1,200 mg total) by mouth once daily.  180 tablet 0    lactulose (CHRONULAC) 10 gram/15 mL solution Take 45 mLs (30 g total) by mouth 3 (three) times daily as needed (constipation).  473 mL 4    loratadine (CLARITIN) 10 mg tablet Take 10 mg by mouth once daily. 6/5/2023: PRN allergies      metoprolol succinate (TOPROL-XL) 50 MG 24 hr tablet Take 1 tablet (50 mg total) by mouth once daily.  90 tablet 3    polycarbophil (FIBERCON) 625 mg tablet Take 625 mg by mouth once daily.       pravastatin (PRAVACHOL) 40 MG tablet TAKE 1 TABLET BY MOUTH EVERY DAY  90 tablet 3    rivaroxaban (XARELTO) 15 mg Tab Take 1 tablet (15 mg total) by mouth Daily.  30 tablet 11    spironolactone (ALDACTONE) 25 MG tablet Take 1 tablet (25 mg total) by  mouth daily as needed (Weight greater than 125 lb).  30 tablet 11    traZODone (DESYREL) 50 MG tablet 1 pill PO PRN for insomnia at bedtime. If it does not help fall asleep fast enough, can take 30-40 minutes before bedtime.  30 tablet 5    vitamin D (VITAMIN D3) 1000 units Tab Take 1,000 Units by mouth once daily.       [DISCONTINUED] gabapentin (NEURONTIN) 600 MG tablet Take 2 tablets (1,200 mg total) by mouth once daily.  180 tablet 0    [DISCONTINUED] pravastatin (PRAVACHOL) 40 MG tablet TAKE 1 TABLET BY MOUTH EVERY DAY  90 tablet 3     No facility-administered encounter medications on file as of 2/28/2025.

## 2025-04-21 ENCOUNTER — HOSPITAL ENCOUNTER (INPATIENT)
Facility: HOSPITAL | Age: 85
LOS: 5 days | Discharge: HOME HEALTH | DRG: 193 | End: 2025-04-27
Attending: STUDENT IN AN ORGANIZED HEALTH CARE EDUCATION/TRAINING PROGRAM
Payer: MEDICARE

## 2025-04-21 DIAGNOSIS — R79.89 ELEVATED BRAIN NATRIURETIC PEPTIDE (BNP) LEVEL: ICD-10-CM

## 2025-04-21 DIAGNOSIS — I27.20 PULMONARY HYPERTENSION: Chronic | ICD-10-CM

## 2025-04-21 DIAGNOSIS — R09.02 HYPOXIA: ICD-10-CM

## 2025-04-21 DIAGNOSIS — J84.9 INTERSTITIAL LUNG DISEASE: Primary | Chronic | ICD-10-CM

## 2025-04-21 DIAGNOSIS — R79.89 ELEVATED TROPONIN: ICD-10-CM

## 2025-04-21 DIAGNOSIS — R50.9 FEVER: ICD-10-CM

## 2025-04-21 DIAGNOSIS — Z13.6 SCREENING FOR CARDIOVASCULAR CONDITION: ICD-10-CM

## 2025-04-21 DIAGNOSIS — I50.9 CONGESTIVE HEART FAILURE, UNSPECIFIED HF CHRONICITY, UNSPECIFIED HEART FAILURE TYPE: ICD-10-CM

## 2025-04-21 DIAGNOSIS — R07.9 CHEST PAIN: ICD-10-CM

## 2025-04-21 DIAGNOSIS — J18.9 COMMUNITY ACQUIRED PNEUMONIA, UNSPECIFIED LATERALITY: ICD-10-CM

## 2025-04-21 LAB
ABSOLUTE EOSINOPHIL (OHS): 0.01 K/UL
ABSOLUTE MONOCYTE (OHS): 0.84 K/UL (ref 0.3–1)
ABSOLUTE NEUTROPHIL COUNT (OHS): 4.73 K/UL (ref 1.8–7.7)
ALBUMIN SERPL BCP-MCNC: 4.1 G/DL (ref 3.5–5.2)
ALP SERPL-CCNC: 75 UNIT/L (ref 40–150)
ALT SERPL W/O P-5'-P-CCNC: 21 UNIT/L (ref 10–44)
ANION GAP (OHS): 13 MMOL/L (ref 8–16)
AST SERPL-CCNC: 34 UNIT/L (ref 11–45)
BACTERIA #/AREA URNS AUTO: ABNORMAL /HPF
BASOPHILS # BLD AUTO: 0.04 K/UL
BASOPHILS NFR BLD AUTO: 0.6 %
BILIRUB SERPL-MCNC: 0.7 MG/DL (ref 0.1–1)
BILIRUB UR QL STRIP.AUTO: NEGATIVE
BNP SERPL-MCNC: 559 PG/ML (ref 0–99)
BUN SERPL-MCNC: 15 MG/DL (ref 8–23)
CALCIUM SERPL-MCNC: 9.4 MG/DL (ref 8.7–10.5)
CHLORIDE SERPL-SCNC: 97 MMOL/L (ref 95–110)
CLARITY UR: CLEAR
CO2 SERPL-SCNC: 23 MMOL/L (ref 23–29)
COLOR UR AUTO: ABNORMAL
CREAT SERPL-MCNC: 1 MG/DL (ref 0.5–1.4)
ERYTHROCYTE [DISTWIDTH] IN BLOOD BY AUTOMATED COUNT: 15.2 % (ref 11.5–14.5)
FIO2: 21 %
GFR SERPLBLD CREATININE-BSD FMLA CKD-EPI: 55 ML/MIN/1.73/M2
GLUCOSE SERPL-MCNC: 155 MG/DL (ref 70–110)
GLUCOSE UR QL STRIP: ABNORMAL
HCT VFR BLD AUTO: 37.6 % (ref 37–48.5)
HGB BLD-MCNC: 13 GM/DL (ref 12–16)
HGB UR QL STRIP: ABNORMAL
HOLD SPECIMEN: NORMAL
IMM GRANULOCYTES # BLD AUTO: 0.04 K/UL (ref 0–0.04)
IMM GRANULOCYTES NFR BLD AUTO: 0.6 % (ref 0–0.5)
INFLUENZA A MOLECULAR (OHS): NEGATIVE
INFLUENZA B MOLECULAR (OHS): NEGATIVE
KETONES UR QL STRIP: NEGATIVE
LDH SERPL L TO P-CCNC: 1.4 MMOL/L (ref 0.5–2.2)
LEUKOCYTE ESTERASE UR QL STRIP: NEGATIVE
LYMPHOCYTES # BLD AUTO: 1.13 K/UL (ref 1–4.8)
MCH RBC QN AUTO: 32.2 PG (ref 27–31)
MCHC RBC AUTO-ENTMCNC: 34.6 G/DL (ref 32–36)
MCV RBC AUTO: 93 FL (ref 82–98)
MICROSCOPIC COMMENT: ABNORMAL
NITRITE UR QL STRIP: NEGATIVE
NUCLEATED RBC (/100WBC) (OHS): 0 /100 WBC
PH UR STRIP: 7 [PH]
PLATELET # BLD AUTO: 207 K/UL (ref 150–450)
PMV BLD AUTO: 9.4 FL (ref 9.2–12.9)
POC PERFORMED BY: NORMAL
POTASSIUM SERPL-SCNC: 3.1 MMOL/L (ref 3.5–5.1)
PROCALCITONIN SERPL-MCNC: 0.02 NG/ML
PROT SERPL-MCNC: 8.6 GM/DL (ref 6–8.4)
PROT UR QL STRIP: NEGATIVE
RBC # BLD AUTO: 4.04 M/UL (ref 4–5.4)
RBC #/AREA URNS AUTO: 2 /HPF (ref 0–4)
RELATIVE EOSINOPHIL (OHS): 0.1 %
RELATIVE LYMPHOCYTE (OHS): 16.6 % (ref 18–48)
RELATIVE MONOCYTE (OHS): 12.4 % (ref 4–15)
RELATIVE NEUTROPHIL (OHS): 69.7 % (ref 38–73)
SODIUM SERPL-SCNC: 133 MMOL/L (ref 136–145)
SP GR UR STRIP: 1
SPECIMEN SOURCE: NORMAL
TROPONIN I SERPL DL<=0.01 NG/ML-MCNC: 0.06 NG/ML
UROBILINOGEN UR STRIP-ACNC: NEGATIVE EU/DL
WBC # BLD AUTO: 6.79 K/UL (ref 3.9–12.7)
WBC #/AREA URNS AUTO: 1 /HPF (ref 0–5)
YEAST UR QL AUTO: ABNORMAL /HPF

## 2025-04-21 PROCEDURE — 96375 TX/PRO/DX INJ NEW DRUG ADDON: CPT

## 2025-04-21 PROCEDURE — 93010 ELECTROCARDIOGRAM REPORT: CPT | Mod: ,,, | Performed by: INTERNAL MEDICINE

## 2025-04-21 PROCEDURE — 63600175 PHARM REV CODE 636 W HCPCS: Performed by: STUDENT IN AN ORGANIZED HEALTH CARE EDUCATION/TRAINING PROGRAM

## 2025-04-21 PROCEDURE — 87502 INFLUENZA DNA AMP PROBE: CPT | Performed by: STUDENT IN AN ORGANIZED HEALTH CARE EDUCATION/TRAINING PROGRAM

## 2025-04-21 PROCEDURE — 87040 BLOOD CULTURE FOR BACTERIA: CPT | Mod: 91 | Performed by: STUDENT IN AN ORGANIZED HEALTH CARE EDUCATION/TRAINING PROGRAM

## 2025-04-21 PROCEDURE — G0378 HOSPITAL OBSERVATION PER HR: HCPCS

## 2025-04-21 PROCEDURE — 99900035 HC TECH TIME PER 15 MIN (STAT)

## 2025-04-21 PROCEDURE — 93005 ELECTROCARDIOGRAM TRACING: CPT

## 2025-04-21 PROCEDURE — 99291 CRITICAL CARE FIRST HOUR: CPT

## 2025-04-21 PROCEDURE — 25000003 PHARM REV CODE 250: Performed by: STUDENT IN AN ORGANIZED HEALTH CARE EDUCATION/TRAINING PROGRAM

## 2025-04-21 PROCEDURE — 96365 THER/PROPH/DIAG IV INF INIT: CPT

## 2025-04-21 PROCEDURE — 84145 PROCALCITONIN (PCT): CPT | Performed by: STUDENT IN AN ORGANIZED HEALTH CARE EDUCATION/TRAINING PROGRAM

## 2025-04-21 PROCEDURE — 85025 COMPLETE CBC W/AUTO DIFF WBC: CPT | Performed by: STUDENT IN AN ORGANIZED HEALTH CARE EDUCATION/TRAINING PROGRAM

## 2025-04-21 PROCEDURE — 84484 ASSAY OF TROPONIN QUANT: CPT | Performed by: STUDENT IN AN ORGANIZED HEALTH CARE EDUCATION/TRAINING PROGRAM

## 2025-04-21 PROCEDURE — 80053 COMPREHEN METABOLIC PANEL: CPT | Performed by: STUDENT IN AN ORGANIZED HEALTH CARE EDUCATION/TRAINING PROGRAM

## 2025-04-21 PROCEDURE — 81003 URINALYSIS AUTO W/O SCOPE: CPT | Performed by: STUDENT IN AN ORGANIZED HEALTH CARE EDUCATION/TRAINING PROGRAM

## 2025-04-21 PROCEDURE — 83605 ASSAY OF LACTIC ACID: CPT

## 2025-04-21 PROCEDURE — 83880 ASSAY OF NATRIURETIC PEPTIDE: CPT | Performed by: STUDENT IN AN ORGANIZED HEALTH CARE EDUCATION/TRAINING PROGRAM

## 2025-04-21 RX ORDER — CEFTRIAXONE 2 G/1
2 INJECTION, POWDER, FOR SOLUTION INTRAMUSCULAR; INTRAVENOUS ONCE
Status: COMPLETED | OUTPATIENT
Start: 2025-04-21 | End: 2025-04-21

## 2025-04-21 RX ORDER — ACETAMINOPHEN 500 MG
1000 TABLET ORAL
Status: COMPLETED | OUTPATIENT
Start: 2025-04-21 | End: 2025-04-21

## 2025-04-21 RX ADMIN — AZITHROMYCIN MONOHYDRATE 500 MG: 500 INJECTION, POWDER, LYOPHILIZED, FOR SOLUTION INTRAVENOUS at 09:04

## 2025-04-21 RX ADMIN — CEFTRIAXONE SODIUM 2 G: 2 INJECTION, POWDER, FOR SOLUTION INTRAMUSCULAR; INTRAVENOUS at 09:04

## 2025-04-21 RX ADMIN — ACETAMINOPHEN 1000 MG: 500 TABLET ORAL at 09:04

## 2025-04-21 NOTE — Clinical Note
Diagnosis: Fever [819846]   Future Attending Provider: BEKAH HASKINS [[id number]]   Special Needs:: Fall Risk [15]

## 2025-04-22 PROBLEM — R79.89 ELEVATED TROPONIN: Status: ACTIVE | Noted: 2025-04-22

## 2025-04-22 PROBLEM — E87.6 HYPOKALEMIA: Status: ACTIVE | Noted: 2025-04-22

## 2025-04-22 PROBLEM — J18.9 CAP (COMMUNITY ACQUIRED PNEUMONIA): Status: ACTIVE | Noted: 2025-04-22

## 2025-04-22 PROBLEM — E87.1 HYPONATREMIA: Status: ACTIVE | Noted: 2025-04-22

## 2025-04-22 PROBLEM — I48.91 ATRIAL FIBRILLATION WITH RVR: Status: ACTIVE | Noted: 2025-04-22

## 2025-04-22 PROBLEM — J96.01 ACUTE HYPOXIC RESPIRATORY FAILURE: Status: ACTIVE | Noted: 2025-04-22

## 2025-04-22 LAB
ABSOLUTE EOSINOPHIL (OHS): 0.01 K/UL
ABSOLUTE MONOCYTE (OHS): 0.81 K/UL (ref 0.3–1)
ABSOLUTE NEUTROPHIL COUNT (OHS): 5.03 K/UL (ref 1.8–7.7)
ALBUMIN SERPL BCP-MCNC: 3.6 G/DL (ref 3.5–5.2)
ALP SERPL-CCNC: 68 UNIT/L (ref 40–150)
ALT SERPL W/O P-5'-P-CCNC: 20 UNIT/L (ref 10–44)
ANION GAP (OHS): 14 MMOL/L (ref 8–16)
AST SERPL-CCNC: 37 UNIT/L (ref 11–45)
BASOPHILS # BLD AUTO: 0.02 K/UL
BASOPHILS NFR BLD AUTO: 0.3 %
BILIRUB SERPL-MCNC: 0.6 MG/DL (ref 0.1–1)
BUN SERPL-MCNC: 14 MG/DL (ref 8–23)
CALCIUM SERPL-MCNC: 9.2 MG/DL (ref 8.7–10.5)
CHLORIDE SERPL-SCNC: 97 MMOL/L (ref 95–110)
CO2 SERPL-SCNC: 24 MMOL/L (ref 23–29)
CREAT SERPL-MCNC: 1 MG/DL (ref 0.5–1.4)
ERYTHROCYTE [DISTWIDTH] IN BLOOD BY AUTOMATED COUNT: 15.5 % (ref 11.5–14.5)
GFR SERPLBLD CREATININE-BSD FMLA CKD-EPI: 55 ML/MIN/1.73/M2
GLUCOSE SERPL-MCNC: 120 MG/DL (ref 70–110)
HCT VFR BLD AUTO: 36.4 % (ref 37–48.5)
HGB BLD-MCNC: 12.5 GM/DL (ref 12–16)
IMM GRANULOCYTES # BLD AUTO: 0.03 K/UL (ref 0–0.04)
IMM GRANULOCYTES NFR BLD AUTO: 0.4 % (ref 0–0.5)
LYMPHOCYTES # BLD AUTO: 1.58 K/UL (ref 1–4.8)
MCH RBC QN AUTO: 32.1 PG (ref 27–31)
MCHC RBC AUTO-ENTMCNC: 34.3 G/DL (ref 32–36)
MCV RBC AUTO: 94 FL (ref 82–98)
NUCLEATED RBC (/100WBC) (OHS): 0 /100 WBC
PLATELET # BLD AUTO: 212 K/UL (ref 150–450)
PMV BLD AUTO: 9.6 FL (ref 9.2–12.9)
POCT GLUCOSE: 128 MG/DL (ref 70–110)
POCT GLUCOSE: 139 MG/DL (ref 70–110)
POCT GLUCOSE: 140 MG/DL (ref 70–110)
POCT GLUCOSE: 167 MG/DL (ref 70–110)
POTASSIUM SERPL-SCNC: 3 MMOL/L (ref 3.5–5.1)
PROT SERPL-MCNC: 7.8 GM/DL (ref 6–8.4)
RBC # BLD AUTO: 3.89 M/UL (ref 4–5.4)
RELATIVE EOSINOPHIL (OHS): 0.1 %
RELATIVE LYMPHOCYTE (OHS): 21.1 % (ref 18–48)
RELATIVE MONOCYTE (OHS): 10.8 % (ref 4–15)
RELATIVE NEUTROPHIL (OHS): 67.3 % (ref 38–73)
SODIUM SERPL-SCNC: 135 MMOL/L (ref 136–145)
WBC # BLD AUTO: 7.48 K/UL (ref 3.9–12.7)

## 2025-04-22 PROCEDURE — 25000003 PHARM REV CODE 250

## 2025-04-22 PROCEDURE — 25000242 PHARM REV CODE 250 ALT 637 W/ HCPCS

## 2025-04-22 PROCEDURE — 96375 TX/PRO/DX INJ NEW DRUG ADDON: CPT

## 2025-04-22 PROCEDURE — 25000003 PHARM REV CODE 250: Performed by: REGISTERED NURSE

## 2025-04-22 PROCEDURE — 63600175 PHARM REV CODE 636 W HCPCS: Performed by: REGISTERED NURSE

## 2025-04-22 PROCEDURE — 11000001 HC ACUTE MED/SURG PRIVATE ROOM

## 2025-04-22 PROCEDURE — 25500020 PHARM REV CODE 255: Performed by: STUDENT IN AN ORGANIZED HEALTH CARE EDUCATION/TRAINING PROGRAM

## 2025-04-22 PROCEDURE — 94640 AIRWAY INHALATION TREATMENT: CPT

## 2025-04-22 PROCEDURE — 82962 GLUCOSE BLOOD TEST: CPT

## 2025-04-22 PROCEDURE — 99900035 HC TECH TIME PER 15 MIN (STAT)

## 2025-04-22 PROCEDURE — 84460 ALANINE AMINO (ALT) (SGPT): CPT | Performed by: REGISTERED NURSE

## 2025-04-22 PROCEDURE — 25000003 PHARM REV CODE 250: Performed by: FAMILY MEDICINE

## 2025-04-22 PROCEDURE — 27000221 HC OXYGEN, UP TO 24 HOURS

## 2025-04-22 PROCEDURE — 94761 N-INVAS EAR/PLS OXIMETRY MLT: CPT

## 2025-04-22 PROCEDURE — 85025 COMPLETE CBC W/AUTO DIFF WBC: CPT | Performed by: REGISTERED NURSE

## 2025-04-22 PROCEDURE — 99223 1ST HOSP IP/OBS HIGH 75: CPT | Mod: ,,, | Performed by: NURSE PRACTITIONER

## 2025-04-22 PROCEDURE — 96372 THER/PROPH/DIAG INJ SC/IM: CPT | Performed by: REGISTERED NURSE

## 2025-04-22 RX ORDER — METOPROLOL TARTRATE 1 MG/ML
5 INJECTION, SOLUTION INTRAVENOUS EVERY 6 HOURS PRN
Status: DISCONTINUED | OUTPATIENT
Start: 2025-04-22 | End: 2025-04-27 | Stop reason: HOSPADM

## 2025-04-22 RX ORDER — DOXYCYCLINE HYCLATE 100 MG
100 TABLET ORAL 2 TIMES DAILY
Status: ON HOLD | COMMUNITY
Start: 2025-04-21 | End: 2025-04-27 | Stop reason: HOSPADM

## 2025-04-22 RX ORDER — GABAPENTIN 300 MG/1
600 CAPSULE ORAL 2 TIMES DAILY
Status: DISCONTINUED | OUTPATIENT
Start: 2025-04-22 | End: 2025-04-27 | Stop reason: HOSPADM

## 2025-04-22 RX ORDER — GLUCAGON 1 MG
1 KIT INJECTION
Status: DISCONTINUED | OUTPATIENT
Start: 2025-04-22 | End: 2025-04-27 | Stop reason: HOSPADM

## 2025-04-22 RX ORDER — POTASSIUM CHLORIDE 20 MEQ/1
40 TABLET, EXTENDED RELEASE ORAL ONCE
Status: COMPLETED | OUTPATIENT
Start: 2025-04-22 | End: 2025-04-22

## 2025-04-22 RX ORDER — BENZONATATE 100 MG/1
100 CAPSULE ORAL 3 TIMES DAILY PRN
Status: DISCONTINUED | OUTPATIENT
Start: 2025-04-22 | End: 2025-04-27 | Stop reason: HOSPADM

## 2025-04-22 RX ORDER — IPRATROPIUM BROMIDE AND ALBUTEROL SULFATE 2.5; .5 MG/3ML; MG/3ML
3 SOLUTION RESPIRATORY (INHALATION) EVERY 4 HOURS
Status: DISCONTINUED | OUTPATIENT
Start: 2025-04-22 | End: 2025-04-27 | Stop reason: HOSPADM

## 2025-04-22 RX ORDER — IBUPROFEN 200 MG
16 TABLET ORAL
Status: DISCONTINUED | OUTPATIENT
Start: 2025-04-22 | End: 2025-04-27 | Stop reason: HOSPADM

## 2025-04-22 RX ORDER — METOPROLOL SUCCINATE 50 MG/1
50 TABLET, EXTENDED RELEASE ORAL DAILY
Status: DISCONTINUED | OUTPATIENT
Start: 2025-04-22 | End: 2025-04-27 | Stop reason: HOSPADM

## 2025-04-22 RX ORDER — IPRATROPIUM BROMIDE AND ALBUTEROL SULFATE 2.5; .5 MG/3ML; MG/3ML
3 SOLUTION RESPIRATORY (INHALATION) EVERY 6 HOURS PRN
Status: DISCONTINUED | OUTPATIENT
Start: 2025-04-22 | End: 2025-04-22

## 2025-04-22 RX ORDER — IBUPROFEN 200 MG
24 TABLET ORAL
Status: DISCONTINUED | OUTPATIENT
Start: 2025-04-22 | End: 2025-04-27 | Stop reason: HOSPADM

## 2025-04-22 RX ORDER — PRAVASTATIN SODIUM 40 MG/1
40 TABLET ORAL DAILY
Status: DISCONTINUED | OUTPATIENT
Start: 2025-04-22 | End: 2025-04-27 | Stop reason: HOSPADM

## 2025-04-22 RX ORDER — ENOXAPARIN SODIUM 100 MG/ML
1 INJECTION SUBCUTANEOUS EVERY 12 HOURS
Status: DISCONTINUED | OUTPATIENT
Start: 2025-04-22 | End: 2025-04-22

## 2025-04-22 RX ORDER — ONDANSETRON HYDROCHLORIDE 2 MG/ML
4 INJECTION, SOLUTION INTRAVENOUS EVERY 8 HOURS PRN
Status: DISCONTINUED | OUTPATIENT
Start: 2025-04-22 | End: 2025-04-27 | Stop reason: HOSPADM

## 2025-04-22 RX ORDER — IPRATROPIUM BROMIDE AND ALBUTEROL SULFATE 2.5; .5 MG/3ML; MG/3ML
3 SOLUTION RESPIRATORY (INHALATION) EVERY 4 HOURS PRN
Status: DISCONTINUED | OUTPATIENT
Start: 2025-04-22 | End: 2025-04-22

## 2025-04-22 RX ORDER — CETIRIZINE HYDROCHLORIDE 5 MG/1
5 TABLET ORAL NIGHTLY
Status: DISCONTINUED | OUTPATIENT
Start: 2025-04-22 | End: 2025-04-27 | Stop reason: HOSPADM

## 2025-04-22 RX ORDER — SPIRONOLACTONE 25 MG/1
25 TABLET ORAL DAILY PRN
Status: DISCONTINUED | OUTPATIENT
Start: 2025-04-22 | End: 2025-04-23

## 2025-04-22 RX ORDER — NALOXONE HCL 0.4 MG/ML
0.02 VIAL (ML) INJECTION
Status: DISCONTINUED | OUTPATIENT
Start: 2025-04-22 | End: 2025-04-27 | Stop reason: HOSPADM

## 2025-04-22 RX ORDER — CEFTRIAXONE 2 G/1
2 INJECTION, POWDER, FOR SOLUTION INTRAMUSCULAR; INTRAVENOUS
Status: DISCONTINUED | OUTPATIENT
Start: 2025-04-22 | End: 2025-04-23

## 2025-04-22 RX ORDER — SODIUM CHLORIDE 0.9 % (FLUSH) 0.9 %
10 SYRINGE (ML) INJECTION EVERY 12 HOURS PRN
Status: DISCONTINUED | OUTPATIENT
Start: 2025-04-22 | End: 2025-04-27 | Stop reason: HOSPADM

## 2025-04-22 RX ORDER — GUAIFENESIN 600 MG/1
600 TABLET, EXTENDED RELEASE ORAL 2 TIMES DAILY
Status: DISCONTINUED | OUTPATIENT
Start: 2025-04-22 | End: 2025-04-27 | Stop reason: HOSPADM

## 2025-04-22 RX ORDER — FUROSEMIDE 10 MG/ML
20 INJECTION INTRAMUSCULAR; INTRAVENOUS ONCE
Status: COMPLETED | OUTPATIENT
Start: 2025-04-22 | End: 2025-04-22

## 2025-04-22 RX ORDER — DEXAMETHASONE SODIUM PHOSPHATE 4 MG/ML
4 INJECTION, SOLUTION INTRA-ARTICULAR; INTRALESIONAL; INTRAMUSCULAR; INTRAVENOUS; SOFT TISSUE EVERY 24 HOURS
Status: DISCONTINUED | OUTPATIENT
Start: 2025-04-22 | End: 2025-04-26

## 2025-04-22 RX ORDER — FUROSEMIDE 20 MG/1
20 TABLET ORAL DAILY PRN
Status: DISCONTINUED | OUTPATIENT
Start: 2025-04-22 | End: 2025-04-23

## 2025-04-22 RX ORDER — ACETAMINOPHEN 325 MG/1
650 TABLET ORAL EVERY 4 HOURS PRN
Status: DISCONTINUED | OUTPATIENT
Start: 2025-04-22 | End: 2025-04-27 | Stop reason: HOSPADM

## 2025-04-22 RX ORDER — BUDESONIDE 0.5 MG/2ML
0.5 INHALANT ORAL EVERY 12 HOURS
Status: DISCONTINUED | OUTPATIENT
Start: 2025-04-22 | End: 2025-04-27 | Stop reason: HOSPADM

## 2025-04-22 RX ORDER — INSULIN ASPART 100 [IU]/ML
0-5 INJECTION, SOLUTION INTRAVENOUS; SUBCUTANEOUS
Status: DISCONTINUED | OUTPATIENT
Start: 2025-04-22 | End: 2025-04-27 | Stop reason: HOSPADM

## 2025-04-22 RX ORDER — MONTELUKAST SODIUM 10 MG/1
10 TABLET ORAL DAILY
Status: DISCONTINUED | OUTPATIENT
Start: 2025-04-22 | End: 2025-04-27 | Stop reason: HOSPADM

## 2025-04-22 RX ADMIN — CETIRIZINE HYDROCHLORIDE 5 MG: 5 TABLET, FILM COATED ORAL at 09:04

## 2025-04-22 RX ADMIN — GUAIFENESIN 600 MG: 600 TABLET, EXTENDED RELEASE ORAL at 09:04

## 2025-04-22 RX ADMIN — BUDESONIDE 0.5 MG: 0.5 INHALANT RESPIRATORY (INHALATION) at 07:04

## 2025-04-22 RX ADMIN — POTASSIUM CHLORIDE 40 MEQ: 1500 TABLET, EXTENDED RELEASE ORAL at 09:04

## 2025-04-22 RX ADMIN — FUROSEMIDE 20 MG: 10 INJECTION, SOLUTION INTRAMUSCULAR; INTRAVENOUS at 05:04

## 2025-04-22 RX ADMIN — GABAPENTIN 600 MG: 300 CAPSULE ORAL at 09:04

## 2025-04-22 RX ADMIN — METOPROLOL SUCCINATE 50 MG: 50 TABLET, EXTENDED RELEASE ORAL at 09:04

## 2025-04-22 RX ADMIN — CEFTRIAXONE SODIUM 2 G: 2 INJECTION, POWDER, FOR SOLUTION INTRAMUSCULAR; INTRAVENOUS at 09:04

## 2025-04-22 RX ADMIN — PRAVASTATIN SODIUM 40 MG: 40 TABLET ORAL at 09:04

## 2025-04-22 RX ADMIN — ENOXAPARIN SODIUM 60 MG: 60 INJECTION SUBCUTANEOUS at 02:04

## 2025-04-22 RX ADMIN — MONTELUKAST 10 MG: 10 TABLET, FILM COATED ORAL at 01:04

## 2025-04-22 RX ADMIN — IPRATROPIUM BROMIDE AND ALBUTEROL SULFATE 3 ML: 2.5; .5 SOLUTION RESPIRATORY (INHALATION) at 07:04

## 2025-04-22 RX ADMIN — IPRATROPIUM BROMIDE AND ALBUTEROL SULFATE 3 ML: 2.5; .5 SOLUTION RESPIRATORY (INHALATION) at 11:04

## 2025-04-22 RX ADMIN — RIVAROXABAN 15 MG: 15 TABLET, FILM COATED ORAL at 06:04

## 2025-04-22 RX ADMIN — POTASSIUM BICARBONATE 50 MEQ: 978 TABLET, EFFERVESCENT ORAL at 05:04

## 2025-04-22 RX ADMIN — HUMAN ALBUMIN MICROSPHERES AND PERFLUTREN 0.11 MG: 10; .22 INJECTION, SOLUTION INTRAVENOUS at 05:04

## 2025-04-22 RX ADMIN — AZITHROMYCIN MONOHYDRATE 500 MG: 500 INJECTION, POWDER, LYOPHILIZED, FOR SOLUTION INTRAVENOUS at 09:04

## 2025-04-22 NOTE — ASSESSMENT & PLAN NOTE
Patient's blood pressure range in the last 24 hours was: BP  Min: 137/61  Max: 167/77.The patient's inpatient anti-hypertensive regimen is listed below:  Current Antihypertensives  metoprolol succinate (TOPROL-XL) 24 hr tablet 50 mg, Daily, Oral  spironolactone tablet 25 mg, Daily PRN, Oral  furosemide injection 20 mg, Once, Intravenous    Plan  - BP is controlled, no changes needed to their regimen  -

## 2025-04-22 NOTE — ED PROVIDER NOTES
ED Provider Note - 4/21/2025    History     Chief Complaint   Patient presents with    Shortness of Breath     [facility] brought in a 85 y/o female from home with c/o SOB today.  Seen at Urgent care this am, CXR done, Covid and Influenza done there all also neg.  Alejandro with increasing SOB.  Given  ABX Rx given, not started.  Left 18G IV inserted per EMS.  Accu check 150       HPI     Claudia Sierra is a 84 y.o. year old female with past medical and surgical history as seen below, presenting with chief complaint of shortness of breath.  Worsening over the past 3 days.  Went to urgent care this morning and was sent home following a chest x-ray, COVID, influenza.  The viral testing was negative.  Unclear regarding the chest x-ray but reportedly was sent home with antibiotics.  We did not know which antibiotic.  She has not started taking them either.  Began having worsening shortness of breath this evening so EMS was called to bring her to the hospital.        Past Medical History:   Diagnosis Date    *Atrial fibrillation     Breast cancer     Right breast cancer    Hypertension     Slow transit constipation 12/19/2013     Past Surgical History:   Procedure Laterality Date    ADENOIDECTOMY  70 years ago    At same time as tonsillectomy. .    APPENDECTOMY      ARTHROPLASTY OF HIP BY ANTERIOR APPROACH Right 01/31/2022    Procedure: ARTHROPLASTY, HIP, TOTAL, ANTERIOR APPROACH:RIGHT: DEPUY-C-STEM+PINNACLE;  Surgeon: Cisco Mcneal III, MD;  Location: [facility] OR;  Service: Orthopedics;  Laterality: Right;    BREAST BIOPSY      right    BREAST LUMPECTOMY Right 2013    BREAST SURGERY  2013    EPIDURAL STEROID INJECTION INTO CERVICAL SPINE N/A 02/12/2020    Procedure: Injection-steroid-epidural-cervical--C7-T1 IL ELMIRA;  Surgeon: Alicia Proctor MD;  Location: [facility] PAIN [facility]GT;  Service: Pain Management;  Laterality: N/A;  sign consent at [facility]    EYE SURGERY  2018    HYSTERECTOMY      JOINT REPLACEMENT  Hip, 2022     lymphnode removal      12 from Right axilla    PORTACATH PLACEMENT  2013    TONSILLECTOMY           Family History   Problem Relation Name Age of Onset    Heart disease Mother Candi     Hypertension Mother Candi     Diabetes Mother Candi     Heart disease Father Sal     Cancer Father Sal     Cervical cancer Sister Qing 79    Cancer Sister Qing     Hypertension Sister Pamella     Diabetes Son luis manuel     Heart disease Son luis manuel     Hypertension Son luis manuel     Diabetes Son luis manuel     Heart disease Son luis manuel     Hypertension Son luis manuel      Social History[1]  Social Drivers of Health with Concerns     Physical Activity: Unknown (2/5/2025)    Exercise Vital Sign     Days of Exercise per Week: 0 days     Minutes of Exercise per Session: Not on file   Stress: Stress Concern Present (2/5/2025)    [employer] [place] [employer] - Occupational Stress Questionnaire     Feeling of Stress : To some extent   Housing Stability: Unknown (2/8/2024)    Housing Stability Vital Sign     Unable to Pay for Housing in the Last Year: No     Number of Places Lived in the Last Year: Not on file     Unstable Housing in the Last Year: No   Social Isolation: Not on file      Review of patient's allergies indicates:   Allergen Reactions    Codeine Other (See Comments)     Feel like (climbing the walls)    Hydrocodone Other (See Comments)     Restless and anxious similar to codeine. Tolerated oxycodone without issue.    Benadryl [diphenhydramine hcl] Anxiety       Review of Systems     A full Review of Systems (ROS) was performed and was negative unless otherwise stated in the HPI.      Physical Exam     Vitals:    04/21/25 2247 04/21/25 2304 04/21/25 2332 04/22/25 0032   BP: (!) 140/67  (!) 158/69 (!) 140/65   Pulse: 81  81 76   Resp: (!) 51      Temp:  99.4 °F (37.4 °C)     TempSrc:  Oral     SpO2: (!) 94%  (!) 94% (!) 93%   Weight:       Height:            Physical Exam    Nursing note and vitals  reviewed.  Constitutional: She appears well-developed and well-nourished. No distress.   HENT:   Head: Normocephalic and atraumatic.   Right Ear: External ear normal.   Left Ear: External ear normal.   Nose: Nose normal. Mouth/Throat: Oropharynx is clear and moist.   Eyes: Conjunctivae and EOM are normal. Pupils are equal, round, and reactive to light.   Neck: Neck supple.   Normal range of motion.  Cardiovascular:  Normal rate, regular rhythm, normal heart sounds and intact distal pulses.           Pulmonary/Chest: Breath sounds normal. No stridor. Tachypnea noted. No respiratory distress.   Coarse breath sounds bilaterally without appreciable wheeze or crackle   Abdominal: Abdomen is soft. Bowel sounds are normal. There is no abdominal tenderness.   Musculoskeletal:         General: No tenderness or edema. Normal range of motion.      Cervical back: Normal range of motion and neck supple.     Neurological: She is alert and oriented to person, place, and time. She has normal strength. No cranial nerve deficit or sensory deficit.   Skin: Skin is warm and dry. No rash noted.   Psychiatric: She has a normal mood and affect. Thought content normal.         Lab Results- Independently reviewed by myself      Labs Reviewed   COMPREHENSIVE METABOLIC PANEL - Abnormal       Result Value    Sodium 133 (*)     Potassium 3.1 (*)     Chloride 97      CO2 23      Glucose 155 (*)     BUN 15      Creatinine 1.0      Calcium 9.4      Protein Total 8.6 (*)     Albumin 4.1      Bilirubin Total 0.7      ALP 75      AST 34      ALT 21      Anion Gap 13      eGFR 55 (*)    URINALYSIS, REFLEX TO URINE CULTURE - Abnormal    Color, UA Straw      Appearance, UA Clear      pH, UA 7.0      Spec Grav UA 1.005      Protein, UA Negative      Glucose, UA 4+ (*)     Ketones, UA Negative      Bilirubin, UA Negative      Blood, UA Trace (*)     Nitrites, UA Negative      Urobilinogen, UA Negative      Leukocyte Esterase, UA Negative     B-TYPE  NATRIURETIC PEPTIDE - Abnormal     (*)    TROPONIN I - Abnormal    Troponin-I 0.059 (*)    CBC WITH DIFFERENTIAL - Abnormal    WBC 6.79      RBC 4.04      HGB 13.0      HCT 37.6      MCV 93      MCH 32.2 (*)     MCHC 34.6      RDW 15.2 (*)     Platelet Count 207      MPV 9.4      Nucleated RBC 0      Neut % 69.7      Lymph % 16.6 (*)     Mono % 12.4      Eos % 0.1      Basophil % 0.6      Imm Grans % 0.6 (*)     Neut # 4.73      Lymph # 1.13      Mono # 0.84      Eos # 0.01      Baso # 0.04      Imm Grans # 0.04     URINALYSIS MICROSCOPIC - Abnormal    RBC, UA 2      WBC, UA 1      Bacteria, UA Few (*)     Yeast, UA None      Microscopic Comment       INFLUENZA A & B BY MOLECULAR - Normal    INFLUENZA A MOLECULAR Negative      INFLUENZA B MOLECULAR  Negative     PROCALCITONIN - Normal    Procalcitonin 0.02     CULTURE, BLOOD   CULTURE, BLOOD   CBC W/ AUTO DIFFERENTIAL    Narrative:     The following orders were created for panel order CBC auto differential.  Procedure                               Abnormality         Status                     ---------                               -----------         ------                     CBC with Differential[[id number]]       Abnormal            Final result                 Please view results for these tests on the individual orders.   GREY TOP URINE HOLD    Extra Tube Hold for add-ons.     COMPREHENSIVE METABOLIC PANEL   CBC W/ AUTO DIFFERENTIAL    Narrative:     The following orders were created for panel order CBC with Automated Differential.  Procedure                               Abnormality         Status                     ---------                               -----------         ------                     CBC with Differential[[id number]]                                                        Please view results for these tests on the individual orders.   CBC WITH DIFFERENTIAL   POCT GLUCOSE MONITORING CONTINUOUS           Imaging     Imaging Results               X-Ray Chest AP Portable (Final result)  Result time 04/21/25 21:58:30      Final result by Oren Dunn DO (04/21/25 21:58:30)                   Impression:      No acute abnormality.      Electronically signed by: Oren Dunn  Date:    04/21/2025  Time:    21:58               Narrative:    EXAMINATION:  XR CHEST AP PORTABLE    CLINICAL HISTORY:  Sepsis;    TECHNIQUE:  Single frontal view of the chest was performed.    COMPARISON:  10/21/2024.    FINDINGS:  Coarse interstitial thickening again noted, stable.  No new focal consolidation.  The pleural spaces are clear.  The cardiac silhouette is borderline enlarged.  There are calcifications of the aortic arch.  Osseous structures demonstrate degenerative changes and osteopenia.  There are surgical clips.                                    X-Rays:   Independently Interpreted Readings:   Chest X-Ray: No infiltrates.               ED Course         Critical Care    Date/Time: 4/21/2025 11:18 PM    Performed by: Beto Rush MD  Authorized by: Beto Rush MD  Direct patient critical care time: 13 minutes  Additional history critical care time: 7 minutes  Ordering / reviewing critical care time: 7 minutes  Documentation critical care time: 8 minutes  Consulting other physicians critical care time: 6 minutes  Total critical care time (exclusive of procedural time) : 41 minutes  Critical care time was exclusive of separately billable procedures and treating other patients and teaching time.  Critical care was necessary to treat or prevent imminent or life-threatening deterioration of the following conditions: respiratory failure, cardiac failure and sepsis.  Critical care was time spent personally by me on the following activities: blood draw for specimens, discussions with consultants, interpretation of cardiac output measurements, evaluation of patient's response to treatment, examination of patient, obtaining history from patient or surrogate,  ordering and review of laboratory studies, ordering and performing treatments and interventions, ordering and review of radiographic studies, pulse oximetry, re-evaluation of patient's condition and review of old charts.           This patient does have evidence of infective focus  My overall impression is sepsis.  Source: Respiratory  Antibiotics given-   Antibiotics (72h ago, onward)      Start     Stop Route Frequency Ordered    04/22/25 2100  cefTRIAXone injection 2 g         -- IV Every 24 hours (non-standard times) 04/22/25 0133    04/22/25 2100  azithromycin (ZITHROMAX) 500 mg in 0.9% NaCl 250 mL IVPB (admixture device)         -- IV Every 24 hours (non-standard times) 04/22/25 0133          Latest lactate reviewed-  Recent Labs   Lab 04/21/25 2123   POCLAC 1.4     Organ dysfunction indicated by Acute respiratory failure    Fluid challenge Contraindicated- Fluid bolus is contraindicated in this patient due to Congestive Heart Failure     Post- resuscitation assessment No - Post resuscitation assessment not needed       Will Not start Pressors- Levophed for MAP of 65  Source control achieved by:  Antibiotics      Orders Placed This Encounter    Blood culture x two cultures. Draw prior to antibiotics.    Influenza A & B by Molecular    X-Ray Chest AP Portable    CBC auto differential    Comprehensive metabolic panel    Urinalysis, Reflex to Urine Culture    Brain natriuretic peptide    Troponin I    Procalcitonin    CBC with Differential    GREY TOP URINE HOLD    Urinalysis Microscopic    Comprehensive Metabolic Panel (CMP)    CBC with Automated Differential    CBC with Differential    ED Preference List Used to Initiate Sepsis Orders    Vital signs    Cardiac Monitoring - Adult    Strict intake and output    ED Preference List Used to Initiate Sepsis Orders    Vital signs    Bladder scan    Notify Provider    Place sequential compression device    Recheck Blood Glucose:    Full code    POCT Venous Blood Gas -  Lactate #1    Pulse Oximetry Continuous    POCT Venous Blood Gas    EKG 12-lead    EKG 12-lead    Saline lock IV    Saline lock IV    Possible Hospitalization    Place in Observation    POCT glucose    cefTRIAXone injection 2 g    azithromycin (ZITHROMAX) 500 mg in 0.9% NaCl 250 mL IVPB (admixture device)    acetaminophen tablet 1,000 mg    sodium chloride 0.9% flush 10 mL    naloxone 0.4 mg/mL injection 0.02 mg    glucose chewable tablet 16 g    glucose chewable tablet 24 g    dextrose 50% injection 12.5 g    dextrose 50% injection 25 g    glucagon (human recombinant) injection 1 mg    ondansetron injection 4 mg    acetaminophen tablet 650 mg    insulin aspart U-100 pen 0-5 Units    cefTRIAXone injection 2 g    azithromycin (ZITHROMAX) 500 mg in 0.9% NaCl 250 mL IVPB (admixture device)    enoxaparin injection 60 mg    IP VTE HIGH RISK PATIENT    Bed rest    Progressive Mobility Protocol (mobilize patient to their highest level of functioning at least twice daily)          ED Course as of 04/22/25 0318 Mon Apr 21, 2025 2111 Sepsis fluids withheld due to h/o CHF and lack of Hypotension/signs of shock currently. [KB]   2126 POC Lactate: 1.4 [KB]   2130 EKG 12-lead  Independent interpretation of EKG:   Atrial fibrillation   Rate 104   Normal axis      No STEMI [KB]   2135 CBC auto differential(!)  CBC: No significant anemia, platelet disorder, or leukocytosis.   [KB]   2304 Urinalysis, Reflex to Urine Culture(!)  UA negative for leukocytes and nitrites making urinary infection highly unlikely.   [KB]   2317 Updated patient on results and POC [KB]      ED Course User Index  [KB] Beto Rush MD              Medical Decision Making       The patient's list of active medical problems, social history, medications, and allergies as documented per RN staff has been reviewed.           Medical Decision Making  84-year-old female presents for evaluation of shortness of breath.  Worsening over the past 3 days.   Febrile with new requirement of oxygen at time of presentation.  BNP and troponin both mildly elevated.  Viral testing negative.  No large consolidations on chest x-ray.  Sepsis protocol initiated due to febrile status with tachycardia and tachypnea.  Given respiratory symptoms covered for community-acquired pneumonia.  Discussed with Hospital Medicine to continue patient's evaluation and management.    Amount and/or Complexity of Data Reviewed  Labs: ordered. Decision-making details documented in ED Course.  Radiology: ordered.  ECG/medicine tests:  Decision-making details documented in ED Course.    Risk  OTC drugs.  Prescription drug management.      Medical Decision Making:   Differential Diagnosis:   Pneumonia, CHF exacerbation, NSTEMI, sepsis, bronchitis/viral pneumonia                   Clinical Impression       Disposition   ED Disposition Condition    Observation               Final diagnoses:  [Z13.6] Screening for cardiovascular condition  [R50.9] Fever  [R79.89] Elevated troponin (Primary)  [R09.02] Hypoxia  [I50.9] Congestive heart failure, unspecified HF chronicity, unspecified heart failure type        Beto Rush MD        04/22/2025          DISCLAIMER: This note was prepared with [employer] voice recognition transcription software. Garbled syntax, mangled pronouns, and other bizarre constructions may be attributed to that software system.       Beto Rush MD  04/22/25 0324         [1]   Social History  Tobacco Use    Smoking status: Never    Smokeless tobacco: Never   Substance Use Topics    Alcohol use: Yes     Alcohol/week: 3.0 standard drinks of alcohol     Types: 3 Glasses of wine per week     Comment: every 2 weeks    Drug use: No        Beto Rush MD  04/22/25 7977

## 2025-04-22 NOTE — ASSESSMENT & PLAN NOTE
Patient has a diagnosis of pneumonia. The cause of the pneumonia is unknown at this time. The pneumonia is worsening due to hypoxia. The patient has the following signs/symptoms of pneumonia: persistent hypoxia , cough, and shortness of breath. The patient does have a current oxygen requirement and the patient does not have a home oxygen requirement. I have reviewed the pertinent imaging. The following cultures have been collected: Blood cultures The culture results are listed below.     Current antimicrobial regimen consists of the antibiotics listed below. Will monitor patient closely and continue current treatment plan unchanged.     Continue antibiotics   Supplemental O2      Antibiotics (From admission, onward)      Start     Stop Route Frequency Ordered    04/22/25 2100  cefTRIAXone injection 2 g         -- IV Every 24 hours (non-standard times) 04/22/25 0133 04/22/25 2100  azithromycin (ZITHROMAX) 500 mg in 0.9% NaCl 250 mL IVPB (admixture device)         -- IV Every 24 hours (non-standard times) 04/22/25 0133            Microbiology Results (last 7 days)       Procedure Component Value Units Date/Time    Blood culture x two cultures. Draw prior to antibiotics. [[id number]]  (Normal) Collected: 04/21/25 2117    Order Status: Completed Specimen: Blood from Peripheral, Forearm, Left Updated: 04/22/25 1001     Blood Culture No Growth After 6 Hours    Blood culture x two cultures. Draw prior to antibiotics. [[id number]]  (Normal) Collected: 04/21/25 2123    Order Status: Completed Specimen: Blood from Peripheral, Antecubital, Left Updated: 04/22/25 1001     Blood Culture No Growth After 6 Hours    Influenza A & B by Molecular [[id number]]  (Normal) Collected: 04/21/25 2114    Order Status: Completed Specimen: Nasopharyngeal Swab Updated: 04/21/25 2149     INFLUENZA A MOLECULAR Negative     INFLUENZA B MOLECULAR  Negative

## 2025-04-22 NOTE — ASSESSMENT & PLAN NOTE
Patient has a diagnosis of pneumonia. The cause of the pneumonia is unknown at this time. The pneumonia is worsening due to hypoxia. The patient has the following signs/symptoms of pneumonia: persistent hypoxia , cough, and shortness of breath. The patient does have a current oxygen requirement and the patient does not have a home oxygen requirement. I have reviewed the pertinent imaging. The following cultures have been collected: Blood cultures The culture results are listed below.     Current antimicrobial regimen consists of the antibiotics listed below. Will monitor patient closely and continue current treatment plan unchanged.     Continue antibiotics   Supplemental O2      Antibiotics (From admission, onward)      Start     Stop Route Frequency Ordered    04/22/25 2100  cefTRIAXone injection 2 g         -- IV Every 24 hours (non-standard times) 04/22/25 0133 04/22/25 2100  azithromycin (ZITHROMAX) 500 mg in 0.9% NaCl 250 mL IVPB (admixture device)         -- IV Every 24 hours (non-standard times) 04/22/25 0133            Microbiology Results (last 7 days)       Procedure Component Value Units Date/Time    Influenza A & B by Molecular [[id number]]  (Normal) Collected: 04/21/25 2114    Order Status: Completed Specimen: Nasopharyngeal Swab Updated: 04/21/25 2149     INFLUENZA A MOLECULAR Negative     INFLUENZA B MOLECULAR  Negative    Blood culture x two cultures. Draw prior to antibiotics. [[id number]] Collected: 04/21/25 2117    Order Status: Resulted Specimen: Blood from Peripheral, Forearm, Left Updated: 04/21/25 2131    Blood culture x two cultures. Draw prior to antibiotics. [[id number]] Collected: 04/21/25 2123    Order Status: Resulted Specimen: Blood from Peripheral, Antecubital, Left Updated: 04/21/25 2131

## 2025-04-22 NOTE — SUBJECTIVE & OBJECTIVE
Interval History:  Patient was seen in the morning she was not acute distress complaining of shortness a breath on nasal cannula denies any chest pain no nausea vomiting or diarrhea, troponin was elevated at 0.05 with a elevated BNP cardiology was consulted echo is pending patient is currently on community-acquired pneumonia management.    Review of Systems   Constitutional:  Positive for activity change, appetite change and fatigue.   HENT: Negative.     Respiratory:  Positive for cough, shortness of breath and wheezing.    Cardiovascular:  Negative for chest pain, palpitations and leg swelling.   Gastrointestinal: Negative.    Genitourinary: Negative.    Musculoskeletal: Negative.    Neurological: Negative.    Psychiatric/Behavioral:  Negative for agitation. The patient is nervous/anxious.      Objective:     Vital Signs (Most Recent):  Temp: 98.6 °F (37 °C) (04/22/25 0531)  Pulse: 110 (04/22/25 0932)  Resp: (!) 51 (04/22/25 0915)  BP: (!) 178/111 (04/22/25 0932)  SpO2: 95 % (04/22/25 1027) Vital Signs (24h Range):  Temp:  [98.6 °F (37 °C)-103.3 °F (39.6 °C)] 98.6 °F (37 °C)  Pulse:  [] 110  Resp:  [18-58] 51  SpO2:  [91 %-98 %] 95 %  BP: (137-205)/() 178/111     Weight: 59.9 kg (132 lb)  Body mass index is 26.66 kg/m².    Intake/Output Summary (Last 24 hours) at 4/22/2025 1322  Last data filed at 4/21/2025 2227  Gross per 24 hour   Intake 250 ml   Output --   Net 250 ml         Physical Exam  Constitutional:       General: She is in acute distress.   HENT:      Head: Normocephalic and atraumatic.      Nose: Nose normal.      Mouth/Throat:      Mouth: Mucous membranes are moist.   Eyes:      Extraocular Movements: Extraocular movements intact.      Pupils: Pupils are equal, round, and reactive to light.   Cardiovascular:      Rate and Rhythm: Normal rate. Rhythm irregular.      Pulses: Normal pulses.      Heart sounds: Normal heart sounds.   Pulmonary:      Effort: Respiratory distress present.       Breath sounds: Wheezing present.   Abdominal:      Palpations: Abdomen is soft.   Musculoskeletal:      Right lower leg: No edema.      Left lower leg: No edema.   Skin:     Capillary Refill: Capillary refill takes 2 to 3 seconds.      Coloration: Skin is pale.   Neurological:      Mental Status: She is alert and oriented to person, place, and time.   Psychiatric:         Mood and Affect: Mood normal.               Significant Labs: All pertinent labs within the past 24 hours have been reviewed.  Recent Lab Results  (Last 5 results in the past 24 hours)        04/22/25  0927   04/22/25  0525   04/22/25  0330   04/21/25 2140   04/21/25 2123        Performed By:         [facility] MET-TECH       Specimen source         Venous       Albumin   3.6             ALP   68             ALT   20             Anion Gap   14             Appearance, UA       Clear         AST   37             Bacteria, UA       Few         Baso #   0.02             Basophil %   0.3             Bilirubin (UA)       Negative         BILIRUBIN TOTAL   0.6  Comment: For infants and newborns, interpretation of results should be based   on gestational age, weight and in agreement with clinical   observations.    Premature Infant recommended reference ranges:   0-24 hours:  <8.0 mg/dL   24-48 hours: <12.0 mg/dL   3-5 days:    <15.0 mg/dL   6-29 days:   <15.0 mg/dL             BLOOD CULTURE               BUN   14             Calcium   9.2             Chloride   97             CO2   24             Color, UA       Straw         Creatinine   1.0             eGFR   55  Comment: Estimated GFR calculated using the CKD-EPI creatinine (2021) equation.             Eos #   0.01             Eos %   0.1             FiO2         21.0       Glucose   120             Glucose, UA       4+         Gran # (ANC)   5.03             Hematocrit   36.4             Hemoglobin   12.5             Extra Tube       Hold for add-ons.  Comment: Auto resulted.            Immature Grans (Abs)    0.03  Comment: Mild elevation in immature granulocytes is non specific and can be seen in a variety of conditions including stress response, acute inflammation, trauma and pregnancy. Correlation with other laboratory and clinical findings is essential.             Immature Granulocytes   0.4             Ketones, UA       Negative         Leukocyte Esterase, UA       Negative         Lymph #   1.58             Lymph %   21.1             MCH   32.1             MCHC   34.3             MCV   94             Microscopic Comment         Comment: Other formed elements not mentioned in the report are not present in the microscopic examination.         Mono #   0.81             Mono %   10.8             MPV   9.6             Neut %   67.3             NITRITE UA       Negative         nRBC   0             Blood, UA       Trace         pH, UA       7.0         Platelet Count   212             POC Lactate         1.4       POCT Glucose 139     140           Potassium   3.0             PROTEIN TOTAL   7.8             Protein, UA       Negative  Comment: Recommend a 24 hour urine protein or a urine protein/creatinine ratio if globulin induced proteinuria is clinically suspected.         RBC   3.89             RBC, UA       2         RDW   15.5             Sodium   135             Spec Grav UA       1.005         Urobilinogen, UA       Negative         WBC, UA       1         WBC   7.48             Yeast, UA       None                                Significant Imaging: I have reviewed all pertinent imaging results/findings within the past 24 hours.    Imaging Results              X-Ray Chest AP Portable (Final result)  Result time 04/21/25 21:58:30      Final result by Oren Dunn DO (04/21/25 21:58:30)                   Impression:      No acute abnormality.      Electronically signed by: Oren Dunn  Date:    04/21/2025  Time:    21:58               Narrative:    EXAMINATION:  XR CHEST AP PORTABLE    CLINICAL  HISTORY:  Sepsis;    TECHNIQUE:  Single frontal view of the chest was performed.    COMPARISON:  10/21/2024.    FINDINGS:  Coarse interstitial thickening again noted, stable.  No new focal consolidation.  The pleural spaces are clear.  The cardiac silhouette is borderline enlarged.  There are calcifications of the aortic arch.  Osseous structures demonstrate degenerative changes and osteopenia.  There are surgical clips.                                  Echo  Result Date: 10/10/2024    Left Ventricle: The left ventricle is normal in size. Normal wall   thickness. There is normal systolic function with a visually estimated   ejection fraction of 55 - 60%. Unable to assess diastolic function due to   atrial fibrillation.    Right Ventricle: Normal right ventricular cavity size. Wall thickness   is normal. Systolic function is normal.    Left Atrium: Left atrium is severely dilated.    Right Atrium: Right atrium is severely dilated.    Aortic Valve: The aortic valve is a trileaflet valve. There is moderate   aortic valve sclerosis. Mildly restricted motion. There is mild stenosis.   Aortic valve area by VTI is 2.6 cm2. Aortic valve peak velocity is 1.8   m/s. Mean gradient is 7.3 mmHg. The dimensionless index is 0.92.    Mitral Valve: There is mild regurgitation.    Tricuspid Valve: There is severe regurgitation.    Pulmonary Artery: The estimated pulmonary artery systolic pressure is   64 mmHg.    IVC/SVC: Elevated venous pressure at 15 mmHg.        Echo  Result Date: 4/25/2024    Left Ventricle: The left ventricle is normal in size. There is   concentric remodeling. There is normal systolic function with a visually   estimated ejection fraction of 55 - 60%. Biplane (2D) method of discs   ejection fraction is 59%. Unable to assess diastolic function due to   atrial fibrillation.    Right Ventricle: Normal right ventricular cavity size. Systolic   function is reduced.TAPSE is 1.05 cm.    Left Atrium: Left atrium is  mildly dilated. The left atrium volume   index is 36.1 mL/m2.    Right Atrium: Right atrium is mildly dilated.    Aortic Valve: There is moderate aortic valve sclerosis. Mildly   restricted motion. There is mild to moderate stenosis. Aortic valve area   by VTI is 0.99 cm². Aortic valve area by velocity is 1.02 cm². Aortic   valve peak velocity is 1.66 m/s. Mean gradient is 7 mmHg. The   dimensionless index is 0.33.    Tricuspid Valve: There is moderate regurgitation.    Pulmonic Valve: There is mild regurgitation.    Pulmonary Artery: The estimated pulmonary artery systolic pressure is   53 mmHg.    IVC/SVC: Intermediate venous pressure at 8 mmHg.

## 2025-04-22 NOTE — ED NOTES
Patient's blood pressure 178/111. Patient denies taking blood pressure medication last night. Secure message sent to the provider.

## 2025-04-22 NOTE — SUBJECTIVE & OBJECTIVE
Past Medical History:   Diagnosis Date    *Atrial fibrillation     Breast cancer     Right breast cancer    Hypertension     Slow transit constipation 12/19/2013       Past Surgical History:   Procedure Laterality Date    ADENOIDECTOMY  70 years ago    At same time as tonsillectomy. .    APPENDECTOMY      ARTHROPLASTY OF HIP BY ANTERIOR APPROACH Right 01/31/2022    Procedure: ARTHROPLASTY, HIP, TOTAL, ANTERIOR APPROACH:RIGHT: DEPUY-C-STEM+PINNACLE;  Surgeon: Cisco Mcneal III, MD;  Location: [facility] OR;  Service: Orthopedics;  Laterality: Right;    BREAST BIOPSY      right    BREAST LUMPECTOMY Right 2013    BREAST SURGERY  2013    EPIDURAL STEROID INJECTION INTO CERVICAL SPINE N/A 02/12/2020    Procedure: Injection-steroid-epidural-cervical--C7-T1 IL ELMIRA;  Surgeon: Alicia Proctor MD;  Location: [facility] PAIN [facility]GT;  Service: Pain Management;  Laterality: N/A;  sign consent at [facility]    EYE SURGERY  2018    HYSTERECTOMY      JOINT REPLACEMENT  Hip, 2022    lymphnode removal      12 from Right axilla    PORTACATH PLACEMENT  2013    TONSILLECTOMY         Review of patient's allergies indicates:   Allergen Reactions    Codeine Other (See Comments)     Feel like (climbing the walls)    Hydrocodone Other (See Comments)     Restless and anxious similar to codeine. Tolerated oxycodone without issue.    Benadryl [diphenhydramine hcl] Anxiety       No current facility-administered medications on file prior to encounter.     Current Outpatient Medications on File Prior to Encounter   Medication Sig    empagliflozin (JARDIANCE) 10 mg tablet Take 1 tablet (10 mg total) by mouth once daily.    furosemide (LASIX) 20 MG tablet Take 1 tablet (20 mg total) by mouth daily as needed (Weight greater than 125 lb).    gabapentin (NEURONTIN) 600 MG tablet Take 2 tablets (1,200 mg total) by mouth once daily.    metoprolol succinate (TOPROL-XL) 50 MG 24 hr tablet Take 1 tablet (50 mg total) by mouth once daily.    pravastatin (PRAVACHOL) 40 MG  tablet TAKE 1 TABLET BY MOUTH EVERY DAY (Patient taking differently: Take 40 mg by mouth once daily.)    rivaroxaban (XARELTO) 15 mg Tab Take 1 tablet (15 mg total) by mouth Daily.    spironolactone (ALDACTONE) 25 MG tablet Take 1 tablet (25 mg total) by mouth daily as needed (Weight greater than 125 lb).    acetaminophen (TYLENOL) 650 MG TbSR Take 1 tablet (650 mg total) by mouth every 6 to 8 hours as needed (pain).    ascorbic acid, vitamin C, (VITAMIN C) 1000 MG tablet Take 1,000 mg by mouth once daily.    b complex vitamins tablet Take 1 tablet by mouth once daily.    beta-carotene,A,-vits C,E/mins (OCUVITE ORAL) Take 1 tablet by mouth every evening.    doxycycline (VIBRA-TABS) 100 MG tablet Take 100 mg by mouth 2 (two) times daily.    lactulose (CHRONULAC) 10 gram/15 mL solution Take 45 mLs (30 g total) by mouth 3 (three) times daily as needed (constipation).    loratadine (CLARITIN) 10 mg tablet Take 10 mg by mouth once daily.    polycarbophil (FIBERCON) 625 mg tablet Take 625 mg by mouth once daily.    traZODone (DESYREL) 50 MG tablet 1 pill PO PRN for insomnia at bedtime. If it does not help fall asleep fast enough, can take 30-40 minutes before bedtime.    vitamin D (VITAMIN D3) 1000 units Tab Take 1,000 Units by mouth once daily.     Family History       Problem Relation (Age of Onset)    Cancer Father, Sister    Cervical cancer Sister (79)    Diabetes Mother, Son, Son    Heart disease Mother, Father, Son, Son    Hypertension Mother, Sister, Son, Son          Tobacco Use    Smoking status: Never    Smokeless tobacco: Never   Substance and Sexual Activity    Alcohol use: Yes     Alcohol/week: 3.0 standard drinks of alcohol     Types: 3 Glasses of wine per week     Comment: every 2 weeks    Drug use: No    Sexual activity: Not Currently     Partners: Male     Birth control/protection: None     Review of Systems   Constitutional: Positive for fever. Negative for chills, decreased appetite, diaphoresis,  malaise/fatigue, weight gain and weight loss.   Cardiovascular:  Positive for dyspnea on exertion. Negative for chest pain, claudication, irregular heartbeat, leg swelling, near-syncope, orthopnea, palpitations and paroxysmal nocturnal dyspnea.   Respiratory:  Positive for cough. Negative for shortness of breath, snoring, sputum production and wheezing.    Endocrine: Negative for cold intolerance, heat intolerance, polydipsia, polyphagia and polyuria.   Skin:  Negative for color change, dry skin, itching, nail changes and poor wound healing.   Musculoskeletal:  Negative for back pain, gout, joint pain and joint swelling.   Gastrointestinal:  Negative for bloating, abdominal pain, constipation, diarrhea, hematemesis, hematochezia, melena, nausea and vomiting.   Genitourinary:  Negative for dysuria, hematuria and nocturia.   Neurological:  Negative for dizziness, headaches, light-headedness, numbness, paresthesias and weakness.   Psychiatric/Behavioral:  Negative for altered mental status, depression and memory loss.      Objective:     Vital Signs (Most Recent):  Temp: 98.6 °F (37 °C) (04/22/25 0531)  Pulse: 110 (04/22/25 1402)  Resp: (!) 51 (04/22/25 0915)  BP: (!) 141/68 (04/22/25 1402)  SpO2: 97 % (04/22/25 1402) Vital Signs (24h Range):  Temp:  [98.6 °F (37 °C)-103.3 °F (39.6 °C)] 98.6 °F (37 °C)  Pulse:  [] 110  Resp:  [18-58] 51  SpO2:  [91 %-98 %] 97 %  BP: (137-205)/() 141/68     Weight: 59.9 kg (132 lb)  Body mass index is 26.66 kg/m².    SpO2: 97 %         Intake/Output Summary (Last 24 hours) at 4/22/2025 1537  Last data filed at 4/21/2025 2227  Gross per 24 hour   Intake 250 ml   Output --   Net 250 ml       Lines/Drains/Airways       Peripheral Intravenous Line  Duration                  Peripheral IV - Single Lumen 04/21/25 2121 18 G Left Antecubital <1 day                     Physical Exam  Constitutional:       General: She is not in acute distress.     Appearance: She is well-developed.  "  Cardiovascular:      Rate and Rhythm: Tachycardia present. Rhythm irregularly irregular.      Heart sounds: No murmur heard.     No gallop.   Pulmonary:      Effort: Pulmonary effort is normal. No respiratory distress.      Breath sounds: Wheezing and rhonchi present.   Abdominal:      General: Bowel sounds are normal. There is no distension.      Palpations: Abdomen is soft.      Tenderness: There is no abdominal tenderness.   Skin:     General: Skin is warm and dry.   Neurological:      Mental Status: She is alert and oriented to person, place, and time.           Significant Imaging: Echocardiogram: Transthoracic echo (TTE) complete (Cupid Only):   Results for orders placed or performed during the hospital encounter of 10/10/24   Echo   Result Value Ref Range    LV Diastolic Volume 40.75 mL    Echo EF Estimated 53 %    LV Systolic Volume 19.28 mL    IVS 0.6 0.6 - 1.1 cm    LVIDd 3.2 3.5 - 6.0 cm    LVIDs 2.4 2.1 - 4.0 cm    LVOT diameter 1.9 cm    PW 0.7 0.6 - 1.1 cm    AV LVOT peak gradient 8 mmHg    LVOT mn grad 7 mmHg    LVOT peak kevin 1.4 m/s    LVOT peak VTI 38.7 cm    RV- levi basal diam 3.0 cm    RVOT prox diameter 1.80 cm    RV S' 14.85 cm/s    LA size 3.68 cm    Left Atrium Major Axis 4.77 cm    Left Atrium Minor Axis 4.06 cm    LA Vol (MOD) 49.70 mL    RA Major Axis 4.21 cm    RA Area 15.3 cm2    AV valve area 2.6 cm2    AV area by cont VTI 2.7 cm2    AV peak gradient 13.0 mmHg    AV mean gradient 7.3 mmHg    Ao peak kevin 1.8 m/s    Ao VTI 42.0 cm    MV Peak E Kevin 1.07 m/s    TDI LATERAL 0.08 m/s    TDI SEPTAL 0.08 m/s    MV peak gradient 2 mmHg    TV peak gradient 64 mmHg    TR Max Kevin 3.5 m/s    Ascending aorta 2.89 cm    STJ 2.91 cm    P vein A 0.4 m/s    MV "A" wave duration 148.43 ms    Pulm vein "A" wave 148.43 ms    PV Peak D Kevin 0.37 m/s    PV Peak S Kevin 0.44 m/s    IVC diameter 2.00 cm    Sinus 2.87 cm    LA WIDTH 3.65 cm    RA Width 3.03 cm    TAPSE 1.56 cm    BSA 1.57 m2    LVOT stroke volume " 109.7 cm3    LV Systolic Volume Index 12.5 mL/m2    LV Diastolic Volume Index 26.46 mL/m2    LVOT area 2.8 cm2    FS 25.0 28 - 44 %    Left Ventricle Relative Wall Thickness 0.44 cm    LV mass 49.2 g    LV Mass Index 31.9 g/m2    E/E' ratio 13.38 m/s    LV SEPTAL E/E' RATIO 13.38 m/s    CORY 32.5 mL/m2    LV LATERAL E/E' RATIO 13.38 m/s    LA Vol 50.08 cm3    Pulm vein S/D ratio 1.19     RVOT area 2.54 cm2    RV/LV Ratio 0.94 cm    CORY (MOD) 32.3 mL/m2    AV Velocity Ratio 0.78     AV index (prosthetic) 0.92     EWELINA by Velocity Ratio 2.2 cm²    Triscuspid Valve Regurgitation Peak Gradient 49 mmHg    Mean e' 0.08 m/s    ZLVIDS -1.15     ZLVIDD -3.35     Mitral Valve Heart Rate 80 bpm    TV resting pulmonary artery pressure 64 mmHg    RV TB RVSP 19 mmHg    Est. RA pres 15 mmHg    Narrative      Left Ventricle: The left ventricle is normal in size. Normal wall   thickness. There is normal systolic function with a visually estimated   ejection fraction of 55 - 60%. Unable to assess diastolic function due to   atrial fibrillation.    Right Ventricle: Normal right ventricular cavity size. Wall thickness   is normal. Systolic function is normal.    Left Atrium: Left atrium is severely dilated.    Right Atrium: Right atrium is severely dilated.    Aortic Valve: The aortic valve is a trileaflet valve. There is moderate   aortic valve sclerosis. Mildly restricted motion. There is mild stenosis.   Aortic valve area by VTI is 2.6 cm2. Aortic valve peak velocity is 1.8   m/s. Mean gradient is 7.3 mmHg. The dimensionless index is 0.92.    Mitral Valve: There is mild regurgitation.    Tricuspid Valve: There is severe regurgitation.    Pulmonary Artery: The estimated pulmonary artery systolic pressure is   64 mmHg.    IVC/SVC: Elevated venous pressure at 15 mmHg.

## 2025-04-22 NOTE — H&P
[facility] - Emergency Dept  [facility] Medicine  History & Physical    Patient Name: Claudia Sierra  MRN: [id number]  Patient Class: OP- Observation  Admission Date: 4/21/2025  Attending Physician: Belkis Leyva*   Primary Care Provider: Sajan Curtis MD         Patient information was obtained from ER records.     Subjective:     Principal Problem:<principal problem not specified>    Chief Complaint:   Chief Complaint   Patient presents with    Shortness of Breath     [facility]25 brought in a 83 y/o female from home with c/o SOB today.  Seen at Urgent care this am, CXR done, Covid and Influenza done there all also neg.  Alejandro with increasing SOB.  Given  ABX Rx given, not started.  Left 18G IV inserted per EMS.  Accu check 150        HPI: Patient is an 84-year-old female with a history of breast CA, HTN, Afib presented to ED today for shortness for breath which has been ongoing for the last 3 days.  Patient reports she went to urgent Care today had a chest x-ray and was sent home on  antibiotics however she did not start these and presented to ED for no improvement.  Patient  temperature was 103° currently in Afib which is chronic and she is on Xarelto.  Labwork remarkable for sodium 133, potassium 3.1.  BNP 5 5 9, troponin elevated at 0.059.  She denies any chest pain.  UA negative COVID and influenza negative.  Chest x-ray with interstitial changes.  Patient is started on Rocephin and azithromycin.  Patient will be admitted to Hospital Medicine.    Past Medical History:   Diagnosis Date    *Atrial fibrillation     Breast cancer     Right breast cancer    Hypertension     Slow transit constipation 12/19/2013       Past Surgical History:   Procedure Laterality Date    ADENOIDECTOMY  70 years ago    At same time as tonsillectomy. .    APPENDECTOMY      ARTHROPLASTY OF HIP BY ANTERIOR APPROACH Right 01/31/2022    Procedure: ARTHROPLASTY, HIP, TOTAL, ANTERIOR APPROACH:RIGHT:  DEPUY-C-STEM+PINNACLE;  Surgeon: Cisco Mcneal III, MD;  Location: [facility] OR;  Service: Orthopedics;  Laterality: Right;    BREAST BIOPSY      right    BREAST LUMPECTOMY Right 2013    BREAST SURGERY  2013    EPIDURAL STEROID INJECTION INTO CERVICAL SPINE N/A 02/12/2020    Procedure: Injection-steroid-epidural-cervical--C7-T1 IL ELMIRA;  Surgeon: Alicia Proctor MD;  Location: [facility];  Service: Pain Management;  Laterality: N/A;  sign consent at [facility]    EYE SURGERY  2018    HYSTERECTOMY      JOINT REPLACEMENT  Hip, 2022    lymphnode removal      12 from Right axilla    PORTACATH PLACEMENT  2013    TONSILLECTOMY         Review of patient's allergies indicates:   Allergen Reactions    Codeine Other (See Comments)     Feel like (climbing the walls)    Hydrocodone Other (See Comments)     Restless and anxious similar to codeine. Tolerated oxycodone without issue.    Benadryl [diphenhydramine hcl] Anxiety       No current facility-administered medications on file prior to encounter.     Current Outpatient Medications on File Prior to Encounter   Medication Sig    acetaminophen (TYLENOL) 650 MG TbSR Take 1 tablet (650 mg total) by mouth every 6 to 8 hours as needed (pain).    ascorbic acid, vitamin C, (VITAMIN C) 1000 MG tablet Take 1,000 mg by mouth once daily.    b complex vitamins tablet Take 1 tablet by mouth once daily.    beta-carotene,A,-vits C,E/mins (OCUVITE ORAL) Take 1 tablet by mouth every evening.    empagliflozin (JARDIANCE) 10 mg tablet Take 1 tablet (10 mg total) by mouth once daily.    furosemide (LASIX) 20 MG tablet Take 1 tablet (20 mg total) by mouth daily as needed (Weight greater than 125 lb).    gabapentin (NEURONTIN) 600 MG tablet Take 2 tablets (1,200 mg total) by mouth once daily.    lactulose (CHRONULAC) 10 gram/15 mL solution Take 45 mLs (30 g total) by mouth 3 (three) times daily as needed (constipation).    loratadine (CLARITIN) 10 mg tablet Take 10 mg by mouth once daily.    metoprolol  succinate (TOPROL-XL) 50 MG 24 hr tablet Take 1 tablet (50 mg total) by mouth once daily.    polycarbophil (FIBERCON) 625 mg tablet Take 625 mg by mouth once daily.    pravastatin (PRAVACHOL) 40 MG tablet TAKE 1 TABLET BY MOUTH EVERY DAY    rivaroxaban (XARELTO) 15 mg Tab Take 1 tablet (15 mg total) by mouth Daily.    spironolactone (ALDACTONE) 25 MG tablet Take 1 tablet (25 mg total) by mouth daily as needed (Weight greater than 125 lb).    traZODone (DESYREL) 50 MG tablet 1 pill PO PRN for insomnia at bedtime. If it does not help fall asleep fast enough, can take 30-40 minutes before bedtime.    vitamin D (VITAMIN D3) 1000 units Tab Take 1,000 Units by mouth once daily.     Family History       Problem Relation (Age of Onset)    Cancer Father, Sister    Cervical cancer Sister (79)    Diabetes Mother, Son, Son    Heart disease Mother, Father, Son, Son    Hypertension Mother, Sister, Son, Son          Tobacco Use    Smoking status: Never    Smokeless tobacco: Never   Substance and Sexual Activity    Alcohol use: Yes     Alcohol/week: 3.0 standard drinks of alcohol     Types: 3 Glasses of wine per week     Comment: every 2 weeks    Drug use: No    Sexual activity: Not Currently     Partners: Male     Birth control/protection: None     Review of Systems   Constitutional:  Positive for fever.   Respiratory:  Positive for cough and shortness of breath.    Neurological:  Positive for weakness.   All other systems reviewed and are negative.    Objective:     Vital Signs (Most Recent):  Temp: 99.4 °F (37.4 °C) (04/21/25 2304)  Pulse: 70 (04/22/25 0319)  Resp: (!) 30 (04/22/25 0319)  BP: (!) 167/77 (04/22/25 0319)  SpO2: 95 % (04/22/25 0319) Vital Signs (24h Range):  Temp:  [99.4 °F (37.4 °C)-103.3 °F (39.6 °C)] 99.4 °F (37.4 °C)  Pulse:  [] 70  Resp:  [18-51] 30  SpO2:  [91 %-98 %] 95 %  BP: (137-167)/(61-78) 167/77     Weight: 59.9 kg (132 lb)  Body mass index is 26.66 kg/m².     Physical Exam  Vitals reviewed.    Constitutional:       Appearance: She is ill-appearing.   HENT:      Head: Normocephalic.      Mouth/Throat:      Mouth: Mucous membranes are dry.      Pharynx: Oropharynx is clear.   Eyes:      Pupils: Pupils are equal, round, and reactive to light.   Cardiovascular:      Rate and Rhythm: Tachycardia present. Rhythm irregular.      Pulses: Normal pulses.      Heart sounds: Normal heart sounds.   Pulmonary:      Breath sounds: Rhonchi and rales present.   Abdominal:      General: Bowel sounds are normal.      Palpations: Abdomen is soft.   Musculoskeletal:         General: Normal range of motion.      Cervical back: Normal range of motion.   Skin:     General: Skin is warm and dry.   Neurological:      General: No focal deficit present.      Mental Status: She is alert and oriented to person, place, and time. Mental status is at baseline.   Psychiatric:         Mood and Affect: Mood normal.         Behavior: Behavior normal.              CRANIAL NERVES     CN III, IV, VI   Pupils are equal, round, and reactive to light.       Significant Labs: All pertinent labs within the past 24 hours have been reviewed.  Recent Lab Results  (Last 5 results in the past 24 hours)        04/22/25  0330   04/21/25  2140   04/21/25  2123   04/21/25  2117   04/21/25  2114        Influenza A, Molecular         Negative       Influenza B, Molecular         Negative       Performed By:     [facility]           Procalcitonin       0.02  Comment: A concentration < 0.25 ng/mL represents a low risk of bacterial infection.  Procalcitonin may not be accurate among patients with localized   infection, recent trauma or major surgery, immunosuppressed state,   invasive fungal infection, renal dysfunction. Decisions regarding   initiation or continuation of antibiotic therapy should not be based   solely on procalcitonin levels.         Specimen source     Venous           Albumin       4.1         ALP       75         ALT       21         Anion  Gap       13         Appearance, UA   Clear             AST       34         Bacteria, UA   Few             Baso #       0.04         Basophil %       0.6         Bilirubin (UA)   Negative             BILIRUBIN TOTAL       0.7  Comment: For infants and newborns, interpretation of results should be based   on gestational age, weight and in agreement with clinical   observations.    Premature Infant recommended reference ranges:   0-24 hours:  <8.0 mg/dL   24-48 hours: <12.0 mg/dL   3-5 days:    <15.0 mg/dL   6-29 days:   <15.0 mg/dL         BNP       559  Comment: Values of less than 100 pg/ml are consistent with non-CHF populations.          BUN       15         Calcium       9.4         Chloride       97         CO2       23         Color, UA   Straw             Creatinine       1.0         eGFR       55  Comment: Estimated GFR calculated using the CKD-EPI creatinine (2021) equation.         Eos #       0.01         Eos %       0.1         FiO2     21.0           Glucose       155         Glucose, UA   4+             Gran # (ANC)       4.73         Hematocrit       37.6         Hemoglobin       13.0         Extra Tube   Hold for add-ons.  Comment: Auto resulted.                Immature Grans (Abs)       0.04  Comment: Mild elevation in immature granulocytes is non specific and can be seen in a variety of conditions including stress response, acute inflammation, trauma and pregnancy. Correlation with other laboratory and clinical findings is essential.         Immature Granulocytes       0.6         Ketones, UA   Negative             Leukocyte Esterase, UA   Negative             Lymph #       1.13         Lymph %       16.6         MCH       32.2         MCHC       34.6         MCV       93         Microscopic Comment     Comment: Other formed elements not mentioned in the report are not present in the microscopic examination.             Mono #       0.84         Mono %       12.4         MPV       9.4         Neut  %       69.7         NITRITE UA   Negative             nRBC       0         Blood, UA   Trace             pH, UA   7.0             Platelet Count       207         POC Lactate     1.4           POCT Glucose 140               Potassium       3.1         PROTEIN TOTAL       8.6         Protein, UA   Negative  Comment: Recommend a 24 hour urine protein or a urine protein/creatinine ratio if globulin induced proteinuria is clinically suspected.             RBC       4.04         RBC, UA   2             RDW       15.2         Sodium       133         Spec Grav UA   1.005             Troponin I       0.059  Comment: The reference interval for Troponin I represents the 99th percentile cutoff for our facility and is consistent with 3rd generation assay performance.         Urobilinogen, UA   Negative             WBC, UA   1             WBC       6.79         Yeast, UA   None                                    Significant Imaging: I have reviewed all pertinent imaging results/findings within the past 24 hours.  I have reviewed and interpreted all pertinent imaging results/findings within the past 24 hours.  Assessment/Plan:     Assessment & Plan  Essential hypertension  Patient's blood pressure range in the last 24 hours was: BP  Min: 137/61  Max: 167/77.The patient's inpatient anti-hypertensive regimen is listed below:  Current Antihypertensives  metoprolol succinate (TOPROL-XL) 24 hr tablet 50 mg, Daily, Oral  spironolactone tablet 25 mg, Daily PRN, Oral  furosemide injection 20 mg, Once, Intravenous    Plan  - BP is controlled, no changes needed to their regimen  -   CAP (community acquired pneumonia)  Patient has a diagnosis of pneumonia. The cause of the pneumonia is unknown at this time. The pneumonia is worsening due to hypoxia . The patient has the following signs/symptoms of pneumonia: persistent hypoxia , cough, and shortness of breath. The patient does have a current oxygen requirement and the patient does not  have a home oxygen requirement. I have reviewed the pertinent imaging. The following cultures have been collected: Blood cultures The culture results are listed below.     Current antimicrobial regimen consists of the antibiotics listed below. Will monitor patient closely and continue current treatment plan unchanged.     Continue antibiotics   Supplemental O2      Antibiotics (From admission, onward)      Start     Stop Route Frequency Ordered    04/22/25 2100  cefTRIAXone injection 2 g         -- IV Every 24 hours (non-standard times) 04/22/25 0133 04/22/25 2100  azithromycin (ZITHROMAX) 500 mg in 0.9% NaCl 250 mL IVPB (admixture device)         -- IV Every 24 hours (non-standard times) 04/22/25 0133            Microbiology Results (last 7 days)       Procedure Component Value Units Date/Time    Influenza A & B by Molecular [[id number]]  (Normal) Collected: 04/21/25 2114    Order Status: Completed Specimen: Nasopharyngeal Swab Updated: 04/21/25 2149     INFLUENZA A MOLECULAR Negative     INFLUENZA B MOLECULAR  Negative    Blood culture x two cultures. Draw prior to antibiotics. [[id number]] Collected: 04/21/25 2117    Order Status: Resulted Specimen: Blood from Peripheral, Forearm, Left Updated: 04/21/25 2131    Blood culture x two cultures. Draw prior to antibiotics. [[id number]] Collected: 04/21/25 2123    Order Status: Resulted Specimen: Blood from Peripheral, Antecubital, Left Updated: 04/21/25 2131          Atrial fibrillation  Patient has persistent (7 days or more) atrial fibrillation. Patient is currently in atrial fibrillation. [id number] Score: 3. The patients heart rate in the last 24 hours is as follows:  Pulse  Min: 70  Max: 115     Antiarrhythmics  metoprolol succinate (TOPROL-XL) 24 hr tablet 50 mg, Daily, Oral    Anticoagulants  enoxaparin injection 60 mg, Every 12 hours, Subcutaneous    Plan  - Replete lytes with a goal of K>4, Mg >2  - Patient is anticoagulated, see medications listed  above.  - Patient's afib is currently controlled        VTE Risk Mitigation (From admission, onward)           Ordered     enoxaparin injection 60 mg  Every 12 hours         04/22/25 0133     IP VTE HIGH RISK PATIENT  Once         04/22/25 0133     Place sequential compression device  Until discontinued         04/22/25 0133                         On 04/22/2025, patient should be placed in hospital observation services under my care in collaboration with  Dr. Rios.           Sang Alexandra NP  Department of Hospital Medicine  [place] - Emergency Dept

## 2025-04-22 NOTE — ASSESSMENT & PLAN NOTE
Patient's blood pressure range in the last 24 hours was: BP  Min: 137/61  Max: 205/98.The patient's inpatient anti-hypertensive regimen is listed below:  Current Antihypertensives  metoprolol succinate (TOPROL-XL) 24 hr tablet 50 mg, Daily, Oral  spironolactone tablet 25 mg, Daily PRN, Oral  metoprolol injection 5 mg, Every 6 hours PRN, Intravenous  furosemide tablet 20 mg, Daily PRN, Oral    Plan  - BP is controlled, no changes needed to their regimen  -

## 2025-04-22 NOTE — PROGRESS NOTES
[facility] Emergency Dept  [facility] Medicine  Progress Note    Patient Name: Claudia Sierra  MRN: [id number]  Patient Class: IP- Inpatient   Admission Date: 4/21/2025  Length of Stay: 0 days  Attending Physician: Eris Chu MD  Primary Care Provider: Sajan Curtis MD        Subjective     Principal Problem:Acute hypoxic respiratory failure        HPI:  Patient is an 84-year-old female with a history of breast CA, HTN, Afib presented to ED today for shortness for breath which has been ongoing for the last 3 days.  Patient reports she went to urgent Care today had a chest x-ray and was sent home on  antibiotics however she did not start these and presented to ED for no improvement.  Patient  temperature was 103° currently in Afib which is chronic and she is on Xarelto.  Labwork remarkable for sodium 133, potassium 3.1.  BNP 5 5 9, troponin elevated at 0.059.  She denies any chest pain.  UA negative COVID and influenza negative.  Chest x-ray with interstitial changes.  Patient is started on Rocephin and azithromycin.  Patient will be admitted to Hospital Medicine.    Overview/Hospital Course:  No notes on file    Interval History:  Patient was seen in the morning she was not acute distress complaining of shortness a breath on nasal cannula denies any chest pain no nausea vomiting or diarrhea, troponin was elevated at 0.05 with a elevated BNP cardiology was consulted echo is pending patient is currently on community-acquired pneumonia management.    Review of Systems   Constitutional:  Positive for activity change, appetite change and fatigue.   HENT: Negative.     Respiratory:  Positive for cough, shortness of breath and wheezing.    Cardiovascular:  Negative for chest pain, palpitations and leg swelling.   Gastrointestinal: Negative.    Genitourinary: Negative.    Musculoskeletal: Negative.    Neurological: Negative.    Psychiatric/Behavioral:  Negative for agitation. The patient is nervous/anxious.       Objective:     Vital Signs (Most Recent):  Temp: 98.6 °F (37 °C) (04/22/25 0531)  Pulse: 110 (04/22/25 0932)  Resp: (!) 51 (04/22/25 0915)  BP: (!) 178/111 (04/22/25 0932)  SpO2: 95 % (04/22/25 1027) Vital Signs (24h Range):  Temp:  [98.6 °F (37 °C)-103.3 °F (39.6 °C)] 98.6 °F (37 °C)  Pulse:  [] 110  Resp:  [18-58] 51  SpO2:  [91 %-98 %] 95 %  BP: (137-205)/() 178/111     Weight: 59.9 kg (132 lb)  Body mass index is 26.66 kg/m².    Intake/Output Summary (Last 24 hours) at 4/22/2025 1322  Last data filed at 4/21/2025 2227  Gross per 24 hour   Intake 250 ml   Output --   Net 250 ml         Physical Exam  Constitutional:       General: She is in acute distress.   HENT:      Head: Normocephalic and atraumatic.      Nose: Nose normal.      Mouth/Throat:      Mouth: Mucous membranes are moist.   Eyes:      Extraocular Movements: Extraocular movements intact.      Pupils: Pupils are equal, round, and reactive to light.   Cardiovascular:      Rate and Rhythm: Normal rate. Rhythm irregular.      Pulses: Normal pulses.      Heart sounds: Normal heart sounds.   Pulmonary:      Effort: Respiratory distress present.      Breath sounds: Wheezing present.   Abdominal:      Palpations: Abdomen is soft.   Musculoskeletal:      Right lower leg: No edema.      Left lower leg: No edema.   Skin:     Capillary Refill: Capillary refill takes 2 to 3 seconds.      Coloration: Skin is pale.   Neurological:      Mental Status: She is alert and oriented to person, place, and time.   Psychiatric:         Mood and Affect: Mood normal.               Significant Labs: All pertinent labs within the past 24 hours have been reviewed.  Recent Lab Results  (Last 5 results in the past 24 hours)        04/22/25  0927 04/22/25  0525   04/22/25  0330   04/21/25 2140   04/21/25 2123        Performed By:         acousins       Specimen source         Venous       Albumin   3.6             ALP   68             ALT   20             Anion  Gap   14             Appearance, UA       Clear         AST   37             Bacteria, UA       Few         Baso #   0.02             Basophil %   0.3             Bilirubin (UA)       Negative         BILIRUBIN TOTAL   0.6  Comment: For infants and newborns, interpretation of results should be based   on gestational age, weight and in agreement with clinical   observations.    Premature Infant recommended reference ranges:   0-24 hours:  <8.0 mg/dL   24-48 hours: <12.0 mg/dL   3-5 days:    <15.0 mg/dL   6-29 days:   <15.0 mg/dL             BLOOD CULTURE               BUN   14             Calcium   9.2             Chloride   97             CO2   24             Color, UA       Straw         Creatinine   1.0             eGFR   55  Comment: Estimated GFR calculated using the CKD-EPI creatinine (2021) equation.             Eos #   0.01             Eos %   0.1             FiO2         21.0       Glucose   120             Glucose, UA       4+         Gran # (ANC)   5.03             Hematocrit   36.4             Hemoglobin   12.5             Extra Tube       Hold for add-ons.  Comment: Auto resulted.            Immature Grans (Abs)   0.03  Comment: Mild elevation in immature granulocytes is non specific and can be seen in a variety of conditions including stress response, acute inflammation, trauma and pregnancy. Correlation with other laboratory and clinical findings is essential.             Immature Granulocytes   0.4             Ketones, UA       Negative         Leukocyte Esterase, UA       Negative         Lymph #   1.58             Lymph %   21.1             MCH   32.1             MCHC   34.3             MCV   94             Microscopic Comment         Comment: Other formed elements not mentioned in the report are not present in the microscopic examination.         Mono #   0.81             Mono %   10.8             MPV   9.6             Neut %   67.3             NITRITE UA       Negative         nRBC   0              Blood, UA       Trace         pH, UA       7.0         Platelet Count   212             POC Lactate         1.4       POCT Glucose 139     140           Potassium   3.0             PROTEIN TOTAL   7.8             Protein, UA       Negative  Comment: Recommend a 24 hour urine protein or a urine protein/creatinine ratio if globulin induced proteinuria is clinically suspected.         RBC   3.89             RBC, UA       2         RDW   15.5             Sodium   135             Spec Grav UA       1.005         Urobilinogen, UA       Negative         WBC, UA       1         WBC   7.48             Yeast, UA       None                                Significant Imaging: I have reviewed all pertinent imaging results/findings within the past 24 hours.    Imaging Results              X-Ray Chest AP Portable (Final result)  Result time 04/21/25 21:58:30      Final result by Oren Dunn DO (04/21/25 21:58:30)                   Impression:      No acute abnormality.      Electronically signed by: Oren Dunn  Date:    04/21/2025  Time:    21:58               Narrative:    EXAMINATION:  XR CHEST AP PORTABLE    CLINICAL HISTORY:  Sepsis;    TECHNIQUE:  Single frontal view of the chest was performed.    COMPARISON:  10/21/2024.    FINDINGS:  Coarse interstitial thickening again noted, stable.  No new focal consolidation.  The pleural spaces are clear.  The cardiac silhouette is borderline enlarged.  There are calcifications of the aortic arch.  Osseous structures demonstrate degenerative changes and osteopenia.  There are surgical clips.                                  Echo  Result Date: 10/10/2024    Left Ventricle: The left ventricle is normal in size. Normal wall   thickness. There is normal systolic function with a visually estimated   ejection fraction of 55 - 60%. Unable to assess diastolic function due to   atrial fibrillation.    Right Ventricle: Normal right ventricular cavity size. Wall thickness   is normal.  Systolic function is normal.    Left Atrium: Left atrium is severely dilated.    Right Atrium: Right atrium is severely dilated.    Aortic Valve: The aortic valve is a trileaflet valve. There is moderate   aortic valve sclerosis. Mildly restricted motion. There is mild stenosis.   Aortic valve area by VTI is 2.6 cm2. Aortic valve peak velocity is 1.8   m/s. Mean gradient is 7.3 mmHg. The dimensionless index is 0.92.    Mitral Valve: There is mild regurgitation.    Tricuspid Valve: There is severe regurgitation.    Pulmonary Artery: The estimated pulmonary artery systolic pressure is   64 mmHg.    IVC/SVC: Elevated venous pressure at 15 mmHg.        Echo  Result Date: 4/25/2024    Left Ventricle: The left ventricle is normal in size. There is   concentric remodeling. There is normal systolic function with a visually   estimated ejection fraction of 55 - 60%. Biplane (2D) method of discs   ejection fraction is 59%. Unable to assess diastolic function due to   atrial fibrillation.    Right Ventricle: Normal right ventricular cavity size. Systolic   function is reduced.TAPSE is 1.05 cm.    Left Atrium: Left atrium is mildly dilated. The left atrium volume   index is 36.1 mL/m2.    Right Atrium: Right atrium is mildly dilated.    Aortic Valve: There is moderate aortic valve sclerosis. Mildly   restricted motion. There is mild to moderate stenosis. Aortic valve area   by VTI is 0.99 cm². Aortic valve area by velocity is 1.02 cm². Aortic   valve peak velocity is 1.66 m/s. Mean gradient is 7 mmHg. The   dimensionless index is 0.33.    Tricuspid Valve: There is moderate regurgitation.    Pulmonic Valve: There is mild regurgitation.    Pulmonary Artery: The estimated pulmonary artery systolic pressure is   53 mmHg.    IVC/SVC: Intermediate venous pressure at 8 mmHg.            Assessment & Plan  Essential hypertension  Patient's blood pressure range in the last 24 hours was: BP  Min: 137/61  Max: 205/98.The patient's  inpatient anti-hypertensive regimen is listed below:  Current Antihypertensives  metoprolol succinate (TOPROL-XL) 24 hr tablet 50 mg, Daily, Oral  spironolactone tablet 25 mg, Daily PRN, Oral  metoprolol injection 5 mg, Every 6 hours PRN, Intravenous  furosemide tablet 20 mg, Daily PRN, Oral    Plan  - BP is controlled, no changes needed to their regimen  -   CAP (community acquired pneumonia)  Patient has a diagnosis of pneumonia. The cause of the pneumonia is unknown at this time. The pneumonia is worsening due to hypoxia . The patient has the following signs/symptoms of pneumonia: persistent hypoxia , cough, and shortness of breath. The patient does have a current oxygen requirement and the patient does not have a home oxygen requirement. I have reviewed the pertinent imaging. The following cultures have been collected: Blood cultures The culture results are listed below.     Current antimicrobial regimen consists of the antibiotics listed below. Will monitor patient closely and continue current treatment plan unchanged.     Continue antibiotics   Supplemental O2      Antibiotics (From admission, onward)      Start     Stop Route Frequency Ordered    04/22/25 2100  cefTRIAXone injection 2 g         -- IV Every 24 hours (non-standard times) 04/22/25 0133    04/22/25 2100  azithromycin (ZITHROMAX) 500 mg in 0.9% NaCl 250 mL IVPB (admixture device)         -- IV Every 24 hours (non-standard times) 04/22/25 0133            Microbiology Results (last 7 days)       Procedure Component Value Units Date/Time    Blood culture x two cultures. Draw prior to antibiotics. [[id number]]  (Normal) Collected: 04/21/25 2117    Order Status: Completed Specimen: Blood from Peripheral, Forearm, Left Updated: 04/22/25 1001     Blood Culture No Growth After 6 Hours    Blood culture x two cultures. Draw prior to antibiotics. [[id number]]  (Normal) Collected: 04/21/25 2123    Order Status: Completed Specimen: Blood from Peripheral,  Antecubital, Left Updated: 04/22/25 1001     Blood Culture No Growth After 6 Hours    Influenza A & B by Molecular [[id number]]  (Normal) Collected: 04/21/25 2114    Order Status: Completed Specimen: Nasopharyngeal Swab Updated: 04/21/25 2149     INFLUENZA A MOLECULAR Negative     INFLUENZA B MOLECULAR  Negative          Atrial fibrillation  Patient has persistent (7 days or more) atrial fibrillation. Patient is currently in atrial fibrillation. [id number] Score: 3. The patients heart rate in the last 24 hours is as follows:  Pulse  Min: 70  Max: 118     Antiarrhythmics  metoprolol succinate (TOPROL-XL) 24 hr tablet 50 mg, Daily, Oral  metoprolol injection 5 mg, Every 6 hours PRN, Intravenous    Anticoagulants  rivaroxaban tablet 15 mg, Daily, Oral    Plan  - Replete lytes with a goal of K>4, Mg >2  - Patient is anticoagulated, see medications listed above.  - Patient's afib is currently controlled        Hypokalemia  Patient's most recent potassium results are listed below.   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 The patient's most recent sodium results are listed below.  Recent Labs     04/21/25 2117 04/22/25  0525   * 135*     Plan  - Correct the sodium by 4-6mEq in 24 hours.      - Will treat the hyponatremia with to be monitored since it is a chronic  - Monitor sodium Daily.   - Patient hyponatremia is  monitored  Improving  Acute hypoxic respiratory failure  Patient with Hypoxic Respiratory failure which is Acute.  she is not on home oxygen. Supplemental oxygen was provided and noted-      .   Signs/symptoms of respiratory failure include- tachypnea, increased work of breathing, respiratory distress, and wheezing. Contributing diagnoses includes - CHF and Pneumonia Labs and images were  reviewed. Patient Has not had a recent ABG. Will treat underlying causes and adjust management of respiratory failure as follows- IV antibiotics nasal cannula, echo ordered cardiology was consulted  Elevated troponin  Can be secondary to high demand ischemia  Ordered echocardiogram  Cardiology consulted  No ST changes noted    VTE Risk Mitigation (From admission, onward)           Ordered     rivaroxaban tablet 15 mg  Daily         04/22/25 0749     IP VTE HIGH RISK PATIENT  Once         04/22/25 0133     Place sequential compression device  Until discontinued         04/22/25 0133                    Discharge Planning   BASILIO: 4/24/2025     Code Status: Full Code   Medical Readiness for Discharge Date:                            Eris Beyer MD  Department of Hospital Medicine   [facility] - Emergency Dept

## 2025-04-22 NOTE — ASSESSMENT & PLAN NOTE
Patient has persistent (7 days or more) atrial fibrillation. Patient is currently in atrial fibrillation. [id number] Score: 3. The patients heart rate in the last 24 hours is as follows:  Pulse  Min: 70  Max: 118     Antiarrhythmics  metoprolol succinate (TOPROL-XL) 24 hr tablet 50 mg, Daily, Oral  metoprolol injection 5 mg, Every 6 hours PRN, Intravenous    Anticoagulants  rivaroxaban tablet 15 mg, Daily, Oral    Plan  - Replete lytes with a goal of K>4, Mg >2  - Patient is anticoagulated, see medications listed above.  - Patient's afib is currently controlled

## 2025-04-22 NOTE — ASSESSMENT & PLAN NOTE
Can be secondary to high demand ischemia  Ordered echocardiogram  Cardiology consulted  No ST changes noted

## 2025-04-22 NOTE — ED NOTES
Waffle mattress topper placed on bed, repositioned for comfort. Pt. Is moderately dyspneic, congested, loose incessant cough noted. Sao2-90's.

## 2025-04-22 NOTE — CONSULTS
[place] - Emergency Dept  Cardiology  Consult Note    Patient Name: Claudia Sierra  MRN: [id number]  Admission Date: 4/21/2025  Hospital Length of Stay: 0 days  Code Status: Full Code   Attending Provider: Eris Chu MD   Consulting Provider: KUNAL Ying ANP  Primary Care Physician: Sajan Curtis MD  Principal Problem:Acute hypoxic respiratory failure    Patient information was obtained from patient, past medical records, and ER records.     Inpatient consult to [facility]  Consult performed by: Liliana Villarreal APRN, ANP  Consult ordered by: Eris Chu MD  Reason for consult: elevated troponin        Subjective:     Chief Complaint:  SOB and cough      HPI:   83yo female with HTN, CAP, persistent afib on Xarelto, elevated troponin, acute hypoxemic respiratory failure, breast cancer, HLP, AS, chronic DHF and CKD stage III who presented to the ER with complaints of SOB. She reports SOB and cough for the past 3 days with no improvement. She reports SOB occurs with walking longer distances and denies any chest pain, LE edema or orthopnea. She reports compliance with her medication regimen and will take Lasix on an as needed basis typically once per week. She presented to Urgent care and was COVID and Flu negative and was given oral antibiotics. She reports worsening symptoms therefore she presented to the ER. Labs CBC with WBCs 7K BMP with K+ 3.0 BUN 14 creatinine 1.0  Troponin 0.059 Blood cultures prelim NGTD Initial temp 103.3 EKG afib with . SBP 140s-170s. Started on IV Abx and admitted to [facility] and Cardiology consulted for evaluation of elevated troponin     Past Medical History:   Diagnosis Date    *Atrial fibrillation     Breast cancer     Right breast cancer    Hypertension     Slow transit constipation 12/19/2013       Past Surgical History:   Procedure Laterality Date    ADENOIDECTOMY  70 years ago    At same time as  tonsillectomy. .    APPENDECTOMY      ARTHROPLASTY OF HIP BY ANTERIOR APPROACH Right 01/31/2022    Procedure: ARTHROPLASTY, HIP, TOTAL, ANTERIOR APPROACH:RIGHT: DEPUY-C-STEM+PINNACLE;  Surgeon: Cisco Mcneal III, MD;  Location: [facility] OR;  Service: Orthopedics;  Laterality: Right;    BREAST BIOPSY      right    BREAST LUMPECTOMY Right 2013    BREAST SURGERY  2013    EPIDURAL STEROID INJECTION INTO CERVICAL SPINE N/A 02/12/2020    Procedure: Injection-steroid-epidural-cervical--C7-T1 IL ELMIRA;  Surgeon: Alicia Proctor MD;  Location: [facility] PAIN MGT;  Service: Pain Management;  Laterality: N/A;  sign consent at [facility]    EYE SURGERY  2018    HYSTERECTOMY      JOINT REPLACEMENT  Hip, 2022    lymphnode removal      12 from Right axilla    PORTACATH PLACEMENT  2013    TONSILLECTOMY         Review of patient's allergies indicates:   Allergen Reactions    Codeine Other (See Comments)     Feel like (climbing the walls)    Hydrocodone Other (See Comments)     Restless and anxious similar to codeine. Tolerated oxycodone without issue.    Benadryl [diphenhydramine hcl] Anxiety       No current facility-administered medications on file prior to encounter.     Current Outpatient Medications on File Prior to Encounter   Medication Sig    empagliflozin (JARDIANCE) 10 mg tablet Take 1 tablet (10 mg total) by mouth once daily.    furosemide (LASIX) 20 MG tablet Take 1 tablet (20 mg total) by mouth daily as needed (Weight greater than 125 lb).    gabapentin (NEURONTIN) 600 MG tablet Take 2 tablets (1,200 mg total) by mouth once daily.    metoprolol succinate (TOPROL-XL) 50 MG 24 hr tablet Take 1 tablet (50 mg total) by mouth once daily.    pravastatin (PRAVACHOL) 40 MG tablet TAKE 1 TABLET BY MOUTH EVERY DAY (Patient taking differently: Take 40 mg by mouth once daily.)    rivaroxaban (XARELTO) 15 mg Tab Take 1 tablet (15 mg total) by mouth Daily.    spironolactone (ALDACTONE) 25 MG tablet Take 1 tablet (25 mg total) by mouth daily as  needed (Weight greater than 125 lb).    acetaminophen (TYLENOL) 650 MG TbSR Take 1 tablet (650 mg total) by mouth every 6 to 8 hours as needed (pain).    ascorbic acid, vitamin C, (VITAMIN C) 1000 MG tablet Take 1,000 mg by mouth once daily.    b complex vitamins tablet Take 1 tablet by mouth once daily.    beta-carotene,A,-vits C,E/mins (OCUVITE ORAL) Take 1 tablet by mouth every evening.    doxycycline (VIBRA-TABS) 100 MG tablet Take 100 mg by mouth 2 (two) times daily.    lactulose (CHRONULAC) 10 gram/15 mL solution Take 45 mLs (30 g total) by mouth 3 (three) times daily as needed (constipation).    loratadine (CLARITIN) 10 mg tablet Take 10 mg by mouth once daily.    polycarbophil (FIBERCON) 625 mg tablet Take 625 mg by mouth once daily.    traZODone (DESYREL) 50 MG tablet 1 pill PO PRN for insomnia at bedtime. If it does not help fall asleep fast enough, can take 30-40 minutes before bedtime.    vitamin D (VITAMIN D3) 1000 units Tab Take 1,000 Units by mouth once daily.     Family History       Problem Relation (Age of Onset)    Cancer Father, Sister    Cervical cancer Sister (79)    Diabetes Mother, Son, Son    Heart disease Mother, Father, Son, Son    Hypertension Mother, Sister, Son, Son          Tobacco Use    Smoking status: Never    Smokeless tobacco: Never   Substance and Sexual Activity    Alcohol use: Yes     Alcohol/week: 3.0 standard drinks of alcohol     Types: 3 Glasses of wine per week     Comment: every 2 weeks    Drug use: No    Sexual activity: Not Currently     Partners: Male     Birth control/protection: None     Review of Systems   Constitutional: Positive for fever. Negative for chills, decreased appetite, diaphoresis, malaise/fatigue, weight gain and weight loss.   Cardiovascular:  Positive for dyspnea on exertion. Negative for chest pain, claudication, irregular heartbeat, leg swelling, near-syncope, orthopnea, palpitations and paroxysmal nocturnal dyspnea.   Respiratory:  Positive for  cough. Negative for shortness of breath, snoring, sputum production and wheezing.    Endocrine: Negative for cold intolerance, heat intolerance, polydipsia, polyphagia and polyuria.   Skin:  Negative for color change, dry skin, itching, nail changes and poor wound healing.   Musculoskeletal:  Negative for back pain, gout, joint pain and joint swelling.   Gastrointestinal:  Negative for bloating, abdominal pain, constipation, diarrhea, hematemesis, hematochezia, melena, nausea and vomiting.   Genitourinary:  Negative for dysuria, hematuria and nocturia.   Neurological:  Negative for dizziness, headaches, light-headedness, numbness, paresthesias and weakness.   Psychiatric/Behavioral:  Negative for altered mental status, depression and memory loss.      Objective:     Vital Signs (Most Recent):  Temp: 98.6 °F (37 °C) (04/22/25 0531)  Pulse: 110 (04/22/25 1402)  Resp: (!) 51 (04/22/25 0915)  BP: (!) 141/68 (04/22/25 1402)  SpO2: 97 % (04/22/25 1402) Vital Signs (24h Range):  Temp:  [98.6 °F (37 °C)-103.3 °F (39.6 °C)] 98.6 °F (37 °C)  Pulse:  [] 110  Resp:  [18-58] 51  SpO2:  [91 %-98 %] 97 %  BP: (137-205)/() 141/68     Weight: 59.9 kg (132 lb)  Body mass index is 26.66 kg/m².    SpO2: 97 %         Intake/Output Summary (Last 24 hours) at 4/22/2025 1537  Last data filed at 4/21/2025 2227  Gross per 24 hour   Intake 250 ml   Output --   Net 250 ml       Lines/Drains/Airways       Peripheral Intravenous Line  Duration                  Peripheral IV - Single Lumen 04/21/25 2121 18 G Left Antecubital <1 day                     Physical Exam  Constitutional:       General: She is not in acute distress.     Appearance: She is well-developed.   Cardiovascular:      Rate and Rhythm: Tachycardia present. Rhythm irregularly irregular.      Heart sounds: No murmur heard.     No gallop.   Pulmonary:      Effort: Pulmonary effort is normal. No respiratory distress.      Breath sounds: Wheezing and rhonchi present.  "  Abdominal:      General: Bowel sounds are normal. There is no distension.      Palpations: Abdomen is soft.      Tenderness: There is no abdominal tenderness.   Skin:     General: Skin is warm and dry.   Neurological:      Mental Status: She is alert and oriented to person, place, and time.           Significant Imaging: Echocardiogram: Transthoracic echo (TTE) complete (Cupid Only):   Results for orders placed or performed during the hospital encounter of 10/10/24   Echo   Result Value Ref Range    LV Diastolic Volume 40.75 mL    Echo EF Estimated 53 %    LV Systolic Volume 19.28 mL    IVS 0.6 0.6 - 1.1 cm    LVIDd 3.2 3.5 - 6.0 cm    LVIDs 2.4 2.1 - 4.0 cm    LVOT diameter 1.9 cm    PW 0.7 0.6 - 1.1 cm    AV LVOT peak gradient 8 mmHg    LVOT mn grad 7 mmHg    LVOT peak kevin 1.4 m/s    LVOT peak VTI 38.7 cm    RV- levi basal diam 3.0 cm    RVOT prox diameter 1.80 cm    RV S' 14.85 cm/s    LA size 3.68 cm    Left Atrium Major Axis 4.77 cm    Left Atrium Minor Axis 4.06 cm    LA Vol (MOD) 49.70 mL    RA Major Axis 4.21 cm    RA Area 15.3 cm2    AV valve area 2.6 cm2    AV area by cont VTI 2.7 cm2    AV peak gradient 13.0 mmHg    AV mean gradient 7.3 mmHg    Ao peak kevin 1.8 m/s    Ao VTI 42.0 cm    MV Peak E Kevin 1.07 m/s    TDI LATERAL 0.08 m/s    TDI SEPTAL 0.08 m/s    MV peak gradient 2 mmHg    TV peak gradient 64 mmHg    TR Max Kevin 3.5 m/s    Ascending aorta 2.89 cm    STJ 2.91 cm    P vein A 0.4 m/s    MV "A" wave duration 148.43 ms    Pulm vein "A" wave 148.43 ms    PV Peak D Kevin 0.37 m/s    PV Peak S Kevin 0.44 m/s    IVC diameter 2.00 cm    Sinus 2.87 cm    LA WIDTH 3.65 cm    RA Width 3.03 cm    TAPSE 1.56 cm    BSA 1.57 m2    LVOT stroke volume 109.7 cm3    LV Systolic Volume Index 12.5 mL/m2    LV Diastolic Volume Index 26.46 mL/m2    LVOT area 2.8 cm2    FS 25.0 28 - 44 %    Left Ventricle Relative Wall Thickness 0.44 cm    LV mass 49.2 g    LV Mass Index 31.9 g/m2    E/E' ratio 13.38 m/s    LV SEPTAL E/E' " RATIO 13.38 m/s    CORY 32.5 mL/m2    LV LATERAL E/E' RATIO 13.38 m/s    LA Vol 50.08 cm3    Pulm vein S/D ratio 1.19     RVOT area 2.54 cm2    RV/LV Ratio 0.94 cm    CORY (MOD) 32.3 mL/m2    AV Velocity Ratio 0.78     AV index (prosthetic) 0.92     EWELINA by Velocity Ratio 2.2 cm²    Triscuspid Valve Regurgitation Peak Gradient 49 mmHg    Mean e' 0.08 m/s    ZLVIDS -1.15     ZLVIDD -3.35     Mitral Valve Heart Rate 80 bpm    TV resting pulmonary artery pressure 64 mmHg    RV TB RVSP 19 mmHg    Est. RA pres 15 mmHg    Narrative      Left Ventricle: The left ventricle is normal in size. Normal wall   thickness. There is normal systolic function with a visually estimated   ejection fraction of 55 - 60%. Unable to assess diastolic function due to   atrial fibrillation.    Right Ventricle: Normal right ventricular cavity size. Wall thickness   is normal. Systolic function is normal.    Left Atrium: Left atrium is severely dilated.    Right Atrium: Right atrium is severely dilated.    Aortic Valve: The aortic valve is a trileaflet valve. There is moderate   aortic valve sclerosis. Mildly restricted motion. There is mild stenosis.   Aortic valve area by VTI is 2.6 cm2. Aortic valve peak velocity is 1.8   m/s. Mean gradient is 7.3 mmHg. The dimensionless index is 0.92.    Mitral Valve: There is mild regurgitation.    Tricuspid Valve: There is severe regurgitation.    Pulmonary Artery: The estimated pulmonary artery systolic pressure is   64 mmHg.    IVC/SVC: Elevated venous pressure at 15 mmHg.       Assessment and Plan:     Elevated troponin  - presented with SOB and cough  - troponin 0.059; EKG with no acute STTWC; currently feel mild troponin elevation related to demand etiology in setting of fever and infectious etiology   - echo today; trend troponin     Atrial fibrillation with RVR  - history of persistent afib  - RVR related to fever and infectious etiology  - continue BB and Xarelto     Longstanding persistent atrial  fibrillation  - history of persistent afib followed by Dr Alexander  - rate control strategy; continue BB for rate control along with Xarelto  - monitor on telemetry    Essential hypertension  - SBP 140s-170s  - on Metoprolol and Aldactone as an outpatient   - continue Metoprolol; monitor BP and resume Aldactone if BP remains above goal         VTE Risk Mitigation (From admission, onward)           Ordered     rivaroxaban tablet 15 mg  Daily         04/22/25 0749     IP VTE HIGH RISK PATIENT  Once         04/22/25 0133     Place sequential compression device  Until discontinued         04/22/25 0133                    Thank you for your consult. I will follow-up with patient. Please contact us if you have any additional questions.    KUNAL Ying, ANP  Cardiology   [place] - Emergency Dept

## 2025-04-22 NOTE — ASSESSMENT & PLAN NOTE
- SBP 140s-170s  - on Metoprolol and Aldactone as an outpatient   - continue Metoprolol; monitor BP and resume Aldactone if BP remains above goal

## 2025-04-22 NOTE — ASSESSMENT & PLAN NOTE
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 The patient's most recent sodium results are listed below.  Recent Labs     04/21/25 2117 04/22/25  0525   * 135*     Plan  - Correct the sodium by 4-6mEq in 24 hours.      - Will treat the hyponatremia with to be monitored since it is a chronic  - Monitor sodium Daily.   - Patient hyponatremia is monitored  Improving

## 2025-04-22 NOTE — ED NOTES
Family members at the bedside. Patient requests they be added to the contact list. Obtained names and numbers and list to be shortly updated.

## 2025-04-22 NOTE — ED NOTES
Plan of care reviewed with patient and mother at bedside, questions and concerns addressed; verbalized understanding. Pt. Remains on RA maintaining goal sats. Afebrile. Pain well controlled, PRN oxy x1, PRN dilaudid x1. BG stable (90-110s), given insulin per sliding scale orders. BM x1. Pt. NPO at midnight. Plan to go to OR today for chest washout and wound vac change. Labs to be sent at 0730. VSS. Will continue to monitor. Please see flowsheets for further assessments.    Received call from caller who Identified herself as Soledad Gipson [phone], identifies herself as patient's nephew's wife. Informed called that her name is not on contact list and private patient information not to be discussed over the phone without patient's knowledge or consent and notified caller that will speak to patient to confirm. Caller explained she used to work her and reports understanding.

## 2025-04-22 NOTE — ASSESSMENT & PLAN NOTE
Patient's most recent potassium results are listed below.   Recent Labs     04/21/25 2117 04/22/25  0525   K 3.1* 3.0*     Plan  - Replete potassium per protocol  - Monitor potassium Every 12 hours  - Patient's hypokalemia is monitored

## 2025-04-22 NOTE — ASSESSMENT & PLAN NOTE
Patient with Hypoxic Respiratory failure which is Acute.  she is not on home oxygen. Supplemental oxygen was provided and noted-      .   Signs/symptoms of respiratory failure include- tachypnea, increased work of breathing, respiratory distress, and wheezing. Contributing diagnoses includes - CHF and Pneumonia Labs and images were reviewed. Patient Has not had a recent ABG. Will treat underlying causes and adjust management of respiratory failure as follows- IV antibiotics nasal cannula, echo ordered cardiology was consulted

## 2025-04-22 NOTE — NURSING
Received report from ED nurse, pt arrived on unit, VS taken, tele monitor placed. Fall socks,  Fall band and allergy band placed. Purwick attached. Pt on 1.5 L NC.  Family at bedside.Instructed pt to call for assistance.

## 2025-04-22 NOTE — HPI
Patient is an 84-year-old female with a history of breast CA, HTN, Afib presented to ED today for shortness for breath which has been ongoing for the last 3 days.  Patient reports she went to urgent Care today had a chest x-ray and was sent home on  antibiotics however she did not start these and presented to ED for no improvement.  Patient  temperature was 103° currently in Afib which is chronic and she is on Xarelto.  Labwork remarkable for sodium 133, potassium 3.1.  BNP 5 5 9, troponin elevated at 0.059.  She denies any chest pain.  UA negative COVID and influenza negative.  Chest x-ray with interstitial changes.  Patient is started on Rocephin and azithromycin.  Patient will be admitted to Hospital Medicine.

## 2025-04-22 NOTE — ASSESSMENT & PLAN NOTE
- history of persistent afib followed by Dr Alexander  - rate control strategy; continue BB for rate control along with Xarelto  - monitor on telemetry

## 2025-04-22 NOTE — ED NOTES
External female catheter placed on pt to obtain urine sample. 2L NC applied per Dr. Rush. Pt provided with pillow and blanket, no other needs voiced at this time.

## 2025-04-22 NOTE — ASSESSMENT & PLAN NOTE
- presented with SOB and cough  - troponin 0.059; EKG with no acute STTWC; currently feel mild troponin elevation related to demand etiology in setting of fever and infectious etiology   - echo today; trend troponin

## 2025-04-22 NOTE — ASSESSMENT & PLAN NOTE
- history of persistent afib  - RVR related to fever and infectious etiology  - continue BB and Xarelto

## 2025-04-22 NOTE — HPI
83yo female with HTN, CAP, persistent afib on Xarelto, elevated troponin, acute hypoxemic respiratory failure, breast cancer, HLP, AS, chronic DHF and CKD stage III who presented to the ER with complaints of SOB. She reports SOB and cough for the past 3 days with no improvement. She reports SOB occurs with walking longer distances and denies any chest pain, LE edema or orthopnea. She reports compliance with her medication regimen and will take Lasix on an as needed basis typically once per week. She presented to Urgent care and was COVID and Flu negative and was given oral antibiotics. She reports worsening symptoms therefore she presented to the ER. Labs CBC with WBCs 7K BMP with K+ 3.0 BUN 14 creatinine 1.0  Troponin 0.059 Blood cultures prelim NGTD Initial temp 103.3 EKG afib with . SBP 140s-170s. Started on IV Abx and admitted to [facility] and Cardiology consulted for evaluation of elevated troponin

## 2025-04-22 NOTE — ASSESSMENT & PLAN NOTE
Patient has persistent (7 days or more) atrial fibrillation. Patient is currently in atrial fibrillation. [id number] Score: 3. The patients heart rate in the last 24 hours is as follows:  Pulse  Min: 70  Max: 115     Antiarrhythmics  metoprolol succinate (TOPROL-XL) 24 hr tablet 50 mg, Daily, Oral    Anticoagulants  enoxaparin injection 60 mg, Every 12 hours, Subcutaneous    Plan  - Replete lytes with a goal of K>4, Mg >2  - Patient is anticoagulated, see medications listed above.  - Patient's afib is currently controlled         Yes

## 2025-04-22 NOTE — PHARMACY MED REC
"  [facility]           Pharmacy  Admission Medication History     The home medication history was taken by Christiane Plasencia.      Medication history obtained from Medications listed below were obtained from: [employer] software- [employer]    Based on information gathered for medication list, you may go to "Admission" then "Reconcile Home Medications" tabs to review and/or act upon those items.     The home medication list has been updated by the Pharmacy department.   Please read ALL comments highlighted in yellow.   Please address this information as you see fit.    Feel free to contact us if you have any questions or require assistance.        No current facility-administered medications on file prior to encounter.     Current Outpatient Medications on File Prior to Encounter   Medication Sig Dispense Refill    empagliflozin (JARDIANCE) 10 mg tablet Take 1 tablet (10 mg total) by mouth once daily. 30 tablet 11    furosemide (LASIX) 20 MG tablet Take 1 tablet (20 mg total) by mouth daily as needed (Weight greater than 125 lb). 30 tablet 11    gabapentin (NEURONTIN) 600 MG tablet Take 2 tablets (1,200 mg total) by mouth once daily. 180 tablet 0    metoprolol succinate (TOPROL-XL) 50 MG 24 hr tablet Take 1 tablet (50 mg total) by mouth once daily. 90 tablet 3    pravastatin (PRAVACHOL) 40 MG tablet TAKE 1 TABLET BY MOUTH EVERY DAY (Patient taking differently: Take 40 mg by mouth once daily.) 90 tablet 3    rivaroxaban (XARELTO) 15 mg Tab Take 1 tablet (15 mg total) by mouth Daily. 30 tablet 11    spironolactone (ALDACTONE) 25 MG tablet Take 1 tablet (25 mg total) by mouth daily as needed (Weight greater than 125 lb). 30 tablet 11    acetaminophen (TYLENOL) 650 MG TbSR Take 1 tablet (650 mg total) by mouth every 6 to 8 hours as needed (pain). 120 tablet 0    ascorbic acid, vitamin C, (VITAMIN C) 1000 MG tablet Take 1,000 mg by mouth once daily.      b complex vitamins tablet Take 1 tablet by mouth once " daily.      beta-carotene,A,-vits C,E/mins (OCUVITE ORAL) Take 1 tablet by mouth every evening.      doxycycline (VIBRA-TABS) 100 MG tablet Take 100 mg by mouth 2 (two) times daily.      lactulose (CHRONULAC) 10 gram/15 mL solution Take 45 mLs (30 g total) by mouth 3 (three) times daily as needed (constipation). 473 mL 4    loratadine (CLARITIN) 10 mg tablet Take 10 mg by mouth once daily.      polycarbophil (FIBERCON) 625 mg tablet Take 625 mg by mouth once daily.      traZODone (DESYREL) 50 MG tablet 1 pill PO PRN for insomnia at bedtime. If it does not help fall asleep fast enough, can take 30-40 minutes before bedtime. 30 tablet 5    vitamin D (VITAMIN D3) 1000 units Tab Take 1,000 Units by mouth once daily.         Please address this information as you see fit.  Feel free to contact us if you have any questions or require assistance.    Chritsiane Plasencia  [phone]                .

## 2025-04-22 NOTE — ED NOTES
"Told patient about phone call and verified that is her nephew's wife. Reports she will call back and reports, "I don't have a ." Found  for patient to charge her phone, reports she will call IVY back when phone charges.  "

## 2025-04-22 NOTE — SUBJECTIVE & OBJECTIVE
Past Medical History:   Diagnosis Date    *Atrial fibrillation     Breast cancer     Right breast cancer    Hypertension     Slow transit constipation 12/19/2013       Past Surgical History:   Procedure Laterality Date    ADENOIDECTOMY  70 years ago    At same time as tonsillectomy. .    APPENDECTOMY      ARTHROPLASTY OF HIP BY ANTERIOR APPROACH Right 01/31/2022    Procedure: ARTHROPLASTY, HIP, TOTAL, ANTERIOR APPROACH:RIGHT: DEPUY-C-STEM+PINNACLE;  Surgeon: Cisco Mcneal III, MD;  Location: [facility] OR;  Service: Orthopedics;  Laterality: Right;    BREAST BIOPSY      right    BREAST LUMPECTOMY Right 2013    BREAST SURGERY  2013    EPIDURAL STEROID INJECTION INTO CERVICAL SPINE N/A 02/12/2020    Procedure: Injection-steroid-epidural-cervical--C7-T1 IL ELMIRA;  Surgeon: Alicia Proctor MD;  Location: [facility] PAIN MGT;  Service: Pain Management;  Laterality: N/A;  sign consent at [facility]    EYE SURGERY  2018    HYSTERECTOMY      JOINT REPLACEMENT  Hip, 2022    lymphnode removal      12 from Right axilla    PORTACATH PLACEMENT  2013    TONSILLECTOMY         Review of patient's allergies indicates:   Allergen Reactions    Codeine Other (See Comments)     Feel like (climbing the walls)    Hydrocodone Other (See Comments)     Restless and anxious similar to codeine. Tolerated oxycodone without issue.    Benadryl [diphenhydramine hcl] Anxiety       No current facility-administered medications on file prior to encounter.     Current Outpatient Medications on File Prior to Encounter   Medication Sig    acetaminophen (TYLENOL) 650 MG TbSR Take 1 tablet (650 mg total) by mouth every 6 to 8 hours as needed (pain).    ascorbic acid, vitamin C, (VITAMIN C) 1000 MG tablet Take 1,000 mg by mouth once daily.    b complex vitamins tablet Take 1 tablet by mouth once daily.    beta-carotene,A,-vits C,E/mins (OCUVITE ORAL) Take 1 tablet by mouth every evening.    empagliflozin (JARDIANCE) 10 mg tablet Take 1 tablet (10 mg total) by mouth once  daily.    furosemide (LASIX) 20 MG tablet Take 1 tablet (20 mg total) by mouth daily as needed (Weight greater than 125 lb).    gabapentin (NEURONTIN) 600 MG tablet Take 2 tablets (1,200 mg total) by mouth once daily.    lactulose (CHRONULAC) 10 gram/15 mL solution Take 45 mLs (30 g total) by mouth 3 (three) times daily as needed (constipation).    loratadine (CLARITIN) 10 mg tablet Take 10 mg by mouth once daily.    metoprolol succinate (TOPROL-XL) 50 MG 24 hr tablet Take 1 tablet (50 mg total) by mouth once daily.    polycarbophil (FIBERCON) 625 mg tablet Take 625 mg by mouth once daily.    pravastatin (PRAVACHOL) 40 MG tablet TAKE 1 TABLET BY MOUTH EVERY DAY    rivaroxaban (XARELTO) 15 mg Tab Take 1 tablet (15 mg total) by mouth Daily.    spironolactone (ALDACTONE) 25 MG tablet Take 1 tablet (25 mg total) by mouth daily as needed (Weight greater than 125 lb).    traZODone (DESYREL) 50 MG tablet 1 pill PO PRN for insomnia at bedtime. If it does not help fall asleep fast enough, can take 30-40 minutes before bedtime.    vitamin D (VITAMIN D3) 1000 units Tab Take 1,000 Units by mouth once daily.     Family History       Problem Relation (Age of Onset)    Cancer Father, Sister    Cervical cancer Sister (79)    Diabetes Mother, Son, Son    Heart disease Mother, Father, Son, Son    Hypertension Mother, Sister, Son, Son          Tobacco Use    Smoking status: Never    Smokeless tobacco: Never   Substance and Sexual Activity    Alcohol use: Yes     Alcohol/week: 3.0 standard drinks of alcohol     Types: 3 Glasses of wine per week     Comment: every 2 weeks    Drug use: No    Sexual activity: Not Currently     Partners: Male     Birth control/protection: None     Review of Systems   Constitutional:  Positive for fever.   Respiratory:  Positive for cough and shortness of breath.    Neurological:  Positive for weakness.   All other systems reviewed and are negative.    Objective:     Vital Signs (Most Recent):  Temp: 99.4  °F (37.4 °C) (04/21/25 2304)  Pulse: 70 (04/22/25 0319)  Resp: (!) 30 (04/22/25 0319)  BP: (!) 167/77 (04/22/25 0319)  SpO2: 95 % (04/22/25 0319) Vital Signs (24h Range):  Temp:  [99.4 °F (37.4 °C)-103.3 °F (39.6 °C)] 99.4 °F (37.4 °C)  Pulse:  [] 70  Resp:  [18-51] 30  SpO2:  [91 %-98 %] 95 %  BP: (137-167)/(61-78) 167/77     Weight: 59.9 kg (132 lb)  Body mass index is 26.66 kg/m².     Physical Exam  Vitals reviewed.   Constitutional:       Appearance: She is ill-appearing.   HENT:      Head: Normocephalic.      Mouth/Throat:      Mouth: Mucous membranes are dry.      Pharynx: Oropharynx is clear.   Eyes:      Pupils: Pupils are equal, round, and reactive to light.   Cardiovascular:      Rate and Rhythm: Tachycardia present. Rhythm irregular.      Pulses: Normal pulses.      Heart sounds: Normal heart sounds.   Pulmonary:      Breath sounds: Rhonchi and rales present.   Abdominal:      General: Bowel sounds are normal.      Palpations: Abdomen is soft.   Musculoskeletal:         General: Normal range of motion.      Cervical back: Normal range of motion.   Skin:     General: Skin is warm and dry.   Neurological:      General: No focal deficit present.      Mental Status: She is alert and oriented to person, place, and time. Mental status is at baseline.   Psychiatric:         Mood and Affect: Mood normal.         Behavior: Behavior normal.              CRANIAL NERVES     CN III, IV, VI   Pupils are equal, round, and reactive to light.       Significant Labs: All pertinent labs within the past 24 hours have been reviewed.  Recent Lab Results  (Last 5 results in the past 24 hours)        04/22/25  0330   04/21/25 2140 04/21/25 2123 04/21/25 2117 04/21/25 2114        Influenza A, Molecular         Negative       Influenza B, Molecular         Negative       Performed By:     [facility]           Procalcitonin       0.02  Comment: A concentration < 0.25 ng/mL represents a low risk of bacterial  infection.  Procalcitonin may not be accurate among patients with localized   infection, recent trauma or major surgery, immunosuppressed state,   invasive fungal infection, renal dysfunction. Decisions regarding   initiation or continuation of antibiotic therapy should not be based   solely on procalcitonin levels.         Specimen source     Venous           Albumin       4.1         ALP       75         ALT       21         Anion Gap       13         Appearance, UA   Clear             AST       34         Bacteria, UA   Few             Baso #       0.04         Basophil %       0.6         Bilirubin (UA)   Negative             BILIRUBIN TOTAL       0.7  Comment: For infants and newborns, interpretation of results should be based   on gestational age, weight and in agreement with clinical   observations.    Premature Infant recommended reference ranges:   0-24 hours:  <8.0 mg/dL   24-48 hours: <12.0 mg/dL   3-5 days:    <15.0 mg/dL   6-29 days:   <15.0 mg/dL         BNP       559  Comment: Values of less than 100 pg/ml are consistent with non-CHF populations.          BUN       15         Calcium       9.4         Chloride       97         CO2       23         Color, UA   Straw             Creatinine       1.0         eGFR       55  Comment: Estimated GFR calculated using the CKD-EPI creatinine (2021) equation.         Eos #       0.01         Eos %       0.1         FiO2     21.0           Glucose       155         Glucose, UA   4+             Gran # (ANC)       4.73         Hematocrit       37.6         Hemoglobin       13.0         Extra Tube   Hold for add-ons.  Comment: Auto resulted.                Immature Grans (Abs)       0.04  Comment: Mild elevation in immature granulocytes is non specific and can be seen in a variety of conditions including stress response, acute inflammation, trauma and pregnancy. Correlation with other laboratory and clinical findings is essential.         Immature Granulocytes        0.6         Ketones, UA   Negative             Leukocyte Esterase, UA   Negative             Lymph #       1.13         Lymph %       16.6         MCH       32.2         MCHC       34.6         MCV       93         Microscopic Comment     Comment: Other formed elements not mentioned in the report are not present in the microscopic examination.             Mono #       0.84         Mono %       12.4         MPV       9.4         Neut %       69.7         NITRITE UA   Negative             nRBC       0         Blood, UA   Trace             pH, UA   7.0             Platelet Count       207         POC Lactate     1.4           POCT Glucose 140               Potassium       3.1         PROTEIN TOTAL       8.6         Protein, UA   Negative  Comment: Recommend a 24 hour urine protein or a urine protein/creatinine ratio if globulin induced proteinuria is clinically suspected.             RBC       4.04         RBC, UA   2             RDW       15.2         Sodium       133         Spec Grav UA   1.005             Troponin I       0.059  Comment: The reference interval for Troponin I represents the 99th percentile cutoff for our facility and is consistent with 3rd generation assay performance.         Urobilinogen, UA   Negative             WBC, UA   1             WBC       6.79         Yeast, UA   None                                    Significant Imaging: I have reviewed all pertinent imaging results/findings within the past 24 hours.  I have reviewed and interpreted all pertinent imaging results/findings within the past 24 hours.

## 2025-04-23 PROBLEM — I27.20 PULMONARY HYPERTENSION: Chronic | Status: ACTIVE | Noted: 2025-04-23

## 2025-04-23 PROBLEM — I36.1 NONRHEUMATIC TRICUSPID VALVE REGURGITATION: Status: ACTIVE | Noted: 2025-04-23

## 2025-04-23 LAB
ABSOLUTE EOSINOPHIL (OHS): 0 K/UL
ABSOLUTE MONOCYTE (OHS): 0.92 K/UL (ref 0.3–1)
ABSOLUTE NEUTROPHIL COUNT (OHS): 6.36 K/UL (ref 1.8–7.7)
ALBUMIN SERPL BCP-MCNC: 3.1 G/DL (ref 3.5–5.2)
ALP SERPL-CCNC: 58 UNIT/L (ref 40–150)
ALT SERPL W/O P-5'-P-CCNC: 20 UNIT/L (ref 10–44)
ANION GAP (OHS): 13 MMOL/L (ref 8–16)
APICAL FOUR CHAMBER EJECTION FRACTION: 56 %
APICAL TWO CHAMBER EJECTION FRACTION: 65 %
AST SERPL-CCNC: 63 UNIT/L (ref 11–45)
AV INDEX (PROSTH): 0.35
AV MEAN GRADIENT: 10 MMHG
AV PEAK GRADIENT: 18 MMHG
AV VALVE AREA BY VELOCITY RATIO: 0.9 CM²
AV VALVE AREA: 1.1 CM²
AV VELOCITY RATIO: 0.29
BASOPHILS # BLD AUTO: 0.02 K/UL
BASOPHILS NFR BLD AUTO: 0.2 %
BILIRUB SERPL-MCNC: 0.6 MG/DL (ref 0.1–1)
BSA FOR ECHO PROCEDURE: 1.58 M2
BUN SERPL-MCNC: 19 MG/DL (ref 8–23)
CALCIUM SERPL-MCNC: 8.8 MG/DL (ref 8.7–10.5)
CHLORIDE SERPL-SCNC: 93 MMOL/L (ref 95–110)
CO2 SERPL-SCNC: 22 MMOL/L (ref 23–29)
CREAT SERPL-MCNC: 1 MG/DL (ref 0.5–1.4)
CV ECHO LV RWT: 0.33 CM
DOP CALC AO PEAK VEL: 2.1 M/S
DOP CALC AO VTI: 35.8 CM
DOP CALC LVOT AREA: 3.1 CM2
DOP CALC LVOT DIAMETER: 2 CM
DOP CALC LVOT PEAK VEL: 0.6 M/S
DOP CALC LVOT STROKE VOLUME: 39.6 CM3
DOP CALC MV VTI: 19.3 CM
DOP CALCLVOT PEAK VEL VTI: 12.6 CM
E WAVE DECELERATION TIME: 151 MSEC
E/E' RATIO: 11 M/S
ECHO LV POSTERIOR WALL: 0.6 CM (ref 0.6–1.1)
ERYTHROCYTE [DISTWIDTH] IN BLOOD BY AUTOMATED COUNT: 15.5 % (ref 11.5–14.5)
FRACTIONAL SHORTENING: 19.4 % (ref 28–44)
GFR SERPLBLD CREATININE-BSD FMLA CKD-EPI: 55 ML/MIN/1.73/M2
GLUCOSE SERPL-MCNC: 98 MG/DL (ref 70–110)
HCT VFR BLD AUTO: 38 % (ref 37–48.5)
HGB BLD-MCNC: 12.7 GM/DL (ref 12–16)
HR MV ECHO: 104 BPM
IMM GRANULOCYTES # BLD AUTO: 0.04 K/UL (ref 0–0.04)
IMM GRANULOCYTES NFR BLD AUTO: 0.4 % (ref 0–0.5)
INTERVENTRICULAR SEPTUM: 0.7 CM (ref 0.6–1.1)
IVC DIAMETER: 1.91 CM
LEFT ATRIUM AREA SYSTOLIC (APICAL 2 CHAMBER): 17.22 CM2
LEFT ATRIUM AREA SYSTOLIC (APICAL 4 CHAMBER): 15.04 CM2
LEFT ATRIUM VOLUME INDEX MOD: 26 ML/M2
LEFT ATRIUM VOLUME MOD: 40 ML
LEFT INTERNAL DIMENSION IN SYSTOLE: 2.9 CM (ref 2.1–4)
LEFT VENTRICLE DIASTOLIC VOLUME INDEX: 34.19 ML/M2
LEFT VENTRICLE DIASTOLIC VOLUME: 53 ML
LEFT VENTRICLE END DIASTOLIC VOLUME APICAL 2 CHAMBER: 47.49 ML
LEFT VENTRICLE END DIASTOLIC VOLUME APICAL 4 CHAMBER: 49.58 ML
LEFT VENTRICLE END SYSTOLIC VOLUME APICAL 2 CHAMBER: 45.65 ML
LEFT VENTRICLE END SYSTOLIC VOLUME APICAL 4 CHAMBER: 35.59 ML
LEFT VENTRICLE MASS INDEX: 38.5 G/M2
LEFT VENTRICLE SYSTOLIC VOLUME INDEX: 20 ML/M2
LEFT VENTRICLE SYSTOLIC VOLUME: 31 ML
LEFT VENTRICULAR INTERNAL DIMENSION IN DIASTOLE: 3.6 CM (ref 3.5–6)
LEFT VENTRICULAR MASS: 59.7 G
LV LATERAL E/E' RATIO: 9.9 M/S
LV SEPTAL E/E' RATIO: 12.1 M/S
LVED V (TEICH): 52.59 ML
LVES V (TEICH): 31.09 ML
LVOT MG: 1.03 MMHG
LVOT MV: 0.5 CM/S
LYMPHOCYTES # BLD AUTO: 1.88 K/UL (ref 1–4.8)
MCH RBC QN AUTO: 31.8 PG (ref 27–31)
MCHC RBC AUTO-ENTMCNC: 33.4 G/DL (ref 32–36)
MCV RBC AUTO: 95 FL (ref 82–98)
MV MEAN GRADIENT: 2 MMHG
MV PEAK E VEL: 1.09 M/S
MV PEAK GRADIENT: 5 MMHG
MV STENOSIS PRESSURE HALF TIME: 43.93 MS
MV VALVE AREA BY CONTINUITY EQUATION: 2.05 CM2
MV VALVE AREA P 1/2 METHOD: 5.01 CM2
NUCLEATED RBC (/100WBC) (OHS): 0 /100 WBC
OHS CV RV/LV RATIO: 1 CM
OHS LV EJECTION FRACTION SIMPSONS BIPLANE MOD: 62 %
OHS QRS DURATION: 72 MS
OHS QTC CALCULATION: 407 MS
PISA TR MAX VEL: 4.1 M/S
PLATELET # BLD AUTO: 181 K/UL (ref 150–450)
PMV BLD AUTO: 10.1 FL (ref 9.2–12.9)
POCT GLUCOSE: 102 MG/DL (ref 70–110)
POCT GLUCOSE: 107 MG/DL (ref 70–110)
POCT GLUCOSE: 175 MG/DL (ref 70–110)
POCT GLUCOSE: 200 MG/DL (ref 70–110)
POCT GLUCOSE: 321 MG/DL (ref 70–110)
POTASSIUM SERPL-SCNC: 3.9 MMOL/L (ref 3.5–5.1)
PROT SERPL-MCNC: 7 GM/DL (ref 6–8.4)
PV MV: 0.6 M/S
PV PEAK GRADIENT: 3 MMHG
PV PEAK VELOCITY: 0.86 M/S
RA PRESSURE ESTIMATED: 8 MMHG
RA VOL SYS: 51.16 ML
RBC # BLD AUTO: 3.99 M/UL (ref 4–5.4)
RELATIVE EOSINOPHIL (OHS): 0 %
RELATIVE LYMPHOCYTE (OHS): 20.4 % (ref 18–48)
RELATIVE MONOCYTE (OHS): 10 % (ref 4–15)
RELATIVE NEUTROPHIL (OHS): 69 % (ref 38–73)
RIGHT ATRIAL AREA: 18.5 CM2
RIGHT ATRIUM VOLUME AREA LENGTH APICAL 4 CHAMBER: 47.77 ML
RIGHT VENTRICLE DIASTOLIC BASEL DIMENSION: 3.6 CM
RV TB RVSP: 12 MMHG
RV TISSUE DOPPLER FREE WALL SYSTOLIC VELOCITY 1 (APICAL 4 CHAMBER VIEW): 8.29 CM/S
SINUS: 3.02 CM
SODIUM SERPL-SCNC: 128 MMOL/L (ref 136–145)
STJ: 2.74 CM
TDI LATERAL: 0.11 M/S
TDI SEPTAL: 0.09 M/S
TDI: 0.1 M/S
TR MAX PG: 68 MMHG
TRICUSPID ANNULAR PLANE SYSTOLIC EXCURSION: 1.25 CM
TV REST PULMONARY ARTERY PRESSURE: 75 MMHG
WBC # BLD AUTO: 9.22 K/UL (ref 3.9–12.7)
Z-SCORE OF LEFT VENTRICULAR DIMENSION IN END DIASTOLE: -2.2
Z-SCORE OF LEFT VENTRICULAR DIMENSION IN END SYSTOLE: 0.32

## 2025-04-23 PROCEDURE — 85025 COMPLETE CBC W/AUTO DIFF WBC: CPT | Performed by: REGISTERED NURSE

## 2025-04-23 PROCEDURE — 87449 NOS EACH ORGANISM AG IA: CPT

## 2025-04-23 PROCEDURE — 63600175 PHARM REV CODE 636 W HCPCS

## 2025-04-23 PROCEDURE — 99900035 HC TECH TIME PER 15 MIN (STAT)

## 2025-04-23 PROCEDURE — 87205 SMEAR GRAM STAIN: CPT

## 2025-04-23 PROCEDURE — 87641 MR-STAPH DNA AMP PROBE: CPT

## 2025-04-23 PROCEDURE — 94761 N-INVAS EAR/PLS OXIMETRY MLT: CPT

## 2025-04-23 PROCEDURE — 36415 COLL VENOUS BLD VENIPUNCTURE: CPT | Performed by: REGISTERED NURSE

## 2025-04-23 PROCEDURE — 80053 COMPREHEN METABOLIC PANEL: CPT | Performed by: REGISTERED NURSE

## 2025-04-23 PROCEDURE — 25000003 PHARM REV CODE 250

## 2025-04-23 PROCEDURE — 94640 AIRWAY INHALATION TREATMENT: CPT

## 2025-04-23 PROCEDURE — 11000001 HC ACUTE MED/SURG PRIVATE ROOM

## 2025-04-23 PROCEDURE — 63600175 PHARM REV CODE 636 W HCPCS: Performed by: REGISTERED NURSE

## 2025-04-23 PROCEDURE — 27000221 HC OXYGEN, UP TO 24 HOURS

## 2025-04-23 PROCEDURE — 99233 SBSQ HOSP IP/OBS HIGH 50: CPT | Mod: ,,, | Performed by: NURSE PRACTITIONER

## 2025-04-23 PROCEDURE — 25000242 PHARM REV CODE 250 ALT 637 W/ HCPCS

## 2025-04-23 PROCEDURE — 25000003 PHARM REV CODE 250: Performed by: REGISTERED NURSE

## 2025-04-23 PROCEDURE — 25000003 PHARM REV CODE 250: Performed by: FAMILY MEDICINE

## 2025-04-23 PROCEDURE — 51798 US URINE CAPACITY MEASURE: CPT

## 2025-04-23 RX ORDER — SPIRONOLACTONE 25 MG/1
25 TABLET ORAL DAILY
Status: DISCONTINUED | OUTPATIENT
Start: 2025-04-23 | End: 2025-04-27 | Stop reason: HOSPADM

## 2025-04-23 RX ORDER — SODIUM CHLORIDE FOR INHALATION 3 %
4 VIAL, NEBULIZER (ML) INHALATION
Status: DISCONTINUED | OUTPATIENT
Start: 2025-04-23 | End: 2025-04-27 | Stop reason: HOSPADM

## 2025-04-23 RX ORDER — FUROSEMIDE 20 MG/1
20 TABLET ORAL DAILY
Status: DISCONTINUED | OUTPATIENT
Start: 2025-04-23 | End: 2025-04-23

## 2025-04-23 RX ORDER — FUROSEMIDE 10 MG/ML
20 INJECTION INTRAMUSCULAR; INTRAVENOUS ONCE
Status: COMPLETED | OUTPATIENT
Start: 2025-04-23 | End: 2025-04-23

## 2025-04-23 RX ORDER — ALBUTEROL SULFATE 2.5 MG/.5ML
2.5 SOLUTION RESPIRATORY (INHALATION)
Status: DISCONTINUED | OUTPATIENT
Start: 2025-04-23 | End: 2025-04-27 | Stop reason: HOSPADM

## 2025-04-23 RX ORDER — FUROSEMIDE 10 MG/ML
20 INJECTION INTRAMUSCULAR; INTRAVENOUS ONCE
Status: DISCONTINUED | OUTPATIENT
Start: 2025-04-23 | End: 2025-04-23

## 2025-04-23 RX ORDER — FUROSEMIDE 10 MG/ML
40 INJECTION INTRAMUSCULAR; INTRAVENOUS DAILY
Status: DISCONTINUED | OUTPATIENT
Start: 2025-04-24 | End: 2025-04-26

## 2025-04-23 RX ORDER — FUROSEMIDE 10 MG/ML
40 INJECTION INTRAMUSCULAR; INTRAVENOUS DAILY
Status: DISCONTINUED | OUTPATIENT
Start: 2025-04-23 | End: 2025-04-23

## 2025-04-23 RX ADMIN — BUDESONIDE 0.5 MG: 0.5 INHALANT RESPIRATORY (INHALATION) at 08:04

## 2025-04-23 RX ADMIN — PIPERACILLIN AND TAZOBACTAM 4.5 G: 4; .5 INJECTION, POWDER, LYOPHILIZED, FOR SOLUTION INTRAVENOUS; PARENTERAL at 06:04

## 2025-04-23 RX ADMIN — IPRATROPIUM BROMIDE AND ALBUTEROL SULFATE 3 ML: 2.5; .5 SOLUTION RESPIRATORY (INHALATION) at 04:04

## 2025-04-23 RX ADMIN — IPRATROPIUM BROMIDE AND ALBUTEROL SULFATE 3 ML: 2.5; .5 SOLUTION RESPIRATORY (INHALATION) at 08:04

## 2025-04-23 RX ADMIN — SODIUM CHLORIDE SOLN NEBU 3% 4 ML: 3 NEBU SOLN at 04:04

## 2025-04-23 RX ADMIN — MONTELUKAST 10 MG: 10 TABLET, FILM COATED ORAL at 09:04

## 2025-04-23 RX ADMIN — IPRATROPIUM BROMIDE AND ALBUTEROL SULFATE 3 ML: 2.5; .5 SOLUTION RESPIRATORY (INHALATION) at 03:04

## 2025-04-23 RX ADMIN — GUAIFENESIN 600 MG: 600 TABLET, EXTENDED RELEASE ORAL at 09:04

## 2025-04-23 RX ADMIN — GABAPENTIN 600 MG: 300 CAPSULE ORAL at 09:04

## 2025-04-23 RX ADMIN — PRAVASTATIN SODIUM 40 MG: 40 TABLET ORAL at 09:04

## 2025-04-23 RX ADMIN — BUDESONIDE 0.5 MG: 0.5 INHALANT RESPIRATORY (INHALATION) at 07:04

## 2025-04-23 RX ADMIN — GUAIFENESIN 600 MG: 600 TABLET, EXTENDED RELEASE ORAL at 08:04

## 2025-04-23 RX ADMIN — INSULIN ASPART 4 UNITS: 100 INJECTION, SOLUTION INTRAVENOUS; SUBCUTANEOUS at 04:04

## 2025-04-23 RX ADMIN — IPRATROPIUM BROMIDE AND ALBUTEROL SULFATE 3 ML: 2.5; .5 SOLUTION RESPIRATORY (INHALATION) at 11:04

## 2025-04-23 RX ADMIN — FUROSEMIDE 20 MG: 10 INJECTION, SOLUTION INTRAMUSCULAR; INTRAVENOUS at 02:04

## 2025-04-23 RX ADMIN — IPRATROPIUM BROMIDE AND ALBUTEROL SULFATE 3 ML: 2.5; .5 SOLUTION RESPIRATORY (INHALATION) at 07:04

## 2025-04-23 RX ADMIN — CETIRIZINE HYDROCHLORIDE 5 MG: 5 TABLET, FILM COATED ORAL at 08:04

## 2025-04-23 RX ADMIN — FUROSEMIDE 20 MG: 20 TABLET ORAL at 09:04

## 2025-04-23 RX ADMIN — SPIRONOLACTONE 25 MG: 25 TABLET, FILM COATED ORAL at 09:04

## 2025-04-23 RX ADMIN — TRAZODONE HYDROCHLORIDE 25 MG: 50 TABLET ORAL at 08:04

## 2025-04-23 RX ADMIN — TRAZODONE HYDROCHLORIDE 25 MG: 50 TABLET ORAL at 12:04

## 2025-04-23 RX ADMIN — GABAPENTIN 600 MG: 300 CAPSULE ORAL at 08:04

## 2025-04-23 RX ADMIN — PIPERACILLIN AND TAZOBACTAM 4.5 G: 4; .5 INJECTION, POWDER, LYOPHILIZED, FOR SOLUTION INTRAVENOUS; PARENTERAL at 11:04

## 2025-04-23 RX ADMIN — METOPROLOL SUCCINATE 50 MG: 50 TABLET, EXTENDED RELEASE ORAL at 09:04

## 2025-04-23 RX ADMIN — DEXAMETHASONE SODIUM PHOSPHATE 4 MG: 4 INJECTION, SOLUTION INTRA-ARTICULAR; INTRALESIONAL; INTRAMUSCULAR; INTRAVENOUS; SOFT TISSUE at 09:04

## 2025-04-23 RX ADMIN — RIVAROXABAN 15 MG: 15 TABLET, FILM COATED ORAL at 04:04

## 2025-04-23 NOTE — SUBJECTIVE & OBJECTIVE
Review of Systems   Constitutional: Negative for chills, decreased appetite, diaphoresis, fever, malaise/fatigue, weight gain and weight loss.   Cardiovascular:  Negative for chest pain, claudication, dyspnea on exertion, irregular heartbeat, leg swelling, near-syncope, orthopnea, palpitations and paroxysmal nocturnal dyspnea.   Respiratory:  Positive for cough. Negative for shortness of breath, snoring, sputum production and wheezing.    Endocrine: Negative for cold intolerance, heat intolerance, polydipsia, polyphagia and polyuria.   Skin:  Negative for color change, dry skin, itching, nail changes and poor wound healing.   Musculoskeletal:  Negative for back pain, gout, joint pain and joint swelling.   Gastrointestinal:  Negative for bloating, abdominal pain, constipation, diarrhea, hematemesis, hematochezia, melena, nausea and vomiting.   Genitourinary:  Negative for dysuria, hematuria and nocturia.   Neurological:  Negative for dizziness, headaches, light-headedness, numbness, paresthesias and weakness.   Psychiatric/Behavioral:  Negative for altered mental status, depression and memory loss.      Objective:     Vital Signs (Most Recent):  Temp: 97.9 °F (36.6 °C) (04/23/25 1140)  Pulse: 104 (04/23/25 1140)  Resp: 18 (04/23/25 1140)  BP: (!) 105/53 (04/23/25 1140)  SpO2: 97 % (04/23/25 1140) Vital Signs (24h Range):  Temp:  [97.5 °F (36.4 °C)-100.8 °F (38.2 °C)] 97.9 °F (36.6 °C)  Pulse:  [] 104  Resp:  [16-20] 18  SpO2:  [93 %-100 %] 97 %  BP: (105-167)/(53-92) 105/53     Weight: 59.9 kg (132 lb)  Body mass index is 26.66 kg/m².     SpO2: 97 %         Intake/Output Summary (Last 24 hours) at 4/23/2025 1145  Last data filed at 4/23/2025 1144  Gross per 24 hour   Intake 180 ml   Output 350 ml   Net -170 ml       Lines/Drains/Airways       Peripheral Intravenous Line  Duration                  Peripheral IV - Single Lumen 04/21/25 2121 18 G Left Antecubital 1 day                       Physical  Exam  Constitutional:       General: She is not in acute distress.     Appearance: She is well-developed.   Cardiovascular:      Rate and Rhythm: Tachycardia present. Rhythm irregularly irregular.      Heart sounds: No murmur heard.     No gallop.   Pulmonary:      Effort: Pulmonary effort is normal. No respiratory distress.      Breath sounds: Normal breath sounds. No wheezing.   Abdominal:      General: Bowel sounds are normal. There is no distension.      Palpations: Abdomen is soft.      Tenderness: There is no abdominal tenderness.   Skin:     General: Skin is warm and dry.   Neurological:      Mental Status: She is alert and oriented to person, place, and time.              Significant Imaging: Echocardiogram: Transthoracic echo (TTE) complete (Cupid Only):   Results for orders placed or performed during the hospital encounter of 04/21/25   Echo   Result Value Ref Range    BSA 1.58 m2    Quigley's Biplane MOD Ejection Fraction 62 %    A2C EF 65 %    A4C EF 56 %    LVOT stroke volume 39.6 cm3    LVIDd 3.6 3.5 - 6.0 cm    LV Systolic Volume 31 mL    LV Systolic Volume Index 20.0 mL/m2    LVIDs 2.9 2.1 - 4.0 cm    LV ESV A2C 45.65 mL    LV Diastolic Volume 53 mL    LV ESV A4C 35.59 mL    LV Diastolic Volume Index 34.19 mL/m2    LV EDV A2C 47.175214690171067 mL    LV EDV A4C 49.58 mL    Left Ventricular End Systolic Volume by Teichholz Method 31.09 mL    Left Ventricular End Diastolic Volume by Teichholz Method 52.59 mL    IVS 0.7 0.6 - 1.1 cm    LVOT diameter 2.0 cm    LVOT area 3.1 cm2    FS 19.4 (A) 28 - 44 %    Left Ventricle Relative Wall Thickness 0.33 cm    PW 0.6 0.6 - 1.1 cm    LV mass 59.7 g    LV Mass Index 38.5 g/m2    MV Peak E Kevin 1.09 m/s    TDI LATERAL 0.11 m/s    TDI SEPTAL 0.09 m/s    E/E' ratio 11 m/s    TR Max Kevin 4.1 m/s    E wave deceleration time 151 msec    LV SEPTAL E/E' RATIO 12.1 m/s    LV LATERAL E/E' RATIO 9.9 m/s    LVOT peak kevin 0.6 m/s    Left Ventricular Outflow Tract Mean Velocity  0.50 cm/s    Left Ventricular Outflow Tract Mean Gradient 1.03 mmHg    RV- levi basal diam 3.6 cm    RV S' 8.29 cm/s    TAPSE 1.25 cm    RV/LV Ratio 1.00 cm    LA Vol (MOD) 40 mL    CORY (MOD) 26 mL/m2    RA area length vol 47.77 mL    RA Area 18.5 cm2    RA Vol 51.16 mL    AV mean gradient 10 mmHg    AV peak gradient 18 mmHg    Ao peak vickie 2.1 m/s    Ao VTI 35.8 cm    LVOT peak VTI 12.6 cm    AV valve area 1.1 cm²    AV Velocity Ratio 0.29     AV index (prosthetic) 0.35     EWELINA by Velocity Ratio 0.9 cm²    MV mean gradient 2 mmHg    MV peak gradient 5 mmHg    MV stenosis pressure 1/2 time 43.93 ms    MV valve area p 1/2 method 5.01 cm2    MV valve area by continuity eq 2.05 cm2    MV VTI 19.3 cm    Triscuspid Valve Regurgitation Peak Gradient 68 mmHg    PV PEAK VELOCITY 0.86 m/s    PV peak gradient 3 mmHg    Pulmonary Valve Mean Velocity 0.60 m/s    Sinus 3.02 cm    STJ 2.74 cm    IVC diameter 1.91 cm    Mean e' 0.10 m/s    ZLVIDS 0.32     ZLVIDD -2.20     LA area A4C 15.04 cm2    LA area A2C 17.22 cm2    Mitral Valve Heart Rate 104 bpm    TV resting pulmonary artery pressure 75 mmHg    RV TB RVSP 12 mmHg    Est. RA pres 8 mmHg    Narrative      Left Ventricle: There is low normal systolic function with a visually   estimated ejection fraction of 50 - 55%. Unable to assess diastolic   function due to atrial fibrillation.    Right Ventricle: The right ventricle is normal in size. Systolic   function is mildly reduced.    Left Atrium: dilated    Right Atrium: Right atrium is mildly dilated.    Aortic Valve: There is moderate aortic valve sclerosis. Moderately   restricted motion. There is moderate stenosis. Aortic valve area by VTI is   1.1 cm². Aortic valve peak velocity is 2.1 m/s. Mean gradient is 10 mmHg.   The dimensionless index is 0.35.    Mitral Valve: There is moderate mitral annular calcification. There is   mild regurgitation.    Tricuspid Valve: There is moderate regurgitation.    Pulmonic Valve: There is  mild regurgitation.    Pulmonary Artery: There is pulmonary hypertension. The estimated   pulmonary artery systolic pressure is 75 mmHg.    IVC/SVC: Intermediate venous pressure at 8 mmHg.

## 2025-04-23 NOTE — PLAN OF CARE
Problem: Adult Inpatient Plan of Care  Goal: Plan of Care Review  Outcome: Progressing  Goal: Optimal Comfort and Wellbeing  Outcome: Progressing  Goal: Readiness for Transition of Care  Outcome: Progressing     Problem: Pneumonia  Goal: Fluid Balance  Outcome: Progressing  Goal: Resolution of Infection Signs and Symptoms  Outcome: Progressing     Problem: Fall Injury Risk  Goal: Absence of Fall and Fall-Related Injury  Outcome: Progressing     Problem: Pulmonary Impairment  Goal: Improved Activity Tolerance  Outcome: Progressing  Goal: Effective Airway Clearance  Outcome: Progressing   Plan of care reviewed with patient. Questions answered. All safety measure implemented during shift.

## 2025-04-23 NOTE — PLAN OF CARE
SW met with pt at bedside during rounding with MD. No dc today. At time of dc pts family will provide transport home. SW sent HH referral via epic. SW will continue to follow pt throughout her transitions of care and assist with any dc needs.     Future Appointments   Date Time Provider Department Center   5/2/2025 11:00 AM Mikayla Crane MD [facility] IMPRI [facility]   8/6/2025 10:00 AM LAB[facility] [place]   8/6/2025 10:15 AM CV [facility] ECHO [facility] [place]   8/27/2025  1:20 PM Keith Alexander Jr., MD [facility] CARDIO [place]   8/29/2025  2:30 PM Sajan Curtis MD [facility] PRICRE [place]   2/6/2026  1:00 PM [facility] MAMMO1 KN MAMMO [place] [facility]   2/13/2026 11:40 AM Mingo Wynne MD [facility] HEM ONC [place] [facility]        04/23/25 0908   Rounds   Attendance Provider;;Assigned nurse   Discharge Plan A Home with family;Home Health   Why the patient remains in the hospital Requires continued medical care   Transition of Care Barriers None

## 2025-04-23 NOTE — PLAN OF CARE
"SW rounded on pt at bedside. Pt had 4 neighbors and friends in the room, on 2LNc O2 and eating dinner. SW began assesment, pt asked to come back tomorrow. Friends chimed in with some answers stating "she has a fridge full of food, and plenty of equipment, rollator and lift chair." Pt's son went out of the room to take phone call from provider. CM will continue to follow to complete remaining questions tomorrow. Pt seemed cheerful to have friends around and very supported.       04/22/25 7089   Discharge Assessment   Assessment Type Discharge Planning Assessment   Confirmed/corrected address, phone number and insurance Yes   Source of Information patient;family;health record   People in Home child(ely), adult   Do you expect to return to your current living situation? Yes   Do you have help at home or someone to help you manage your care at home? Yes   Prior to hospitilization cognitive status: Alert/Oriented   Current cognitive status: Alert/Oriented   Home Accessibility wheelchair accessible   Patient currently being followed by outpatient case management?   (placed referral today)   Do you take prescription medications? Yes   Do you have prescription coverage? Yes   Coverage medicare A & B   Do you have any problems affording any of your prescribed medications? No   Is the patient taking medications as prescribed? yes   Who is going to help you get home at discharge? DemiPradeep (Son)  [phone]   How do you get to doctors appointments? family or friend will provide   Discharge Plan A Home Health;Home with family   Discharge Plan B Home   DME Needed Upon Discharge  none   Discharge Plan discussed with: Patient  (4 neighbors and friends in room)   Transition of Care Barriers None   Physical Activity   On average, how many days per week do you engage in moderate to strenuous exercise (like a brisk walk)? Pt Declined   Financial Resource Strain   How hard is it for you to pay for the very basics like food, " housing, medical care, and heating? Not hard   Housing Stability   In the last 12 months, was there a time when you were not able to pay the mortgage or rent on time? N   Transportation Needs   In the past 12 months, has lack of transportation kept you from medical appointments or from getting medications? no   In the past 12 months, has lack of transportation kept you from meetings, work, or from getting things needed for daily living? No   Food Insecurity   Within the past 12 months, you worried that your food would run out before you got the money to buy more. Never true   Stress   Do you feel stress - tense, restless, nervous, or anxious, or unable to sleep at night because your mind is troubled all the time - these days? To some exte   Alcohol Use   Q1: How often do you have a drink containing alcohol? 2-4 pr month   Q2: How many drinks containing alcohol do you have on a typical day when you are drinking? 1 or 2   Utilities   In the past 12 months has the electric, gas, oil, or water company threatened to shut off services in your home? No   Health Literacy   How often do you need to have someone help you when you read instructions, pamphlets, or other written material from your doctor or pharmacy? Sometimes       Future Appointments   Date Time Provider Department Center   8/6/2025 10:00 AM LAB[facility] [place]   8/6/2025 10:15 AM CV [facility] ECHO [facility] C[facility]IA [place]   8/27/2025  1:20 PM Keith Alexander Jr., MD [facility] CARDIO [place]   8/29/2025  2:30 PM Sajan Curtis MD [facility] PRICRE [place]   2/6/2026  1:00 PM [facility] MAMMO1 [facility] MAMMO [facility]   2/13/2026 11:40 AM Mingo Wynne MD [facility] HEM ONC [place] [facility]     Tracey Oshea, [facility]  [phone]  Emergency Room

## 2025-04-23 NOTE — SUBJECTIVE & OBJECTIVE
Interval History:  Patient was seen in the morning continued to be wheezing with acute distress, on 2 L nasal cannula echo was done showed picture of pulmonary hypertension.  Patient to continue on diuresis urology is on board, patient was switched into broad-spectrum antibiotics to cover her pneumonia.  With the scheduled breathing treatments q.4 hours      Review of Systems   Constitutional:  Positive for activity change, appetite change and fatigue.   HENT: Negative.     Respiratory:  Positive for cough, shortness of breath and wheezing.    Cardiovascular:  Negative for chest pain, palpitations and leg swelling.   Gastrointestinal: Negative.    Genitourinary: Negative.    Musculoskeletal: Negative.    Neurological: Negative.    Psychiatric/Behavioral:  Negative for agitation. The patient is nervous/anxious.      Objective:     Vital Signs (Most Recent):  Temp: 97.9 °F (36.6 °C) (04/23/25 1140)  Pulse: 104 (04/23/25 1140)  Resp: 18 (04/23/25 1140)  BP: (!) 105/53 (04/23/25 1140)  SpO2: 97 % (04/23/25 1140) Vital Signs (24h Range):  Temp:  [97.5 °F (36.4 °C)-100.8 °F (38.2 °C)] 97.9 °F (36.6 °C)  Pulse:  [] 104  Resp:  [16-20] 18  SpO2:  [93 %-100 %] 97 %  BP: (105-167)/(53-92) 105/53     Weight: 59.9 kg (132 lb)  Body mass index is 26.66 kg/m².    Intake/Output Summary (Last 24 hours) at 4/23/2025 1315  Last data filed at 4/23/2025 1144  Gross per 24 hour   Intake 180 ml   Output 350 ml   Net -170 ml         Physical Exam  Constitutional:       General: She is in acute distress.   HENT:      Head: Normocephalic and atraumatic.      Nose: Nose normal.      Mouth/Throat:      Mouth: Mucous membranes are moist.   Eyes:      Extraocular Movements: Extraocular movements intact.      Pupils: Pupils are equal, round, and reactive to light.   Cardiovascular:      Rate and Rhythm: Normal rate. Rhythm irregular.      Pulses: Normal pulses.      Heart sounds: Normal heart sounds.   Pulmonary:      Effort: Respiratory  distress present.      Breath sounds: Wheezing present.      Comments: On 2 L nasal cannula  Abdominal:      Palpations: Abdomen is soft.   Musculoskeletal:      Right lower leg: No edema.      Left lower leg: No edema.   Skin:     Capillary Refill: Capillary refill takes 2 to 3 seconds.      Coloration: Skin is pale.   Neurological:      Mental Status: She is alert and oriented to person, place, and time.   Psychiatric:         Mood and Affect: Mood normal.               Significant Labs: All pertinent labs within the past 24 hours have been reviewed.  Recent Lab Results  (Last 5 results in the past 24 hours)        04/23/25  1139   04/23/25  0628   04/23/25  0541   04/23/25  0406   04/22/25  2318        A2C EF               A4C EF               Albumin     3.1           ALP     58           ALT     20           Anion Gap     13           Ao peak vickie               Ao VTI               AST     63           AV valve area               EWELINA by Velocity Ratio               AV mean gradient               AV index (prosthetic)               AV peak gradient               AV Velocity Ratio               Baso #     0.02           Basophil %     0.2           BILIRUBIN TOTAL     0.6  Comment: For infants and newborns, interpretation of results should be based   on gestational age, weight and in agreement with clinical   observations.    Premature Infant recommended reference ranges:   0-24 hours:  <8.0 mg/dL   24-48 hours: <12.0 mg/dL   3-5 days:    <15.0 mg/dL   6-29 days:   <15.0 mg/dL           BSA               BUN     19           Calcium     8.8           Chloride     93           CO2     22           Creatinine     1.0           Left Ventricle Relative Wall Thickness               E/E' ratio               eGFR     55  Comment: Estimated GFR calculated using the CKD-EPI creatinine (2021) equation.           Eos #     0.00           Eos %     0.0           E wave deceleration time               FS               Glucose      98           Gran # (ANC)     6.36           Hematocrit     38.0           Hemoglobin     12.7           Mitral Valve Heart Rate               Immature Grans (Abs)     0.04  Comment: Mild elevation in immature granulocytes is non specific and can be seen in a variety of conditions including stress response, acute inflammation, trauma and pregnancy. Correlation with other laboratory and clinical findings is essential.           Immature Granulocytes     0.4           IVC diameter               IVSd               LA area A2C               LA area A4C               LA Vol               CORY (MOD)               LVOT area               LV LATERAL E/E' RATIO               LV SEPTAL E/E' RATIO               LV EDV BP               LV Diastolic Volume Index               Left Ventricular End Diastolic Volume by Teichholz Method               LV EDV A2C               LV EDV A4C               Left Ventricular End Systolic Volume by Teichholz Method               LV ESV A2C               LV ESV A4C               LVIDd               LVIDs               LV mass               LV Mass Index               Left Ventricular Outflow Tract Mean Gradient               Left Ventricular Outflow Tract Mean Velocity               LVOT diameter               LVOT peak vickie               LVOT stroke volume               LVOT peak VTI               LV ESV BP               LV Systolic Volume Index               Lymph #     1.88           Lymph %     20.4           MCH     31.8           MCHC     33.4           MCV     95           Mean e'               Mono #     0.92           Mono %     10.0           MPV     10.1           MV valve area p 1/2 method               MV valve area by continuity eq               MV mean gradient               MV peak gradient               MV Peak E Vickie               MV stenosis pressure 1/2 time               MV VTI               Neut %     69.0           nRBC     0           Quigley's Biplane MOD Ejection  Fraction               Platelet Count     181           POCT Glucose 200   102     107   128       Potassium     3.9           PROTEIN TOTAL     7.0           Pulmonary Valve Mean Velocity               PV peak gradient               PV PEAK VELOCITY               PW               RA Vol               RA Area               Est. RA pres               RA area length vol               RBC     3.99           RDW     15.5           RV S'               RV TB RVSP               RV/LV Ratio               RV- levi basal diam               Sinus               Sodium     128           STJ               TAPSE               TDI SEPTAL               TDI LATERAL               Triscuspid Valve Regurgitation Peak Gradient               TR Max Kevin               TV resting pulmonary artery pressure               WBC     9.22           ZLVIDD               ZLVIDS                                      Significant Imaging: I have reviewed all pertinent imaging results/findings within the past 24 hours.    Imaging Results              X-Ray Chest AP Portable (Final result)  Result time 04/21/25 21:58:30      Final result by Oren Dunn DO (04/21/25 21:58:30)                   Impression:      No acute abnormality.      Electronically signed by: Oren Dunn  Date:    04/21/2025  Time:    21:58               Narrative:    EXAMINATION:  XR CHEST AP PORTABLE    CLINICAL HISTORY:  Sepsis;    TECHNIQUE:  Single frontal view of the chest was performed.    COMPARISON:  10/21/2024.    FINDINGS:  Coarse interstitial thickening again noted, stable.  No new focal consolidation.  The pleural spaces are clear.  The cardiac silhouette is borderline enlarged.  There are calcifications of the aortic arch.  Osseous structures demonstrate degenerative changes and osteopenia.  There are surgical clips.                                  Echo  Result Date: 4/23/2025    Left Ventricle: There is low normal systolic function with a visually   estimated  ejection fraction of 50 - 55%. Unable to assess diastolic   function due to atrial fibrillation.    Right Ventricle: The right ventricle is normal in size. Systolic   function is mildly reduced.    Left Atrium: dilated    Right Atrium: Right atrium is mildly dilated.    Aortic Valve: There is moderate aortic valve sclerosis. Moderately   restricted motion. There is moderate stenosis. Aortic valve area by VTI is   1.1 cm². Aortic valve peak velocity is 2.1 m/s. Mean gradient is 10 mmHg.   The dimensionless index is 0.35.    Mitral Valve: There is moderate mitral annular calcification. There is   mild regurgitation.    Tricuspid Valve: There is moderate regurgitation.    Pulmonic Valve: There is mild regurgitation.    Pulmonary Artery: There is pulmonary hypertension. The estimated   pulmonary artery systolic pressure is 75 mmHg.    IVC/SVC: Intermediate venous pressure at 8 mmHg.        Echo  Result Date: 10/10/2024    Left Ventricle: The left ventricle is normal in size. Normal wall   thickness. There is normal systolic function with a visually estimated   ejection fraction of 55 - 60%. Unable to assess diastolic function due to   atrial fibrillation.    Right Ventricle: Normal right ventricular cavity size. Wall thickness   is normal. Systolic function is normal.    Left Atrium: Left atrium is severely dilated.    Right Atrium: Right atrium is severely dilated.    Aortic Valve: The aortic valve is a trileaflet valve. There is moderate   aortic valve sclerosis. Mildly restricted motion. There is mild stenosis.   Aortic valve area by VTI is 2.6 cm2. Aortic valve peak velocity is 1.8   m/s. Mean gradient is 7.3 mmHg. The dimensionless index is 0.92.    Mitral Valve: There is mild regurgitation.    Tricuspid Valve: There is severe regurgitation.    Pulmonary Artery: The estimated pulmonary artery systolic pressure is   64 mmHg.    IVC/SVC: Elevated venous pressure at 15 mmHg.        Echo  Result Date: 4/25/2024    Left  Ventricle: The left ventricle is normal in size. There is   concentric remodeling. There is normal systolic function with a visually   estimated ejection fraction of 55 - 60%. Biplane (2D) method of discs   ejection fraction is 59%. Unable to assess diastolic function due to   atrial fibrillation.    Right Ventricle: Normal right ventricular cavity size. Systolic   function is reduced.TAPSE is 1.05 cm.    Left Atrium: Left atrium is mildly dilated. The left atrium volume   index is 36.1 mL/m2.    Right Atrium: Right atrium is mildly dilated.    Aortic Valve: There is moderate aortic valve sclerosis. Mildly   restricted motion. There is mild to moderate stenosis. Aortic valve area   by VTI is 0.99 cm². Aortic valve area by velocity is 1.02 cm². Aortic   valve peak velocity is 1.66 m/s. Mean gradient is 7 mmHg. The   dimensionless index is 0.33.    Tricuspid Valve: There is moderate regurgitation.    Pulmonic Valve: There is mild regurgitation.    Pulmonary Artery: The estimated pulmonary artery systolic pressure is   53 mmHg.    IVC/SVC: Intermediate venous pressure at 8 mmHg.      Echo  Result Date: 4/23/2025    Left Ventricle: There is low normal systolic function with a visually   estimated ejection fraction of 50 - 55%. Unable to assess diastolic   function due to atrial fibrillation.    Right Ventricle: The right ventricle is normal in size. Systolic   function is mildly reduced.    Left Atrium: dilated    Right Atrium: Right atrium is mildly dilated.    Aortic Valve: There is moderate aortic valve sclerosis. Moderately   restricted motion. There is moderate stenosis. Aortic valve area by VTI is   1.1 cm². Aortic valve peak velocity is 2.1 m/s. Mean gradient is 10 mmHg.   The dimensionless index is 0.35.    Mitral Valve: There is moderate mitral annular calcification. There is   mild regurgitation.    Tricuspid Valve: There is moderate regurgitation.    Pulmonic Valve: There is mild regurgitation.    Pulmonary  Artery: There is pulmonary hypertension. The estimated   pulmonary artery systolic pressure is 75 mmHg.    IVC/SVC: Intermediate venous pressure at 8 mmHg.

## 2025-04-23 NOTE — PROGRESS NOTES
Pharmacist Renal Dose Adjustment Note    Claudia Sierra is a 84 y.o. female being treated with the medication Piperacillin-tazobactam    Patient Data:    Vital Signs (Most Recent):  Temp: 98.5 °F (36.9 °C) (04/23/25 0810)  Pulse: 108 (04/23/25 1034)  Resp: 20 (04/23/25 0810)  BP: 110/65 (04/23/25 0810)  SpO2: 99 % (04/23/25 0810) Vital Signs (72h Range):  Temp:  [97.5 °F (36.4 °C)-103.3 °F (39.6 °C)]   Pulse:  []   Resp:  [16-58]   BP: (109-205)/()   SpO2:  [91 %-100 %]      Recent Labs   Lab 04/21/25 2117 04/22/25  0525 04/23/25  0541   CREATININE 1.0 1.0 1.0     Serum creatinine: 1 mg/dL 04/23/25 0541  Estimated creatinine clearance: 33.9 mL/min    Medication:Piperacillin-tazobactam dose: 4.5g frequency q6h will be changed to medication:Piperacillin-tazobactam dose:4.5g frequency:q8h    Pharmacist's Name: Libby Elder  Pharmacist's Extension: [phone]

## 2025-04-23 NOTE — ASSESSMENT & PLAN NOTE
Patient's most recent potassium results are listed below.   Recent Labs     04/21/25  2117 04/22/25  0525 04/23/25  0541   K 3.1* 3.0* 3.9     Plan  - Replete potassium per protocol  - Monitor potassium Every 12 hours  - Patient's hypokalemia is monitored

## 2025-04-23 NOTE — ASSESSMENT & PLAN NOTE
- presented with SOB and cough  - troponin 0.059; EKG with no acute STTWC; currently feel mild troponin elevation related to demand etiology in setting of fever and infectious etiology   - echo with normal LVEF and moderate TR; troponin 0.059

## 2025-04-23 NOTE — PLAN OF CARE
VIRTUAL NURSE: Pt arrived to unit. Permission received per patient to turn camera to view patient. VIP model explained; patient informed this VN will be working with bedside nurse and the rest of the care team. Plan of care reviewed with patient.  Educated patient on VTE, fall risk and fall risk precautions in place. Call light within reach, side rails up x2. Admission questions completed. Patient instucted to ask staff for assistance. Patient verbalized complete understanding. Patient denies complaints or any needs at this time. Initiated plan of care.

## 2025-04-23 NOTE — PROGRESS NOTES
[facility] Medicine  Progress Note    Patient Name: Claudia Sierra  MRN: [id number]  Patient Class: IP- Inpatient   Admission Date: 4/21/2025  Length of Stay: 1 days  Attending Physician: Eris Chu MD  Primary Care Provider: Sajan Curtis MD        Subjective     Principal Problem:Acute hypoxic respiratory failure        HPI:  Patient is an 84-year-old female with a history of breast CA, HTN, Afib presented to ED today for shortness for breath which has been ongoing for the last 3 days.  Patient reports she went to urgent Care today had a chest x-ray and was sent home on  antibiotics however she did not start these and presented to ED for no improvement.  Patient  temperature was 103° currently in Afib which is chronic and she is on Xarelto.  Labwork remarkable for sodium 133, potassium 3.1.  BNP 5 5 9, troponin elevated at 0.059.  She denies any chest pain.  UA negative COVID and influenza negative.  Chest x-ray with interstitial changes.  Patient is started on Rocephin and azithromycin.  Patient will be admitted to Hospital Medicine.    Overview/Hospital Course:  No notes on file    Interval History:  Patient was seen in the morning continued to be wheezing with acute distress, on 2 L nasal cannula echo was done showed picture of pulmonary hypertension.  Patient to continue on diuresis urology is on board, patient was switched into broad-spectrum antibiotics to cover her pneumonia.  With the scheduled breathing treatments q.4 hours      Review of Systems   Constitutional:  Positive for activity change, appetite change and fatigue.   HENT: Negative.     Respiratory:  Positive for cough, shortness of breath and wheezing.    Cardiovascular:  Negative for chest pain, palpitations and leg swelling.   Gastrointestinal: Negative.    Genitourinary: Negative.    Musculoskeletal: Negative.    Neurological: Negative.    Psychiatric/Behavioral:  Negative for agitation. The patient is  nervous/anxious.      Objective:     Vital Signs (Most Recent):  Temp: 97.9 °F (36.6 °C) (04/23/25 1140)  Pulse: 104 (04/23/25 1140)  Resp: 18 (04/23/25 1140)  BP: (!) 105/53 (04/23/25 1140)  SpO2: 97 % (04/23/25 1140) Vital Signs (24h Range):  Temp:  [97.5 °F (36.4 °C)-100.8 °F (38.2 °C)] 97.9 °F (36.6 °C)  Pulse:  [] 104  Resp:  [16-20] 18  SpO2:  [93 %-100 %] 97 %  BP: (105-167)/(53-92) 105/53     Weight: 59.9 kg (132 lb)  Body mass index is 26.66 kg/m².    Intake/Output Summary (Last 24 hours) at 4/23/2025 1315  Last data filed at 4/23/2025 1144  Gross per 24 hour   Intake 180 ml   Output 350 ml   Net -170 ml         Physical Exam  Constitutional:       General: She is in acute distress.   HENT:      Head: Normocephalic and atraumatic.      Nose: Nose normal.      Mouth/Throat:      Mouth: Mucous membranes are moist.   Eyes:      Extraocular Movements: Extraocular movements intact.      Pupils: Pupils are equal, round, and reactive to light.   Cardiovascular:      Rate and Rhythm: Normal rate. Rhythm irregular.      Pulses: Normal pulses.      Heart sounds: Normal heart sounds.   Pulmonary:      Effort: Respiratory distress present.      Breath sounds: Wheezing present.      Comments: On 2 L nasal cannula  Abdominal:      Palpations: Abdomen is soft.   Musculoskeletal:      Right lower leg: No edema.      Left lower leg: No edema.   Skin:     Capillary Refill: Capillary refill takes 2 to 3 seconds.      Coloration: Skin is pale.   Neurological:      Mental Status: She is alert and oriented to person, place, and time.   Psychiatric:         Mood and Affect: Mood normal.               Significant Labs: All pertinent labs within the past 24 hours have been reviewed.  Recent Lab Results  (Last 5 results in the past 24 hours)        04/23/25  1139   04/23/25  0628   04/23/25  0541   04/23/25  0406   04/22/25  2318        A2C EF               A4C EF               Albumin     3.1           ALP     58            ALT     20           Anion Gap     13           Ao peak vickie               Ao VTI               AST     63           AV valve area               EWELINA by Velocity Ratio               AV mean gradient               AV index (prosthetic)               AV peak gradient               AV Velocity Ratio               Baso #     0.02           Basophil %     0.2           BILIRUBIN TOTAL     0.6  Comment: For infants and newborns, interpretation of results should be based   on gestational age, weight and in agreement with clinical   observations.    Premature Infant recommended reference ranges:   0-24 hours:  <8.0 mg/dL   24-48 hours: <12.0 mg/dL   3-5 days:    <15.0 mg/dL   6-29 days:   <15.0 mg/dL           BSA               BUN     19           Calcium     8.8           Chloride     93           CO2     22           Creatinine     1.0           Left Ventricle Relative Wall Thickness               E/E' ratio               eGFR     55  Comment: Estimated GFR calculated using the CKD-EPI creatinine (2021) equation.           Eos #     0.00           Eos %     0.0           E wave deceleration time               FS               Glucose     98           Gran # (ANC)     6.36           Hematocrit     38.0           Hemoglobin     12.7           Mitral Valve Heart Rate               Immature Grans (Abs)     0.04  Comment: Mild elevation in immature granulocytes is non specific and can be seen in a variety of conditions including stress response, acute inflammation, trauma and pregnancy. Correlation with other laboratory and clinical findings is essential.           Immature Granulocytes     0.4           IVC diameter               IVSd               LA area A2C               LA area A4C               LA Vol               CORY (MOD)               LVOT area               LV LATERAL E/E' RATIO               LV SEPTAL E/E' RATIO               LV EDV BP               LV Diastolic Volume Index               Left Ventricular End  Diastolic Volume by Teichholz Method               LV EDV A2C               LV EDV A4C               Left Ventricular End Systolic Volume by Teichholz Method               LV ESV A2C               LV ESV A4C               LVIDd               LVIDs               LV mass               LV Mass Index               Left Ventricular Outflow Tract Mean Gradient               Left Ventricular Outflow Tract Mean Velocity               LVOT diameter               LVOT peak kevin               LVOT stroke volume               LVOT peak VTI               LV ESV BP               LV Systolic Volume Index               Lymph #     1.88           Lymph %     20.4           MCH     31.8           MCHC     33.4           MCV     95           Mean e'               Mono #     0.92           Mono %     10.0           MPV     10.1           MV valve area p 1/2 method               MV valve area by continuity eq               MV mean gradient               MV peak gradient               MV Peak E Kevin               MV stenosis pressure 1/2 time               MV VTI               Neut %     69.0           nRBC     0           Quigley's Biplane MOD Ejection Fraction               Platelet Count     181           POCT Glucose 200   102     107   128       Potassium     3.9           PROTEIN TOTAL     7.0           Pulmonary Valve Mean Velocity               PV peak gradient               PV PEAK VELOCITY               PW               RA Vol               RA Area               Est. RA pres               RA area length vol               RBC     3.99           RDW     15.5           RV S'               RV TB RVSP               RV/LV Ratio               RV- levi basal diam               Sinus               Sodium     128           STJ               TAPSE               TDI SEPTAL               TDI LATERAL               Triscuspid Valve Regurgitation Peak Gradient               TR Max Kevin               TV resting pulmonary artery pressure                WBC     9.22           ZLVIDD               ZLVIDS                                      Significant Imaging: I have reviewed all pertinent imaging results/findings within the past 24 hours.    Imaging Results              X-Ray Chest AP Portable (Final result)  Result time 04/21/25 21:58:30      Final result by Oren Dunn DO (04/21/25 21:58:30)                   Impression:      No acute abnormality.      Electronically signed by: Oren Dunn  Date:    04/21/2025  Time:    21:58               Narrative:    EXAMINATION:  XR CHEST AP PORTABLE    CLINICAL HISTORY:  Sepsis;    TECHNIQUE:  Single frontal view of the chest was performed.    COMPARISON:  10/21/2024.    FINDINGS:  Coarse interstitial thickening again noted, stable.  No new focal consolidation.  The pleural spaces are clear.  The cardiac silhouette is borderline enlarged.  There are calcifications of the aortic arch.  Osseous structures demonstrate degenerative changes and osteopenia.  There are surgical clips.                                  Echo  Result Date: 4/23/2025    Left Ventricle: There is low normal systolic function with a visually   estimated ejection fraction of 50 - 55%. Unable to assess diastolic   function due to atrial fibrillation.    Right Ventricle: The right ventricle is normal in size. Systolic   function is mildly reduced.    Left Atrium: dilated    Right Atrium: Right atrium is mildly dilated.    Aortic Valve: There is moderate aortic valve sclerosis. Moderately   restricted motion. There is moderate stenosis. Aortic valve area by VTI is   1.1 cm². Aortic valve peak velocity is 2.1 m/s. Mean gradient is 10 mmHg.   The dimensionless index is 0.35.    Mitral Valve: There is moderate mitral annular calcification. There is   mild regurgitation.    Tricuspid Valve: There is moderate regurgitation.    Pulmonic Valve: There is mild regurgitation.    Pulmonary Artery: There is pulmonary hypertension. The estimated   pulmonary  artery systolic pressure is 75 mmHg.    IVC/SVC: Intermediate venous pressure at 8 mmHg.        Echo  Result Date: 10/10/2024    Left Ventricle: The left ventricle is normal in size. Normal wall   thickness. There is normal systolic function with a visually estimated   ejection fraction of 55 - 60%. Unable to assess diastolic function due to   atrial fibrillation.    Right Ventricle: Normal right ventricular cavity size. Wall thickness   is normal. Systolic function is normal.    Left Atrium: Left atrium is severely dilated.    Right Atrium: Right atrium is severely dilated.    Aortic Valve: The aortic valve is a trileaflet valve. There is moderate   aortic valve sclerosis. Mildly restricted motion. There is mild stenosis.   Aortic valve area by VTI is 2.6 cm2. Aortic valve peak velocity is 1.8   m/s. Mean gradient is 7.3 mmHg. The dimensionless index is 0.92.    Mitral Valve: There is mild regurgitation.    Tricuspid Valve: There is severe regurgitation.    Pulmonary Artery: The estimated pulmonary artery systolic pressure is   64 mmHg.    IVC/SVC: Elevated venous pressure at 15 mmHg.        Echo  Result Date: 4/25/2024    Left Ventricle: The left ventricle is normal in size. There is   concentric remodeling. There is normal systolic function with a visually   estimated ejection fraction of 55 - 60%. Biplane (2D) method of discs   ejection fraction is 59%. Unable to assess diastolic function due to   atrial fibrillation.    Right Ventricle: Normal right ventricular cavity size. Systolic   function is reduced.TAPSE is 1.05 cm.    Left Atrium: Left atrium is mildly dilated. The left atrium volume   index is 36.1 mL/m2.    Right Atrium: Right atrium is mildly dilated.    Aortic Valve: There is moderate aortic valve sclerosis. Mildly   restricted motion. There is mild to moderate stenosis. Aortic valve area   by VTI is 0.99 cm². Aortic valve area by velocity is 1.02 cm². Aortic   valve peak velocity is 1.66 m/s. Mean  gradient is 7 mmHg. The   dimensionless index is 0.33.    Tricuspid Valve: There is moderate regurgitation.    Pulmonic Valve: There is mild regurgitation.    Pulmonary Artery: The estimated pulmonary artery systolic pressure is   53 mmHg.    IVC/SVC: Intermediate venous pressure at 8 mmHg.      Echo  Result Date: 4/23/2025    Left Ventricle: There is low normal systolic function with a visually   estimated ejection fraction of 50 - 55%. Unable to assess diastolic   function due to atrial fibrillation.    Right Ventricle: The right ventricle is normal in size. Systolic   function is mildly reduced.    Left Atrium: dilated    Right Atrium: Right atrium is mildly dilated.    Aortic Valve: There is moderate aortic valve sclerosis. Moderately   restricted motion. There is moderate stenosis. Aortic valve area by VTI is   1.1 cm². Aortic valve peak velocity is 2.1 m/s. Mean gradient is 10 mmHg.   The dimensionless index is 0.35.    Mitral Valve: There is moderate mitral annular calcification. There is   mild regurgitation.    Tricuspid Valve: There is moderate regurgitation.    Pulmonic Valve: There is mild regurgitation.    Pulmonary Artery: There is pulmonary hypertension. The estimated   pulmonary artery systolic pressure is 75 mmHg.    IVC/SVC: Intermediate venous pressure at 8 mmHg.            Assessment & Plan  Essential hypertension  Patient's blood pressure range in the last 24 hours was: BP  Min: 105/53  Max: 167/77.The patient's inpatient anti-hypertensive regimen is listed below:  Current Antihypertensives  metoprolol succinate (TOPROL-XL) 24 hr tablet 50 mg, Daily, Oral  metoprolol injection 5 mg, Every 6 hours PRN, Intravenous  spironolactone tablet 25 mg, Daily, Oral  furosemide injection 40 mg, Daily, Intravenous  furosemide injection 20 mg, Once, Intravenous    Plan  - BP is controlled, no changes needed to their regimen  -   CAP (community acquired pneumonia)  Patient has a diagnosis of pneumonia. The  cause of the pneumonia is unknown at this time. The pneumonia is worsening due to hypoxia . The patient has the following signs/symptoms of pneumonia: persistent hypoxia , cough, and shortness of breath. The patient does have a current oxygen requirement and the patient does not have a home oxygen requirement. I have reviewed the pertinent imaging. The following cultures have been collected: Blood cultures The culture results are listed below.     Current antimicrobial regimen consists of the antibiotics listed below. Will monitor patient closely and continue current treatment plan unchanged.     Continue antibiotics   Supplemental O2      Antibiotics (From admission, onward)      Start     Stop Route Frequency Ordered    04/23/25 1145  piperacillin-tazobactam (ZOSYN) 4.5 g in D5W 100 mL IVPB (MB+)         -- IV Every 8 hours (non-standard times) 04/23/25 1033            Microbiology Results (last 7 days)       Procedure Component Value Units Date/Time    Culture, Respiratory with Gram Stain [[id number]]     Order Status: Sent Specimen: Respiratory     Blood culture x two cultures. Draw prior to antibiotics. [[id number]]  (Normal) Collected: 04/21/25 2123    Order Status: Completed Specimen: Blood from Peripheral, Antecubital, Left Updated: 04/23/25 0411     Blood Culture No Growth After 24 Hours    Blood culture x two cultures. Draw prior to antibiotics. [[id number]]  (Normal) Collected: 04/21/25 2117    Order Status: Completed Specimen: Blood from Peripheral, Forearm, Left Updated: 04/23/25 0304     Blood Culture No Growth After 24 Hours    Influenza A & B by Molecular [[id number]]  (Normal) Collected: 04/21/25 2114    Order Status: Completed Specimen: Nasopharyngeal Swab Updated: 04/21/25 2149     INFLUENZA A MOLECULAR Negative     INFLUENZA B MOLECULAR  Negative          Atrial fibrillation with RVR  Patient has persistent (7 days or more) atrial fibrillation. Patient is currently in atrial fibrillation.  [id number] Score: 3. The patients heart rate in the last 24 hours is as follows:  Pulse  Min: 82  Max: 112     Antiarrhythmics  metoprolol succinate (TOPROL-XL) 24 hr tablet 50 mg, Daily, Oral  metoprolol injection 5 mg, Every 6 hours PRN, Intravenous    Anticoagulants  rivaroxaban tablet 15 mg, Daily, Oral    Plan  - Replete lytes with a goal of K>4, Mg >2  - Patient is anticoagulated, see medications listed above.  - Patient's afib is currently controlled        Hypokalemia  Patient's most recent potassium results are listed below.   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 The patient's most recent sodium results are listed below.  Recent Labs     04/21/25 2117 04/22/25 0525 04/23/25  0541   * 135* 128*     Plan  - Correct the sodium by 4-6mEq in 24 hours.    -hyponatremia secondary to SIADH, currently patient on fluid restriction less than 1500 mL per day  - Will treat the hyponatremia with to be monitored since it is a chronic  - Monitor sodium Daily.   - Patient hyponatremia is  monitored  Improving  Acute hypoxic respiratory failure  Patient with Hypoxic Respiratory failure which is Acute.  she is not on home oxygen. Supplemental oxygen was provided and noted-      .   Signs/symptoms of respiratory failure include- tachypnea, increased work of breathing, respiratory distress, and wheezing. Contributing diagnoses includes - CHF and Pneumonia Labs and images were reviewed. Patient Has not had a recent ABG. Will treat underlying causes and adjust management of respiratory failure as follows- IV antibiotics nasal cannula, echo ordered cardiology was consulted  Elevated troponin  Can be secondary to high demand ischemia  Ordered echocardiogram that showed severe pulmonary  hypertension with moderate mitral regurg  Cardiology consulted appreciate recommendations  No ST changes noted    Longstanding persistent atrial fibrillation  Patient has long standing persistent (>12 months) atrial fibrillation. Patient is currently in atrial fibrillation. [id number] Score: 3. The patients heart rate in the last 24 hours is as follows:  Pulse  Min: 82  Max: 112     Antiarrhythmics  metoprolol succinate (TOPROL-XL) 24 hr tablet 50 mg, Daily, Oral  metoprolol injection 5 mg, Every 6 hours PRN, Intravenous    Anticoagulants  rivaroxaban tablet 15 mg, Daily, Oral    Plan  - Replete lytes with a goal of K>4, Mg >2  - Patient is anticoagulated, see medications listed above.  - Patient's afib is currently controlled  - to be monitored closely  -cardiology is consulted      Nonrheumatic aortic valve stenosis  Echocardiogram with evidence of mitral regurgitation that is moderate . The patient's most recent echocardiogram result is listed below. We will manage the valvular abnormality by diuresing the patient, cardiology is on board.    Echo  Result Date: 4/23/2025    Left Ventricle: There is low normal systolic function with a visually   estimated ejection fraction of 50 - 55%. Unable to assess diastolic   function due to atrial fibrillation.    Right Ventricle: The right ventricle is normal in size. Systolic   function is mildly reduced.    Left Atrium: dilated    Right Atrium: Right atrium is mildly dilated.    Aortic Valve: There is moderate aortic valve sclerosis. Moderately   restricted motion. There is moderate stenosis. Aortic valve area by VTI is   1.1 cm². Aortic valve peak velocity is 2.1 m/s. Mean gradient is 10 mmHg.   The dimensionless index is 0.35.    Mitral Valve: There is moderate mitral annular calcification. There is   mild regurgitation.    Tricuspid Valve: There is moderate regurgitation.    Pulmonic Valve: There is mild regurgitation.    Pulmonary Artery: There is pulmonary  hypertension. The estimated   pulmonary artery systolic pressure is 75 mmHg.    IVC/SVC: Intermediate venous pressure at 8 mmHg.        Echo  Result Date: 10/10/2024    Left Ventricle: The left ventricle is normal in size. Normal wall   thickness. There is normal systolic function with a visually estimated   ejection fraction of 55 - 60%. Unable to assess diastolic function due to   atrial fibrillation.    Right Ventricle: Normal right ventricular cavity size. Wall thickness   is normal. Systolic function is normal.    Left Atrium: Left atrium is severely dilated.    Right Atrium: Right atrium is severely dilated.    Aortic Valve: The aortic valve is a trileaflet valve. There is moderate   aortic valve sclerosis. Mildly restricted motion. There is mild stenosis.   Aortic valve area by VTI is 2.6 cm2. Aortic valve peak velocity is 1.8   m/s. Mean gradient is 7.3 mmHg. The dimensionless index is 0.92.    Mitral Valve: There is mild regurgitation.    Tricuspid Valve: There is severe regurgitation.    Pulmonary Artery: The estimated pulmonary artery systolic pressure is   64 mmHg.    IVC/SVC: Elevated venous pressure at 15 mmHg.        Echo  Result Date: 4/25/2024    Left Ventricle: The left ventricle is normal in size. There is   concentric remodeling. There is normal systolic function with a visually   estimated ejection fraction of 55 - 60%. Biplane (2D) method of discs   ejection fraction is 59%. Unable to assess diastolic function due to   atrial fibrillation.    Right Ventricle: Normal right ventricular cavity size. Systolic   function is reduced.TAPSE is 1.05 cm.    Left Atrium: Left atrium is mildly dilated. The left atrium volume   index is 36.1 mL/m2.    Right Atrium: Right atrium is mildly dilated.    Aortic Valve: There is moderate aortic valve sclerosis. Mildly   restricted motion. There is mild to moderate stenosis. Aortic valve area   by VTI is 0.99 cm². Aortic valve area by velocity is 1.02 cm². Aortic    valve peak velocity is 1.66 m/s. Mean gradient is 7 mmHg. The   dimensionless index is 0.33.    Tricuspid Valve: There is moderate regurgitation.    Pulmonic Valve: There is mild regurgitation.    Pulmonary Artery: The estimated pulmonary artery systolic pressure is   53 mmHg.    IVC/SVC: Intermediate venous pressure at 8 mmHg.         Pulmonary hypertension  Given her history of stroke ideation more than 10 years ago due to her history of breast cancer, that can be the reason for her pulmonary hypertension  Echo was done  On Aldactone and IV Lasix  Strict intake and output, less than 1500 mL per day with low-salt diet  Cardiology is on board  Nonrheumatic tricuspid valve regurgitation  Echocardiogram with evidence of tricuspid regurgitation that is moderate . The patient's most recent echocardiogram result is listed below.     Echo  Result Date: 4/23/2025    Left Ventricle: There is low normal systolic function with a visually   estimated ejection fraction of 50 - 55%. Unable to assess diastolic   function due to atrial fibrillation.    Right Ventricle: The right ventricle is normal in size. Systolic   function is mildly reduced.    Left Atrium: dilated    Right Atrium: Right atrium is mildly dilated.    Aortic Valve: There is moderate aortic valve sclerosis. Moderately   restricted motion. There is moderate stenosis. Aortic valve area by VTI is   1.1 cm². Aortic valve peak velocity is 2.1 m/s. Mean gradient is 10 mmHg.   The dimensionless index is 0.35.    Mitral Valve: There is moderate mitral annular calcification. There is   mild regurgitation.    Tricuspid Valve: There is moderate regurgitation.    Pulmonic Valve: There is mild regurgitation.    Pulmonary Artery: There is pulmonary hypertension. The estimated   pulmonary artery systolic pressure is 75 mmHg.    IVC/SVC: Intermediate venous pressure at 8 mmHg.        Echo  Result Date: 10/10/2024    Left Ventricle: The left ventricle is normal in size. Normal  wall   thickness. There is normal systolic function with a visually estimated   ejection fraction of 55 - 60%. Unable to assess diastolic function due to   atrial fibrillation.    Right Ventricle: Normal right ventricular cavity size. Wall thickness   is normal. Systolic function is normal.    Left Atrium: Left atrium is severely dilated.    Right Atrium: Right atrium is severely dilated.    Aortic Valve: The aortic valve is a trileaflet valve. There is moderate   aortic valve sclerosis. Mildly restricted motion. There is mild stenosis.   Aortic valve area by VTI is 2.6 cm2. Aortic valve peak velocity is 1.8   m/s. Mean gradient is 7.3 mmHg. The dimensionless index is 0.92.    Mitral Valve: There is mild regurgitation.    Tricuspid Valve: There is severe regurgitation.    Pulmonary Artery: The estimated pulmonary artery systolic pressure is   64 mmHg.    IVC/SVC: Elevated venous pressure at 15 mmHg.        Echo  Result Date: 4/25/2024    Left Ventricle: The left ventricle is normal in size. There is   concentric remodeling. There is normal systolic function with a visually   estimated ejection fraction of 55 - 60%. Biplane (2D) method of discs   ejection fraction is 59%. Unable to assess diastolic function due to   atrial fibrillation.    Right Ventricle: Normal right ventricular cavity size. Systolic   function is reduced.TAPSE is 1.05 cm.    Left Atrium: Left atrium is mildly dilated. The left atrium volume   index is 36.1 mL/m2.    Right Atrium: Right atrium is mildly dilated.    Aortic Valve: There is moderate aortic valve sclerosis. Mildly   restricted motion. There is mild to moderate stenosis. Aortic valve area   by VTI is 0.99 cm². Aortic valve area by velocity is 1.02 cm². Aortic   valve peak velocity is 1.66 m/s. Mean gradient is 7 mmHg. The   dimensionless index is 0.33.    Tricuspid Valve: There is moderate regurgitation.    Pulmonic Valve: There is mild regurgitation.    Pulmonary Artery: The estimated  pulmonary artery systolic pressure is   53 mmHg.    IVC/SVC: Intermediate venous pressure at 8 mmHg.         VTE Risk Mitigation (From admission, onward)           Ordered     rivaroxaban tablet 15 mg  Daily         04/22/25 0749     IP VTE HIGH RISK PATIENT  Once         04/22/25 0133     Place sequential compression device  Until discontinued         04/22/25 0133                    Discharge Planning   BASILIO: 4/25/2025     Code Status: Full Code   Medical Readiness for Discharge Date:   Discharge Plan A: Home with family, Home Health                        Eris Beyer MD  Department of [facility]   [facility]

## 2025-04-23 NOTE — ASSESSMENT & PLAN NOTE
- SBP 110s  - on Metoprolol and Aldactone as an outpatient   - continue Metoprolol; monitor BP and resume Aldactone if BP remains above goal

## 2025-04-23 NOTE — ASSESSMENT & PLAN NOTE
Echocardiogram with evidence of mitral regurgitation that is moderate . The patient's most recent echocardiogram result is listed below. We will manage the valvular abnormality by diuresing the patient, cardiology is on board.    Echo  Result Date: 4/23/2025    Left Ventricle: There is low normal systolic function with a visually   estimated ejection fraction of 50 - 55%. Unable to assess diastolic   function due to atrial fibrillation.    Right Ventricle: The right ventricle is normal in size. Systolic   function is mildly reduced.    Left Atrium: dilated    Right Atrium: Right atrium is mildly dilated.    Aortic Valve: There is moderate aortic valve sclerosis. Moderately   restricted motion. There is moderate stenosis. Aortic valve area by VTI is   1.1 cm². Aortic valve peak velocity is 2.1 m/s. Mean gradient is 10 mmHg.   The dimensionless index is 0.35.    Mitral Valve: There is moderate mitral annular calcification. There is   mild regurgitation.    Tricuspid Valve: There is moderate regurgitation.    Pulmonic Valve: There is mild regurgitation.    Pulmonary Artery: There is pulmonary hypertension. The estimated   pulmonary artery systolic pressure is 75 mmHg.    IVC/SVC: Intermediate venous pressure at 8 mmHg.        Echo  Result Date: 10/10/2024    Left Ventricle: The left ventricle is normal in size. Normal wall   thickness. There is normal systolic function with a visually estimated   ejection fraction of 55 - 60%. Unable to assess diastolic function due to   atrial fibrillation.    Right Ventricle: Normal right ventricular cavity size. Wall thickness   is normal. Systolic function is normal.    Left Atrium: Left atrium is severely dilated.    Right Atrium: Right atrium is severely dilated.    Aortic Valve: The aortic valve is a trileaflet valve. There is moderate   aortic valve sclerosis. Mildly restricted motion. There is mild stenosis.   Aortic valve area by VTI is 2.6 cm2. Aortic valve peak velocity is  1.8   m/s. Mean gradient is 7.3 mmHg. The dimensionless index is 0.92.    Mitral Valve: There is mild regurgitation.    Tricuspid Valve: There is severe regurgitation.    Pulmonary Artery: The estimated pulmonary artery systolic pressure is   64 mmHg.    IVC/SVC: Elevated venous pressure at 15 mmHg.        Echo  Result Date: 4/25/2024    Left Ventricle: The left ventricle is normal in size. There is   concentric remodeling. There is normal systolic function with a visually   estimated ejection fraction of 55 - 60%. Biplane (2D) method of discs   ejection fraction is 59%. Unable to assess diastolic function due to   atrial fibrillation.    Right Ventricle: Normal right ventricular cavity size. Systolic   function is reduced.TAPSE is 1.05 cm.    Left Atrium: Left atrium is mildly dilated. The left atrium volume   index is 36.1 mL/m2.    Right Atrium: Right atrium is mildly dilated.    Aortic Valve: There is moderate aortic valve sclerosis. Mildly   restricted motion. There is mild to moderate stenosis. Aortic valve area   by VTI is 0.99 cm². Aortic valve area by velocity is 1.02 cm². Aortic   valve peak velocity is 1.66 m/s. Mean gradient is 7 mmHg. The   dimensionless index is 0.33.    Tricuspid Valve: There is moderate regurgitation.    Pulmonic Valve: There is mild regurgitation.    Pulmonary Artery: The estimated pulmonary artery systolic pressure is   53 mmHg.    IVC/SVC: Intermediate venous pressure at 8 mmHg.

## 2025-04-23 NOTE — ASSESSMENT & PLAN NOTE
Patient's blood pressure range in the last 24 hours was: BP  Min: 105/53  Max: 167/77.The patient's inpatient anti-hypertensive regimen is listed below:  Current Antihypertensives  metoprolol succinate (TOPROL-XL) 24 hr tablet 50 mg, Daily, Oral  metoprolol injection 5 mg, Every 6 hours PRN, Intravenous  spironolactone tablet 25 mg, Daily, Oral  furosemide injection 40 mg, Daily, Intravenous  furosemide injection 20 mg, Once, Intravenous    Plan  - BP is controlled, no changes needed to their regimen  -

## 2025-04-23 NOTE — ASSESSMENT & PLAN NOTE
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 The patient's most recent sodium results are listed below.  Recent Labs     04/21/25  2117 04/22/25  0525 04/23/25  0541   * 135* 128*     Plan  - Correct the sodium by 4-6mEq in 24 hours.    -hyponatremia secondary to SIADH, currently patient on fluid restriction less than 1500 mL per day  - Will treat the hyponatremia with to be monitored since it is a chronic  - Monitor sodium Daily.   - Patient hyponatremia is monitored  Improving

## 2025-04-23 NOTE — ASSESSMENT & PLAN NOTE
Patient has long standing persistent (>12 months) atrial fibrillation. Patient is currently in atrial fibrillation. [id number] Score: 3. The patients heart rate in the last 24 hours is as follows:  Pulse  Min: 82  Max: 112     Antiarrhythmics  metoprolol succinate (TOPROL-XL) 24 hr tablet 50 mg, Daily, Oral  metoprolol injection 5 mg, Every 6 hours PRN, Intravenous    Anticoagulants  rivaroxaban tablet 15 mg, Daily, Oral    Plan  - Replete lytes with a goal of K>4, Mg >2  - Patient is anticoagulated, see medications listed above.  - Patient's afib is currently controlled  - to be monitored closely  -cardiology is consulted

## 2025-04-23 NOTE — ASSESSMENT & PLAN NOTE
Patient has persistent (7 days or more) atrial fibrillation. Patient is currently in atrial fibrillation. [id number] Score: 3. The patients heart rate in the last 24 hours is as follows:  Pulse  Min: 82  Max: 112     Antiarrhythmics  metoprolol succinate (TOPROL-XL) 24 hr tablet 50 mg, Daily, Oral  metoprolol injection 5 mg, Every 6 hours PRN, Intravenous    Anticoagulants  rivaroxaban tablet 15 mg, Daily, Oral    Plan  - Replete lytes with a goal of K>4, Mg >2  - Patient is anticoagulated, see medications listed above.  - Patient's afib is currently controlled

## 2025-04-23 NOTE — CARE UPDATE
04/23/25 1607   Patient Assessment/Suction   Level of Consciousness (AVPU) alert   Respiratory Effort Unlabored   Expansion/Accessory Muscles/Retractions no retractions;expansion symmetric   All Lung Fields Breath Sounds Anterior:;Lateral:;coarse   Cough Frequency frequent   Cough Type nonproductive;good;congested   PRE-TX-O2   Device (Oxygen Therapy) nasal cannula   Flow (L/min) (Oxygen Therapy) 2   SpO2 99 %   Pulse Oximetry Type Continuous   Pulse 104   Resp 20   Aerosol Therapy   $ Aerosol Therapy Charges Aerosol Treatment   Respiratory Treatment Status (SVN) given   Treatment Route (SVN) mask;oxygen   Patient Position HOB elevated   Post Treatment Assessment (SVN) breath sounds unchanged   Signs of Intolerance (SVN) none   Breath Sounds Post-Respiratory Treatment   Post-treatment Heart Rate (beats/min) 107   Post-treatment Resp Rate (breaths/min) 20     Sputum induction done, patient has non productive cough at this time

## 2025-04-23 NOTE — HOSPITAL COURSE
83yo female with HTN, CAP, persistent afib on Xarelto, elevated troponin, acute hypoxemic respiratory failure, breast cancer, HLP, AS, chronic DHF and CKD stage III who presented to the ER with complaints of SOB. She reports SOB and cough for the past 3 days with no improvement. She reports SOB occurs with walking longer distances and denies any chest pain, LE edema or orthopnea. She reports compliance with her medication regimen and will take Lasix on an as needed basis typically once per week. She presented to Urgent care and was COVID and Flu negative and was given oral antibiotics. She reports worsening symptoms therefore she presented to the ER. Labs CBC with WBCs 7K BMP with K+ 3.0 BUN 14 creatinine 1.0  Troponin 0.059 Blood cultures prelim NGTD Initial temp 103.3 EKG afib with . SBP 140s-170s. Started on IV Abx and admitted to [facility] and Cardiology consulted for evaluation of elevated troponin     4/23/2025 Remains in afib with HR up to 110s-120s. SBP 110s CBC WNL. BMP with K+ 3.0 Given oral Lasix this AM  4/24/2025 Remains on IV lasix 40mg daily with 2.9L out overnight negative 2.4L since admission. CBC and BMP WNL. SBP stable.HR 90s-100s with continued afib   4/25/2025 Remains in atrial fibrillation HR improving. SBP 90s-140s Remains on IV Lasix 40mg BID with 2.3L out overnight BMP pending Na 129 yesterday. Off O2 today with pulse ox 93-94%

## 2025-04-23 NOTE — ASSESSMENT & PLAN NOTE
Echocardiogram with evidence of tricuspid regurgitation that is moderate . The patient's most recent echocardiogram result is listed below.     Echo  Result Date: 4/23/2025    Left Ventricle: There is low normal systolic function with a visually   estimated ejection fraction of 50 - 55%. Unable to assess diastolic   function due to atrial fibrillation.    Right Ventricle: The right ventricle is normal in size. Systolic   function is mildly reduced.    Left Atrium: dilated    Right Atrium: Right atrium is mildly dilated.    Aortic Valve: There is moderate aortic valve sclerosis. Moderately   restricted motion. There is moderate stenosis. Aortic valve area by VTI is   1.1 cm². Aortic valve peak velocity is 2.1 m/s. Mean gradient is 10 mmHg.   The dimensionless index is 0.35.    Mitral Valve: There is moderate mitral annular calcification. There is   mild regurgitation.    Tricuspid Valve: There is moderate regurgitation.    Pulmonic Valve: There is mild regurgitation.    Pulmonary Artery: There is pulmonary hypertension. The estimated   pulmonary artery systolic pressure is 75 mmHg.    IVC/SVC: Intermediate venous pressure at 8 mmHg.        Echo  Result Date: 10/10/2024    Left Ventricle: The left ventricle is normal in size. Normal wall   thickness. There is normal systolic function with a visually estimated   ejection fraction of 55 - 60%. Unable to assess diastolic function due to   atrial fibrillation.    Right Ventricle: Normal right ventricular cavity size. Wall thickness   is normal. Systolic function is normal.    Left Atrium: Left atrium is severely dilated.    Right Atrium: Right atrium is severely dilated.    Aortic Valve: The aortic valve is a trileaflet valve. There is moderate   aortic valve sclerosis. Mildly restricted motion. There is mild stenosis.   Aortic valve area by VTI is 2.6 cm2. Aortic valve peak velocity is 1.8   m/s. Mean gradient is 7.3 mmHg. The dimensionless index is 0.92.    Mitral  Problem: Falls - Risk of  Goal: *Absence of Falls  Description  Document Teodoro Fells Fall Risk and appropriate interventions in the flowsheet.   Outcome: Progressing Towards Goal     Problem: Small Bowel Obstruction: Day 1  Goal: Activity/Safety  Outcome: Progressing Towards Goal  Goal: Consults, if ordered  Outcome: Progressing Towards Goal  Goal: Diagnostic Test/Procedures  Outcome: Progressing Towards Goal  Goal: Nutrition/Diet  Outcome: Progressing Towards Goal  Goal: Medications  Outcome: Progressing Towards Goal  Goal: Respiratory  Outcome: Progressing Towards Goal  Goal: Treatments/Interventions/Procedures  Outcome: Progressing Towards Goal  Goal: Psychosocial  Outcome: Progressing Towards Goal  Goal: *Optimal pain control at patient's stated goal  Outcome: Progressing Towards Goal  Goal: *Adequate urinary output (equal to or greater than 30 milliliters/hour)  Outcome: Progressing Towards Goal  Goal: *Hemodynamically stable  Outcome: Progressing Towards Goal  Goal: *Demonstrates progressive activity  Outcome: Progressing Towards Goal  Goal: *Absence of nausea/vomiting  Outcome: Progressing Towards Goal Valve: There is mild regurgitation.    Tricuspid Valve: There is severe regurgitation.    Pulmonary Artery: The estimated pulmonary artery systolic pressure is   64 mmHg.    IVC/SVC: Elevated venous pressure at 15 mmHg.        Echo  Result Date: 4/25/2024    Left Ventricle: The left ventricle is normal in size. There is   concentric remodeling. There is normal systolic function with a visually   estimated ejection fraction of 55 - 60%. Biplane (2D) method of discs   ejection fraction is 59%. Unable to assess diastolic function due to   atrial fibrillation.    Right Ventricle: Normal right ventricular cavity size. Systolic   function is reduced.TAPSE is 1.05 cm.    Left Atrium: Left atrium is mildly dilated. The left atrium volume   index is 36.1 mL/m2.    Right Atrium: Right atrium is mildly dilated.    Aortic Valve: There is moderate aortic valve sclerosis. Mildly   restricted motion. There is mild to moderate stenosis. Aortic valve area   by VTI is 0.99 cm². Aortic valve area by velocity is 1.02 cm². Aortic   valve peak velocity is 1.66 m/s. Mean gradient is 7 mmHg. The   dimensionless index is 0.33.    Tricuspid Valve: There is moderate regurgitation.    Pulmonic Valve: There is mild regurgitation.    Pulmonary Artery: The estimated pulmonary artery systolic pressure is   53 mmHg.    IVC/SVC: Intermediate venous pressure at 8 mmHg.

## 2025-04-23 NOTE — ASSESSMENT & PLAN NOTE
Patient has a diagnosis of pneumonia. The cause of the pneumonia is unknown at this time. The pneumonia is worsening due to hypoxia. The patient has the following signs/symptoms of pneumonia: persistent hypoxia , cough, and shortness of breath. The patient does have a current oxygen requirement and the patient does not have a home oxygen requirement. I have reviewed the pertinent imaging. The following cultures have been collected: Blood cultures The culture results are listed below.     Current antimicrobial regimen consists of the antibiotics listed below. Will monitor patient closely and continue current treatment plan unchanged.     Continue antibiotics   Supplemental O2      Antibiotics (From admission, onward)      Start     Stop Route Frequency Ordered    04/23/25 1145  piperacillin-tazobactam (ZOSYN) 4.5 g in D5W 100 mL IVPB (MB+)         -- IV Every 8 hours (non-standard times) 04/23/25 1033            Microbiology Results (last 7 days)       Procedure Component Value Units Date/Time    Culture, Respiratory with Gram Stain [[id number]]     Order Status: Sent Specimen: Respiratory     Blood culture x two cultures. Draw prior to antibiotics. [[id number]]  (Normal) Collected: 04/21/25 2123    Order Status: Completed Specimen: Blood from Peripheral, Antecubital, Left Updated: 04/23/25 0411     Blood Culture No Growth After 24 Hours    Blood culture x two cultures. Draw prior to antibiotics. [[id number]]  (Normal) Collected: 04/21/25 2117    Order Status: Completed Specimen: Blood from Peripheral, Forearm, Left Updated: 04/23/25 0304     Blood Culture No Growth After 24 Hours    Influenza A & B by Molecular [[id number]]  (Normal) Collected: 04/21/25 2114    Order Status: Completed Specimen: Nasopharyngeal Swab Updated: 04/21/25 2149     INFLUENZA A MOLECULAR Negative     INFLUENZA B MOLECULAR  Negative

## 2025-04-23 NOTE — ASSESSMENT & PLAN NOTE
- echo with normal LVEF moderate TR estimated PA pressure 75mmHg  -   - admitted with PNA; rales noted on exam this AM given Lasix oral dose this AM- may need IV Lasix

## 2025-04-23 NOTE — ASSESSMENT & PLAN NOTE
Can be secondary to high demand ischemia  Ordered echocardiogram that showed severe pulmonary hypertension with moderate mitral regurg  Cardiology consulted appreciate recommendations  No ST changes noted

## 2025-04-23 NOTE — ASSESSMENT & PLAN NOTE
Given her history of stroke ideation more than 10 years ago due to her history of breast cancer, that can be the reason for her pulmonary hypertension  Echo was done  On Aldactone and IV Lasix  Strict intake and output, less than 1500 mL per day with low-salt diet  Cardiology is on board

## 2025-04-23 NOTE — PROGRESS NOTES
[facility] - Telemetry  Cardiology  Progress Note    Patient Name: Claudia Sierra  MRN: [id number]  Admission Date: 4/21/2025  Hospital Length of Stay: 1 days  Code Status: Full Code   Attending Physician: Eris Chu MD   Primary Care Physician: Sajan Curtis MD  Expected Discharge Date: 4/25/2025  Principal Problem:Acute hypoxic respiratory failure    Subjective:     Hospital Course:   85yo female with HTN, CAP, persistent afib on Xarelto, elevated troponin, acute hypoxemic respiratory failure, breast cancer, HLP, AS, chronic DHF and CKD stage III who presented to the ER with complaints of SOB. She reports SOB and cough for the past 3 days with no improvement. She reports SOB occurs with walking longer distances and denies any chest pain, LE edema or orthopnea. She reports compliance with her medication regimen and will take Lasix on an as needed basis typically once per week. She presented to Urgent care and was COVID and Flu negative and was given oral antibiotics. She reports worsening symptoms therefore she presented to the ER. Labs CBC with WBCs 7K BMP with K+ 3.0 BUN 14 creatinine 1.0  Troponin 0.059 Blood cultures prelim NGTD Initial temp 103.3 EKG afib with . SBP 140s-170s. Started on IV Abx and admitted to [facility] and Cardiology consulted for evaluation of elevated troponin     4/23/2025 Remains in afib with HR up to 110s-120s. SBP 110s CBC WNL. BMP with K+ 3.0 Given oral Lasix this AM        Review of Systems   Constitutional: Negative for chills, decreased appetite, diaphoresis, fever, malaise/fatigue, weight gain and weight loss.   Cardiovascular:  Negative for chest pain, claudication, dyspnea on exertion, irregular heartbeat, leg swelling, near-syncope, orthopnea, palpitations and paroxysmal nocturnal dyspnea.   Respiratory:  Positive for cough. Negative for shortness of breath, snoring, sputum production and wheezing.    Endocrine: Negative for cold  intolerance, heat intolerance, polydipsia, polyphagia and polyuria.   Skin:  Negative for color change, dry skin, itching, nail changes and poor wound healing.   Musculoskeletal:  Negative for back pain, gout, joint pain and joint swelling.   Gastrointestinal:  Negative for bloating, abdominal pain, constipation, diarrhea, hematemesis, hematochezia, melena, nausea and vomiting.   Genitourinary:  Negative for dysuria, hematuria and nocturia.   Neurological:  Negative for dizziness, headaches, light-headedness, numbness, paresthesias and weakness.   Psychiatric/Behavioral:  Negative for altered mental status, depression and memory loss.      Objective:     Vital Signs (Most Recent):  Temp: 97.9 °F (36.6 °C) (04/23/25 1140)  Pulse: 104 (04/23/25 1140)  Resp: 18 (04/23/25 1140)  BP: (!) 105/53 (04/23/25 1140)  SpO2: 97 % (04/23/25 1140) Vital Signs (24h Range):  Temp:  [97.5 °F (36.4 °C)-100.8 °F (38.2 °C)] 97.9 °F (36.6 °C)  Pulse:  [] 104  Resp:  [16-20] 18  SpO2:  [93 %-100 %] 97 %  BP: (105-167)/(53-92) 105/53     Weight: 59.9 kg (132 lb)  Body mass index is 26.66 kg/m².     SpO2: 97 %         Intake/Output Summary (Last 24 hours) at 4/23/2025 1145  Last data filed at 4/23/2025 1144  Gross per 24 hour   Intake 180 ml   Output 350 ml   Net -170 ml       Lines/Drains/Airways       Peripheral Intravenous Line  Duration                  Peripheral IV - Single Lumen 04/21/25 2121 18 G Left Antecubital 1 day                       Physical Exam  Constitutional:       General: She is not in acute distress.     Appearance: She is well-developed.   Cardiovascular:      Rate and Rhythm: Tachycardia present. Rhythm irregularly irregular.      Heart sounds: No murmur heard.     No gallop.   Pulmonary:      Effort: Pulmonary effort is normal. No respiratory distress.      Breath sounds: Normal breath sounds. No wheezing.   Abdominal:      General: Bowel sounds are normal. There is no distension.      Palpations: Abdomen is  soft.      Tenderness: There is no abdominal tenderness.   Skin:     General: Skin is warm and dry.   Neurological:      Mental Status: She is alert and oriented to person, place, and time.              Significant Imaging: Echocardiogram: Transthoracic echo (TTE) complete (Cupid Only):   Results for orders placed or performed during the hospital encounter of 04/21/25   Echo   Result Value Ref Range    BSA 1.58 m2    Quigley's Biplane MOD Ejection Fraction 62 %    A2C EF 65 %    A4C EF 56 %    LVOT stroke volume 39.6 cm3    LVIDd 3.6 3.5 - 6.0 cm    LV Systolic Volume 31 mL    LV Systolic Volume Index 20.0 mL/m2    LVIDs 2.9 2.1 - 4.0 cm    LV ESV A2C 45.65 mL    LV Diastolic Volume 53 mL    LV ESV A4C 35.59 mL    LV Diastolic Volume Index 34.19 mL/m2    LV EDV A2C 47.542838383790870 mL    LV EDV A4C 49.58 mL    Left Ventricular End Systolic Volume by Teichholz Method 31.09 mL    Left Ventricular End Diastolic Volume by Teichholz Method 52.59 mL    IVS 0.7 0.6 - 1.1 cm    LVOT diameter 2.0 cm    LVOT area 3.1 cm2    FS 19.4 (A) 28 - 44 %    Left Ventricle Relative Wall Thickness 0.33 cm    PW 0.6 0.6 - 1.1 cm    LV mass 59.7 g    LV Mass Index 38.5 g/m2    MV Peak E Kevin 1.09 m/s    TDI LATERAL 0.11 m/s    TDI SEPTAL 0.09 m/s    E/E' ratio 11 m/s    TR Max Kevin 4.1 m/s    E wave deceleration time 151 msec    LV SEPTAL E/E' RATIO 12.1 m/s    LV LATERAL E/E' RATIO 9.9 m/s    LVOT peak kevin 0.6 m/s    Left Ventricular Outflow Tract Mean Velocity 0.50 cm/s    Left Ventricular Outflow Tract Mean Gradient 1.03 mmHg    RV- levi basal diam 3.6 cm    RV S' 8.29 cm/s    TAPSE 1.25 cm    RV/LV Ratio 1.00 cm    LA Vol (MOD) 40 mL    CORY (MOD) 26 mL/m2    RA area length vol 47.77 mL    RA Area 18.5 cm2    RA Vol 51.16 mL    AV mean gradient 10 mmHg    AV peak gradient 18 mmHg    Ao peak kevin 2.1 m/s    Ao VTI 35.8 cm    LVOT peak VTI 12.6 cm    AV valve area 1.1 cm²    AV Velocity Ratio 0.29     AV index (prosthetic) 0.35     EWELINA  by Velocity Ratio 0.9 cm²    MV mean gradient 2 mmHg    MV peak gradient 5 mmHg    MV stenosis pressure 1/2 time 43.93 ms    MV valve area p 1/2 method 5.01 cm2    MV valve area by continuity eq 2.05 cm2    MV VTI 19.3 cm    Triscuspid Valve Regurgitation Peak Gradient 68 mmHg    PV PEAK VELOCITY 0.86 m/s    PV peak gradient 3 mmHg    Pulmonary Valve Mean Velocity 0.60 m/s    Sinus 3.02 cm    STJ 2.74 cm    IVC diameter 1.91 cm    Mean e' 0.10 m/s    ZLVIDS 0.32     ZLVIDD -2.20     LA area A4C 15.04 cm2    LA area A2C 17.22 cm2    Mitral Valve Heart Rate 104 bpm    TV resting pulmonary artery pressure 75 mmHg    RV TB RVSP 12 mmHg    Est. RA pres 8 mmHg    Narrative      Left Ventricle: There is low normal systolic function with a visually   estimated ejection fraction of 50 - 55%. Unable to assess diastolic   function due to atrial fibrillation.    Right Ventricle: The right ventricle is normal in size. Systolic   function is mildly reduced.    Left Atrium: dilated    Right Atrium: Right atrium is mildly dilated.    Aortic Valve: There is moderate aortic valve sclerosis. Moderately   restricted motion. There is moderate stenosis. Aortic valve area by VTI is   1.1 cm². Aortic valve peak velocity is 2.1 m/s. Mean gradient is 10 mmHg.   The dimensionless index is 0.35.    Mitral Valve: There is moderate mitral annular calcification. There is   mild regurgitation.    Tricuspid Valve: There is moderate regurgitation.    Pulmonic Valve: There is mild regurgitation.    Pulmonary Artery: There is pulmonary hypertension. The estimated   pulmonary artery systolic pressure is 75 mmHg.    IVC/SVC: Intermediate venous pressure at 8 mmHg.       Assessment and Plan:     Brief HPI: Seen this morning on AM NP rounds. Discussed POC as detailed below-verbalized understanding and agrees with POC      Nonrheumatic tricuspid valve regurgitation  - echo with normal LVEF moderate TR estimated PA pressure 75mmHg  -   - admitted  with PNA; rales noted on exam this AM given Lasix oral dose this AM- may need IV Lasix     Elevated troponin  - presented with SOB and cough  - troponin 0.059; EKG with no acute STTWC; currently feel mild troponin elevation related to demand etiology in setting of fever and infectious etiology   - echo with normal LVEF and moderate TR; troponin 0.059    Atrial fibrillation with RVR  - history of persistent afib  - RVR related to fever and infectious etiology  - continue BB and Xarelto     Nonrheumatic aortic valve stenosis  - echo with normal LVEF and moderate AS with EWELINA 1.1cm2 MG 10mmHg     Longstanding persistent atrial fibrillation  - history of persistent afib followed by Dr Alexander  - rate control strategy; continue BB for rate control along with Xarelto  - monitor on telemetry    Essential hypertension  - SBP 110s  - on Metoprolol and Aldactone as an outpatient   - continue Metoprolol; monitor BP and resume Aldactone if BP remains above goal         VTE Risk Mitigation (From admission, onward)           Ordered     rivaroxaban tablet 15 mg  Daily         04/22/25 0749     IP VTE HIGH RISK PATIENT  Once         04/22/25 0133     Place sequential compression device  Until discontinued         04/22/25 0133                    KUNAL Ying, ANP  Cardiology  [facility] - Telemetry

## 2025-04-24 LAB
ABSOLUTE EOSINOPHIL (OHS): 0 K/UL
ABSOLUTE MONOCYTE (OHS): 0.9 K/UL (ref 0.3–1)
ABSOLUTE NEUTROPHIL COUNT (OHS): 7.12 K/UL (ref 1.8–7.7)
ALBUMIN SERPL BCP-MCNC: 3 G/DL (ref 3.5–5.2)
ALP SERPL-CCNC: 50 UNIT/L (ref 40–150)
ALT SERPL W/O P-5'-P-CCNC: 23 UNIT/L (ref 10–44)
ANION GAP (OHS): 11 MMOL/L (ref 8–16)
AST SERPL-CCNC: 55 UNIT/L (ref 11–45)
BASOPHILS # BLD AUTO: 0.01 K/UL
BASOPHILS NFR BLD AUTO: 0.1 %
BILIRUB SERPL-MCNC: 0.8 MG/DL (ref 0.1–1)
BUN SERPL-MCNC: 27 MG/DL (ref 8–23)
CALCIUM SERPL-MCNC: 8.9 MG/DL (ref 8.7–10.5)
CHLORIDE SERPL-SCNC: 93 MMOL/L (ref 95–110)
CO2 SERPL-SCNC: 25 MMOL/L (ref 23–29)
CREAT SERPL-MCNC: 1 MG/DL (ref 0.5–1.4)
ERYTHROCYTE [DISTWIDTH] IN BLOOD BY AUTOMATED COUNT: 15.1 % (ref 11.5–14.5)
GFR SERPLBLD CREATININE-BSD FMLA CKD-EPI: 55 ML/MIN/1.73/M2
GLUCOSE SERPL-MCNC: 143 MG/DL (ref 70–110)
HCT VFR BLD AUTO: 35 % (ref 37–48.5)
HGB BLD-MCNC: 11.8 GM/DL (ref 12–16)
IMM GRANULOCYTES # BLD AUTO: 0.05 K/UL (ref 0–0.04)
IMM GRANULOCYTES NFR BLD AUTO: 0.5 % (ref 0–0.5)
LYMPHOCYTES # BLD AUTO: 1.24 K/UL (ref 1–4.8)
MCH RBC QN AUTO: 31.6 PG (ref 27–31)
MCHC RBC AUTO-ENTMCNC: 33.7 G/DL (ref 32–36)
MCV RBC AUTO: 94 FL (ref 82–98)
MRSA PCR SCRN (OHS): NOT DETECTED
NUCLEATED RBC (/100WBC) (OHS): 0 /100 WBC
PLATELET # BLD AUTO: 182 K/UL (ref 150–450)
PMV BLD AUTO: 10.1 FL (ref 9.2–12.9)
POCT GLUCOSE: 133 MG/DL (ref 70–110)
POCT GLUCOSE: 163 MG/DL (ref 70–110)
POCT GLUCOSE: 244 MG/DL (ref 70–110)
POTASSIUM SERPL-SCNC: 3.8 MMOL/L (ref 3.5–5.1)
PROT SERPL-MCNC: 6.7 GM/DL (ref 6–8.4)
RBC # BLD AUTO: 3.74 M/UL (ref 4–5.4)
RELATIVE EOSINOPHIL (OHS): 0 %
RELATIVE LYMPHOCYTE (OHS): 13.3 % (ref 18–48)
RELATIVE MONOCYTE (OHS): 9.7 % (ref 4–15)
RELATIVE NEUTROPHIL (OHS): 76.4 % (ref 38–73)
SODIUM SERPL-SCNC: 129 MMOL/L (ref 136–145)
WBC # BLD AUTO: 9.32 K/UL (ref 3.9–12.7)

## 2025-04-24 PROCEDURE — 99233 SBSQ HOSP IP/OBS HIGH 50: CPT | Mod: ,,, | Performed by: NURSE PRACTITIONER

## 2025-04-24 PROCEDURE — 25000242 PHARM REV CODE 250 ALT 637 W/ HCPCS

## 2025-04-24 PROCEDURE — 94640 AIRWAY INHALATION TREATMENT: CPT

## 2025-04-24 PROCEDURE — 80053 COMPREHEN METABOLIC PANEL: CPT | Performed by: REGISTERED NURSE

## 2025-04-24 PROCEDURE — 97530 THERAPEUTIC ACTIVITIES: CPT

## 2025-04-24 PROCEDURE — 94761 N-INVAS EAR/PLS OXIMETRY MLT: CPT

## 2025-04-24 PROCEDURE — 97162 PT EVAL MOD COMPLEX 30 MIN: CPT

## 2025-04-24 PROCEDURE — 11000001 HC ACUTE MED/SURG PRIVATE ROOM

## 2025-04-24 PROCEDURE — 99900035 HC TECH TIME PER 15 MIN (STAT)

## 2025-04-24 PROCEDURE — 63600175 PHARM REV CODE 636 W HCPCS

## 2025-04-24 PROCEDURE — 25000003 PHARM REV CODE 250: Performed by: FAMILY MEDICINE

## 2025-04-24 PROCEDURE — 97116 GAIT TRAINING THERAPY: CPT

## 2025-04-24 PROCEDURE — 36415 COLL VENOUS BLD VENIPUNCTURE: CPT | Performed by: REGISTERED NURSE

## 2025-04-24 PROCEDURE — 85025 COMPLETE CBC W/AUTO DIFF WBC: CPT | Performed by: REGISTERED NURSE

## 2025-04-24 PROCEDURE — 97535 SELF CARE MNGMENT TRAINING: CPT

## 2025-04-24 PROCEDURE — 25000003 PHARM REV CODE 250

## 2025-04-24 PROCEDURE — 97165 OT EVAL LOW COMPLEX 30 MIN: CPT

## 2025-04-24 PROCEDURE — 27000221 HC OXYGEN, UP TO 24 HOURS

## 2025-04-24 PROCEDURE — 25000003 PHARM REV CODE 250: Performed by: REGISTERED NURSE

## 2025-04-24 RX ADMIN — IPRATROPIUM BROMIDE AND ALBUTEROL SULFATE 3 ML: 2.5; .5 SOLUTION RESPIRATORY (INHALATION) at 12:04

## 2025-04-24 RX ADMIN — PRAVASTATIN SODIUM 40 MG: 40 TABLET ORAL at 11:04

## 2025-04-24 RX ADMIN — GUAIFENESIN 600 MG: 600 TABLET, EXTENDED RELEASE ORAL at 08:04

## 2025-04-24 RX ADMIN — GABAPENTIN 600 MG: 300 CAPSULE ORAL at 11:04

## 2025-04-24 RX ADMIN — PIPERACILLIN AND TAZOBACTAM 4.5 G: 4; .5 INJECTION, POWDER, LYOPHILIZED, FOR SOLUTION INTRAVENOUS; PARENTERAL at 11:04

## 2025-04-24 RX ADMIN — IPRATROPIUM BROMIDE AND ALBUTEROL SULFATE 3 ML: 2.5; .5 SOLUTION RESPIRATORY (INHALATION) at 08:04

## 2025-04-24 RX ADMIN — FUROSEMIDE 40 MG: 10 INJECTION, SOLUTION INTRAVENOUS at 11:04

## 2025-04-24 RX ADMIN — GABAPENTIN 600 MG: 300 CAPSULE ORAL at 08:04

## 2025-04-24 RX ADMIN — EMPAGLIFLOZIN 10 MG: 10 TABLET, FILM COATED ORAL at 11:04

## 2025-04-24 RX ADMIN — TRAZODONE HYDROCHLORIDE 25 MG: 50 TABLET ORAL at 10:04

## 2025-04-24 RX ADMIN — MONTELUKAST 10 MG: 10 TABLET, FILM COATED ORAL at 11:04

## 2025-04-24 RX ADMIN — METOPROLOL SUCCINATE 50 MG: 50 TABLET, EXTENDED RELEASE ORAL at 11:04

## 2025-04-24 RX ADMIN — IPRATROPIUM BROMIDE AND ALBUTEROL SULFATE 3 ML: 2.5; .5 SOLUTION RESPIRATORY (INHALATION) at 04:04

## 2025-04-24 RX ADMIN — IPRATROPIUM BROMIDE AND ALBUTEROL SULFATE 3 ML: 2.5; .5 SOLUTION RESPIRATORY (INHALATION) at 07:04

## 2025-04-24 RX ADMIN — SPIRONOLACTONE 25 MG: 25 TABLET, FILM COATED ORAL at 11:04

## 2025-04-24 RX ADMIN — IPRATROPIUM BROMIDE AND ALBUTEROL SULFATE 3 ML: 2.5; .5 SOLUTION RESPIRATORY (INHALATION) at 03:04

## 2025-04-24 RX ADMIN — CETIRIZINE HYDROCHLORIDE 5 MG: 5 TABLET, FILM COATED ORAL at 08:04

## 2025-04-24 RX ADMIN — DEXAMETHASONE SODIUM PHOSPHATE 4 MG: 4 INJECTION, SOLUTION INTRA-ARTICULAR; INTRALESIONAL; INTRAMUSCULAR; INTRAVENOUS; SOFT TISSUE at 11:04

## 2025-04-24 RX ADMIN — RIVAROXABAN 15 MG: 15 TABLET, FILM COATED ORAL at 05:04

## 2025-04-24 RX ADMIN — IPRATROPIUM BROMIDE AND ALBUTEROL SULFATE 3 ML: 2.5; .5 SOLUTION RESPIRATORY (INHALATION) at 11:04

## 2025-04-24 RX ADMIN — GUAIFENESIN 600 MG: 600 TABLET, EXTENDED RELEASE ORAL at 11:04

## 2025-04-24 RX ADMIN — PIPERACILLIN AND TAZOBACTAM 4.5 G: 4; .5 INJECTION, POWDER, LYOPHILIZED, FOR SOLUTION INTRAVENOUS; PARENTERAL at 08:04

## 2025-04-24 RX ADMIN — BUDESONIDE 0.5 MG: 0.5 INHALANT RESPIRATORY (INHALATION) at 07:04

## 2025-04-24 RX ADMIN — PIPERACILLIN AND TAZOBACTAM 4.5 G: 4; .5 INJECTION, POWDER, LYOPHILIZED, FOR SOLUTION INTRAVENOUS; PARENTERAL at 03:04

## 2025-04-24 RX ADMIN — BUDESONIDE 0.5 MG: 0.5 INHALANT RESPIRATORY (INHALATION) at 08:04

## 2025-04-24 RX ADMIN — INSULIN ASPART 2 UNITS: 100 INJECTION, SOLUTION INTRAVENOUS; SUBCUTANEOUS at 05:04

## 2025-04-24 NOTE — ASSESSMENT & PLAN NOTE
- echo with normal LVEF moderate TR estimated PA pressure 75mmHg  -   - admitted with PNA; rales noted on exam this AM on IV Lasix

## 2025-04-24 NOTE — ASSESSMENT & PLAN NOTE
Patient has long standing persistent (>12 months) atrial fibrillation. Patient is currently in atrial fibrillation. [id number] Score: 3. The patients heart rate in the last 24 hours is as follows:  Pulse  Min: 75  Max: 113     Antiarrhythmics  metoprolol succinate (TOPROL-XL) 24 hr tablet 50 mg, Daily, Oral  metoprolol injection 5 mg, Every 6 hours PRN, Intravenous    Anticoagulants  rivaroxaban tablet 15 mg, Daily, Oral    Plan  - Replete lytes with a goal of K>4, Mg >2  - Patient is anticoagulated, see medications listed above.  - Patient's afib is currently controlled  - to be monitored closely  -cardiology is consulted

## 2025-04-24 NOTE — SUBJECTIVE & OBJECTIVE
Interval History:  Patient was seen in the morning continued to be wheezing with acute distress, on 2 L nasal cannula echo was done showed picture of pulmonary hypertension.  Patient to continue on diuresis urology is on board, on 04/23 patient was switched into broad-spectrum antibiotics to cover her pneumonia.  With the scheduled breathing treatments q.4 hours      Review of Systems   Constitutional:  Positive for activity change, appetite change and fatigue.   HENT: Negative.     Respiratory:  Positive for cough, shortness of breath and wheezing.    Cardiovascular:  Negative for chest pain, palpitations and leg swelling.   Gastrointestinal: Negative.    Genitourinary: Negative.    Musculoskeletal: Negative.    Neurological: Negative.    Psychiatric/Behavioral:  Negative for agitation. The patient is nervous/anxious.      Objective:     Vital Signs (Most Recent):  Temp: 97.4 °F (36.3 °C) (04/24/25 1124)  Pulse: 90 (04/24/25 1215)  Resp: 16 (04/24/25 1215)  BP: 108/66 (04/24/25 1124)  SpO2: 96 % (04/24/25 1215) Vital Signs (24h Range):  Temp:  [97.4 °F (36.3 °C)-98.3 °F (36.8 °C)] 97.4 °F (36.3 °C)  Pulse:  [] 90  Resp:  [16-20] 16  SpO2:  [96 %-100 %] 96 %  BP: ()/(52-72) 108/66     Weight: 59.9 kg (132 lb)  Body mass index is 26.66 kg/m².    Intake/Output Summary (Last 24 hours) at 4/24/2025 1450  Last data filed at 4/24/2025 1257  Gross per 24 hour   Intake 370 ml   Output 1600 ml   Net -1230 ml         Physical Exam  Constitutional:       General: She is in acute distress.   HENT:      Head: Normocephalic and atraumatic.      Nose: Nose normal.      Mouth/Throat:      Mouth: Mucous membranes are moist.   Eyes:      Extraocular Movements: Extraocular movements intact.      Pupils: Pupils are equal, round, and reactive to light.   Cardiovascular:      Rate and Rhythm: Normal rate. Rhythm irregular.      Pulses: Normal pulses.      Heart sounds: Normal heart sounds.   Pulmonary:      Effort:  Respiratory distress present.      Breath sounds: Wheezing present.      Comments: On 2 L nasal cannula  Abdominal:      Palpations: Abdomen is soft.   Musculoskeletal:      Right lower leg: No edema.      Left lower leg: No edema.   Skin:     Capillary Refill: Capillary refill takes 2 to 3 seconds.      Coloration: Skin is pale.   Neurological:      Mental Status: She is alert and oriented to person, place, and time.   Psychiatric:         Mood and Affect: Mood normal.               Significant Labs: All pertinent labs within the past 24 hours have been reviewed.  Recent Lab Results  (Last 5 results in the past 24 hours)        04/24/25  1121   04/24/25  0651   04/24/25  0621   04/23/25  2352   04/23/25  1646        Albumin   3.0             ALP   50             ALT   23             Anion Gap   11             AST   55             Baso #   0.01             Basophil %   0.1             BILIRUBIN TOTAL   0.8  Comment: For infants and newborns, interpretation of results should be based   on gestational age, weight and in agreement with clinical   observations.    Premature Infant recommended reference ranges:   0-24 hours:  <8.0 mg/dL   24-48 hours: <12.0 mg/dL   3-5 days:    <15.0 mg/dL   6-29 days:   <15.0 mg/dL             BUN   27             Calcium   8.9             Chloride   93             CO2   25             Creatinine   1.0             eGFR   55  Comment: Estimated GFR calculated using the CKD-EPI creatinine (2021) equation.             Eos #   0.00             Eos %   0.0             Glucose   143             GRAM STAIN         No WBCs  [P]                No organisms seen  [P]       Gran # (ANC)   7.12             Hematocrit   35.0             Hemoglobin   11.8             Immature Grans (Abs)   0.05  Comment: Mild elevation in immature granulocytes is non specific and can be seen in a variety of conditions including stress response, acute inflammation, trauma and pregnancy. Correlation with other  laboratory and clinical findings is essential.             Immature Granulocytes   0.5             Lymph #   1.24             Lymph %   13.3             MCH   31.6             MCHC   33.7             MCV   94             Mono #   0.90             Mono %   9.7             MPV   10.1             MRSA SCREEN BY PCR               Neut %   76.4             nRBC   0             Platelet Count   182             POCT Glucose 133     163   175         Potassium   3.8             PROTEIN TOTAL   6.7             RBC   3.74             RDW   15.1             Sodium   129             WBC   9.32                                     [P] - Preliminary Result               Significant Imaging: I have reviewed all pertinent imaging results/findings within the past 24 hours.    Imaging Results              X-Ray Chest AP Portable (Final result)  Result time 04/21/25 21:58:30      Final result by Oren Dunn DO (04/21/25 21:58:30)                   Impression:      No acute abnormality.      Electronically signed by: Oren Dunn  Date:    04/21/2025  Time:    21:58               Narrative:    EXAMINATION:  XR CHEST AP PORTABLE    CLINICAL HISTORY:  Sepsis;    TECHNIQUE:  Single frontal view of the chest was performed.    COMPARISON:  10/21/2024.    FINDINGS:  Coarse interstitial thickening again noted, stable.  No new focal consolidation.  The pleural spaces are clear.  The cardiac silhouette is borderline enlarged.  There are calcifications of the aortic arch.  Osseous structures demonstrate degenerative changes and osteopenia.  There are surgical clips.                                  Echo  Result Date: 4/23/2025    Left Ventricle: There is low normal systolic function with a visually   estimated ejection fraction of 50 - 55%. Unable to assess diastolic   function due to atrial fibrillation.    Right Ventricle: The right ventricle is normal in size. Systolic   function is mildly reduced.    Left Atrium: dilated    Right  Atrium: Right atrium is mildly dilated.    Aortic Valve: There is moderate aortic valve sclerosis. Moderately   restricted motion. There is moderate stenosis. Aortic valve area by VTI is   1.1 cm². Aortic valve peak velocity is 2.1 m/s. Mean gradient is 10 mmHg.   The dimensionless index is 0.35.    Mitral Valve: There is moderate mitral annular calcification. There is   mild regurgitation.    Tricuspid Valve: There is moderate regurgitation.    Pulmonic Valve: There is mild regurgitation.    Pulmonary Artery: There is pulmonary hypertension. The estimated   pulmonary artery systolic pressure is 75 mmHg.    IVC/SVC: Intermediate venous pressure at 8 mmHg.        Echo  Result Date: 10/10/2024    Left Ventricle: The left ventricle is normal in size. Normal wall   thickness. There is normal systolic function with a visually estimated   ejection fraction of 55 - 60%. Unable to assess diastolic function due to   atrial fibrillation.    Right Ventricle: Normal right ventricular cavity size. Wall thickness   is normal. Systolic function is normal.    Left Atrium: Left atrium is severely dilated.    Right Atrium: Right atrium is severely dilated.    Aortic Valve: The aortic valve is a trileaflet valve. There is moderate   aortic valve sclerosis. Mildly restricted motion. There is mild stenosis.   Aortic valve area by VTI is 2.6 cm2. Aortic valve peak velocity is 1.8   m/s. Mean gradient is 7.3 mmHg. The dimensionless index is 0.92.    Mitral Valve: There is mild regurgitation.    Tricuspid Valve: There is severe regurgitation.    Pulmonary Artery: The estimated pulmonary artery systolic pressure is   64 mmHg.    IVC/SVC: Elevated venous pressure at 15 mmHg.        Echo  Result Date: 4/25/2024    Left Ventricle: The left ventricle is normal in size. There is   concentric remodeling. There is normal systolic function with a visually   estimated ejection fraction of 55 - 60%. Biplane (2D) method of discs   ejection fraction is  59%. Unable to assess diastolic function due to   atrial fibrillation.    Right Ventricle: Normal right ventricular cavity size. Systolic   function is reduced.TAPSE is 1.05 cm.    Left Atrium: Left atrium is mildly dilated. The left atrium volume   index is 36.1 mL/m2.    Right Atrium: Right atrium is mildly dilated.    Aortic Valve: There is moderate aortic valve sclerosis. Mildly   restricted motion. There is mild to moderate stenosis. Aortic valve area   by VTI is 0.99 cm². Aortic valve area by velocity is 1.02 cm². Aortic   valve peak velocity is 1.66 m/s. Mean gradient is 7 mmHg. The   dimensionless index is 0.33.    Tricuspid Valve: There is moderate regurgitation.    Pulmonic Valve: There is mild regurgitation.    Pulmonary Artery: The estimated pulmonary artery systolic pressure is   53 mmHg.    IVC/SVC: Intermediate venous pressure at 8 mmHg.      Echo  Result Date: 4/23/2025    Left Ventricle: There is low normal systolic function with a visually   estimated ejection fraction of 50 - 55%. Unable to assess diastolic   function due to atrial fibrillation.    Right Ventricle: The right ventricle is normal in size. Systolic   function is mildly reduced.    Left Atrium: dilated    Right Atrium: Right atrium is mildly dilated.    Aortic Valve: There is moderate aortic valve sclerosis. Moderately   restricted motion. There is moderate stenosis. Aortic valve area by VTI is   1.1 cm². Aortic valve peak velocity is 2.1 m/s. Mean gradient is 10 mmHg.   The dimensionless index is 0.35.    Mitral Valve: There is moderate mitral annular calcification. There is   mild regurgitation.    Tricuspid Valve: There is moderate regurgitation.    Pulmonic Valve: There is mild regurgitation.    Pulmonary Artery: There is pulmonary hypertension. The estimated   pulmonary artery systolic pressure is 75 mmHg.    IVC/SVC: Intermediate venous pressure at 8 mmHg.

## 2025-04-24 NOTE — PLAN OF CARE
OT evaluation completed, coeval with PT due to anticipated low activity tolerance. Patient with PLOF of resides alone, occasional supports of neighbor/nephew (son resides in [place]), not on home O2, no recent falls, occasionally sleeps in lift recliner chair, eats out a lot and keeps leftovers/does very light meal prep, overall modified independent/independent in ADLs, mobility with no devices, and drives. On evaluation this date patient fearful of falling and with mild complaints of shortness of breath, CGA for functional mobility with rolling walker, able to stand for ~ 5 minutes, requiring minimal assist, increased time for LB ADLs. On supplemental O2, heartrate elevated on continuous pulse oximeter (although occasional artifact reads noted). Will continue to follow. At this time recommend moderate intensity therapy, resides alone (will progress as able).    Problem: Occupational Therapy  Goal: Occupational Therapy Goal  Description: Goals to be met by: 5/22/2025     Patient will increase functional independence with ADLs by performing:    LE Dressing with Stand-by Assistance.  Toileting from bedside commode with Stand-by Assistance for hygiene and clothing management.   Toilet transfer to bedside commode with Stand-by Assistance.  Functional mobility to / from bathroom with least restrictive device, WFL SpO2, and supervision.  100% reciprocation of at least 3 techniques to mitigate fall risk to maximize safety to return to least restrictive environment    Outcome: Progressing

## 2025-04-24 NOTE — PLAN OF CARE
Problem: Physical Therapy  Goal: Physical Therapy Goal  Description: Goals to be met by: 25     Patient will increase functional independence with mobility by performin. Supine to sit with Modified [place]  2. Sit to supine with Modified [place]  3. Sit to stand transfer with Modified [place] with use of LRAD.   4. Bed to chair transfer with Modified [place] using LRAD.   5. Gait  x 150 feet with Modified [place] using LRAD.     Outcome: Progressing    PT/OT co-evaluation completed due to anticipated complexity of pt's presentation. Pt's PLOF: Independent with no AD. Pt at this time MIN A/CGA with use of RW; limited functional endurance. PT recommending moderate intensity therapy. Therapy will continue to progress pt as able.

## 2025-04-24 NOTE — PLAN OF CARE
Patient on oxygen with documented flow.  Will attempt to wean per O2 order protocol. Patient given aerosol treatment with no adverse reactions noted. Patient instructed on proper use.

## 2025-04-24 NOTE — ASSESSMENT & PLAN NOTE
- presented with SOB and cough  - troponin 0.059; EKG with no acute STTWC;  mild troponin elevation related to demand etiology in setting of fever and infectious etiology   - echo with normal LVEF and moderate TR

## 2025-04-24 NOTE — PLAN OF CARE
SW met with pt at bedside during rounding with MD. No dc today. At time of dc pts family will provide transport home. SW will continue to follow pt throughout her transitions of care and assist with any dc needs. PT/OT consulted.     Future Appointments   Date Time Provider Department Center   5/2/2025 11:00 AM Mikayla Crane MD [facility] IMPRI [facility]   8/6/2025 10:00 AM LAB, [facility]   8/6/2025 10:15 AM CV [facility] ECHO [facility] CARDIA [facility]   8/27/2025  1:20 PM Keith Alexander Jr., MD [facility] CARDIO [facility]   8/29/2025  2:30 PM Sajan Curtis MD [facility] PRICRE [facility]   2/6/2026  1:00 PM [facility] MAMMO1 KN MAMMO [facility]   2/13/2026 11:40 AM Mingo Wynne MD [facility] HEM ONC [facility]        04/24/25 0858   Rounds   Attendance Provider;   Discharge Plan A Home with family;Home Health   Why the patient remains in the hospital Requires continued medical care   Transition of Care Barriers None

## 2025-04-24 NOTE — SUBJECTIVE & OBJECTIVE
ROS  Objective:     Vital Signs (Most Recent):  Temp: 97.4 °F (36.3 °C) (04/24/25 1124)  Pulse: 90 (04/24/25 1215)  Resp: 16 (04/24/25 1215)  BP: 108/66 (04/24/25 1124)  SpO2: 96 % (04/24/25 1215) Vital Signs (24h Range):  Temp:  [97.4 °F (36.3 °C)-98.3 °F (36.8 °C)] 97.4 °F (36.3 °C)  Pulse:  [] 90  Resp:  [16-20] 16  SpO2:  [96 %-100 %] 96 %  BP: ()/(52-72) 108/66     Weight: 59.9 kg (132 lb)  Body mass index is 26.66 kg/m².     SpO2: 96 %         Intake/Output Summary (Last 24 hours) at 4/24/2025 1456  Last data filed at 4/24/2025 1257  Gross per 24 hour   Intake 370 ml   Output 1600 ml   Net -1230 ml       Lines/Drains/Airways       Drain  Duration             Female External Urinary Catheter w/ Suction 04/23/25 1600 <1 day              Peripheral Intravenous Line  Duration                  Peripheral IV - Single Lumen 04/21/25 2121 18 G Left Antecubital 2 days                       Physical Exam  Constitutional:       General: She is not in acute distress.     Appearance: She is well-developed.   Cardiovascular:      Rate and Rhythm: Normal rate. Rhythm irregularly irregular.      Heart sounds: No murmur heard.     No gallop.   Pulmonary:      Effort: Pulmonary effort is normal. No respiratory distress.      Breath sounds: Normal breath sounds. No wheezing.   Abdominal:      General: Bowel sounds are normal. There is no distension.      Palpations: Abdomen is soft.      Tenderness: There is no abdominal tenderness.   Skin:     General: Skin is warm and dry.   Neurological:      Mental Status: She is alert and oriented to person, place, and time.                Significant Imaging: Echocardiogram: Transthoracic echo (TTE) complete (Cupid Only):   Results for orders placed or performed during the hospital encounter of 04/21/25   Echo   Result Value Ref Range    BSA 1.58 m2    Quigley's Biplane MOD Ejection Fraction 62 %    A2C EF 65 %    A4C EF 56 %    LVOT stroke volume 39.6 cm3    LVIDd 3.6 3.5  - 6.0 cm    LV Systolic Volume 31 mL    LV Systolic Volume Index 20.0 mL/m2    LVIDs 2.9 2.1 - 4.0 cm    LV ESV A2C 45.65 mL    LV Diastolic Volume 53 mL    LV ESV A4C 35.59 mL    LV Diastolic Volume Index 34.19 mL/m2    LV EDV A2C 47.222028229677780 mL    LV EDV A4C 49.58 mL    Left Ventricular End Systolic Volume by Teichholz Method 31.09 mL    Left Ventricular End Diastolic Volume by Teichholz Method 52.59 mL    IVS 0.7 0.6 - 1.1 cm    LVOT diameter 2.0 cm    LVOT area 3.1 cm2    FS 19.4 (A) 28 - 44 %    Left Ventricle Relative Wall Thickness 0.33 cm    PW 0.6 0.6 - 1.1 cm    LV mass 59.7 g    LV Mass Index 38.5 g/m2    MV Peak E Kevin 1.09 m/s    TDI LATERAL 0.11 m/s    TDI SEPTAL 0.09 m/s    E/E' ratio 11 m/s    TR Max Kevin 4.1 m/s    E wave deceleration time 151 msec    LV SEPTAL E/E' RATIO 12.1 m/s    LV LATERAL E/E' RATIO 9.9 m/s    LVOT peak kevin 0.6 m/s    Left Ventricular Outflow Tract Mean Velocity 0.50 cm/s    Left Ventricular Outflow Tract Mean Gradient 1.03 mmHg    RV- levi basal diam 3.6 cm    RV S' 8.29 cm/s    TAPSE 1.25 cm    RV/LV Ratio 1.00 cm    LA Vol (MOD) 40 mL    CORY (MOD) 26 mL/m2    RA area length vol 47.77 mL    RA Area 18.5 cm2    RA Vol 51.16 mL    AV mean gradient 10 mmHg    AV peak gradient 18 mmHg    Ao peak kevin 2.1 m/s    Ao VTI 35.8 cm    LVOT peak VTI 12.6 cm    AV valve area 1.1 cm²    AV Velocity Ratio 0.29     AV index (prosthetic) 0.35     EWELINA by Velocity Ratio 0.9 cm²    MV mean gradient 2 mmHg    MV peak gradient 5 mmHg    MV stenosis pressure 1/2 time 43.93 ms    MV valve area p 1/2 method 5.01 cm2    MV valve area by continuity eq 2.05 cm2    MV VTI 19.3 cm    Triscuspid Valve Regurgitation Peak Gradient 68 mmHg    PV PEAK VELOCITY 0.86 m/s    PV peak gradient 3 mmHg    Pulmonary Valve Mean Velocity 0.60 m/s    Sinus 3.02 cm    STJ 2.74 cm    IVC diameter 1.91 cm    Mean e' 0.10 m/s    ZLVIDS 0.32     ZLVIDD -2.20     LA area A4C 15.04 cm2    LA area A2C 17.22 cm2    Mitral  Valve Heart Rate 104 bpm    TV resting pulmonary artery pressure 75 mmHg    RV TB RVSP 12 mmHg    Est. RA pres 8 mmHg    Narrative      Left Ventricle: There is low normal systolic function with a visually   estimated ejection fraction of 50 - 55%. Unable to assess diastolic   function due to atrial fibrillation.    Right Ventricle: The right ventricle is normal in size. Systolic   function is mildly reduced.    Left Atrium: dilated    Right Atrium: Right atrium is mildly dilated.    Aortic Valve: There is moderate aortic valve sclerosis. Moderately   restricted motion. There is moderate stenosis. Aortic valve area by VTI is   1.1 cm². Aortic valve peak velocity is 2.1 m/s. Mean gradient is 10 mmHg.   The dimensionless index is 0.35.    Mitral Valve: There is moderate mitral annular calcification. There is   mild regurgitation.    Tricuspid Valve: There is moderate regurgitation.    Pulmonic Valve: There is mild regurgitation.    Pulmonary Artery: There is pulmonary hypertension. The estimated   pulmonary artery systolic pressure is 75 mmHg.    IVC/SVC: Intermediate venous pressure at 8 mmHg.

## 2025-04-24 NOTE — ASSESSMENT & PLAN NOTE
Patient has persistent (7 days or more) atrial fibrillation. Patient is currently in atrial fibrillation. HASYG8HPCc Score: 3. The patients heart rate in the last 24 hours is as follows:  Pulse  Min: 75  Max: 113     Antiarrhythmics  metoprolol succinate (TOPROL-XL) 24 hr tablet 50 mg, Daily, Oral  metoprolol injection 5 mg, Every 6 hours PRN, Intravenous    Anticoagulants  rivaroxaban tablet 15 mg, Daily, Oral    Plan  - Replete lytes with a goal of K>4, Mg >2  - Patient is anticoagulated, see medications listed above.  - Patient's afib is currently controlled

## 2025-04-24 NOTE — ASSESSMENT & PLAN NOTE
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 The patient's most recent sodium results are listed below.  Recent Labs     04/22/25  0525 04/23/25  0541 04/24/25  0651   * 128* 129*     Plan  - Correct the sodium by 4-6mEq in 24 hours.    -hyponatremia secondary to SIADH, currently patient on fluid restriction less than 1500 mL per day  - Will treat the hyponatremia with to be monitored since it is a chronic  - Monitor sodium Daily.   - Patient hyponatremia is monitored  Improving

## 2025-04-24 NOTE — ASSESSMENT & PLAN NOTE
Patient's blood pressure range in the last 24 hours was: BP  Min: 91/61  Max: 122/66.The patient's inpatient anti-hypertensive regimen is listed below:  Current Antihypertensives  metoprolol succinate (TOPROL-XL) 24 hr tablet 50 mg, Daily, Oral  metoprolol injection 5 mg, Every 6 hours PRN, Intravenous  spironolactone tablet 25 mg, Daily, Oral  furosemide injection 40 mg, Daily, Intravenous    Plan  - BP is controlled, no changes needed to their regimen  -

## 2025-04-24 NOTE — PT/OT/SLP EVAL
Occupational Therapy   Evaluation and treatment    Name: Claudia Sierra  MRN: [id number]  Admitting Diagnosis: Acute hypoxic respiratory failure  Recent Surgery: * No surgery found *      Recommendations:     Discharge Recommendations: Moderate Intensity Therapy  Discharge Equipment Recommendations:   (tbd)  Barriers to discharge:  Other (Comment) (resides alone; increased assist)    Assessment:     Claudia Sierra is a 84 y.o. female with a medical diagnosis of Acute hypoxic respiratory failure.  She presents with ..The primary encounter diagnosis was Elevated troponin. Diagnoses of Screening for cardiovascular condition, Fever, Chest pain, Hypoxia, Congestive heart failure, unspecified HF chronicity, unspecified heart failure type, Elevated brain natriuretic peptide (BNP) level, and Community acquired pneumonia, unspecified laterality were also pertinent to this visit.  . Performance deficits affecting function: weakness, impaired self care skills, impaired functional mobility, gait instability, impaired endurance, impaired cardiopulmonary response to activity.      OT evaluation completed, coeval with PT due to anticipated low activity tolerance.On evaluation this date patient fearful of falling and with mild complaints of shortness of breath, CGA for functional mobility with rolling walker, able to stand for ~ 5 minutes, requiring minimal assist, increased time for LB ADLs. On supplemental O2, heartrate elevated on continuous pulse oximeter (although occasional artifact reads noted). Will continue to follow. At this time recommend moderate intensity therapy, resides alone (will progress as able).        Rehab Prognosis: Good; patient would benefit from acute skilled OT services to address these deficits and reach maximum level of function.       Plan:     Patient to be seen 4 x/week to address the above listed problems via self-care/home management, therapeutic activities, therapeutic exercises  Plan of  Care Expires: 05/22/25  Plan of Care Reviewed with: patient    Subjective     Chief Complaint: fatigue/apprehension, states mild SOB, been laying in bed  Patient/Family Comments/goals: wants to return to independent lifestyle    Occupational Profile:  Living Environment: resides alone, single story home, threshold, walk in shower  Previous level of function:  Patient with PLOF of resides alone, not on home O2, no recent falls, occasionally sleeps in lift recliner chair, eats out a lot and keeps leftovers/does very light meal prep, overall modified independent/independent in ADLs, mobility with no devices, and drives.   Equipment Used at Home: walker, rolling, bedside commode (lift recliner chair, built in shower seat; *was not using any device prior)  Assistance upon Discharge: tbd; occasional supports of neighbor/nephew (son resides in [place])    Pain/Comfort:  Pain Rating 1: 0/10  Pain Addressed 1: Reposition  Pain Rating Post-Intervention 1: 0/10      Objective:     Communicated with: nursing prior to session.  Patient found HOB elevated with bed alarm, telemetry, pulse ox (continuous), PureWick, oxygen upon OT entry to room.    General Precautions: Standard, fall  Orthopedic Precautions: N/A  Braces: N/A  Respiratory Status: Nasal cannula, flow 2 L/min ; On supplemental O2 (WFL SpO2), heartrate elevated on continuous pulse oximeter (although occasional artifact reads noted)     Occupational Performance:    Bed Mobility:    Patient completed Supine to Sit with minimal assist, increased time  Patient completed Sit to Supine with minimal assist    Functional Mobility/Transfers:  Patient completed Sit <> Stand Transfer with rolling walker  with  CGA, increased time   Functional Mobility: in room functional mobility short distance rolling walker/CGA; Standing at sink with intermittent countertop support x ~ 5 minutes tolerance with CGA/SBA    Activities of Daily Living:  Grooming: supervision; standing at sink  Upper  Body Dressing: minimal assist (management of tele leads/ pulse monitor)  Lower Body Dressing: minimal assist;seated cross leg approach/ CGA balance manage over hips  Toileting: urine incontinence/ dirty purwick removed/ clean replaced; dependence (anticipate x1 assist to bsc)    Cognitive/Visual Perceptual:  Cognitive/Psychosocial Skills:     -       Oriented to: Person, Place, Time, and Situation   -       Follows Commands/attention:follows commands, needs reassurance  -       Safety awareness/insight to disability: intact   Visual/Perceptual: hx cataract removal per report; functional acuity for needs    Physical Exam:  Dominant hand:    -       right  Upper Extremity Range of Motion:     -       Right Upper Extremity: WFL  -       Left Upper Extremity: WFL  Upper Extremity Strength:    -       Right Upper Extremity: WFL  -       Left Upper Extremity: WFL    AMPAC 6 Click ADL:  AMPAC Total Score: 17    Treatment & Education:  Patient educated on role of OT. Occupational profile developed.  Instructed on calming/ pursed lip breathing and awareness to lines.  ADL / functional mobility training as above.  Guided for use of rolling walker to mitigate fatigue/ exertion.  Assisted return to bed. BLE elevated.  Call light in reach.      Patient left HOB elevated with all lines intact, call button in reach, and nursing notified  Educated on recommendation for use of bsc with x1 staff assist.    GOALS:   Multidisciplinary Problems       Occupational Therapy Goals          Problem: Occupational Therapy    Goal Priority Disciplines Outcome Interventions   Occupational Therapy Goal     OT, PT/OT Progressing    Description: Goals to be met by: 5/22/2025     Patient will increase functional independence with ADLs by performing:    LE Dressing with Stand-by Assistance.  Toileting from bedside commode with Stand-by Assistance for hygiene and clothing management.   Toilet transfer to bedside commode with Stand-by  Assistance.  Functional mobility to / from bathroom with least restrictive device, WFL SpO2, and supervision.  100% reciprocation of at least 3 techniques to mitigate fall risk to maximize safety to return to least restrictive environment                             History:     Past Medical History:   Diagnosis Date    *Atrial fibrillation     Breast cancer     Right breast cancer    Hypertension     Slow transit constipation 12/19/2013         Past Surgical History:   Procedure Laterality Date    ADENOIDECTOMY  70 years ago    At same time as tonsillectomy. .    APPENDECTOMY      ARTHROPLASTY OF HIP BY ANTERIOR APPROACH Right 01/31/2022    Procedure: ARTHROPLASTY, HIP, TOTAL, ANTERIOR APPROACH:RIGHT: DEPUY-C-STEM+PINNACLE;  Surgeon: Cisco Mcneal III, MD;  Location: [facility] OR;  Service: Orthopedics;  Laterality: Right;    BREAST BIOPSY      right    BREAST LUMPECTOMY Right 2013    BREAST SURGERY  2013    EPIDURAL STEROID INJECTION INTO CERVICAL SPINE N/A 02/12/2020    Procedure: Injection-steroid-epidural-cervical--C7-T1 IL ELMIRA;  Surgeon: Alicia Proctor MD;  Location: [facility] PAIN [facility];  Service: Pain Management;  Laterality: N/A;  sign consent at [facility]    EYE SURGERY  2018    HYSTERECTOMY      JOINT REPLACEMENT  Hip, 2022    lymphnode removal      12 from Right axilla    PORTACATH PLACEMENT  2013    TONSILLECTOMY         Time Tracking:     OT Date of Treatment: 04/24/25  OT Start Time: 1030  OT Stop Time: 1109  OT Total Time (min): 39 min total time    Billable Minutes:Evaluation 10 min  Self Care/Home Management 15 min  Therapeutic Activity 10 min    4/24/2025

## 2025-04-24 NOTE — ASSESSMENT & PLAN NOTE
- SBP 90s-110s  - on Metoprolol and Aldactone as an outpatient   - continue Metoprolol and Aldactone   - BP well controlled

## 2025-04-24 NOTE — ASSESSMENT & PLAN NOTE
Echocardiogram with evidence of tricuspid regurgitation that is moderate . The patient's most recent echocardiogram result is listed below.     Echo  Result Date: 4/23/2025    Left Ventricle: There is low normal systolic function with a visually   estimated ejection fraction of 50 - 55%. Unable to assess diastolic   function due to atrial fibrillation.    Right Ventricle: The right ventricle is normal in size. Systolic   function is mildly reduced.    Left Atrium: dilated    Right Atrium: Right atrium is mildly dilated.    Aortic Valve: There is moderate aortic valve sclerosis. Moderately   restricted motion. There is moderate stenosis. Aortic valve area by VTI is   1.1 cm². Aortic valve peak velocity is 2.1 m/s. Mean gradient is 10 mmHg.   The dimensionless index is 0.35.    Mitral Valve: There is moderate mitral annular calcification. There is   mild regurgitation.    Tricuspid Valve: There is moderate regurgitation.    Pulmonic Valve: There is mild regurgitation.    Pulmonary Artery: There is pulmonary hypertension. The estimated   pulmonary artery systolic pressure is 75 mmHg.    IVC/SVC: Intermediate venous pressure at 8 mmHg.        Echo  Result Date: 10/10/2024    Left Ventricle: The left ventricle is normal in size. Normal wall   thickness. There is normal systolic function with a visually estimated   ejection fraction of 55 - 60%. Unable to assess diastolic function due to   atrial fibrillation.    Right Ventricle: Normal right ventricular cavity size. Wall thickness   is normal. Systolic function is normal.    Left Atrium: Left atrium is severely dilated.    Right Atrium: Right atrium is severely dilated.    Aortic Valve: The aortic valve is a trileaflet valve. There is moderate   aortic valve sclerosis. Mildly restricted motion. There is mild stenosis.   Aortic valve area by VTI is 2.6 cm2. Aortic valve peak velocity is 1.8   m/s. Mean gradient is 7.3 mmHg. The dimensionless index is 0.92.    Mitral  Valve: There is mild regurgitation.    Tricuspid Valve: There is severe regurgitation.    Pulmonary Artery: The estimated pulmonary artery systolic pressure is   64 mmHg.    IVC/SVC: Elevated venous pressure at 15 mmHg.        Echo  Result Date: 4/25/2024    Left Ventricle: The left ventricle is normal in size. There is   concentric remodeling. There is normal systolic function with a visually   estimated ejection fraction of 55 - 60%. Biplane (2D) method of discs   ejection fraction is 59%. Unable to assess diastolic function due to   atrial fibrillation.    Right Ventricle: Normal right ventricular cavity size. Systolic   function is reduced.TAPSE is 1.05 cm.    Left Atrium: Left atrium is mildly dilated. The left atrium volume   index is 36.1 mL/m2.    Right Atrium: Right atrium is mildly dilated.    Aortic Valve: There is moderate aortic valve sclerosis. Mildly   restricted motion. There is mild to moderate stenosis. Aortic valve area   by VTI is 0.99 cm². Aortic valve area by velocity is 1.02 cm². Aortic   valve peak velocity is 1.66 m/s. Mean gradient is 7 mmHg. The   dimensionless index is 0.33.    Tricuspid Valve: There is moderate regurgitation.    Pulmonic Valve: There is mild regurgitation.    Pulmonary Artery: The estimated pulmonary artery systolic pressure is   53 mmHg.    IVC/SVC: Intermediate venous pressure at 8 mmHg.

## 2025-04-24 NOTE — ASSESSMENT & PLAN NOTE
- echo with normal LVEF and moderate AS with EWELINA 1.1cm2 MG 10mmHg as well as TR  - diuresis in process

## 2025-04-24 NOTE — ASSESSMENT & PLAN NOTE
Patient's most recent potassium results are listed below.   Recent Labs     04/22/25  0525 04/23/25  0541 04/24/25  0651   K 3.0* 3.9 3.8     Plan  - Replete potassium per protocol  - Monitor potassium Every 12 hours  - Patient's hypokalemia is monitored

## 2025-04-24 NOTE — PT/OT/SLP EVAL
Physical Therapy Evaluation and Treatment    Patient Name:  Claudia Sierra   MRN:  [id number]    Recommendations:     Discharge Recommendations: Moderate Intensity Therapy   Discharge Equipment Recommendations:  (TBD)   Barriers to discharge: Decreased caregiver support and limited functional endurance    Assessment:     Claudia Sierra is a 84 y.o. female admitted with a medical diagnosis of Acute hypoxic respiratory failure.  She presents with the following impairments/functional limitations: weakness, impaired endurance, impaired self care skills, impaired functional mobility, gait instability, impaired balance, impaired cardiopulmonary response to activity.      PT/OT co-evaluation completed due to anticipated complexity of pt's presentation. Pt's PLOF: Independent with no AD. Pt at this time MIN A/CGA with use of RW; limited functional endurance. PT recommending moderate intensity therapy. Therapy will continue to progress pt as able.       Rehab Prognosis: Good; patient would benefit from acute skilled PT services to address these deficits and reach maximum level of function.    Recent Surgery: * No surgery found *      Plan:     During this hospitalization, patient to be seen 4 x/week to address the identified rehab impairments via gait training, therapeutic activities, therapeutic exercises, neuromuscular re-education and progress toward the following goals:    Plan of Care Expires:  05/24/25    Subjective     Chief Complaint: weakness  Patient/Family Comments/goals: to progress mobility  Pain/Comfort:  Pain Rating 1: 0/10  Pain Rating Post-Intervention 1: 0/10    Patients cultural, spiritual, Taoism conflicts given the current situation: no    Living Environment:  Lives alone in 1 story home with threshold to enter; WIS with built in seat.   Prior to admission, patients level of function was: Independent with no AD.  Equipment used at home: bedside commode, walker, rolling (lift reclining chair;  triangle foldable walker).  DME owned (not currently used): none.  Upon discharge, patient will have assistance from neighbor/nephew (not ).    Objective:     Communicated with Nurse prior to session.  Patient found HOB elevated with bed alarm, oxygen, PureWick  upon PT entry to room.    General Precautions: Standard, fall  Orthopedic Precautions:N/A   Braces: N/A  Respiratory Status: Nasal cannula, flow 2 L/min; 91-95%    Exams:  Cognitive Exam:  Patient is oriented to Person, Place, Time, and Situation  Sensation:    -       Intact  light/touch to BLE  RLE ROM: WFL  RLE Strength: WFL  LLE ROM: WFL  LLE Strength: WFL    Functional Mobility:  Bed Mobility:     Supine to Sit: minimum assistance  Sit to Supine: contact guard assistance  Transfers:     Sit to Stand:  contact guard assistance with rolling walker  Gait: ~5ft with use of RW and CGA      AM-PAC 6 CLICK MOBILITY  Total Score:16       Treatment & Education:  Pt educated on role of PT.  Pt requires verbal cues on proper hand placement on RW to increase safety.   Pt's bed pad soiled in urine; brief changed in standing/new purewik set in place.   Pt able to maintain static standing balance at sink with CGA/SBA with use of RW to complete self-care.  Pt requires verbal cues to limit talking with mobility to conserve energy and improve breathing pattern as well as concentration on task.   Pt educated on use of call button; pt understanding.     Patient left HOB elevated with all lines intact, call button in reach, bed alarm on, and Nurse notified.    GOALS:   Multidisciplinary Problems       Physical Therapy Goals          Problem: Physical Therapy    Goal Priority Disciplines Outcome Interventions   Physical Therapy Goal     PT, PT/OT Progressing    Description: Goals to be met by: 25     Patient will increase functional independence with mobility by performin. Supine to sit with Modified [place]  2. Sit to supine with Modified  [place]  3. Sit to stand transfer with Modified [place] with use of LRAD.   4. Bed to chair transfer with Modified [place] using LRAD.   5. Gait  x 150 feet with Modified [place] using LRAD.                          History:     Past Medical History:   Diagnosis Date    *Atrial fibrillation     Breast cancer     Right breast cancer    Hypertension     Slow transit constipation 12/19/2013       Past Surgical History:   Procedure Laterality Date    ADENOIDECTOMY  70 years ago    At same time as tonsillectomy. .    APPENDECTOMY      ARTHROPLASTY OF HIP BY ANTERIOR APPROACH Right 01/31/2022    Procedure: ARTHROPLASTY, HIP, TOTAL, ANTERIOR APPROACH:RIGHT: DEPUY-C-STEM+PINNACLE;  Surgeon: Cisco Mcneal III, MD;  Location: [facility] OR;  Service: Orthopedics;  Laterality: Right;    BREAST BIOPSY      right    BREAST LUMPECTOMY Right 2013    BREAST SURGERY  2013    EPIDURAL STEROID INJECTION INTO CERVICAL SPINE N/A 02/12/2020    Procedure: Injection-steroid-epidural-cervical--C7-T1 IL ELMIRA;  Surgeon: Alicia Proctor MD;  Location: [facility] PAIN MGT;  Service: Pain Management;  Laterality: N/A;  sign consent at [facility]    EYE SURGERY  2018    HYSTERECTOMY      JOINT REPLACEMENT  Hip, 2022    lymphnode removal      12 from Right axilla    PORTACATH PLACEMENT  2013    TONSILLECTOMY         Time Tracking:     PT Received On: 04/24/25  PT Start Time: 1030     PT Stop Time: 1109  PT Total Time (min): 39 min With OT    Billable Minutes: Evaluation 12, Gait Training 8, and Therapeutic Activity 10      04/24/2025

## 2025-04-24 NOTE — ASSESSMENT & PLAN NOTE
- history of persistent afib  - RVR related to fever and infectious etiology; HR improved; continue BB   - continue BB and Xarelto

## 2025-04-24 NOTE — ASSESSMENT & PLAN NOTE
After obtaining consent, and per orders of Karine Patel CNP injection of Flu Zone given in right deltoid by Anna Gunter. Patient instructed to remain in clinic for 20 minutes afterwards, and to report any adverse reaction to me immediately. No reaction noted. Echocardiogram with evidence of mitral regurgitation that is moderate . The patient's most recent echocardiogram result is listed below. We will manage the valvular abnormality by diuresing the patient, cardiology is on board.    Echo  Result Date: 4/23/2025    Left Ventricle: There is low normal systolic function with a visually   estimated ejection fraction of 50 - 55%. Unable to assess diastolic   function due to atrial fibrillation.    Right Ventricle: The right ventricle is normal in size. Systolic   function is mildly reduced.    Left Atrium: dilated    Right Atrium: Right atrium is mildly dilated.    Aortic Valve: There is moderate aortic valve sclerosis. Moderately   restricted motion. There is moderate stenosis. Aortic valve area by VTI is   1.1 cm². Aortic valve peak velocity is 2.1 m/s. Mean gradient is 10 mmHg.   The dimensionless index is 0.35.    Mitral Valve: There is moderate mitral annular calcification. There is   mild regurgitation.    Tricuspid Valve: There is moderate regurgitation.    Pulmonic Valve: There is mild regurgitation.    Pulmonary Artery: There is pulmonary hypertension. The estimated   pulmonary artery systolic pressure is 75 mmHg.    IVC/SVC: Intermediate venous pressure at 8 mmHg.        Echo  Result Date: 10/10/2024    Left Ventricle: The left ventricle is normal in size. Normal wall   thickness. There is normal systolic function with a visually estimated   ejection fraction of 55 - 60%. Unable to assess diastolic function due to   atrial fibrillation.    Right Ventricle: Normal right ventricular cavity size. Wall thickness   is normal. Systolic function is normal.    Left Atrium: Left atrium is severely dilated.    Right Atrium: Right atrium is severely dilated.    Aortic Valve: The aortic valve is a trileaflet valve. There is moderate   aortic valve sclerosis. Mildly restricted motion. There is mild stenosis.   Aortic valve area by VTI is 2.6 cm2. Aortic valve peak velocity is  1.8   m/s. Mean gradient is 7.3 mmHg. The dimensionless index is 0.92.    Mitral Valve: There is mild regurgitation.    Tricuspid Valve: There is severe regurgitation.    Pulmonary Artery: The estimated pulmonary artery systolic pressure is   64 mmHg.    IVC/SVC: Elevated venous pressure at 15 mmHg.        Echo  Result Date: 4/25/2024    Left Ventricle: The left ventricle is normal in size. There is   concentric remodeling. There is normal systolic function with a visually   estimated ejection fraction of 55 - 60%. Biplane (2D) method of discs   ejection fraction is 59%. Unable to assess diastolic function due to   atrial fibrillation.    Right Ventricle: Normal right ventricular cavity size. Systolic   function is reduced.TAPSE is 1.05 cm.    Left Atrium: Left atrium is mildly dilated. The left atrium volume   index is 36.1 mL/m2.    Right Atrium: Right atrium is mildly dilated.    Aortic Valve: There is moderate aortic valve sclerosis. Mildly   restricted motion. There is mild to moderate stenosis. Aortic valve area   by VTI is 0.99 cm². Aortic valve area by velocity is 1.02 cm². Aortic   valve peak velocity is 1.66 m/s. Mean gradient is 7 mmHg. The   dimensionless index is 0.33.    Tricuspid Valve: There is moderate regurgitation.    Pulmonic Valve: There is mild regurgitation.    Pulmonary Artery: The estimated pulmonary artery systolic pressure is   53 mmHg.    IVC/SVC: Intermediate venous pressure at 8 mmHg.

## 2025-04-24 NOTE — PROGRESS NOTES
[facility] - Telemetry  Cardiology  Progress Note    Patient Name: Claudia Sierra  MRN: [id number]  Admission Date: 4/21/2025  Hospital Length of Stay: 2 days  Code Status: Full Code   Attending Physician: Eris Chu MD   Primary Care Physician: Sajan Curtis MD  Expected Discharge Date: 4/26/2025  Principal Problem:Acute hypoxic respiratory failure    Subjective:     Hospital Course:   83yo female with HTN, CAP, persistent afib on Xarelto, elevated troponin, acute hypoxemic respiratory failure, breast cancer, HLP, AS, chronic DHF and CKD stage III who presented to the ER with complaints of SOB. She reports SOB and cough for the past 3 days with no improvement. She reports SOB occurs with walking longer distances and denies any chest pain, LE edema or orthopnea. She reports compliance with her medication regimen and will take Lasix on an as needed basis typically once per week. She presented to Urgent care and was COVID and Flu negative and was given oral antibiotics. She reports worsening symptoms therefore she presented to the ER. Labs CBC with WBCs 7K BMP with K+ 3.0 BUN 14 creatinine 1.0  Troponin 0.059 Blood cultures prelim NGTD Initial temp 103.3 EKG afib with . SBP 140s-170s. Started on IV Abx and admitted to [facility] and Cardiology consulted for evaluation of elevated troponin     4/23/2025 Remains in afib with HR up to 110s-120s. SBP 110s CBC WNL. BMP with K+ 3.0 Given oral Lasix this AM  4/24/2025 Remains on IV lasix 40mg daily with 2.9L out overnight negative 2.4L since admission. CBC and BMP WNL. SBP stable.HR 90s-100s with continued afib         ROS  Objective:     Vital Signs (Most Recent):  Temp: 97.4 °F (36.3 °C) (04/24/25 1124)  Pulse: 90 (04/24/25 1215)  Resp: 16 (04/24/25 1215)  BP: 108/66 (04/24/25 1124)  SpO2: 96 % (04/24/25 1215) Vital Signs (24h Range):  Temp:  [97.4 °F (36.3 °C)-98.3 °F (36.8 °C)] 97.4 °F (36.3 °C)  Pulse:  [] 90  Resp:   [16-20] 16  SpO2:  [96 %-100 %] 96 %  BP: ()/(52-72) 108/66     Weight: 59.9 kg (132 lb)  Body mass index is 26.66 kg/m².     SpO2: 96 %         Intake/Output Summary (Last 24 hours) at 4/24/2025 1456  Last data filed at 4/24/2025 1257  Gross per 24 hour   Intake 370 ml   Output 1600 ml   Net -1230 ml       Lines/Drains/Airways       Drain  Duration             Female External Urinary Catheter w/ Suction 04/23/25 1600 <1 day              Peripheral Intravenous Line  Duration                  Peripheral IV - Single Lumen 04/21/25 2121 18 G Left Antecubital 2 days                       Physical Exam  Constitutional:       General: She is not in acute distress.     Appearance: She is well-developed.   Cardiovascular:      Rate and Rhythm: Normal rate. Rhythm irregularly irregular.      Heart sounds: No murmur heard.     No gallop.   Pulmonary:      Effort: Pulmonary effort is normal. No respiratory distress.      Breath sounds: Normal breath sounds. No wheezing.   Abdominal:      General: Bowel sounds are normal. There is no distension.      Palpations: Abdomen is soft.      Tenderness: There is no abdominal tenderness.   Skin:     General: Skin is warm and dry.   Neurological:      Mental Status: She is alert and oriented to person, place, and time.                Significant Imaging: Echocardiogram: Transthoracic echo (TTE) complete (Cupid Only):   Results for orders placed or performed during the hospital encounter of 04/21/25   Echo   Result Value Ref Range    BSA 1.58 m2    Quigley's Biplane MOD Ejection Fraction 62 %    A2C EF 65 %    A4C EF 56 %    LVOT stroke volume 39.6 cm3    LVIDd 3.6 3.5 - 6.0 cm    LV Systolic Volume 31 mL    LV Systolic Volume Index 20.0 mL/m2    LVIDs 2.9 2.1 - 4.0 cm    LV ESV A2C 45.65 mL    LV Diastolic Volume 53 mL    LV ESV A4C 35.59 mL    LV Diastolic Volume Index 34.19 mL/m2    LV EDV A2C 47.332642402836105 mL    LV EDV A4C 49.58 mL    Left Ventricular End Systolic Volume by  Teichholz Method 31.09 mL    Left Ventricular End Diastolic Volume by Teichholz Method 52.59 mL    IVS 0.7 0.6 - 1.1 cm    LVOT diameter 2.0 cm    LVOT area 3.1 cm2    FS 19.4 (A) 28 - 44 %    Left Ventricle Relative Wall Thickness 0.33 cm    PW 0.6 0.6 - 1.1 cm    LV mass 59.7 g    LV Mass Index 38.5 g/m2    MV Peak E Kevin 1.09 m/s    TDI LATERAL 0.11 m/s    TDI SEPTAL 0.09 m/s    E/E' ratio 11 m/s    TR Max Kevin 4.1 m/s    E wave deceleration time 151 msec    LV SEPTAL E/E' RATIO 12.1 m/s    LV LATERAL E/E' RATIO 9.9 m/s    LVOT peak kevin 0.6 m/s    Left Ventricular Outflow Tract Mean Velocity 0.50 cm/s    Left Ventricular Outflow Tract Mean Gradient 1.03 mmHg    RV- levi basal diam 3.6 cm    RV S' 8.29 cm/s    TAPSE 1.25 cm    RV/LV Ratio 1.00 cm    LA Vol (MOD) 40 mL    CORY (MOD) 26 mL/m2    RA area length vol 47.77 mL    RA Area 18.5 cm2    RA Vol 51.16 mL    AV mean gradient 10 mmHg    AV peak gradient 18 mmHg    Ao peak kevin 2.1 m/s    Ao VTI 35.8 cm    LVOT peak VTI 12.6 cm    AV valve area 1.1 cm²    AV Velocity Ratio 0.29     AV index (prosthetic) 0.35     EWELINA by Velocity Ratio 0.9 cm²    MV mean gradient 2 mmHg    MV peak gradient 5 mmHg    MV stenosis pressure 1/2 time 43.93 ms    MV valve area p 1/2 method 5.01 cm2    MV valve area by continuity eq 2.05 cm2    MV VTI 19.3 cm    Triscuspid Valve Regurgitation Peak Gradient 68 mmHg    PV PEAK VELOCITY 0.86 m/s    PV peak gradient 3 mmHg    Pulmonary Valve Mean Velocity 0.60 m/s    Sinus 3.02 cm    STJ 2.74 cm    IVC diameter 1.91 cm    Mean e' 0.10 m/s    ZLVIDS 0.32     ZLVIDD -2.20     LA area A4C 15.04 cm2    LA area A2C 17.22 cm2    Mitral Valve Heart Rate 104 bpm    TV resting pulmonary artery pressure 75 mmHg    RV TB RVSP 12 mmHg    Est. RA pres 8 mmHg    Narrative      Left Ventricle: There is low normal systolic function with a visually   estimated ejection fraction of 50 - 55%. Unable to assess diastolic   function due to atrial fibrillation.     Right Ventricle: The right ventricle is normal in size. Systolic   function is mildly reduced.    Left Atrium: dilated    Right Atrium: Right atrium is mildly dilated.    Aortic Valve: There is moderate aortic valve sclerosis. Moderately   restricted motion. There is moderate stenosis. Aortic valve area by VTI is   1.1 cm². Aortic valve peak velocity is 2.1 m/s. Mean gradient is 10 mmHg.   The dimensionless index is 0.35.    Mitral Valve: There is moderate mitral annular calcification. There is   mild regurgitation.    Tricuspid Valve: There is moderate regurgitation.    Pulmonic Valve: There is mild regurgitation.    Pulmonary Artery: There is pulmonary hypertension. The estimated   pulmonary artery systolic pressure is 75 mmHg.    IVC/SVC: Intermediate venous pressure at 8 mmHg.       Assessment and Plan:     Brief HPI: Seen this afternoon on NP rounds with family at the bedside. Continues to complain of cough but feeling better. Remains on O2. Discussed POC as detailed below-verbalized understanding and agrees with POC      Nonrheumatic tricuspid valve regurgitation  - echo with normal LVEF moderate TR estimated PA pressure 75mmHg  -   - admitted with PNA; rales noted on exam this AM on IV Lasix     Elevated troponin  - presented with SOB and cough  - troponin 0.059; EKG with no acute STTWC;  mild troponin elevation related to demand etiology in setting of fever and infectious etiology   - echo with normal LVEF and moderate TR    Atrial fibrillation with RVR  - history of persistent afib  - RVR related to fever and infectious etiology; HR improved; continue BB   - continue BB and Xarelto     Nonrheumatic aortic valve stenosis  - echo with normal LVEF and moderate AS with EWELINA 1.1cm2 MG 10mmHg as well as TR  - diuresis in process     Longstanding persistent atrial fibrillation  - history of persistent afib followed by Dr Alexander  - rate control strategy; continue BB for rate control along with Xarelto  -  monitor on telemetry    Essential hypertension  - SBP 90s-110s  - on Metoprolol and Aldactone as an outpatient   - continue Metoprolol and Aldactone   - BP well controlled        VTE Risk Mitigation (From admission, onward)           Ordered     rivaroxaban tablet 15 mg  Daily         04/22/25 0749     IP VTE HIGH RISK PATIENT  Once         04/22/25 0133     Place sequential compression device  Until discontinued         04/22/25 0133                    KUNAL Ying, ANP  Cardiology  [facility] - Telemetry

## 2025-04-24 NOTE — ASSESSMENT & PLAN NOTE
Patient with Hypoxic Respiratory failure which is Acute.  she is not on home oxygen. Supplemental oxygen was provided and noted- Oxygen Concentration (%):  [28] 28    .   Signs/symptoms of respiratory failure include- tachypnea, increased work of breathing, respiratory distress, and wheezing. Contributing diagnoses includes - CHF and Pneumonia Labs and images were reviewed. Patient Has not had a recent ABG. Will treat underlying causes and adjust management of respiratory failure as follows- IV antibiotics nasal cannula, echo ordered cardiology was consulted

## 2025-04-24 NOTE — ASSESSMENT & PLAN NOTE
Patient has a diagnosis of pneumonia. The cause of the pneumonia is unknown at this time. The pneumonia is worsening due to hypoxia. The patient has the following signs/symptoms of pneumonia: persistent hypoxia , cough, and shortness of breath. The patient does have a current oxygen requirement and the patient does not have a home oxygen requirement. I have reviewed the pertinent imaging. The following cultures have been collected: Blood cultures The culture results are listed below.     Current antimicrobial regimen consists of the antibiotics listed below. Will monitor patient closely and continue current treatment plan unchanged.     Continue antibiotics   Supplemental O2      Antibiotics (From admission, onward)      Start     Stop Route Frequency Ordered    04/23/25 1145  piperacillin-tazobactam (ZOSYN) 4.5 g in D5W 100 mL IVPB (MB+)         -- IV Every 8 hours (non-standard times) 04/23/25 1033            Microbiology Results (last 7 days)       Procedure Component Value Units Date/Time    Culture, Respiratory with Gram Stain [[id number]] Collected: 04/23/25 1646    Order Status: Completed Specimen: Respiratory from Sputum, Expectorated Updated: 04/24/25 1133     GRAM STAIN No WBCs      No organisms seen    MRSA Screen by PCR [[id number]]  (Normal) Collected: 04/23/25 1505    Order Status: Completed Specimen: Nasal Swab Updated: 04/24/25 0429     MRSA PCR Screen Not Detected    Blood culture x two cultures. Draw prior to antibiotics. [[id number]]  (Normal) Collected: 04/21/25 2123    Order Status: Completed Specimen: Blood from Peripheral, Antecubital, Left Updated: 04/24/25 0419     Blood Culture No Growth After 48 Hours    Blood culture x two cultures. Draw prior to antibiotics. [[id number]]  (Normal) Collected: 04/21/25 2117    Order Status: Completed Specimen: Blood from Peripheral, Forearm, Left Updated: 04/24/25 0303     Blood Culture No Growth After 48 Hours    Influenza A & B by Molecular [[id number]]   (Normal) Collected: 04/21/25 2114    Order Status: Completed Specimen: Nasopharyngeal Swab Updated: 04/21/25 2149     INFLUENZA A MOLECULAR Negative     INFLUENZA B MOLECULAR  Negative

## 2025-04-25 LAB
ANION GAP (OHS): 16 MMOL/L (ref 8–16)
BUN SERPL-MCNC: 28 MG/DL (ref 8–23)
CALCIUM SERPL-MCNC: 9.8 MG/DL (ref 8.7–10.5)
CHLORIDE SERPL-SCNC: 93 MMOL/L (ref 95–110)
CO2 SERPL-SCNC: 25 MMOL/L (ref 23–29)
CREAT SERPL-MCNC: 1.3 MG/DL (ref 0.5–1.4)
GFR SERPLBLD CREATININE-BSD FMLA CKD-EPI: 40 ML/MIN/1.73/M2
GLUCOSE SERPL-MCNC: 153 MG/DL (ref 70–110)
MAGNESIUM SERPL-MCNC: 2 MG/DL (ref 1.6–2.6)
POCT GLUCOSE: 164 MG/DL (ref 70–110)
POCT GLUCOSE: 165 MG/DL (ref 70–110)
POCT GLUCOSE: 166 MG/DL (ref 70–110)
POCT GLUCOSE: 170 MG/DL (ref 70–110)
POCT GLUCOSE: 239 MG/DL (ref 70–110)
POTASSIUM SERPL-SCNC: 4.1 MMOL/L (ref 3.5–5.1)
SODIUM SERPL-SCNC: 134 MMOL/L (ref 136–145)

## 2025-04-25 PROCEDURE — 94761 N-INVAS EAR/PLS OXIMETRY MLT: CPT

## 2025-04-25 PROCEDURE — 27000221 HC OXYGEN, UP TO 24 HOURS

## 2025-04-25 PROCEDURE — 97530 THERAPEUTIC ACTIVITIES: CPT | Mod: CO

## 2025-04-25 PROCEDURE — 80048 BASIC METABOLIC PNL TOTAL CA: CPT | Performed by: NURSE PRACTITIONER

## 2025-04-25 PROCEDURE — 97535 SELF CARE MNGMENT TRAINING: CPT | Mod: CO

## 2025-04-25 PROCEDURE — 25000003 PHARM REV CODE 250: Performed by: FAMILY MEDICINE

## 2025-04-25 PROCEDURE — 25000003 PHARM REV CODE 250: Performed by: REGISTERED NURSE

## 2025-04-25 PROCEDURE — 25000003 PHARM REV CODE 250

## 2025-04-25 PROCEDURE — 97110 THERAPEUTIC EXERCISES: CPT | Mod: CQ

## 2025-04-25 PROCEDURE — 97530 THERAPEUTIC ACTIVITIES: CPT | Mod: CQ

## 2025-04-25 PROCEDURE — 94640 AIRWAY INHALATION TREATMENT: CPT

## 2025-04-25 PROCEDURE — 36415 COLL VENOUS BLD VENIPUNCTURE: CPT | Performed by: NURSE PRACTITIONER

## 2025-04-25 PROCEDURE — 11000001 HC ACUTE MED/SURG PRIVATE ROOM

## 2025-04-25 PROCEDURE — 83735 ASSAY OF MAGNESIUM: CPT | Performed by: NURSE PRACTITIONER

## 2025-04-25 PROCEDURE — 63600175 PHARM REV CODE 636 W HCPCS

## 2025-04-25 PROCEDURE — 99900035 HC TECH TIME PER 15 MIN (STAT)

## 2025-04-25 PROCEDURE — 25000242 PHARM REV CODE 250 ALT 637 W/ HCPCS

## 2025-04-25 PROCEDURE — 99233 SBSQ HOSP IP/OBS HIGH 50: CPT | Mod: ,,, | Performed by: NURSE PRACTITIONER

## 2025-04-25 RX ORDER — LEVOFLOXACIN 750 MG/1
750 TABLET, FILM COATED ORAL EVERY OTHER DAY
Status: DISCONTINUED | OUTPATIENT
Start: 2025-04-25 | End: 2025-04-27 | Stop reason: HOSPADM

## 2025-04-25 RX ADMIN — RIVAROXABAN 15 MG: 15 TABLET, FILM COATED ORAL at 04:04

## 2025-04-25 RX ADMIN — GABAPENTIN 600 MG: 300 CAPSULE ORAL at 09:04

## 2025-04-25 RX ADMIN — FUROSEMIDE 40 MG: 10 INJECTION, SOLUTION INTRAVENOUS at 09:04

## 2025-04-25 RX ADMIN — LEVOFLOXACIN 750 MG: 750 TABLET, FILM COATED ORAL at 09:04

## 2025-04-25 RX ADMIN — IPRATROPIUM BROMIDE AND ALBUTEROL SULFATE 3 ML: 2.5; .5 SOLUTION RESPIRATORY (INHALATION) at 03:04

## 2025-04-25 RX ADMIN — SPIRONOLACTONE 25 MG: 25 TABLET, FILM COATED ORAL at 09:04

## 2025-04-25 RX ADMIN — IPRATROPIUM BROMIDE AND ALBUTEROL SULFATE 3 ML: 2.5; .5 SOLUTION RESPIRATORY (INHALATION) at 07:04

## 2025-04-25 RX ADMIN — CETIRIZINE HYDROCHLORIDE 5 MG: 5 TABLET, FILM COATED ORAL at 08:04

## 2025-04-25 RX ADMIN — DEXAMETHASONE SODIUM PHOSPHATE 4 MG: 4 INJECTION, SOLUTION INTRA-ARTICULAR; INTRALESIONAL; INTRAMUSCULAR; INTRAVENOUS; SOFT TISSUE at 09:04

## 2025-04-25 RX ADMIN — METOPROLOL SUCCINATE 50 MG: 50 TABLET, EXTENDED RELEASE ORAL at 09:04

## 2025-04-25 RX ADMIN — GUAIFENESIN 600 MG: 600 TABLET, EXTENDED RELEASE ORAL at 09:04

## 2025-04-25 RX ADMIN — BUDESONIDE 0.5 MG: 0.5 INHALANT RESPIRATORY (INHALATION) at 07:04

## 2025-04-25 RX ADMIN — IPRATROPIUM BROMIDE AND ALBUTEROL SULFATE 3 ML: 2.5; .5 SOLUTION RESPIRATORY (INHALATION) at 11:04

## 2025-04-25 RX ADMIN — GABAPENTIN 600 MG: 300 CAPSULE ORAL at 08:04

## 2025-04-25 RX ADMIN — TRAZODONE HYDROCHLORIDE 25 MG: 50 TABLET ORAL at 11:04

## 2025-04-25 RX ADMIN — EMPAGLIFLOZIN 10 MG: 10 TABLET, FILM COATED ORAL at 09:04

## 2025-04-25 RX ADMIN — MONTELUKAST 10 MG: 10 TABLET, FILM COATED ORAL at 09:04

## 2025-04-25 RX ADMIN — PIPERACILLIN AND TAZOBACTAM 4.5 G: 4; .5 INJECTION, POWDER, LYOPHILIZED, FOR SOLUTION INTRAVENOUS; PARENTERAL at 04:04

## 2025-04-25 RX ADMIN — PRAVASTATIN SODIUM 40 MG: 40 TABLET ORAL at 09:04

## 2025-04-25 RX ADMIN — GUAIFENESIN 600 MG: 600 TABLET, EXTENDED RELEASE ORAL at 08:04

## 2025-04-25 NOTE — ASSESSMENT & PLAN NOTE
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 The patient's most recent sodium results are listed below.  Recent Labs     04/23/25  0541 04/24/25  0651 04/25/25  1039   * 129* 134*     Plan  - Correct the sodium by 4-6mEq in 24 hours.    -hyponatremia secondary to SIADH, currently patient on fluid restriction less than 1500 mL per day  - Will treat the hyponatremia with to be monitored since it is a chronic  - Monitor sodium Daily.   - Patient hyponatremia is monitored  Improving

## 2025-04-25 NOTE — PLAN OF CARE
SW met with pt at bedside during rounding with MD. No dc today. SW will follow up with pts son to discuss dc planning. SW will continue to follow pt throughout her transitions of care and assist with any dc needs.     Future Appointments   Date Time Provider Department Center   5/2/2025 11:00 AM Mikayla Crane MD [facility] IMPRI [facility]   8/6/2025 10:00 AM LAB[facility]   8/6/2025 10:15 AM CV [facility] ECHO [facility]   8/27/2025  1:20 PM Keith Alexander Jr., MD [facility] CARDIO [facility]   8/29/2025  2:30 PM Sajan Curtis MD [facility] PRICRE [place]   2/6/2026  1:00 PM [facility] MAMMO1 [facility] M[facility]   2/13/2026 11:40 AM Mingo Wynne MD [facility] HEM ONC [facility]        04/25/25 0909   Rounds   Attendance Provider;;Pharmacist   Discharge Plan A Home with family;Home Health   Why the patient remains in the hospital Requires continued medical care   Transition of Care Barriers None

## 2025-04-25 NOTE — PLAN OF CARE
Problem: Occupational Therapy  Goal: Occupational Therapy Goal  Description: Goals to be met by: 5/22/2025     Patient will increase functional independence with ADLs by performing:    LE Dressing with Stand-by Assistance.  Toileting from bedside commode with Stand-by Assistance for hygiene and clothing management.   Toilet transfer to bedside commode with Stand-by Assistance.  Functional mobility to / from bathroom with least restrictive device, WFL SpO2, and supervision.  100% reciprocation of at least 3 techniques to mitigate fall risk to maximize safety to return to least restrictive environment    Outcome: Progressing   Claudianoemí Sierra is a 84 y.o. female with a medical diagnosis of Acute hypoxic respiratory failure.  Performance deficits affecting function are weakness, impaired endurance, impaired self care skills, impaired functional mobility, gait instability, impaired balance, decreased upper extremity function, decreased lower extremity function, decreased safety awareness, impaired coordination.    Pt found in chair, agreeable to therapy.  Pt was limited by dizziness in standing, BP stable 117/62.  Pt is progressing towards goals. Continue OT services to address functional goals, progressing as able.

## 2025-04-25 NOTE — ASSESSMENT & PLAN NOTE
Patient's most recent potassium results are listed below.   Recent Labs     04/23/25  0541 04/24/25  0651 04/25/25  1039   K 3.9 3.8 4.1     Plan  - Replete potassium per protocol  - Monitor potassium Every 12 hours  - Patient's hypokalemia is monitored

## 2025-04-25 NOTE — SUBJECTIVE & OBJECTIVE
Review of Systems   Constitutional: Negative for chills, decreased appetite, diaphoresis, fever, malaise/fatigue, weight gain and weight loss.   Cardiovascular:  Negative for chest pain, claudication, dyspnea on exertion, irregular heartbeat, leg swelling, near-syncope, orthopnea, palpitations and paroxysmal nocturnal dyspnea.   Respiratory:  Negative for cough, shortness of breath, snoring, sputum production and wheezing.    Endocrine: Negative for cold intolerance, heat intolerance, polydipsia, polyphagia and polyuria.   Skin:  Negative for color change, dry skin, itching, nail changes and poor wound healing.   Musculoskeletal:  Negative for back pain, gout, joint pain and joint swelling.   Gastrointestinal:  Negative for bloating, abdominal pain, constipation, diarrhea, hematemesis, hematochezia, melena, nausea and vomiting.   Genitourinary:  Negative for dysuria, hematuria and nocturia.   Neurological:  Negative for dizziness, headaches, light-headedness, numbness, paresthesias and weakness.   Psychiatric/Behavioral:  Negative for altered mental status, depression and memory loss.      Objective:     Vital Signs (Most Recent):  Temp: 97.4 °F (36.3 °C) (04/25/25 0736)  Pulse: 99 (04/25/25 0954)  Resp: 18 (04/25/25 0748)  BP: 126/79 (04/25/25 0954)  SpO2: 100 % (04/25/25 0748) Vital Signs (24h Range):  Temp:  [97.4 °F (36.3 °C)-97.9 °F (36.6 °C)] 97.4 °F (36.3 °C)  Pulse:  [] 99  Resp:  [16-18] 18  SpO2:  [93 %-100 %] 100 %  BP: ()/(42-79) 126/79     Weight: 59.9 kg (132 lb)  Body mass index is 26.66 kg/m².     SpO2: 100 %         Intake/Output Summary (Last 24 hours) at 4/25/2025 1114  Last data filed at 4/25/2025 1015  Gross per 24 hour   Intake 250 ml   Output 2850 ml   Net -2600 ml       Lines/Drains/Airways       Drain  Duration             Female External Urinary Catheter w/ Suction 04/23/25 1600 1 day              Peripheral Intravenous Line  Duration                  Peripheral IV - Single  Lumen 04/21/25 2121 18 G Left Antecubital 3 days                       Physical Exam  Constitutional:       General: She is not in acute distress.     Appearance: She is well-developed.   Cardiovascular:      Rate and Rhythm: Normal rate. Rhythm irregularly irregular.      Heart sounds: No murmur heard.     No gallop.   Pulmonary:      Effort: Pulmonary effort is normal. No respiratory distress.      Breath sounds: Normal breath sounds. No wheezing.   Abdominal:      General: Bowel sounds are normal. There is no distension.      Palpations: Abdomen is soft.      Tenderness: There is no abdominal tenderness.   Skin:     General: Skin is warm and dry.   Neurological:      Mental Status: She is alert and oriented to person, place, and time.              Significant Imaging: Echocardiogram: Transthoracic echo (TTE) complete (Cupid Only):   Results for orders placed or performed during the hospital encounter of 04/21/25   Echo   Result Value Ref Range    BSA 1.58 m2    Quigley's Biplane MOD Ejection Fraction 62 %    A2C EF 65 %    A4C EF 56 %    LVOT stroke volume 39.6 cm3    LVIDd 3.6 3.5 - 6.0 cm    LV Systolic Volume 31 mL    LV Systolic Volume Index 20.0 mL/m2    LVIDs 2.9 2.1 - 4.0 cm    LV ESV A2C 45.65 mL    LV Diastolic Volume 53 mL    LV ESV A4C 35.59 mL    LV Diastolic Volume Index 34.19 mL/m2    LV EDV A2C 47.141478922746713 mL    LV EDV A4C 49.58 mL    Left Ventricular End Systolic Volume by Teichholz Method 31.09 mL    Left Ventricular End Diastolic Volume by Teichholz Method 52.59 mL    IVS 0.7 0.6 - 1.1 cm    LVOT diameter 2.0 cm    LVOT area 3.1 cm2    FS 19.4 (A) 28 - 44 %    Left Ventricle Relative Wall Thickness 0.33 cm    PW 0.6 0.6 - 1.1 cm    LV mass 59.7 g    LV Mass Index 38.5 g/m2    MV Peak E Kevin 1.09 m/s    TDI LATERAL 0.11 m/s    TDI SEPTAL 0.09 m/s    E/E' ratio 11 m/s    TR Max Kevin 4.1 m/s    E wave deceleration time 151 msec    LV SEPTAL E/E' RATIO 12.1 m/s    LV LATERAL E/E' RATIO 9.9 m/s     LVOT peak vickie 0.6 m/s    Left Ventricular Outflow Tract Mean Velocity 0.50 cm/s    Left Ventricular Outflow Tract Mean Gradient 1.03 mmHg    RV- levi basal diam 3.6 cm    RV S' 8.29 cm/s    TAPSE 1.25 cm    RV/LV Ratio 1.00 cm    LA Vol (MOD) 40 mL    CORY (MOD) 26 mL/m2    RA area length vol 47.77 mL    RA Area 18.5 cm2    RA Vol 51.16 mL    AV mean gradient 10 mmHg    AV peak gradient 18 mmHg    Ao peak vickie 2.1 m/s    Ao VTI 35.8 cm    LVOT peak VTI 12.6 cm    AV valve area 1.1 cm²    AV Velocity Ratio 0.29     AV index (prosthetic) 0.35     EWELINA by Velocity Ratio 0.9 cm²    MV mean gradient 2 mmHg    MV peak gradient 5 mmHg    MV stenosis pressure 1/2 time 43.93 ms    MV valve area p 1/2 method 5.01 cm2    MV valve area by continuity eq 2.05 cm2    MV VTI 19.3 cm    Triscuspid Valve Regurgitation Peak Gradient 68 mmHg    PV PEAK VELOCITY 0.86 m/s    PV peak gradient 3 mmHg    Pulmonary Valve Mean Velocity 0.60 m/s    Sinus 3.02 cm    STJ 2.74 cm    IVC diameter 1.91 cm    Mean e' 0.10 m/s    ZLVIDS 0.32     ZLVIDD -2.20     LA area A4C 15.04 cm2    LA area A2C 17.22 cm2    Mitral Valve Heart Rate 104 bpm    TV resting pulmonary artery pressure 75 mmHg    RV TB RVSP 12 mmHg    Est. RA pres 8 mmHg    Narrative      Left Ventricle: There is low normal systolic function with a visually   estimated ejection fraction of 50 - 55%. Unable to assess diastolic   function due to atrial fibrillation.    Right Ventricle: The right ventricle is normal in size. Systolic   function is mildly reduced.    Left Atrium: dilated    Right Atrium: Right atrium is mildly dilated.    Aortic Valve: There is moderate aortic valve sclerosis. Moderately   restricted motion. There is moderate stenosis. Aortic valve area by VTI is   1.1 cm². Aortic valve peak velocity is 2.1 m/s. Mean gradient is 10 mmHg.   The dimensionless index is 0.35.    Mitral Valve: There is moderate mitral annular calcification. There is   mild regurgitation.     Tricuspid Valve: There is moderate regurgitation.    Pulmonic Valve: There is mild regurgitation.    Pulmonary Artery: There is pulmonary hypertension. The estimated   pulmonary artery systolic pressure is 75 mmHg.    IVC/SVC: Intermediate venous pressure at 8 mmHg.

## 2025-04-25 NOTE — ASSESSMENT & PLAN NOTE
Patient has a diagnosis of pneumonia. The cause of the pneumonia is unknown at this time. The pneumonia is worsening due to hypoxia. The patient has the following signs/symptoms of pneumonia: persistent hypoxia , cough, and shortness of breath. The patient does have a current oxygen requirement and the patient does not have a home oxygen requirement. I have reviewed the pertinent imaging. The following cultures have been collected: Blood cultures The culture results are listed below.     Current antimicrobial regimen consists of the antibiotics listed below. Will monitor patient closely and continue current treatment plan unchanged.     Continue antibiotics antibiotic deescalated on 04/25 into levofloxacin p.o.   Supplemental O2      Antibiotics (From admission, onward)      Start     Stop Route Frequency Ordered    04/25/25 0915  levoFLOXacin tablet 750 mg         -- Oral Every other day 04/25/25 0913            Microbiology Results (last 7 days)       Procedure Component Value Units Date/Time    Culture, Respiratory with Gram Stain [[id number]] Collected: 04/23/25 1646    Order Status: Completed Specimen: Respiratory from Sputum, Expectorated Updated: 04/25/25 1112     Respiratory Culture Normal respiratory tracey     GRAM STAIN No WBCs      No organisms seen    Blood culture x two cultures. Draw prior to antibiotics. [[id number]]  (Normal) Collected: 04/21/25 2123    Order Status: Completed Specimen: Blood from Peripheral, Antecubital, Left Updated: 04/25/25 0411     Blood Culture No Growth After 72 Hours    Blood culture x two cultures. Draw prior to antibiotics. [[id number]]  (Normal) Collected: 04/21/25 2117    Order Status: Completed Specimen: Blood from Peripheral, Forearm, Left Updated: 04/25/25 0302     Blood Culture No Growth After 72 Hours    MRSA Screen by PCR [[id number]]  (Normal) Collected: 04/23/25 1505    Order Status: Completed Specimen: Nasal Swab Updated: 04/24/25 0429     MRSA PCR Screen Not  Detected    Influenza A & B by Molecular [[id number]]  (Normal) Collected: 04/21/25 2114    Order Status: Completed Specimen: Nasopharyngeal Swab Updated: 04/21/25 2149     INFLUENZA A MOLECULAR Negative     INFLUENZA B MOLECULAR  Negative

## 2025-04-25 NOTE — ASSESSMENT & PLAN NOTE
Patient's blood pressure range in the last 24 hours was: BP  Min: 95/42  Max: 143/79.The patient's inpatient anti-hypertensive regimen is listed below:  Current Antihypertensives  metoprolol succinate (TOPROL-XL) 24 hr tablet 50 mg, Daily, Oral  metoprolol injection 5 mg, Every 6 hours PRN, Intravenous  spironolactone tablet 25 mg, Daily, Oral  furosemide injection 40 mg, Daily, Intravenous    Plan  - BP is controlled, no changes needed to their regimen  -

## 2025-04-25 NOTE — ASSESSMENT & PLAN NOTE
- Na 129 yesterday down from 133-135 upon admission   - BMP pending this AM  - felt to be related to hypervolemic hyponatremia; monitor with diuresis; repeat CXR today

## 2025-04-25 NOTE — PROGRESS NOTES
[facility] Medicine  Progress Note    Patient Name: Claudia Sierra  MRN: [id number]  Patient Class: IP- Inpatient   Admission Date: 4/21/2025  Length of Stay: 3 days  Attending Physician: Eris Chu MD  Primary Care Provider: Sajan Curtis MD        Subjective     Principal Problem:Acute hypoxic respiratory failure        HPI:  Patient is an 84-year-old female with a history of breast CA, HTN, Afib presented to ED today for shortness for breath which has been ongoing for the last 3 days.  Patient reports she went to urgent Care today had a chest x-ray and was sent home on  antibiotics however she did not start these and presented to ED for no improvement.  Patient  temperature was 103° currently in Afib which is chronic and she is on Xarelto.  Labwork remarkable for sodium 133, potassium 3.1.  BNP 5 5 9, troponin elevated at 0.059.  She denies any chest pain.  UA negative COVID and influenza negative.  Chest x-ray with interstitial changes.  Patient is started on Rocephin and azithromycin.  Patient will be admitted to Hospital Medicine.    Overview/Hospital Course:  No notes on file    Interval History:  Patient was successfully weaned off nasal cannula to room air remained to be wheezy on auscultation complaining of cough and chest pain, B chest x-ray showed picture of interstitial lung disease(given her history of chest radiotherapy that can be related).      Review of Systems   Constitutional:  Positive for activity change, appetite change and fatigue.   HENT: Negative.     Respiratory:  Positive for cough, shortness of breath and wheezing.    Cardiovascular:  Negative for chest pain, palpitations and leg swelling.   Gastrointestinal: Negative.    Genitourinary: Negative.    Musculoskeletal: Negative.    Neurological: Negative.    Psychiatric/Behavioral:  Negative for agitation. The patient is nervous/anxious.      Objective:     Vital Signs (Most Recent):  Temp: 97.5 °F  (36.4 °C) (04/25/25 1120)  Pulse: 90 (04/25/25 1242)  Resp: 20 (04/25/25 1120)  BP: 119/82 (04/25/25 1120)  SpO2: (!) 91 % (04/25/25 1120) Vital Signs (24h Range):  Temp:  [97.4 °F (36.3 °C)-97.9 °F (36.6 °C)] 97.5 °F (36.4 °C)  Pulse:  [] 90  Resp:  [16-20] 20  SpO2:  [91 %-100 %] 91 %  BP: ()/(42-82) 119/82     Weight: 59.9 kg (132 lb)  Body mass index is 26.66 kg/m².    Intake/Output Summary (Last 24 hours) at 4/25/2025 1435  Last data filed at 4/25/2025 1435  Gross per 24 hour   Intake 416 ml   Output 4075 ml   Net -3659 ml         Physical Exam  Constitutional:       General: She is in acute distress.   HENT:      Head: Normocephalic and atraumatic.      Nose: Nose normal.      Mouth/Throat:      Mouth: Mucous membranes are moist.   Eyes:      Extraocular Movements: Extraocular movements intact.      Pupils: Pupils are equal, round, and reactive to light.   Cardiovascular:      Rate and Rhythm: Normal rate. Rhythm irregular.      Pulses: Normal pulses.      Heart sounds: Normal heart sounds.   Pulmonary:      Effort: Respiratory distress present.      Breath sounds: Wheezing present.      Comments: On 2 L nasal cannula  Abdominal:      Palpations: Abdomen is soft.   Musculoskeletal:      Right lower leg: No edema.      Left lower leg: No edema.   Skin:     Capillary Refill: Capillary refill takes 2 to 3 seconds.      Coloration: Skin is pale.   Neurological:      Mental Status: She is alert and oriented to person, place, and time.   Psychiatric:         Mood and Affect: Mood normal.               Significant Labs: All pertinent labs within the past 24 hours have been reviewed.  Recent Lab Results  (Last 5 results in the past 24 hours)        04/25/25  1118   04/25/25  1039   04/25/25  0608   04/25/25  0044   04/24/25  1619        Anion Gap   16             BUN   28             Calcium   9.8             Chloride   93             CO2   25             Creatinine   1.3             eGFR   40  Comment:  Estimated GFR calculated using the CKD-EPI creatinine (2021) equation.             Glucose   153             Magnesium    2.0             POCT Glucose 165     170   239   244       Potassium   4.1             Sodium   134                                    Significant Imaging: I have reviewed all pertinent imaging results/findings within the past 24 hours.    Imaging Results              X-Ray Chest AP Portable (Final result)  Result time 04/21/25 21:58:30      Final result by Oren Dunn DO (04/21/25 21:58:30)                   Impression:      No acute abnormality.      Electronically signed by: Oren Dunn  Date:    04/21/2025  Time:    21:58               Narrative:    EXAMINATION:  XR CHEST AP PORTABLE    CLINICAL HISTORY:  Sepsis;    TECHNIQUE:  Single frontal view of the chest was performed.    COMPARISON:  10/21/2024.    FINDINGS:  Coarse interstitial thickening again noted, stable.  No new focal consolidation.  The pleural spaces are clear.  The cardiac silhouette is borderline enlarged.  There are calcifications of the aortic arch.  Osseous structures demonstrate degenerative changes and osteopenia.  There are surgical clips.                                  Echo  Result Date: 4/23/2025    Left Ventricle: There is low normal systolic function with a visually   estimated ejection fraction of 50 - 55%. Unable to assess diastolic   function due to atrial fibrillation.    Right Ventricle: The right ventricle is normal in size. Systolic   function is mildly reduced.    Left Atrium: dilated    Right Atrium: Right atrium is mildly dilated.    Aortic Valve: There is moderate aortic valve sclerosis. Moderately   restricted motion. There is moderate stenosis. Aortic valve area by VTI is   1.1 cm². Aortic valve peak velocity is 2.1 m/s. Mean gradient is 10 mmHg.   The dimensionless index is 0.35.    Mitral Valve: There is moderate mitral annular calcification. There is   mild regurgitation.    Tricuspid  Valve: There is moderate regurgitation.    Pulmonic Valve: There is mild regurgitation.    Pulmonary Artery: There is pulmonary hypertension. The estimated   pulmonary artery systolic pressure is 75 mmHg.    IVC/SVC: Intermediate venous pressure at 8 mmHg.        Echo  Result Date: 10/10/2024    Left Ventricle: The left ventricle is normal in size. Normal wall   thickness. There is normal systolic function with a visually estimated   ejection fraction of 55 - 60%. Unable to assess diastolic function due to   atrial fibrillation.    Right Ventricle: Normal right ventricular cavity size. Wall thickness   is normal. Systolic function is normal.    Left Atrium: Left atrium is severely dilated.    Right Atrium: Right atrium is severely dilated.    Aortic Valve: The aortic valve is a trileaflet valve. There is moderate   aortic valve sclerosis. Mildly restricted motion. There is mild stenosis.   Aortic valve area by VTI is 2.6 cm2. Aortic valve peak velocity is 1.8   m/s. Mean gradient is 7.3 mmHg. The dimensionless index is 0.92.    Mitral Valve: There is mild regurgitation.    Tricuspid Valve: There is severe regurgitation.    Pulmonary Artery: The estimated pulmonary artery systolic pressure is   64 mmHg.    IVC/SVC: Elevated venous pressure at 15 mmHg.      Echo  Result Date: 4/23/2025    Left Ventricle: There is low normal systolic function with a visually   estimated ejection fraction of 50 - 55%. Unable to assess diastolic   function due to atrial fibrillation.    Right Ventricle: The right ventricle is normal in size. Systolic   function is mildly reduced.    Left Atrium: dilated    Right Atrium: Right atrium is mildly dilated.    Aortic Valve: There is moderate aortic valve sclerosis. Moderately   restricted motion. There is moderate stenosis. Aortic valve area by VTI is   1.1 cm². Aortic valve peak velocity is 2.1 m/s. Mean gradient is 10 mmHg.   The dimensionless index is 0.35.    Mitral Valve: There is  moderate mitral annular calcification. There is   mild regurgitation.    Tricuspid Valve: There is moderate regurgitation.    Pulmonic Valve: There is mild regurgitation.    Pulmonary Artery: There is pulmonary hypertension. The estimated   pulmonary artery systolic pressure is 75 mmHg.    IVC/SVC: Intermediate venous pressure at 8 mmHg.        Imaging Results              X-Ray Chest AP Portable (Final result)  Result time 04/21/25 21:58:30      Final result by Oren Dnun DO (04/21/25 21:58:30)                   Impression:      No acute abnormality.      Electronically signed by: Oren Dunn  Date:    04/21/2025  Time:    21:58               Narrative:    EXAMINATION:  XR CHEST AP PORTABLE    CLINICAL HISTORY:  Sepsis;    TECHNIQUE:  Single frontal view of the chest was performed.    COMPARISON:  10/21/2024.    FINDINGS:  Coarse interstitial thickening again noted, stable.  No new focal consolidation.  The pleural spaces are clear.  The cardiac silhouette is borderline enlarged.  There are calcifications of the aortic arch.  Osseous structures demonstrate degenerative changes and osteopenia.  There are surgical clips.                                        Assessment & Plan  Essential hypertension  Patient's blood pressure range in the last 24 hours was: BP  Min: 95/42  Max: 143/79.The patient's inpatient anti-hypertensive regimen is listed below:  Current Antihypertensives  metoprolol succinate (TOPROL-XL) 24 hr tablet 50 mg, Daily, Oral  metoprolol injection 5 mg, Every 6 hours PRN, Intravenous  spironolactone tablet 25 mg, Daily, Oral  furosemide injection 40 mg, Daily, Intravenous    Plan  - BP is controlled, no changes needed to their regimen  -   CAP (community acquired pneumonia)  Patient has a diagnosis of pneumonia. The cause of the pneumonia is unknown at this time. The pneumonia is worsening due to hypoxia . The patient has the following signs/symptoms of pneumonia: persistent hypoxia , cough,  and shortness of breath. The patient does have a current oxygen requirement and the patient does not have a home oxygen requirement. I have reviewed the pertinent imaging. The following cultures have been collected: Blood cultures The culture results are listed below.     Current antimicrobial regimen consists of the antibiotics listed below. Will monitor patient closely and continue current treatment plan unchanged.     Continue antibiotics antibiotic deescalated on 04/25 into levofloxacin p.o.   Supplemental O2      Antibiotics (From admission, onward)      Start     Stop Route Frequency Ordered    04/25/25 0915  levoFLOXacin tablet 750 mg         -- Oral Every other day 04/25/25 0913            Microbiology Results (last 7 days)       Procedure Component Value Units Date/Time    Culture, Respiratory with Gram Stain [[id number]] Collected: 04/23/25 1646    Order Status: Completed Specimen: Respiratory from Sputum, Expectorated Updated: 04/25/25 1112     Respiratory Culture Normal respiratory tracey     GRAM STAIN No WBCs      No organisms seen    Blood culture x two cultures. Draw prior to antibiotics. [[id number]]  (Normal) Collected: 04/21/25 2123    Order Status: Completed Specimen: Blood from Peripheral, Antecubital, Left Updated: 04/25/25 0411     Blood Culture No Growth After 72 Hours    Blood culture x two cultures. Draw prior to antibiotics. [[id number]]  (Normal) Collected: 04/21/25 2117    Order Status: Completed Specimen: Blood from Peripheral, Forearm, Left Updated: 04/25/25 0302     Blood Culture No Growth After 72 Hours    MRSA Screen by PCR [[id number]]  (Normal) Collected: 04/23/25 1505    Order Status: Completed Specimen: Nasal Swab Updated: 04/24/25 0429     MRSA PCR Screen Not Detected    Influenza A & B by Molecular [[id number]]  (Normal) Collected: 04/21/25 2114    Order Status: Completed Specimen: Nasopharyngeal Swab Updated: 04/21/25 2149     INFLUENZA A MOLECULAR Negative     INFLUENZA B  MOLECULAR  Negative          Atrial fibrillation with RVR  Patient has persistent (7 days or more) atrial fibrillation. Patient is currently in atrial fibrillation. [id number] Score: 3. The patients heart rate in the last 24 hours is as follows:  Pulse  Min: 80  Max: 107     Antiarrhythmics  metoprolol succinate (TOPROL-XL) 24 hr tablet 50 mg, Daily, Oral  metoprolol injection 5 mg, Every 6 hours PRN, Intravenous    Anticoagulants  rivaroxaban tablet 15 mg, Daily, Oral    Plan  - Replete lytes with a goal of K>4, Mg >2  - Patient is anticoagulated, see medications listed above.  - Patient's afib is currently controlled        Hypokalemia  Patient's most recent potassium results are listed below.   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 The patient's most recent sodium results are listed below.  Recent Labs     04/23/25  0541 04/24/25  0651 04/25/25  1039   * 129* 134*     Plan  - Correct the sodium by 4-6mEq in 24 hours.    -hyponatremia secondary to SIADH, currently patient on fluid restriction less than 1500 mL per day  - Will treat the hyponatremia with to be monitored since it is a chronic  - Monitor sodium Daily.   - Patient hyponatremia is  monitored  Improving  Acute hypoxic respiratory failure  Patient with Hypoxic Respiratory failure which is Acute.  she is not on home oxygen. Supplemental oxygen was provided and noted- Oxygen Concentration (%):  [28] 28    .   Signs/symptoms of respiratory failure include- tachypnea, increased work of breathing, respiratory distress, and wheezing. Contributing diagnoses includes - CHF and Pneumonia Labs and images were reviewed. Patient Has not had a recent ABG. Will treat underlying causes and adjust management of respiratory failure as  follows- IV antibiotics nasal cannula, echo ordered cardiology was consulted  Elevated troponin  Can be secondary to high demand ischemia  Ordered echocardiogram that showed severe pulmonary hypertension with moderate mitral regurg  Cardiology consulted appreciate recommendations  No ST changes noted    Longstanding persistent atrial fibrillation  Patient has long standing persistent (>12 months) atrial fibrillation. Patient is currently in atrial fibrillation. [id number] Score: 3. The patients heart rate in the last 24 hours is as follows:  Pulse  Min: 80  Max: 107     Antiarrhythmics  metoprolol succinate (TOPROL-XL) 24 hr tablet 50 mg, Daily, Oral  metoprolol injection 5 mg, Every 6 hours PRN, Intravenous    Anticoagulants  rivaroxaban tablet 15 mg, Daily, Oral    Plan  - Replete lytes with a goal of K>4, Mg >2  - Patient is anticoagulated, see medications listed above.  - Patient's afib is currently controlled  - to be monitored closely  -cardiology is consulted      Nonrheumatic aortic valve stenosis  Echocardiogram with evidence of mitral regurgitation that is moderate . The patient's most recent echocardiogram result is listed below. We will manage the valvular abnormality by diuresing the patient, cardiology is on board.    Echo  Result Date: 4/23/2025    Left Ventricle: There is low normal systolic function with a visually   estimated ejection fraction of 50 - 55%. Unable to assess diastolic   function due to atrial fibrillation.    Right Ventricle: The right ventricle is normal in size. Systolic   function is mildly reduced.    Left Atrium: dilated    Right Atrium: Right atrium is mildly dilated.    Aortic Valve: There is moderate aortic valve sclerosis. Moderately   restricted motion. There is moderate stenosis. Aortic valve area by VTI is   1.1 cm². Aortic valve peak velocity is 2.1 m/s. Mean gradient is 10 mmHg.   The dimensionless index is 0.35.    Mitral Valve: There is moderate mitral annular  calcification. There is   mild regurgitation.    Tricuspid Valve: There is moderate regurgitation.    Pulmonic Valve: There is mild regurgitation.    Pulmonary Artery: There is pulmonary hypertension. The estimated   pulmonary artery systolic pressure is 75 mmHg.    IVC/SVC: Intermediate venous pressure at 8 mmHg.        Echo  Result Date: 10/10/2024    Left Ventricle: The left ventricle is normal in size. Normal wall   thickness. There is normal systolic function with a visually estimated   ejection fraction of 55 - 60%. Unable to assess diastolic function due to   atrial fibrillation.    Right Ventricle: Normal right ventricular cavity size. Wall thickness   is normal. Systolic function is normal.    Left Atrium: Left atrium is severely dilated.    Right Atrium: Right atrium is severely dilated.    Aortic Valve: The aortic valve is a trileaflet valve. There is moderate   aortic valve sclerosis. Mildly restricted motion. There is mild stenosis.   Aortic valve area by VTI is 2.6 cm2. Aortic valve peak velocity is 1.8   m/s. Mean gradient is 7.3 mmHg. The dimensionless index is 0.92.    Mitral Valve: There is mild regurgitation.    Tricuspid Valve: There is severe regurgitation.    Pulmonary Artery: The estimated pulmonary artery systolic pressure is   64 mmHg.    IVC/SVC: Elevated venous pressure at 15 mmHg.         Pulmonary hypertension  Given her history of stroke ideation more than 10 years ago due to her history of breast cancer, that can be the reason for her pulmonary hypertension  Echo was done  On Aldactone and IV Lasix  Strict intake and output, less than 1500 mL per day with low-salt diet  Cardiology is on board  2.3L overnight negative 4.5L since admission   Nonrheumatic tricuspid valve regurgitation  Echocardiogram with evidence of tricuspid regurgitation that is moderate . The patient's most recent echocardiogram result is listed below.     Echo  Result Date: 4/23/2025    Left Ventricle: There is low  normal systolic function with a visually   estimated ejection fraction of 50 - 55%. Unable to assess diastolic   function due to atrial fibrillation.    Right Ventricle: The right ventricle is normal in size. Systolic   function is mildly reduced.    Left Atrium: dilated    Right Atrium: Right atrium is mildly dilated.    Aortic Valve: There is moderate aortic valve sclerosis. Moderately   restricted motion. There is moderate stenosis. Aortic valve area by VTI is   1.1 cm². Aortic valve peak velocity is 2.1 m/s. Mean gradient is 10 mmHg.   The dimensionless index is 0.35.    Mitral Valve: There is moderate mitral annular calcification. There is   mild regurgitation.    Tricuspid Valve: There is moderate regurgitation.    Pulmonic Valve: There is mild regurgitation.    Pulmonary Artery: There is pulmonary hypertension. The estimated   pulmonary artery systolic pressure is 75 mmHg.    IVC/SVC: Intermediate venous pressure at 8 mmHg.        Echo  Result Date: 10/10/2024    Left Ventricle: The left ventricle is normal in size. Normal wall   thickness. There is normal systolic function with a visually estimated   ejection fraction of 55 - 60%. Unable to assess diastolic function due to   atrial fibrillation.    Right Ventricle: Normal right ventricular cavity size. Wall thickness   is normal. Systolic function is normal.    Left Atrium: Left atrium is severely dilated.    Right Atrium: Right atrium is severely dilated.    Aortic Valve: The aortic valve is a trileaflet valve. There is moderate   aortic valve sclerosis. Mildly restricted motion. There is mild stenosis.   Aortic valve area by VTI is 2.6 cm2. Aortic valve peak velocity is 1.8   m/s. Mean gradient is 7.3 mmHg. The dimensionless index is 0.92.    Mitral Valve: There is mild regurgitation.    Tricuspid Valve: There is severe regurgitation.    Pulmonary Artery: The estimated pulmonary artery systolic pressure is   64 mmHg.    IVC/SVC: Elevated venous pressure at  15 mmHg.       2.3L overnight negative 4.5L since admission   VTE Risk Mitigation (From admission, onward)           Ordered     rivaroxaban tablet 15 mg  Daily         04/22/25 0749     IP VTE HIGH RISK PATIENT  Once         04/22/25 0133     Place sequential compression device  Until discontinued         04/22/25 0133                    Discharge Planning   BASILIO: 4/26/2025     Code Status: Full Code   Medical Readiness for Discharge Date:   Discharge Plan A: Home, Home Health   Discharge Delays: None known at this time            Please place Justification for DME        Eris Beyer MD  Department of Hospital Medicine   [place] [facility]

## 2025-04-25 NOTE — PT/OT/SLP PROGRESS
Physical Therapy Treatment    Patient Name:  Claudia Sierra   MRN:  [id number]    Recommendations:     Discharge Recommendations: Moderate Intensity Therapy  Discharge Equipment Recommendations:  (TBD)  Barriers to discharge:  decreased mobility,strength and endurance    Assessment:     Claudia Sierra is a 84 y.o. female admitted with a medical diagnosis of Acute hypoxic respiratory failure.  She presents with the following impairments/functional limitations: weakness, impaired endurance, impaired functional mobility, gait instability, impaired balance, decreased lower extremity function, impaired skin, impaired coordination, impaired joint extensibility,pt with good participation and requires assistance with mobility at this time,pt will benefit from moderate intensity therapy upon discharge.    Rehab Prognosis: Good; patient would benefit from acute skilled PT services to address these deficits and reach maximum level of function.    Recent Surgery: * No surgery found *      Plan:     During this hospitalization, patient to be seen 4 x/week to address the identified rehab impairments via gait training, therapeutic activities, therapeutic exercises, neuromuscular re-education and progress toward the following goals:    Plan of Care Expires:  05/24/25    Subjective     Chief Complaint: n/a  Patient/Family Comments/goals: pt agreeable to rx.  Pain/Comfort:  Pain Rating 1: 0/10      Objective:     Communicated with nsg prior to session.  Patient found supine with   upon PT entry to room.     General Precautions: Standard, fall  Orthopedic Precautions: N/A  Braces: N/A  Respiratory Status: Room air     Functional Mobility:  Bed Mobility:     Supine to Sit: supervision  Transfers:     Sit to Stand:  minimum assistance and X 2 trials with rolling walker  Bed to Chair: minimum assistance with  rolling walker  using  Step Transfer  Balance: fair standing balance with RW      AM-PAC 6 CLICK MOBILITY  Turning over in  bed (including adjusting bedclothes, sheets and blankets)?: 3  Sitting down on and standing up from a chair with arms (e.g., wheelchair, bedside commode, etc.): 3  Moving from lying on back to sitting on the side of the bed?: 3  Moving to and from a bed to a chair (including a wheelchair)?: 3  Need to walk in hospital room?: 3  Climbing 3-5 steps with a railing?: 1  Basic Mobility Total Score: 16       Treatment & Education: pt took 3-4 steps with RW and Min A,requests met,set up chair and alarm,le seated ex's within tolerance.      Patient left up in chair with all lines intact, call button in reach, chair alarm on, and nsg notified..    GOALS:   Multidisciplinary Problems       Physical Therapy Goals          Problem: Physical Therapy    Goal Priority Disciplines Outcome Interventions   Physical Therapy Goal     PT, PT/OT Progressing    Description: Goals to be met by: 25     Patient will increase functional independence with mobility by performin. Supine to sit with Modified [place]  2. Sit to supine with Modified [place]  3. Sit to stand transfer with Modified [place] with use of LRAD.   4. Bed to chair transfer with Modified [place] using LRAD.   5. Gait  x 150 feet with Modified [place] using LRAD.                          DME Justifications:  No DME recommended requiring DME justifications    Time Tracking:     PT Received On: 25  PT Start Time: 907     PT Stop Time: 941  PT Total Time (min): 34 min     Billable Minutes: Therapeutic Activity 23 and Therapeutic Exercise 11    Treatment Type: Treatment  PT/PTA: PTA     Number of PTA visits since last PT visit: 2025

## 2025-04-25 NOTE — ASSESSMENT & PLAN NOTE
Echocardiogram with evidence of tricuspid regurgitation that is moderate . The patient's most recent echocardiogram result is listed below.     Echo  Result Date: 4/23/2025    Left Ventricle: There is low normal systolic function with a visually   estimated ejection fraction of 50 - 55%. Unable to assess diastolic   function due to atrial fibrillation.    Right Ventricle: The right ventricle is normal in size. Systolic   function is mildly reduced.    Left Atrium: dilated    Right Atrium: Right atrium is mildly dilated.    Aortic Valve: There is moderate aortic valve sclerosis. Moderately   restricted motion. There is moderate stenosis. Aortic valve area by VTI is   1.1 cm². Aortic valve peak velocity is 2.1 m/s. Mean gradient is 10 mmHg.   The dimensionless index is 0.35.    Mitral Valve: There is moderate mitral annular calcification. There is   mild regurgitation.    Tricuspid Valve: There is moderate regurgitation.    Pulmonic Valve: There is mild regurgitation.    Pulmonary Artery: There is pulmonary hypertension. The estimated   pulmonary artery systolic pressure is 75 mmHg.    IVC/SVC: Intermediate venous pressure at 8 mmHg.        Echo  Result Date: 10/10/2024    Left Ventricle: The left ventricle is normal in size. Normal wall   thickness. There is normal systolic function with a visually estimated   ejection fraction of 55 - 60%. Unable to assess diastolic function due to   atrial fibrillation.    Right Ventricle: Normal right ventricular cavity size. Wall thickness   is normal. Systolic function is normal.    Left Atrium: Left atrium is severely dilated.    Right Atrium: Right atrium is severely dilated.    Aortic Valve: The aortic valve is a trileaflet valve. There is moderate   aortic valve sclerosis. Mildly restricted motion. There is mild stenosis.   Aortic valve area by VTI is 2.6 cm2. Aortic valve peak velocity is 1.8   m/s. Mean gradient is 7.3 mmHg. The dimensionless index is 0.92.    Mitral  Valve: There is mild regurgitation.    Tricuspid Valve: There is severe regurgitation.    Pulmonary Artery: The estimated pulmonary artery systolic pressure is   64 mmHg.    IVC/SVC: Elevated venous pressure at 15 mmHg.       2.3L overnight negative 4.5L since admission

## 2025-04-25 NOTE — PLAN OF CARE
SW met with pt and pts son at bedside to discuss dc planning. SW discussed PT/OT recs. Pt and family declined snf placement. Pt is agreeable to dc home with  services. Pts son will provide transport home at time of dc. Pt resides in home alone. Pts son will be in town for a couple of days to assist with care as needed. Pts son will also reach out to family and neighbors to check on pt when he leaves.     Pts son is interested in sitters agencies contact information.   Sitter agencies list provided to son.     *Please note if pt dc on Saturday pts son will be at a  during the morning time. Pts son will be able to transport pt home after  services.     SW will continue to follow pt throughout her transitions of care and assist with any dc needs.     [facility]     Future Appointments   Date Time Provider Department Center   2025 11:00 AM Mikayla Crane MD [facility] IMPRI [facility]   2025 10:00 AM LAB[facility]   2025 10:15 AM CV [facility] E[facility]   2025  1:20 PM Keith Alexander Jr., MD [facility]   2025  2:30 PM Sajan Curtis MD [facility] PRI[facility]   2026  1:00 PM [facility] MAMMO1 [facility]   2026 11:40 AM Mingo Wynne MD [facility] HEM ONC [facility]        25 1205   Post-Acute Status   Post-Acute Authorization Home Health   Home Health Status Referrals Sent   Hospital Resources/Appts/Education Provided Appointments scheduled and added to AVS   Discharge Delays None known at this time   Discharge Plan   Discharge Plan A Home;Home Health

## 2025-04-25 NOTE — ASSESSMENT & PLAN NOTE
Patient has long standing persistent (>12 months) atrial fibrillation. Patient is currently in atrial fibrillation. [id number] Score: 3. The patients heart rate in the last 24 hours is as follows:  Pulse  Min: 80  Max: 107     Antiarrhythmics  metoprolol succinate (TOPROL-XL) 24 hr tablet 50 mg, Daily, Oral  metoprolol injection 5 mg, Every 6 hours PRN, Intravenous    Anticoagulants  rivaroxaban tablet 15 mg, Daily, Oral    Plan  - Replete lytes with a goal of K>4, Mg >2  - Patient is anticoagulated, see medications listed above.  - Patient's afib is currently controlled  - to be monitored closely  -cardiology is consulted

## 2025-04-25 NOTE — ASSESSMENT & PLAN NOTE
- SBP 90s-140s  - on Metoprolol and Aldactone as an outpatient   - continue Metoprolol and Aldactone   - BP well controlled

## 2025-04-25 NOTE — SUBJECTIVE & OBJECTIVE
Interval History:  Patient was successfully weaned off nasal cannula to room air remained to be wheezy on auscultation complaining of cough and chest pain, B chest x-ray showed picture of interstitial lung disease(given her history of chest radiotherapy that can be related).      Review of Systems   Constitutional:  Positive for activity change, appetite change and fatigue.   HENT: Negative.     Respiratory:  Positive for cough, shortness of breath and wheezing.    Cardiovascular:  Negative for chest pain, palpitations and leg swelling.   Gastrointestinal: Negative.    Genitourinary: Negative.    Musculoskeletal: Negative.    Neurological: Negative.    Psychiatric/Behavioral:  Negative for agitation. The patient is nervous/anxious.      Objective:     Vital Signs (Most Recent):  Temp: 97.5 °F (36.4 °C) (04/25/25 1120)  Pulse: 90 (04/25/25 1242)  Resp: 20 (04/25/25 1120)  BP: 119/82 (04/25/25 1120)  SpO2: (!) 91 % (04/25/25 1120) Vital Signs (24h Range):  Temp:  [97.4 °F (36.3 °C)-97.9 °F (36.6 °C)] 97.5 °F (36.4 °C)  Pulse:  [] 90  Resp:  [16-20] 20  SpO2:  [91 %-100 %] 91 %  BP: ()/(42-82) 119/82     Weight: 59.9 kg (132 lb)  Body mass index is 26.66 kg/m².    Intake/Output Summary (Last 24 hours) at 4/25/2025 1435  Last data filed at 4/25/2025 1435  Gross per 24 hour   Intake 416 ml   Output 4075 ml   Net -3659 ml         Physical Exam  Constitutional:       General: She is in acute distress.   HENT:      Head: Normocephalic and atraumatic.      Nose: Nose normal.      Mouth/Throat:      Mouth: Mucous membranes are moist.   Eyes:      Extraocular Movements: Extraocular movements intact.      Pupils: Pupils are equal, round, and reactive to light.   Cardiovascular:      Rate and Rhythm: Normal rate. Rhythm irregular.      Pulses: Normal pulses.      Heart sounds: Normal heart sounds.   Pulmonary:      Effort: Respiratory distress present.      Breath sounds: Wheezing present.      Comments: On 2 L nasal  cannula  Abdominal:      Palpations: Abdomen is soft.   Musculoskeletal:      Right lower leg: No edema.      Left lower leg: No edema.   Skin:     Capillary Refill: Capillary refill takes 2 to 3 seconds.      Coloration: Skin is pale.   Neurological:      Mental Status: She is alert and oriented to person, place, and time.   Psychiatric:         Mood and Affect: Mood normal.               Significant Labs: All pertinent labs within the past 24 hours have been reviewed.  Recent Lab Results  (Last 5 results in the past 24 hours)        04/25/25  1118   04/25/25  1039   04/25/25  0608   04/25/25  0044   04/24/25  1619        Anion Gap   16             BUN   28             Calcium   9.8             Chloride   93             CO2   25             Creatinine   1.3             eGFR   40  Comment: Estimated GFR calculated using the CKD-EPI creatinine (2021) equation.             Glucose   153             Magnesium    2.0             POCT Glucose 165     170   239   244       Potassium   4.1             Sodium   134                                    Significant Imaging: I have reviewed all pertinent imaging results/findings within the past 24 hours.    Imaging Results              X-Ray Chest AP Portable (Final result)  Result time 04/21/25 21:58:30      Final result by Oren Dunn DO (04/21/25 21:58:30)                   Impression:      No acute abnormality.      Electronically signed by: Oren Dunn  Date:    04/21/2025  Time:    21:58               Narrative:    EXAMINATION:  XR CHEST AP PORTABLE    CLINICAL HISTORY:  Sepsis;    TECHNIQUE:  Single frontal view of the chest was performed.    COMPARISON:  10/21/2024.    FINDINGS:  Coarse interstitial thickening again noted, stable.  No new focal consolidation.  The pleural spaces are clear.  The cardiac silhouette is borderline enlarged.  There are calcifications of the aortic arch.  Osseous structures demonstrate degenerative changes and osteopenia.  There are  surgical clips.                                  Echo  Result Date: 4/23/2025    Left Ventricle: There is low normal systolic function with a visually   estimated ejection fraction of 50 - 55%. Unable to assess diastolic   function due to atrial fibrillation.    Right Ventricle: The right ventricle is normal in size. Systolic   function is mildly reduced.    Left Atrium: dilated    Right Atrium: Right atrium is mildly dilated.    Aortic Valve: There is moderate aortic valve sclerosis. Moderately   restricted motion. There is moderate stenosis. Aortic valve area by VTI is   1.1 cm². Aortic valve peak velocity is 2.1 m/s. Mean gradient is 10 mmHg.   The dimensionless index is 0.35.    Mitral Valve: There is moderate mitral annular calcification. There is   mild regurgitation.    Tricuspid Valve: There is moderate regurgitation.    Pulmonic Valve: There is mild regurgitation.    Pulmonary Artery: There is pulmonary hypertension. The estimated   pulmonary artery systolic pressure is 75 mmHg.    IVC/SVC: Intermediate venous pressure at 8 mmHg.        Echo  Result Date: 10/10/2024    Left Ventricle: The left ventricle is normal in size. Normal wall   thickness. There is normal systolic function with a visually estimated   ejection fraction of 55 - 60%. Unable to assess diastolic function due to   atrial fibrillation.    Right Ventricle: Normal right ventricular cavity size. Wall thickness   is normal. Systolic function is normal.    Left Atrium: Left atrium is severely dilated.    Right Atrium: Right atrium is severely dilated.    Aortic Valve: The aortic valve is a trileaflet valve. There is moderate   aortic valve sclerosis. Mildly restricted motion. There is mild stenosis.   Aortic valve area by VTI is 2.6 cm2. Aortic valve peak velocity is 1.8   m/s. Mean gradient is 7.3 mmHg. The dimensionless index is 0.92.    Mitral Valve: There is mild regurgitation.    Tricuspid Valve: There is severe regurgitation.    Pulmonary  Artery: The estimated pulmonary artery systolic pressure is   64 mmHg.    IVC/SVC: Elevated venous pressure at 15 mmHg.      Echo  Result Date: 4/23/2025    Left Ventricle: There is low normal systolic function with a visually   estimated ejection fraction of 50 - 55%. Unable to assess diastolic   function due to atrial fibrillation.    Right Ventricle: The right ventricle is normal in size. Systolic   function is mildly reduced.    Left Atrium: dilated    Right Atrium: Right atrium is mildly dilated.    Aortic Valve: There is moderate aortic valve sclerosis. Moderately   restricted motion. There is moderate stenosis. Aortic valve area by VTI is   1.1 cm². Aortic valve peak velocity is 2.1 m/s. Mean gradient is 10 mmHg.   The dimensionless index is 0.35.    Mitral Valve: There is moderate mitral annular calcification. There is   mild regurgitation.    Tricuspid Valve: There is moderate regurgitation.    Pulmonic Valve: There is mild regurgitation.    Pulmonary Artery: There is pulmonary hypertension. The estimated   pulmonary artery systolic pressure is 75 mmHg.    IVC/SVC: Intermediate venous pressure at 8 mmHg.        Imaging Results              X-Ray Chest AP Portable (Final result)  Result time 04/21/25 21:58:30      Final result by Oren Dunn DO (04/21/25 21:58:30)                   Impression:      No acute abnormality.      Electronically signed by: Oren Dunn  Date:    04/21/2025  Time:    21:58               Narrative:    EXAMINATION:  XR CHEST AP PORTABLE    CLINICAL HISTORY:  Sepsis;    TECHNIQUE:  Single frontal view of the chest was performed.    COMPARISON:  10/21/2024.    FINDINGS:  Coarse interstitial thickening again noted, stable.  No new focal consolidation.  The pleural spaces are clear.  The cardiac silhouette is borderline enlarged.  There are calcifications of the aortic arch.  Osseous structures demonstrate degenerative changes and osteopenia.  There are surgical clips.

## 2025-04-25 NOTE — PROGRESS NOTES
[facility] - Telemetry  Cardiology  Progress Note    Patient Name: Claudia Sierra  MRN: [id number]  Admission Date: 4/21/2025  Hospital Length of Stay: 3 days  Code Status: Full Code   Attending Physician: Eris Chu MD   Primary Care Physician: Sajan Curtis MD  Expected Discharge Date: 4/26/2025  Principal Problem:Acute hypoxic respiratory failure    Subjective:     Hospital Course:   85yo female with HTN, CAP, persistent afib on Xarelto, elevated troponin, acute hypoxemic respiratory failure, breast cancer, HLP, AS, chronic DHF and CKD stage III who presented to the ER with complaints of SOB. She reports SOB and cough for the past 3 days with no improvement. She reports SOB occurs with walking longer distances and denies any chest pain, LE edema or orthopnea. She reports compliance with her medication regimen and will take Lasix on an as needed basis typically once per week. She presented to Urgent care and was COVID and Flu negative and was given oral antibiotics. She reports worsening symptoms therefore she presented to the ER. Labs CBC with WBCs 7K BMP with K+ 3.0 BUN 14 creatinine 1.0  Troponin 0.059 Blood cultures prelim NGTD Initial temp 103.3 EKG afib with . SBP 140s-170s. Started on IV Abx and admitted to [facility] consulted for evaluation of elevated troponin     4/23/2025 Remains in afib with HR up to 110s-120s. SBP 110s CBC WNL. BMP with K+ 3.0 Given oral Lasix this AM  4/24/2025 Remains on IV lasix 40mg daily with 2.9L out overnight negative 2.4L since admission. CBC and BMP WNL. SBP stable.HR 90s-100s with continued afib   4/25/2025 Remains in atrial fibrillation HR improving. SBP 90s-140s Remains on IV Lasix 40mg BID with 2.3L out overnight BMP pending Na 129 yesterday. Off O2 today with pulse ox 93-94%        Review of Systems   Constitutional: Negative for chills, decreased appetite, diaphoresis, fever, malaise/fatigue, weight gain and  weight loss.   Cardiovascular:  Negative for chest pain, claudication, dyspnea on exertion, irregular heartbeat, leg swelling, near-syncope, orthopnea, palpitations and paroxysmal nocturnal dyspnea.   Respiratory:  Negative for cough, shortness of breath, snoring, sputum production and wheezing.    Endocrine: Negative for cold intolerance, heat intolerance, polydipsia, polyphagia and polyuria.   Skin:  Negative for color change, dry skin, itching, nail changes and poor wound healing.   Musculoskeletal:  Negative for back pain, gout, joint pain and joint swelling.   Gastrointestinal:  Negative for bloating, abdominal pain, constipation, diarrhea, hematemesis, hematochezia, melena, nausea and vomiting.   Genitourinary:  Negative for dysuria, hematuria and nocturia.   Neurological:  Negative for dizziness, headaches, light-headedness, numbness, paresthesias and weakness.   Psychiatric/Behavioral:  Negative for altered mental status, depression and memory loss.      Objective:     Vital Signs (Most Recent):  Temp: 97.4 °F (36.3 °C) (04/25/25 0736)  Pulse: 99 (04/25/25 0954)  Resp: 18 (04/25/25 0748)  BP: 126/79 (04/25/25 0954)  SpO2: 100 % (04/25/25 0748) Vital Signs (24h Range):  Temp:  [97.4 °F (36.3 °C)-97.9 °F (36.6 °C)] 97.4 °F (36.3 °C)  Pulse:  [] 99  Resp:  [16-18] 18  SpO2:  [93 %-100 %] 100 %  BP: ()/(42-79) 126/79     Weight: 59.9 kg (132 lb)  Body mass index is 26.66 kg/m².     SpO2: 100 %         Intake/Output Summary (Last 24 hours) at 4/25/2025 1114  Last data filed at 4/25/2025 1015  Gross per 24 hour   Intake 250 ml   Output 2850 ml   Net -2600 ml       Lines/Drains/Airways       Drain  Duration             Female External Urinary Catheter w/ Suction 04/23/25 1600 1 day              Peripheral Intravenous Line  Duration                  Peripheral IV - Single Lumen 04/21/25 2121 18 G Left Antecubital 3 days                       Physical Exam  Constitutional:       General: She is not in  acute distress.     Appearance: She is well-developed.   Cardiovascular:      Rate and Rhythm: Normal rate. Rhythm irregularly irregular.      Heart sounds: No murmur heard.     No gallop.   Pulmonary:      Effort: Pulmonary effort is normal. No respiratory distress.      Breath sounds: Normal breath sounds. No wheezing.   Abdominal:      General: Bowel sounds are normal. There is no distension.      Palpations: Abdomen is soft.      Tenderness: There is no abdominal tenderness.   Skin:     General: Skin is warm and dry.   Neurological:      Mental Status: She is alert and oriented to person, place, and time.              Significant Imaging: Echocardiogram: Transthoracic echo (TTE) complete (Cupid Only):   Results for orders placed or performed during the hospital encounter of 04/21/25   Echo   Result Value Ref Range    BSA 1.58 m2    Quigley's Biplane MOD Ejection Fraction 62 %    A2C EF 65 %    A4C EF 56 %    LVOT stroke volume 39.6 cm3    LVIDd 3.6 3.5 - 6.0 cm    LV Systolic Volume 31 mL    LV Systolic Volume Index 20.0 mL/m2    LVIDs 2.9 2.1 - 4.0 cm    LV ESV A2C 45.65 mL    LV Diastolic Volume 53 mL    LV ESV A4C 35.59 mL    LV Diastolic Volume Index 34.19 mL/m2    LV EDV A2C 47.877956864549273 mL    LV EDV A4C 49.58 mL    Left Ventricular End Systolic Volume by Teichholz Method 31.09 mL    Left Ventricular End Diastolic Volume by Teichholz Method 52.59 mL    IVS 0.7 0.6 - 1.1 cm    LVOT diameter 2.0 cm    LVOT area 3.1 cm2    FS 19.4 (A) 28 - 44 %    Left Ventricle Relative Wall Thickness 0.33 cm    PW 0.6 0.6 - 1.1 cm    LV mass 59.7 g    LV Mass Index 38.5 g/m2    MV Peak E Kevin 1.09 m/s    TDI LATERAL 0.11 m/s    TDI SEPTAL 0.09 m/s    E/E' ratio 11 m/s    TR Max Kevin 4.1 m/s    E wave deceleration time 151 msec    LV SEPTAL E/E' RATIO 12.1 m/s    LV LATERAL E/E' RATIO 9.9 m/s    LVOT peak kevin 0.6 m/s    Left Ventricular Outflow Tract Mean Velocity 0.50 cm/s    Left Ventricular Outflow Tract Mean Gradient  1.03 mmHg    RV- levi basal diam 3.6 cm    RV S' 8.29 cm/s    TAPSE 1.25 cm    RV/LV Ratio 1.00 cm    LA Vol (MOD) 40 mL    CORY (MOD) 26 mL/m2    RA area length vol 47.77 mL    RA Area 18.5 cm2    RA Vol 51.16 mL    AV mean gradient 10 mmHg    AV peak gradient 18 mmHg    Ao peak vickie 2.1 m/s    Ao VTI 35.8 cm    LVOT peak VTI 12.6 cm    AV valve area 1.1 cm²    AV Velocity Ratio 0.29     AV index (prosthetic) 0.35     EWELINA by Velocity Ratio 0.9 cm²    MV mean gradient 2 mmHg    MV peak gradient 5 mmHg    MV stenosis pressure 1/2 time 43.93 ms    MV valve area p 1/2 method 5.01 cm2    MV valve area by continuity eq 2.05 cm2    MV VTI 19.3 cm    Triscuspid Valve Regurgitation Peak Gradient 68 mmHg    PV PEAK VELOCITY 0.86 m/s    PV peak gradient 3 mmHg    Pulmonary Valve Mean Velocity 0.60 m/s    Sinus 3.02 cm    STJ 2.74 cm    IVC diameter 1.91 cm    Mean e' 0.10 m/s    ZLVIDS 0.32     ZLVIDD -2.20     LA area A4C 15.04 cm2    LA area A2C 17.22 cm2    Mitral Valve Heart Rate 104 bpm    TV resting pulmonary artery pressure 75 mmHg    RV TB RVSP 12 mmHg    Est. RA pres 8 mmHg    Narrative      Left Ventricle: There is low normal systolic function with a visually   estimated ejection fraction of 50 - 55%. Unable to assess diastolic   function due to atrial fibrillation.    Right Ventricle: The right ventricle is normal in size. Systolic   function is mildly reduced.    Left Atrium: dilated    Right Atrium: Right atrium is mildly dilated.    Aortic Valve: There is moderate aortic valve sclerosis. Moderately   restricted motion. There is moderate stenosis. Aortic valve area by VTI is   1.1 cm². Aortic valve peak velocity is 2.1 m/s. Mean gradient is 10 mmHg.   The dimensionless index is 0.35.    Mitral Valve: There is moderate mitral annular calcification. There is   mild regurgitation.    Tricuspid Valve: There is moderate regurgitation.    Pulmonic Valve: There is mild regurgitation.    Pulmonary Artery: There is  pulmonary hypertension. The estimated   pulmonary artery systolic pressure is 75 mmHg.    IVC/SVC: Intermediate venous pressure at 8 mmHg.       Assessment and Plan:     Brief HPI: Seen this morning on AM NP while resting in bed. Reports SOB improving. Cough remains. Discussed POC as detailed below-verbalized understanding and agrees with POC      Nonrheumatic tricuspid valve regurgitation  - echo with normal LVEF moderate TR estimated PA pressure 75mmHg  -   - admitted with PNA; rales improving; remains on IV Lasix; volume overloaded initially given TR and AS; IV Lasix with 2.3L overnight negative 4.5L since admission    Elevated troponin  - presented with SOB and cough  - troponin 0.059; EKG with no acute STTWC;  mild troponin elevation related to demand etiology in setting of fever and infectious etiology   - echo with normal LVEF and moderate TR    Hyponatremia  - Na 129 yesterday down from 133-135 upon admission   - BMP pending this AM  - felt to be related to hypervolemic hyponatremia; monitor with diuresis; repeat CXR today     Atrial fibrillation with RVR  - history of persistent afib  - RVR related to fever and infectious etiology; HR improving; continue BB   - continue BB and Xarelto     Nonrheumatic aortic valve stenosis  - echo with normal LVEF and moderate AS with EWELINA 1.1cm2 MG 10mmHg as well as TR  - diuresis in process     Longstanding persistent atrial fibrillation  - history of persistent afib followed by Dr Alexander  - rate control strategy; continue BB for rate control along with Xarelto  - monitor on telemetry    Essential hypertension  - SBP 90s-140s  - on Metoprolol and Aldactone as an outpatient   - continue Metoprolol and Aldactone   - BP well controlled        VTE Risk Mitigation (From admission, onward)           Ordered     rivaroxaban tablet 15 mg  Daily         04/22/25 0749     IP VTE HIGH RISK PATIENT  Once         04/22/25 0133     Place sequential compression device  Until  discontinued         04/22/25 0133                    KUNAL Ying, ANP  Cardiology  [facility] - Telemetry

## 2025-04-25 NOTE — ASSESSMENT & PLAN NOTE
- history of persistent afib  - RVR related to fever and infectious etiology; HR improving; continue BB   - continue BB and Xarelto

## 2025-04-25 NOTE — PLAN OF CARE
Problem: Physical Therapy  Goal: Physical Therapy Goal  Description: Goals to be met by: 25     Patient will increase functional independence with mobility by performin. Supine to sit with Modified [place]  2. Sit to supine with Modified [place]  3. Sit to stand transfer with Modified [place] with use of LRAD.   4. Bed to chair transfer with Modified [place] using LRAD.   5. Gait  x 150 feet with Modified [place] using LRAD.     Outcome: Progressing

## 2025-04-25 NOTE — ASSESSMENT & PLAN NOTE
Given her history of stroke ideation more than 10 years ago due to her history of breast cancer, that can be the reason for her pulmonary hypertension  Echo was done  On Aldactone and IV Lasix  Strict intake and output, less than 1500 mL per day with low-salt diet  Cardiology is on board  2.3L overnight negative 4.5L since admission

## 2025-04-25 NOTE — ASSESSMENT & PLAN NOTE
Patient has persistent (7 days or more) atrial fibrillation. Patient is currently in atrial fibrillation. [id number] Score: 3. The patients heart rate in the last 24 hours is as follows:  Pulse  Min: 80  Max: 107     Antiarrhythmics  metoprolol succinate (TOPROL-XL) 24 hr tablet 50 mg, Daily, Oral  metoprolol injection 5 mg, Every 6 hours PRN, Intravenous    Anticoagulants  rivaroxaban tablet 15 mg, Daily, Oral    Plan  - Replete lytes with a goal of K>4, Mg >2  - Patient is anticoagulated, see medications listed above.  - Patient's afib is currently controlled

## 2025-04-25 NOTE — ASSESSMENT & PLAN NOTE
Echocardiogram with evidence of mitral regurgitation that is moderate . The patient's most recent echocardiogram result is listed below. We will manage the valvular abnormality by diuresing the patient, cardiology is on board.    Echo  Result Date: 4/23/2025    Left Ventricle: There is low normal systolic function with a visually   estimated ejection fraction of 50 - 55%. Unable to assess diastolic   function due to atrial fibrillation.    Right Ventricle: The right ventricle is normal in size. Systolic   function is mildly reduced.    Left Atrium: dilated    Right Atrium: Right atrium is mildly dilated.    Aortic Valve: There is moderate aortic valve sclerosis. Moderately   restricted motion. There is moderate stenosis. Aortic valve area by VTI is   1.1 cm². Aortic valve peak velocity is 2.1 m/s. Mean gradient is 10 mmHg.   The dimensionless index is 0.35.    Mitral Valve: There is moderate mitral annular calcification. There is   mild regurgitation.    Tricuspid Valve: There is moderate regurgitation.    Pulmonic Valve: There is mild regurgitation.    Pulmonary Artery: There is pulmonary hypertension. The estimated   pulmonary artery systolic pressure is 75 mmHg.    IVC/SVC: Intermediate venous pressure at 8 mmHg.        Echo  Result Date: 10/10/2024    Left Ventricle: The left ventricle is normal in size. Normal wall   thickness. There is normal systolic function with a visually estimated   ejection fraction of 55 - 60%. Unable to assess diastolic function due to   atrial fibrillation.    Right Ventricle: Normal right ventricular cavity size. Wall thickness   is normal. Systolic function is normal.    Left Atrium: Left atrium is severely dilated.    Right Atrium: Right atrium is severely dilated.    Aortic Valve: The aortic valve is a trileaflet valve. There is moderate   aortic valve sclerosis. Mildly restricted motion. There is mild stenosis.   Aortic valve area by VTI is 2.6 cm2. Aortic valve peak velocity is  1.8   m/s. Mean gradient is 7.3 mmHg. The dimensionless index is 0.92.    Mitral Valve: There is mild regurgitation.    Tricuspid Valve: There is severe regurgitation.    Pulmonary Artery: The estimated pulmonary artery systolic pressure is   64 mmHg.    IVC/SVC: Elevated venous pressure at 15 mmHg.

## 2025-04-25 NOTE — ASSESSMENT & PLAN NOTE
- echo with normal LVEF moderate TR estimated PA pressure 75mmHg  -   - admitted with PNA; rales improving; remains on IV Lasix; volume overloaded initially given TR and AS; IV Lasix with 2.3L overnight negative 4.5L since admission

## 2025-04-25 NOTE — PT/OT/SLP PROGRESS
Occupational Therapy   Treatment    Name: Claudia Sierra  MRN: [id number]  Admitting Diagnosis:  Acute hypoxic respiratory failure       Recommendations:     Discharge Recommendations: Moderate Intensity Therapy  Discharge Equipment Recommendations:  to be determined by next level of care  Barriers to discharge:  Other (Comment) (Increased level of assistance)    Assessment:     Claudia Sierra is a 84 y.o. female with a medical diagnosis of Acute hypoxic respiratory failure.  Performance deficits affecting function are weakness, impaired endurance, impaired self care skills, impaired functional mobility, gait instability, impaired balance, decreased upper extremity function, decreased lower extremity function, decreased safety awareness, impaired coordination.    Pt found in chair, agreeable to therapy.  Pt was limited by dizziness in standing, BP stable 117/62.  Pt is progressing towards goals. Continue OT services to address functional goals, progressing as able.      Rehab Prognosis:  Good; patient would benefit from acute skilled OT services to address these deficits and reach maximum level of function.       Plan:     Patient to be seen 4 x/week to address the above listed problems via self-care/home management, therapeutic activities, therapeutic exercises  Plan of Care Expires: 05/22/25  Plan of Care Reviewed with: patient    Subjective     Chief Complaint: dizzy after walking   Patient/Family Comments/goals: to return to prior level of functioning   Pain/Comfort:  Pain Rating 1: 0/10  Pain Rating Post-Intervention 1: 0/10    Objective:     Communicated with: nurse prior to session.  Patient found up in chair with chair check, peripheral IV, PureWick upon OT entry to room.    General Precautions: Standard, fall    Orthopedic Precautions:N/A  Braces: N/A  Respiratory Status: Room air     Occupational Performance:     Bed Mobility:    Patient completed Sit to Supine with stand by assistance      Functional Mobility/Transfers:  Patient completed Sit <> Stand Transfer with contact guard assistance  with  rolling walker x 2 trials.  Patient completed Bed <> Chair Transfer using Stand Pivot technique with contact guard assistance with rolling walker  Functional Mobility: Pt ambulated around bed with CGA using RW.  Slow pace.  Reports of dizziness.     Activities of Daily Living:  Grooming: stand by assistance seated EOB 2/2 dizziness  Lower Body Dressing: minimum assistance to don slippers      AMPAC 6 Click ADL: 18    Treatment & Education:  Pt sat EOB with SBA.     Patient left HOB elevated with all lines intact, call button in reach, bed alarm on, and nurse notified    GOALS:   Multidisciplinary Problems       Occupational Therapy Goals          Problem: Occupational Therapy    Goal Priority Disciplines Outcome Interventions   Occupational Therapy Goal     OT, PT/OT Progressing    Description: Goals to be met by: 5/22/2025     Patient will increase functional independence with ADLs by performing:    LE Dressing with Stand-by Assistance.  Toileting from bedside commode with Stand-by Assistance for hygiene and clothing management.   Toilet transfer to bedside commode with Stand-by Assistance.  Functional mobility to / from bathroom with least restrictive device, WFL SpO2, and supervision.  100% reciprocation of at least 3 techniques to mitigate fall risk to maximize safety to return to least restrictive environment                         Time Tracking:     OT Date of Treatment: 04/25/25  OT Start Time: 1117  OT Stop Time: 1148  OT Total Time (min): 31 min    Billable Minutes:Self Care/Home Management 11  Therapeutic Activity 20               4/25/2025

## 2025-04-26 PROBLEM — J84.9 INTERSTITIAL LUNG DISEASE: Chronic | Status: ACTIVE | Noted: 2025-04-26

## 2025-04-26 LAB
BACTERIA SPEC CULT: ABNORMAL
GRAM STN SPEC: ABNORMAL
GRAM STN SPEC: ABNORMAL
POCT GLUCOSE: 114 MG/DL (ref 70–110)
POCT GLUCOSE: 124 MG/DL (ref 70–110)
POCT GLUCOSE: 170 MG/DL (ref 70–110)
POCT GLUCOSE: 178 MG/DL (ref 70–110)

## 2025-04-26 PROCEDURE — 94761 N-INVAS EAR/PLS OXIMETRY MLT: CPT

## 2025-04-26 PROCEDURE — 97110 THERAPEUTIC EXERCISES: CPT

## 2025-04-26 PROCEDURE — 11000001 HC ACUTE MED/SURG PRIVATE ROOM

## 2025-04-26 PROCEDURE — 97530 THERAPEUTIC ACTIVITIES: CPT

## 2025-04-26 PROCEDURE — 63600175 PHARM REV CODE 636 W HCPCS

## 2025-04-26 PROCEDURE — 99232 SBSQ HOSP IP/OBS MODERATE 35: CPT | Mod: ,,, | Performed by: INTERNAL MEDICINE

## 2025-04-26 PROCEDURE — 25000003 PHARM REV CODE 250: Performed by: REGISTERED NURSE

## 2025-04-26 PROCEDURE — 25000242 PHARM REV CODE 250 ALT 637 W/ HCPCS

## 2025-04-26 PROCEDURE — 25000003 PHARM REV CODE 250

## 2025-04-26 PROCEDURE — 94640 AIRWAY INHALATION TREATMENT: CPT

## 2025-04-26 PROCEDURE — 25000003 PHARM REV CODE 250: Performed by: FAMILY MEDICINE

## 2025-04-26 RX ORDER — DEXAMETHASONE 4 MG/1
4 TABLET ORAL DAILY
Status: DISCONTINUED | OUTPATIENT
Start: 2025-04-27 | End: 2025-04-27 | Stop reason: HOSPADM

## 2025-04-26 RX ORDER — BUMETANIDE 1 MG/1
1 TABLET ORAL DAILY
Status: DISCONTINUED | OUTPATIENT
Start: 2025-04-27 | End: 2025-04-27 | Stop reason: HOSPADM

## 2025-04-26 RX ORDER — BUMETANIDE 1 MG/1
1 TABLET ORAL DAILY
Status: DISCONTINUED | OUTPATIENT
Start: 2025-04-26 | End: 2025-04-26

## 2025-04-26 RX ADMIN — SPIRONOLACTONE 25 MG: 25 TABLET, FILM COATED ORAL at 09:04

## 2025-04-26 RX ADMIN — GUAIFENESIN 600 MG: 600 TABLET, EXTENDED RELEASE ORAL at 09:04

## 2025-04-26 RX ADMIN — IPRATROPIUM BROMIDE AND ALBUTEROL SULFATE 3 ML: 2.5; .5 SOLUTION RESPIRATORY (INHALATION) at 07:04

## 2025-04-26 RX ADMIN — GABAPENTIN 600 MG: 300 CAPSULE ORAL at 09:04

## 2025-04-26 RX ADMIN — LACTULOSE 30 G: 20 SOLUTION ORAL at 12:04

## 2025-04-26 RX ADMIN — IPRATROPIUM BROMIDE AND ALBUTEROL SULFATE 3 ML: 2.5; .5 SOLUTION RESPIRATORY (INHALATION) at 04:04

## 2025-04-26 RX ADMIN — RIVAROXABAN 15 MG: 15 TABLET, FILM COATED ORAL at 05:04

## 2025-04-26 RX ADMIN — EMPAGLIFLOZIN 10 MG: 10 TABLET, FILM COATED ORAL at 09:04

## 2025-04-26 RX ADMIN — METOPROLOL SUCCINATE 50 MG: 50 TABLET, EXTENDED RELEASE ORAL at 09:04

## 2025-04-26 RX ADMIN — FUROSEMIDE 40 MG: 10 INJECTION, SOLUTION INTRAVENOUS at 09:04

## 2025-04-26 RX ADMIN — MONTELUKAST 10 MG: 10 TABLET, FILM COATED ORAL at 09:04

## 2025-04-26 RX ADMIN — GUAIFENESIN 600 MG: 600 TABLET, EXTENDED RELEASE ORAL at 08:04

## 2025-04-26 RX ADMIN — BUDESONIDE 0.5 MG: 0.5 INHALANT RESPIRATORY (INHALATION) at 07:04

## 2025-04-26 RX ADMIN — GABAPENTIN 600 MG: 300 CAPSULE ORAL at 08:04

## 2025-04-26 RX ADMIN — IPRATROPIUM BROMIDE AND ALBUTEROL SULFATE 3 ML: 2.5; .5 SOLUTION RESPIRATORY (INHALATION) at 11:04

## 2025-04-26 RX ADMIN — IPRATROPIUM BROMIDE AND ALBUTEROL SULFATE 3 ML: 2.5; .5 SOLUTION RESPIRATORY (INHALATION) at 03:04

## 2025-04-26 RX ADMIN — PRAVASTATIN SODIUM 40 MG: 40 TABLET ORAL at 09:04

## 2025-04-26 RX ADMIN — CETIRIZINE HYDROCHLORIDE 5 MG: 5 TABLET, FILM COATED ORAL at 08:04

## 2025-04-26 RX ADMIN — DEXAMETHASONE SODIUM PHOSPHATE 4 MG: 4 INJECTION, SOLUTION INTRA-ARTICULAR; INTRALESIONAL; INTRAMUSCULAR; INTRAVENOUS; SOFT TISSUE at 09:04

## 2025-04-26 NOTE — ASSESSMENT & PLAN NOTE
Echocardiogram with evidence of tricuspid regurgitation that is moderate . The patient's most recent echocardiogram result is listed below.     Echo  Result Date: 4/23/2025    Left Ventricle: There is low normal systolic function with a visually   estimated ejection fraction of 50 - 55%. Unable to assess diastolic   function due to atrial fibrillation.    Right Ventricle: The right ventricle is normal in size. Systolic   function is mildly reduced.    Left Atrium: dilated    Right Atrium: Right atrium is mildly dilated.    Aortic Valve: There is moderate aortic valve sclerosis. Moderately   restricted motion. There is moderate stenosis. Aortic valve area by VTI is   1.1 cm². Aortic valve peak velocity is 2.1 m/s. Mean gradient is 10 mmHg.   The dimensionless index is 0.35.    Mitral Valve: There is moderate mitral annular calcification. There is   mild regurgitation.    Tricuspid Valve: There is moderate regurgitation.    Pulmonic Valve: There is mild regurgitation.    Pulmonary Artery: There is pulmonary hypertension. The estimated   pulmonary artery systolic pressure is 75 mmHg.    IVC/SVC: Intermediate venous pressure at 8 mmHg.        Echo  Result Date: 10/10/2024    Left Ventricle: The left ventricle is normal in size. Normal wall   thickness. There is normal systolic function with a visually estimated   ejection fraction of 55 - 60%. Unable to assess diastolic function due to   atrial fibrillation.    Right Ventricle: Normal right ventricular cavity size. Wall thickness   is normal. Systolic function is normal.    Left Atrium: Left atrium is severely dilated.    Right Atrium: Right atrium is severely dilated.    Aortic Valve: The aortic valve is a trileaflet valve. There is moderate   aortic valve sclerosis. Mildly restricted motion. There is mild stenosis.   Aortic valve area by VTI is 2.6 cm2. Aortic valve peak velocity is 1.8   m/s. Mean gradient is 7.3 mmHg. The dimensionless index is 0.92.    Mitral  Valve: There is mild regurgitation.    Tricuspid Valve: There is severe regurgitation.    Pulmonary Artery: The estimated pulmonary artery systolic pressure is   64 mmHg.    IVC/SVC: Elevated venous pressure at 15 mmHg.       2.3L overnight negative 4.5L since admission   4/26 switch to 1 mg p.o. Bumex plus Aldactone cardiology recommendation

## 2025-04-26 NOTE — ASSESSMENT & PLAN NOTE
Patient's blood pressure range in the last 24 hours was: BP  Min: 104/69  Max: 130/71.The patient's inpatient anti-hypertensive regimen is listed below:  Current Antihypertensives  metoprolol succinate (TOPROL-XL) 24 hr tablet 50 mg, Daily, Oral  metoprolol injection 5 mg, Every 6 hours PRN, Intravenous  spironolactone tablet 25 mg, Daily, Oral  bumetanide tablet 1 mg, Daily, Oral    Plan  - BP is controlled, no changes needed to their regimen  -

## 2025-04-26 NOTE — ASSESSMENT & PLAN NOTE
Patient's most recent potassium results are listed below.   Recent Labs     04/24/25  0651 04/25/25  1039   K 3.8 4.1     Plan  - Replete potassium per protocol  - Monitor potassium Every 12 hours  - Patient's hypokalemia is monitored

## 2025-04-26 NOTE — ASSESSMENT & PLAN NOTE
Echocardiogram with evidence of mitral regurgitation that is moderate . The patient's most recent echocardiogram result is listed below. We will manage the valvular abnormality by diuresing the patient, cardiology is on board.    Echo  Result Date: 4/23/2025    Left Ventricle: There is low normal systolic function with a visually   estimated ejection fraction of 50 - 55%. Unable to assess diastolic   function due to atrial fibrillation.    Right Ventricle: The right ventricle is normal in size. Systolic   function is mildly reduced.    Left Atrium: dilated    Right Atrium: Right atrium is mildly dilated.    Aortic Valve: There is moderate aortic valve sclerosis. Moderately   restricted motion. There is moderate stenosis. Aortic valve area by VTI is   1.1 cm². Aortic valve peak velocity is 2.1 m/s. Mean gradient is 10 mmHg.   The dimensionless index is 0.35.    Mitral Valve: There is moderate mitral annular calcification. There is   mild regurgitation.    Tricuspid Valve: There is moderate regurgitation.    Pulmonic Valve: There is mild regurgitation.    Pulmonary Artery: There is pulmonary hypertension. The estimated   pulmonary artery systolic pressure is 75 mmHg.    IVC/SVC: Intermediate venous pressure at 8 mmHg.        Echo  Result Date: 10/10/2024    Left Ventricle: The left ventricle is normal in size. Normal wall   thickness. There is normal systolic function with a visually estimated   ejection fraction of 55 - 60%. Unable to assess diastolic function due to   atrial fibrillation.    Right Ventricle: Normal right ventricular cavity size. Wall thickness   is normal. Systolic function is normal.    Left Atrium: Left atrium is severely dilated.    Right Atrium: Right atrium is severely dilated.    Aortic Valve: The aortic valve is a trileaflet valve. There is moderate   aortic valve sclerosis. Mildly restricted motion. There is mild stenosis.   Aortic valve area by VTI is 2.6 cm2. Aortic valve peak velocity is  1.8   m/s. Mean gradient is 7.3 mmHg. The dimensionless index is 0.92.    Mitral Valve: There is mild regurgitation.    Tricuspid Valve: There is severe regurgitation.    Pulmonary Artery: The estimated pulmonary artery systolic pressure is   64 mmHg.    IVC/SVC: Elevated venous pressure at 15 mmHg.       Per cardiology recommendation patient was switched into Bumex mg p.o. plus Aldactone

## 2025-04-26 NOTE — PROGRESS NOTES
[facility] Medicine  Progress Note    Patient Name: Claudia Sierra  MRN: [id number]  Patient Class: IP- Inpatient   Admission Date: 4/21/2025  Length of Stay: 4 days  Attending Physician: Eris Chu MD  Primary Care Provider: Sajan Curtis MD        Subjective     Principal Problem:Acute hypoxic respiratory failure        HPI:  Patient is an 84-year-old female with a history of breast CA, HTN, Afib presented to ED today for shortness for breath which has been ongoing for the last 3 days.  Patient reports she went to urgent Care today had a chest x-ray and was sent home on  antibiotics however she did not start these and presented to ED for no improvement.  Patient  temperature was 103° currently in Afib which is chronic and she is on Xarelto.  Labwork remarkable for sodium 133, potassium 3.1.  BNP 5 5 9, troponin elevated at 0.059.  She denies any chest pain.  UA negative COVID and influenza negative.  Chest x-ray with interstitial changes.  Patient is started on Rocephin and azithromycin.  Patient will be admitted to Hospital Medicine.    Overview/Hospital Course:  No notes on file    Interval History:  Patient was successfully weaned off nasal cannula to room air remained significant improvement of respiratory status patient is still complaining of cough, PT OT recommended moderate intensity therapy but patient's family would like to be discharged home on home health..  Switched into p.o. medications (dexamethasone to continue total of 7 days) switched from IV Lasix into 1 mg p.o. Bumex cardiology recommendations..      Review of Systems   Constitutional:  Positive for activity change, appetite change and fatigue.   HENT: Negative.     Respiratory:  Positive for cough. Negative for shortness of breath and wheezing.    Cardiovascular:  Negative for chest pain, palpitations and leg swelling.   Gastrointestinal: Negative.    Genitourinary: Negative.    Musculoskeletal: Negative.     Neurological: Negative.    Psychiatric/Behavioral:  Negative for agitation. The patient is nervous/anxious.      Objective:     Vital Signs (Most Recent):  Temp: 97.8 °F (36.6 °C) (04/26/25 1127)  Pulse: 92 (04/26/25 1143)  Resp: 18 (04/26/25 1143)  BP: 109/72 (04/26/25 1127)  SpO2: 98 % (04/26/25 1143) Vital Signs (24h Range):  Temp:  [97.4 °F (36.3 °C)-98.7 °F (37.1 °C)] 97.8 °F (36.6 °C)  Pulse:  [] 92  Resp:  [15-21] 18  SpO2:  [91 %-100 %] 98 %  BP: (104-130)/(63-72) 109/72     Weight: 59.9 kg (132 lb)  Body mass index is 26.66 kg/m².    Intake/Output Summary (Last 24 hours) at 4/26/2025 1501  Last data filed at 4/26/2025 0643  Gross per 24 hour   Intake 300 ml   Output 1500 ml   Net -1200 ml         Physical Exam  Constitutional:       General: She is not in acute distress.  HENT:      Head: Normocephalic and atraumatic.      Nose: Nose normal.      Mouth/Throat:      Mouth: Mucous membranes are moist.   Eyes:      Extraocular Movements: Extraocular movements intact.      Pupils: Pupils are equal, round, and reactive to light.   Cardiovascular:      Rate and Rhythm: Normal rate. Rhythm irregular.      Pulses: Normal pulses.      Heart sounds: Normal heart sounds.   Pulmonary:      Effort: No respiratory distress.      Breath sounds: No wheezing.      Comments: On room air bilateral coarse crepitation  Abdominal:      Palpations: Abdomen is soft.   Musculoskeletal:      Right lower leg: No edema.      Left lower leg: No edema.   Skin:     Capillary Refill: Capillary refill takes 2 to 3 seconds.      Coloration: Skin is not pale.   Neurological:      Mental Status: She is alert and oriented to person, place, and time.   Psychiatric:         Mood and Affect: Mood normal.               Significant Labs: All pertinent labs within the past 24 hours have been reviewed.  Recent Lab Results         04/26/25  1125   04/26/25  0616   04/25/25  2203   04/25/25  1622        POCT Glucose 124   114   166   164                Significant Imaging: I have reviewed all pertinent imaging results/findings within the past 24 hours.    Imaging Results              X-Ray Chest AP Portable (Final result)  Result time 04/21/25 21:58:30      Final result by Oren Dunn DO (04/21/25 21:58:30)                   Impression:      No acute abnormality.      Electronically signed by: Oren Dunn  Date:    04/21/2025  Time:    21:58               Narrative:    EXAMINATION:  XR CHEST AP PORTABLE    CLINICAL HISTORY:  Sepsis;    TECHNIQUE:  Single frontal view of the chest was performed.    COMPARISON:  10/21/2024.    FINDINGS:  Coarse interstitial thickening again noted, stable.  No new focal consolidation.  The pleural spaces are clear.  The cardiac silhouette is borderline enlarged.  There are calcifications of the aortic arch.  Osseous structures demonstrate degenerative changes and osteopenia.  There are surgical clips.                                  Echo  Result Date: 4/23/2025    Left Ventricle: There is low normal systolic function with a visually   estimated ejection fraction of 50 - 55%. Unable to assess diastolic   function due to atrial fibrillation.    Right Ventricle: The right ventricle is normal in size. Systolic   function is mildly reduced.    Left Atrium: dilated    Right Atrium: Right atrium is mildly dilated.    Aortic Valve: There is moderate aortic valve sclerosis. Moderately   restricted motion. There is moderate stenosis. Aortic valve area by VTI is   1.1 cm². Aortic valve peak velocity is 2.1 m/s. Mean gradient is 10 mmHg.   The dimensionless index is 0.35.    Mitral Valve: There is moderate mitral annular calcification. There is   mild regurgitation.    Tricuspid Valve: There is moderate regurgitation.    Pulmonic Valve: There is mild regurgitation.    Pulmonary Artery: There is pulmonary hypertension. The estimated   pulmonary artery systolic pressure is 75 mmHg.    IVC/SVC: Intermediate venous pressure at 8  mmHg.        Echo  Result Date: 10/10/2024    Left Ventricle: The left ventricle is normal in size. Normal wall   thickness. There is normal systolic function with a visually estimated   ejection fraction of 55 - 60%. Unable to assess diastolic function due to   atrial fibrillation.    Right Ventricle: Normal right ventricular cavity size. Wall thickness   is normal. Systolic function is normal.    Left Atrium: Left atrium is severely dilated.    Right Atrium: Right atrium is severely dilated.    Aortic Valve: The aortic valve is a trileaflet valve. There is moderate   aortic valve sclerosis. Mildly restricted motion. There is mild stenosis.   Aortic valve area by VTI is 2.6 cm2. Aortic valve peak velocity is 1.8   m/s. Mean gradient is 7.3 mmHg. The dimensionless index is 0.92.    Mitral Valve: There is mild regurgitation.    Tricuspid Valve: There is severe regurgitation.    Pulmonary Artery: The estimated pulmonary artery systolic pressure is   64 mmHg.    IVC/SVC: Elevated venous pressure at 15 mmHg.      Echo  Result Date: 4/23/2025    Left Ventricle: There is low normal systolic function with a visually   estimated ejection fraction of 50 - 55%. Unable to assess diastolic   function due to atrial fibrillation.    Right Ventricle: The right ventricle is normal in size. Systolic   function is mildly reduced.    Left Atrium: dilated    Right Atrium: Right atrium is mildly dilated.    Aortic Valve: There is moderate aortic valve sclerosis. Moderately   restricted motion. There is moderate stenosis. Aortic valve area by VTI is   1.1 cm². Aortic valve peak velocity is 2.1 m/s. Mean gradient is 10 mmHg.   The dimensionless index is 0.35.    Mitral Valve: There is moderate mitral annular calcification. There is   mild regurgitation.    Tricuspid Valve: There is moderate regurgitation.    Pulmonic Valve: There is mild regurgitation.    Pulmonary Artery: There is pulmonary hypertension. The estimated   pulmonary artery  systolic pressure is 75 mmHg.    IVC/SVC: Intermediate venous pressure at 8 mmHg.        Imaging Results              X-Ray Chest AP Portable (Final result)  Result time 04/21/25 21:58:30      Final result by Oren Dunn DO (04/21/25 21:58:30)                   Impression:      No acute abnormality.      Electronically signed by: Oren Dunn  Date:    04/21/2025  Time:    21:58               Narrative:    EXAMINATION:  XR CHEST AP PORTABLE    CLINICAL HISTORY:  Sepsis;    TECHNIQUE:  Single frontal view of the chest was performed.    COMPARISON:  10/21/2024.    FINDINGS:  Coarse interstitial thickening again noted, stable.  No new focal consolidation.  The pleural spaces are clear.  The cardiac silhouette is borderline enlarged.  There are calcifications of the aortic arch.  Osseous structures demonstrate degenerative changes and osteopenia.  There are surgical clips.                                        Assessment & Plan  Essential hypertension  Patient's blood pressure range in the last 24 hours was: BP  Min: 104/69  Max: 130/71.The patient's inpatient anti-hypertensive regimen is listed below:  Current Antihypertensives  metoprolol succinate (TOPROL-XL) 24 hr tablet 50 mg, Daily, Oral  metoprolol injection 5 mg, Every 6 hours PRN, Intravenous  spironolactone tablet 25 mg, Daily, Oral  bumetanide tablet 1 mg, Daily, Oral    Plan  - BP is controlled, no changes needed to their regimen  -   CAP (community acquired pneumonia)  Patient has a diagnosis of pneumonia. The cause of the pneumonia is unknown at this time. The pneumonia is worsening due to hypoxia . The patient has the following signs/symptoms of pneumonia: persistent hypoxia , cough, and shortness of breath. The patient does have a current oxygen requirement and the patient does not have a home oxygen requirement. I have reviewed the pertinent imaging. The following cultures have been collected: Blood cultures The culture results are listed  below.     Current antimicrobial regimen consists of the antibiotics listed below. Will monitor patient closely and continue current treatment plan unchanged.     Continue antibiotics antibiotic deescalated on 04/25 into levofloxacin p.o. (to finish total of 7 days of antibiotics)   Supplemental O2      Antibiotics (From admission, onward)      Start     Stop Route Frequency Ordered    04/25/25 0915  levoFLOXacin tablet 750 mg         -- Oral Every other day 04/25/25 0913            Microbiology Results (last 7 days)       Procedure Component Value Units Date/Time    Blood culture x two cultures. Draw prior to antibiotics. [[id number]]  (Normal) Collected: 04/21/25 2123    Order Status: Completed Specimen: Blood from Peripheral, Antecubital, Left Updated: 04/26/25 0406     Blood Culture No Growth After 96 hours    Blood culture x two cultures. Draw prior to antibiotics. [[id number]]  (Normal) Collected: 04/21/25 2117    Order Status: Completed Specimen: Blood from Peripheral, Forearm, Left Updated: 04/26/25 0304     Blood Culture No Growth After 96 hours    Culture, Respiratory with Gram Stain [[id number]] Collected: 04/23/25 1646    Order Status: Completed Specimen: Respiratory from Sputum, Expectorated Updated: 04/25/25 1112     Respiratory Culture Normal respiratory tracey     GRAM STAIN No WBCs      No organisms seen    MRSA Screen by PCR [[id number]]  (Normal) Collected: 04/23/25 1505    Order Status: Completed Specimen: Nasal Swab Updated: 04/24/25 0429     MRSA PCR Screen Not Detected    Influenza A & B by Molecular [[id number]]  (Normal) Collected: 04/21/25 2114    Order Status: Completed Specimen: Nasopharyngeal Swab Updated: 04/21/25 2149     INFLUENZA A MOLECULAR Negative     INFLUENZA B MOLECULAR  Negative          Atrial fibrillation with RVR  Patient has persistent (7 days or more) atrial fibrillation. Patient is currently in atrial fibrillation. [id number] Score: 3. The patients heart rate in the last  24 hours is as follows:  Pulse  Min: 61  Max: 101     Antiarrhythmics  metoprolol succinate (TOPROL-XL) 24 hr tablet 50 mg, Daily, Oral  metoprolol injection 5 mg, Every 6 hours PRN, Intravenous    Anticoagulants  rivaroxaban tablet 15 mg, Daily, Oral    Plan  - Replete lytes with a goal of K>4, Mg >2  - Patient is anticoagulated, see medications listed above.  - Patient's afib is currently controlled        Hypokalemia  Patient's most recent potassium results are listed below.   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 The patient's most recent sodium results are listed below.  Recent Labs     04/24/25  0651 04/25/25  1039   * 134*     Plan  - Correct the sodium by 4-6mEq in 24 hours.    -hyponatremia secondary to SIADH, currently patient on fluid restriction less than 1500 mL per day  - Will treat the hyponatremia with to be monitored since it is a chronic  - Monitor sodium Daily.   - Patient hyponatremia is  monitored  Improving  Acute hypoxic respiratory failure  Patient with Hypoxic Respiratory failure which is Acute.  she is not on home oxygen. Supplemental oxygen was provided and noted-      .   Signs/symptoms of respiratory failure include- tachypnea, increased work of breathing, respiratory distress, and wheezing. Contributing diagnoses includes - CHF and Pneumonia Labs and images were reviewed. Patient Has not had a recent ABG. Will treat underlying causes and adjust management of respiratory failure as follows- IV antibiotics nasal cannula, echo ordered cardiology was consulted  4/26 improved  Elevated troponin  Can be secondary to high demand ischemia  Ordered echocardiogram that showed severe pulmonary hypertension with moderate mitral regurg  Cardiology consulted appreciate  recommendations  No ST changes noted    Longstanding persistent atrial fibrillation  Patient has long standing persistent (>12 months) atrial fibrillation. Patient is currently in atrial fibrillation. [id number] Score: 3. The patients heart rate in the last 24 hours is as follows:  Pulse  Min: 61  Max: 101     Antiarrhythmics  metoprolol succinate (TOPROL-XL) 24 hr tablet 50 mg, Daily, Oral  metoprolol injection 5 mg, Every 6 hours PRN, Intravenous    Anticoagulants  rivaroxaban tablet 15 mg, Daily, Oral    Plan  - Replete lytes with a goal of K>4, Mg >2  - Patient is anticoagulated, see medications listed above.  - Patient's afib is currently controlled  - to be monitored closely  -cardiology is consulted      Nonrheumatic aortic valve stenosis  Echocardiogram with evidence of mitral regurgitation that is moderate . The patient's most recent echocardiogram result is listed below. We will manage the valvular abnormality by diuresing the patient, cardiology is on board.    Echo  Result Date: 4/23/2025    Left Ventricle: There is low normal systolic function with a visually   estimated ejection fraction of 50 - 55%. Unable to assess diastolic   function due to atrial fibrillation.    Right Ventricle: The right ventricle is normal in size. Systolic   function is mildly reduced.    Left Atrium: dilated    Right Atrium: Right atrium is mildly dilated.    Aortic Valve: There is moderate aortic valve sclerosis. Moderately   restricted motion. There is moderate stenosis. Aortic valve area by VTI is   1.1 cm². Aortic valve peak velocity is 2.1 m/s. Mean gradient is 10 mmHg.   The dimensionless index is 0.35.    Mitral Valve: There is moderate mitral annular calcification. There is   mild regurgitation.    Tricuspid Valve: There is moderate regurgitation.    Pulmonic Valve: There is mild regurgitation.    Pulmonary Artery: There is pulmonary hypertension. The estimated   pulmonary artery systolic pressure is 75 mmHg.     IVC/SVC: Intermediate venous pressure at 8 mmHg.        Echo  Result Date: 10/10/2024    Left Ventricle: The left ventricle is normal in size. Normal wall   thickness. There is normal systolic function with a visually estimated   ejection fraction of 55 - 60%. Unable to assess diastolic function due to   atrial fibrillation.    Right Ventricle: Normal right ventricular cavity size. Wall thickness   is normal. Systolic function is normal.    Left Atrium: Left atrium is severely dilated.    Right Atrium: Right atrium is severely dilated.    Aortic Valve: The aortic valve is a trileaflet valve. There is moderate   aortic valve sclerosis. Mildly restricted motion. There is mild stenosis.   Aortic valve area by VTI is 2.6 cm2. Aortic valve peak velocity is 1.8   m/s. Mean gradient is 7.3 mmHg. The dimensionless index is 0.92.    Mitral Valve: There is mild regurgitation.    Tricuspid Valve: There is severe regurgitation.    Pulmonary Artery: The estimated pulmonary artery systolic pressure is   64 mmHg.    IVC/SVC: Elevated venous pressure at 15 mmHg.       Per cardiology recommendation patient was switched into Bumex mg p.o. plus Aldactone  Pulmonary hypertension  Given her history of stroke ideation more than 10 years ago due to her history of breast cancer, that can be the reason for her pulmonary hypertension  Echo was done  On Aldactone and IV Lasix  Strict intake and output, less than 1500 mL per day with low-salt diet  Cardiology is on board  2.3L overnight negative 4.5L since admission   4/26 switched on home oxygen neurology recommended switching patient to 1 mg Bumex with Aldactone, interstitial lung disease was documented by repeat chest x-ray given her history of chest radiation 10 years ago  Nonrheumatic tricuspid valve regurgitation  Echocardiogram with evidence of tricuspid regurgitation that is moderate . The patient's most recent echocardiogram result is listed below.     Echo  Result Date: 4/23/2025     Left Ventricle: There is low normal systolic function with a visually   estimated ejection fraction of 50 - 55%. Unable to assess diastolic   function due to atrial fibrillation.    Right Ventricle: The right ventricle is normal in size. Systolic   function is mildly reduced.    Left Atrium: dilated    Right Atrium: Right atrium is mildly dilated.    Aortic Valve: There is moderate aortic valve sclerosis. Moderately   restricted motion. There is moderate stenosis. Aortic valve area by VTI is   1.1 cm². Aortic valve peak velocity is 2.1 m/s. Mean gradient is 10 mmHg.   The dimensionless index is 0.35.    Mitral Valve: There is moderate mitral annular calcification. There is   mild regurgitation.    Tricuspid Valve: There is moderate regurgitation.    Pulmonic Valve: There is mild regurgitation.    Pulmonary Artery: There is pulmonary hypertension. The estimated   pulmonary artery systolic pressure is 75 mmHg.    IVC/SVC: Intermediate venous pressure at 8 mmHg.        Echo  Result Date: 10/10/2024    Left Ventricle: The left ventricle is normal in size. Normal wall   thickness. There is normal systolic function with a visually estimated   ejection fraction of 55 - 60%. Unable to assess diastolic function due to   atrial fibrillation.    Right Ventricle: Normal right ventricular cavity size. Wall thickness   is normal. Systolic function is normal.    Left Atrium: Left atrium is severely dilated.    Right Atrium: Right atrium is severely dilated.    Aortic Valve: The aortic valve is a trileaflet valve. There is moderate   aortic valve sclerosis. Mildly restricted motion. There is mild stenosis.   Aortic valve area by VTI is 2.6 cm2. Aortic valve peak velocity is 1.8   m/s. Mean gradient is 7.3 mmHg. The dimensionless index is 0.92.    Mitral Valve: There is mild regurgitation.    Tricuspid Valve: There is severe regurgitation.    Pulmonary Artery: The estimated pulmonary artery systolic pressure is   64 mmHg.     IVC/SVC: Elevated venous pressure at 15 mmHg.       2.3L overnight negative 4.5L since admission   4/26 switch to 1 mg p.o. Bumex plus Aldactone cardiology recommendation  Interstitial lung disease  Documented by repeat chest x-ray patient had radiation exposure (secondary to breast cancer) 10 years ago      VTE Risk Mitigation (From admission, onward)           Ordered     rivaroxaban tablet 15 mg  Daily         04/22/25 0749     IP VTE HIGH RISK PATIENT  Once         04/22/25 0133     Place sequential compression device  Until discontinued         04/22/25 0133                    Discharge Planning   BASILIO: 4/26/2025     Code Status: Full Code   Medical Readiness for Discharge Date:   Discharge Plan A: Home, Home Health   Discharge Delays: None known at this time            Please place Justification for DME        Eris Beyer MD  Department of Hospital Medicine   [facility]

## 2025-04-26 NOTE — PLAN OF CARE
Problem: Physical Therapy  Goal: Physical Therapy Goal  Description: Goals to be met by: 25     Patient will increase functional independence with mobility by performin. Supine to sit with [place]  2. Sit to supine with [place]  3. Sit to stand transfer with [place] with use of LRAD.   4. Bed to chair transfer with [place] using LRAD.   5. Gait  x 150 feet with [place] using LRAD.     Outcome: Progressing

## 2025-04-26 NOTE — ASSESSMENT & PLAN NOTE
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 The patient's most recent sodium results are listed below.  Recent Labs     04/24/25  0651 04/25/25  1039   * 134*     Plan  - Correct the sodium by 4-6mEq in 24 hours.    -hyponatremia secondary to SIADH, currently patient on fluid restriction less than 1500 mL per day  - Will treat the hyponatremia with to be monitored since it is a chronic  - Monitor sodium Daily.   - Patient hyponatremia is monitored  Improving

## 2025-04-26 NOTE — ASSESSMENT & PLAN NOTE
Patient has a diagnosis of pneumonia. The cause of the pneumonia is unknown at this time. The pneumonia is worsening due to hypoxia. The patient has the following signs/symptoms of pneumonia: persistent hypoxia , cough, and shortness of breath. The patient does have a current oxygen requirement and the patient does not have a home oxygen requirement. I have reviewed the pertinent imaging. The following cultures have been collected: Blood cultures The culture results are listed below.     Current antimicrobial regimen consists of the antibiotics listed below. Will monitor patient closely and continue current treatment plan unchanged.     Continue antibiotics antibiotic deescalated on 04/25 into levofloxacin p.o. (to finish total of 7 days of antibiotics)   Supplemental O2      Antibiotics (From admission, onward)      Start     Stop Route Frequency Ordered    04/25/25 0915  levoFLOXacin tablet 750 mg         -- Oral Every other day 04/25/25 0913            Microbiology Results (last 7 days)       Procedure Component Value Units Date/Time    Blood culture x two cultures. Draw prior to antibiotics. [[id number]]  (Normal) Collected: 04/21/25 2123    Order Status: Completed Specimen: Blood from Peripheral, Antecubital, Left Updated: 04/26/25 0406     Blood Culture No Growth After 96 hours    Blood culture x two cultures. Draw prior to antibiotics. [[id number]]  (Normal) Collected: 04/21/25 2117    Order Status: Completed Specimen: Blood from Peripheral, Forearm, Left Updated: 04/26/25 0304     Blood Culture No Growth After 96 hours    Culture, Respiratory with Gram Stain [[id number]] Collected: 04/23/25 1646    Order Status: Completed Specimen: Respiratory from Sputum, Expectorated Updated: 04/25/25 1112     Respiratory Culture Normal respiratory tracey     GRAM STAIN No WBCs      No organisms seen    MRSA Screen by PCR [[id number]]  (Normal) Collected: 04/23/25 1505    Order Status: Completed Specimen: Nasal Swab  Updated: 04/24/25 0429     MRSA PCR Screen Not Detected    Influenza A & B by Molecular [[id number]]  (Normal) Collected: 04/21/25 2114    Order Status: Completed Specimen: Nasopharyngeal Swab Updated: 04/21/25 2149     INFLUENZA A MOLECULAR Negative     INFLUENZA B MOLECULAR  Negative

## 2025-04-26 NOTE — ASSESSMENT & PLAN NOTE
Documented by repeat chest x-ray patient had radiation exposure (secondary to breast cancer) 10 years ago

## 2025-04-26 NOTE — ASSESSMENT & PLAN NOTE
Patient with Hypoxic Respiratory failure which is Acute.  she is not on home oxygen. Supplemental oxygen was provided and noted-      .   Signs/symptoms of respiratory failure include- tachypnea, increased work of breathing, respiratory distress, and wheezing. Contributing diagnoses includes - CHF and Pneumonia Labs and images were reviewed. Patient Has not had a recent ABG. Will treat underlying causes and adjust management of respiratory failure as follows- IV antibiotics nasal cannula, echo ordered cardiology was consulted  4/26 improved

## 2025-04-26 NOTE — ASSESSMENT & PLAN NOTE
Patient has long standing persistent (>12 months) atrial fibrillation. Patient is currently in atrial fibrillation. [id number] Score: 3. The patients heart rate in the last 24 hours is as follows:  Pulse  Min: 61  Max: 101     Antiarrhythmics  metoprolol succinate (TOPROL-XL) 24 hr tablet 50 mg, Daily, Oral  metoprolol injection 5 mg, Every 6 hours PRN, Intravenous    Anticoagulants  rivaroxaban tablet 15 mg, Daily, Oral    Plan  - Replete lytes with a goal of K>4, Mg >2  - Patient is anticoagulated, see medications listed above.  - Patient's afib is currently controlled  - to be monitored closely  -cardiology is consulted

## 2025-04-26 NOTE — PT/OT/SLP PROGRESS
Physical Therapy Treatment    Patient Name:  Claudia Sierra   MRN:  [id number]    Recommendations:     Discharge Recommendations: Moderate Intensity Therapy  Discharge Equipment Recommendations: other (see comments) (to be determined by next level of care)  Barriers to discharge: None    Assessment:     Claudia Sierra is a 84 y.o. female admitted with a medical diagnosis of Acute hypoxic respiratory failure.  She presents with the following impairments/functional limitations: weakness, impaired endurance, impaired self care skills, impaired functional mobility, gait instability, impaired balance, decreased upper extremity function, decreased lower extremity function, decreased safety awareness, impaired coordination.     Pt found supine in bed in no apparent distress.  Pt tolerated therapeutic exercises and activity without complaints of pain/discomfort.  Pt left supine in bed with call button in reach and nursing notified.    Rehab Prognosis: Good; patient would benefit from acute skilled PT services to address these deficits and reach maximum level of function.    Recent Surgery: * No surgery found *      Plan:     During this hospitalization, patient to be seen 4 x/week to address the identified rehab impairments via gait training, therapeutic activities, therapeutic exercises, neuromuscular re-education and progress toward the following goals:    Plan of Care Expires:  05/24/25    Subjective     Chief Complaint: Pt c/o of sore throat and generalized fatigue  Patient/Family Comments/goals:   Pain/Comfort:  Pain Rating 1: 0/10  Pain Rating Post-Intervention 1: 0/10      Objective:     Communicated with nurse prior to session.  Patient found supine with PureWick, peripheral IV, pulse ox (continuous), telemetry upon PT entry to room.     General Precautions: Standard, fall  Orthopedic Precautions: N/A  Braces: N/A  Respiratory Status: Room air     Functional Mobility:  Bed Mobility:     Rolling Left:  stand  by assistance  Rolling Right: stand by assistance  Scooting: contact guard assistance  Supine to Sit: contact guard assistance  Sit to Supine: contact guard assistance  Transfers:     Sit to Stand:  contact guard assistance with rolling walker  Gait: Ambulated x 2-3 steps with RW and CGA.      AM-PAC 6 CLICK MOBILITY  Turning over in bed (including adjusting bedclothes, sheets and blankets)?: 3  Sitting down on and standing up from a chair with arms (e.g., wheelchair, bedside commode, etc.): 3  Moving from lying on back to sitting on the side of the bed?: 3  Moving to and from a bed to a chair (including a wheelchair)?: 3  Need to walk in hospital room?: 3  Climbing 3-5 steps with a railing?: 1  Basic Mobility Total Score: 16       Treatment & Education:  Pt found supine in bed in no apparent distress.  Pt performed supine bilateral ankle pumps x 10 reps without assistance and supine straight leg raises bilaterally x 10 reps with min A.  Pt rolled to her right SBA and transferred supine to sit with CGA.  Pt performed seated bilateral LAQ and hip flexion x 10 reps each without assistance.  Pt performed sit to stand transition with RW and CGA.  Pt ambulated 2-3 steps with RW and CGA.  Pt c/o mild dizziness with attempted ambulation, but able to perform 10 hip flexions bilaterally with the use of RW.  Pt transferred to sitting EOB and sitting to supine with CGA.  Pt scooted in bed with CGA.  Pt left supine in bed with call button in reach.    Patient left supine with all lines intact, call button in reach, and nurse notified..    GOALS:   Multidisciplinary Problems       Physical Therapy Goals          Problem: Physical Therapy    Goal Priority Disciplines Outcome Interventions   Physical Therapy Goal     PT, PT/OT Progressing    Description: Goals to be met by: 25     Patient will increase functional independence with mobility by performin. Supine to sit with Modified [place]  2. Sit to supine with  Modified [place]  3. Sit to stand transfer with Modified [place] with use of LRAD.   4. Bed to chair transfer with Modified [place] using LRAD.   5. Gait  x 150 feet with Modified [place] using LRAD.                          DME Justifications:  No DME recommended requiring DME justifications    Time Tracking:     PT Received On: 04/26/25  PT Start Time: 0850     PT Stop Time: 0914  PT Total Time (min): 24 min     Billable Minutes: Therapeutic Activity 14 and Therapeutic Exercise 10    Treatment Type: Treatment  PT/PTA: PT     Number of PTA visits since last PT visit: 0     04/26/2025

## 2025-04-26 NOTE — ASSESSMENT & PLAN NOTE
Patient has persistent (7 days or more) atrial fibrillation. Patient is currently in atrial fibrillation. [id number] Score: 3. The patients heart rate in the last 24 hours is as follows:  Pulse  Min: 61  Max: 101     Antiarrhythmics  metoprolol succinate (TOPROL-XL) 24 hr tablet 50 mg, Daily, Oral  metoprolol injection 5 mg, Every 6 hours PRN, Intravenous    Anticoagulants  rivaroxaban tablet 15 mg, Daily, Oral    Plan  - Replete lytes with a goal of K>4, Mg >2  - Patient is anticoagulated, see medications listed above.  - Patient's afib is currently controlled

## 2025-04-26 NOTE — PLAN OF CARE
Problem: Adult Inpatient Plan of Care  Goal: Plan of Care Review  Outcome: Progressing  Goal: Optimal Comfort and Wellbeing  Outcome: Progressing     Problem: Pneumonia  Goal: Fluid Balance  Outcome: Progressing

## 2025-04-26 NOTE — PLAN OF CARE
Problem: Adult Inpatient Plan of Care  Goal: Plan of Care Review  Outcome: Progressing   Plan of care updated.

## 2025-04-26 NOTE — ASSESSMENT & PLAN NOTE
Given her history of stroke ideation more than 10 years ago due to her history of breast cancer, that can be the reason for her pulmonary hypertension  Echo was done  On Aldactone and IV Lasix  Strict intake and output, less than 1500 mL per day with low-salt diet  Cardiology is on board  2.3L overnight negative 4.5L since admission   4/26 switched on home oxygen neurology recommended switching patient to 1 mg Bumex with Aldactone, interstitial lung disease was documented by repeat chest x-ray given her history of chest radiation 10 years ago

## 2025-04-27 VITALS
TEMPERATURE: 98 F | SYSTOLIC BLOOD PRESSURE: 111 MMHG | HEIGHT: 59 IN | OXYGEN SATURATION: 96 % | BODY MASS INDEX: 26.61 KG/M2 | RESPIRATION RATE: 18 BRPM | HEART RATE: 91 BPM | DIASTOLIC BLOOD PRESSURE: 78 MMHG | WEIGHT: 132 LBS

## 2025-04-27 LAB
BACTERIA BLD CULT: NORMAL
BACTERIA BLD CULT: NORMAL

## 2025-04-27 PROCEDURE — 25000003 PHARM REV CODE 250

## 2025-04-27 PROCEDURE — 99900035 HC TECH TIME PER 15 MIN (STAT)

## 2025-04-27 PROCEDURE — 94640 AIRWAY INHALATION TREATMENT: CPT

## 2025-04-27 PROCEDURE — 94761 N-INVAS EAR/PLS OXIMETRY MLT: CPT

## 2025-04-27 PROCEDURE — 63600175 PHARM REV CODE 636 W HCPCS

## 2025-04-27 PROCEDURE — 25000003 PHARM REV CODE 250: Performed by: FAMILY MEDICINE

## 2025-04-27 PROCEDURE — 25000003 PHARM REV CODE 250: Performed by: REGISTERED NURSE

## 2025-04-27 PROCEDURE — 25000242 PHARM REV CODE 250 ALT 637 W/ HCPCS

## 2025-04-27 RX ORDER — GUAIFENESIN 600 MG/1
600 TABLET, EXTENDED RELEASE ORAL 2 TIMES DAILY
Qty: 60 TABLET | Refills: 0 | Status: SHIPPED | OUTPATIENT
Start: 2025-04-27

## 2025-04-27 RX ORDER — LEVOFLOXACIN 750 MG/1
750 TABLET, FILM COATED ORAL EVERY OTHER DAY
Qty: 3 TABLET | Refills: 0 | Status: SHIPPED | OUTPATIENT
Start: 2025-04-27 | End: 2025-05-03

## 2025-04-27 RX ORDER — MONTELUKAST SODIUM 10 MG/1
10 TABLET ORAL DAILY
Qty: 30 TABLET | Refills: 0 | Status: SHIPPED | OUTPATIENT
Start: 2025-04-27 | End: 2025-05-27

## 2025-04-27 RX ORDER — BUDESONIDE 0.5 MG/2ML
0.5 INHALANT ORAL DAILY
Qty: 2 ML | Refills: 0 | Status: SHIPPED | OUTPATIENT
Start: 2025-04-27 | End: 2025-04-27 | Stop reason: HOSPADM

## 2025-04-27 RX ORDER — BUDESONIDE AND FORMOTEROL FUMARATE DIHYDRATE 80; 4.5 UG/1; UG/1
2 AEROSOL RESPIRATORY (INHALATION) DAILY
Qty: 6.9 G | Refills: 0 | Status: SHIPPED | OUTPATIENT
Start: 2025-04-27 | End: 2026-04-27

## 2025-04-27 RX ORDER — DEXAMETHASONE 4 MG/1
4 TABLET ORAL DAILY
Qty: 3 TABLET | Refills: 0 | Status: SHIPPED | OUTPATIENT
Start: 2025-04-27 | End: 2025-04-30

## 2025-04-27 RX ORDER — SPIRONOLACTONE 25 MG/1
25 TABLET ORAL DAILY
Qty: 30 TABLET | Refills: 11 | Status: SHIPPED | OUTPATIENT
Start: 2025-04-27 | End: 2026-04-27

## 2025-04-27 RX ORDER — BENZONATATE 100 MG/1
100 CAPSULE ORAL 3 TIMES DAILY PRN
Qty: 30 CAPSULE | Refills: 0 | Status: SHIPPED | OUTPATIENT
Start: 2025-04-27 | End: 2025-05-07

## 2025-04-27 RX ORDER — BUMETANIDE 1 MG/1
1 TABLET ORAL DAILY
Qty: 30 TABLET | Refills: 11 | Status: SHIPPED | OUTPATIENT
Start: 2025-04-27 | End: 2026-04-27

## 2025-04-27 RX ADMIN — MONTELUKAST 10 MG: 10 TABLET, FILM COATED ORAL at 09:04

## 2025-04-27 RX ADMIN — BUMETANIDE 1 MG: 1 TABLET ORAL at 09:04

## 2025-04-27 RX ADMIN — METOPROLOL SUCCINATE 50 MG: 50 TABLET, EXTENDED RELEASE ORAL at 09:04

## 2025-04-27 RX ADMIN — DEXAMETHASONE 4 MG: 4 TABLET ORAL at 09:04

## 2025-04-27 RX ADMIN — LEVOFLOXACIN 750 MG: 750 TABLET, FILM COATED ORAL at 09:04

## 2025-04-27 RX ADMIN — PRAVASTATIN SODIUM 40 MG: 40 TABLET ORAL at 09:04

## 2025-04-27 RX ADMIN — GABAPENTIN 600 MG: 300 CAPSULE ORAL at 09:04

## 2025-04-27 RX ADMIN — EMPAGLIFLOZIN 10 MG: 10 TABLET, FILM COATED ORAL at 09:04

## 2025-04-27 RX ADMIN — BENZONATATE 100 MG: 100 CAPSULE ORAL at 09:04

## 2025-04-27 RX ADMIN — SPIRONOLACTONE 25 MG: 25 TABLET, FILM COATED ORAL at 09:04

## 2025-04-27 RX ADMIN — IPRATROPIUM BROMIDE AND ALBUTEROL SULFATE 3 ML: 2.5; .5 SOLUTION RESPIRATORY (INHALATION) at 07:04

## 2025-04-27 RX ADMIN — IPRATROPIUM BROMIDE AND ALBUTEROL SULFATE 3 ML: 2.5; .5 SOLUTION RESPIRATORY (INHALATION) at 04:04

## 2025-04-27 RX ADMIN — BUDESONIDE 0.5 MG: 0.5 INHALANT RESPIRATORY (INHALATION) at 07:04

## 2025-04-27 NOTE — PLAN OF CARE
Introduced as VN and will be reviewing discharge instructions.   Reviewed AVS with patient and family including follow up care, activity, diet. Educated patient and family on reason for admission, home medication list, and discharge instructions including when to return to ED and the following doctor appointments.  Education per flowsheet.  Opportunity given for questions and questions answered.   Nurse notified of   completion of discharge education. Patient is waiting on wheelchair

## 2025-04-27 NOTE — ASSESSMENT & PLAN NOTE
Patient has a diagnosis of pneumonia. The cause of the pneumonia is unknown at this time. The pneumonia is worsening due to hypoxia. The patient has the following signs/symptoms of pneumonia: persistent hypoxia , cough, and shortness of breath. The patient does have a current oxygen requirement and the patient does not have a home oxygen requirement. I have reviewed the pertinent imaging. The following cultures have been collected: Blood cultures The culture results are listed below.     Current antimicrobial regimen consists of the antibiotics listed below. Will monitor patient closely and continue current treatment plan unchanged.     Continue antibiotics antibiotic deescalated on 04/25 into levofloxacin p.o. (to finish total of 7 days of antibiotics)   Supplemental O2      Antibiotics (From admission, onward)      Start     Stop Route Frequency Ordered    04/25/25 0915  levoFLOXacin tablet 750 mg         -- Oral Every other day 04/25/25 0913            Microbiology Results (last 7 days)       Procedure Component Value Units Date/Time    Blood culture x two cultures. Draw prior to antibiotics. [[id number]]  (Normal) Collected: 04/21/25 2117    Order Status: Completed Specimen: Blood from Peripheral, Forearm, Left Updated: 04/27/25 0401     Blood Culture No Growth After 5 Days    Blood culture x two cultures. Draw prior to antibiotics. [[id number]]  (Normal) Collected: 04/21/25 2123    Order Status: Completed Specimen: Blood from Peripheral, Antecubital, Left Updated: 04/27/25 0401     Blood Culture No Growth After 5 Days    Culture, Respiratory with Gram Stain [[id number]]  (Abnormal) Collected: 04/23/25 1646    Order Status: Completed Specimen: Respiratory from Sputum, Expectorated Updated: 04/26/25 1747     Respiratory Culture Rare Candida albicans     GRAM STAIN No WBCs      No organisms seen    MRSA Screen by PCR [[id number]]  (Normal) Collected: 04/23/25 1505    Order Status: Completed Specimen: Nasal Swab  Updated: 04/24/25 0429     MRSA PCR Screen Not Detected    Influenza A & B by Molecular [[id number]]  (Normal) Collected: 04/21/25 2114    Order Status: Completed Specimen: Nasopharyngeal Swab Updated: 04/21/25 2149     INFLUENZA A MOLECULAR Negative     INFLUENZA B MOLECULAR  Negative

## 2025-04-27 NOTE — PROGRESS NOTES
[facility] Note     HPI/Interval:       Doing well this morning.  Breathing much improved.  Telemetry reviewed.    History obtained by patient interview and supplemented by nursing documentation and chart review.   Objective   PMHx:  has a past medical history of *Atrial fibrillation, Breast cancer, Hypertension, and Slow transit constipation (12/19/2013).   SurgHx:  has a past surgical history that includes Hysterectomy; Appendectomy; Tonsillectomy; Portacath placement (2013); lymphnode removal; Breast biopsy; Breast lumpectomy (Right, 2013); Epidural steroid injection into cervical spine (N/A, 02/12/2020); Adenoidectomy (70 years ago); Eye surgery (2018); Arthroplasty of hip by anterior approach (Right, 01/31/2022); Breast surgery (2013); and Joint replacement (Hip, 2022).   FamHx: family history includes Cancer in her father and sister; Cervical cancer (age of onset: 79) in her sister; Diabetes in her mother, son, and son; Heart disease in her father, mother, son, and son; Hypertension in her mother, sister, son, and son.   SocialHx:  reports that she has never smoked. She has never used smokeless tobacco. She reports current alcohol use of about 3.0 standard drinks of alcohol per week. She reports that she does not use drugs.  Home Meds:  Current Outpatient Medications   Medication Instructions    ascorbic acid (vitamin C) (VITAMIN C) 1,000 mg, Daily    b complex vitamins tablet 1 tablet, Daily    beta-carotene,A,-vits C,E/mins (OCUVITE ORAL) 1 tablet, Nightly    doxycycline (VIBRA-TABS) 100 mg, 2 times daily    empagliflozin (JARDIANCE) 10 mg, Oral, Daily    furosemide (LASIX) 20 mg, Oral, Daily PRN    gabapentin (NEURONTIN) 1,200 mg, Oral, Daily    lactulose (CHRONULAC) 30 g, Oral, 3 times daily PRN    loratadine (CLARITIN) 10 mg, Daily    MAPAP ARTHRITIS PAIN 650 mg, Oral, Every 6-8 hours PRN    metoprolol succinate (TOPROL-XL) 50 mg, Oral, Daily    polycarbophil (FIBERCON) 625 mg, Daily     pravastatin (PRAVACHOL) 40 mg, Oral    rivaroxaban (XARELTO) 15 mg, Oral, Daily    spironolactone (ALDACTONE) 25 mg, Oral, Daily PRN    traZODone (DESYREL) 50 MG tablet 1 pill PO PRN for insomnia at bedtime. If it does not help fall asleep fast enough, can take 30-40 minutes before bedtime.    vitamin D (VITAMIN D3) 1,000 Units, Daily     Review of Systems: Comprehensive ROS was performed and is negative unless otherwise noted in HPI.     Objective:   Last 24 Hour Vital Signs:  BP  Min: 105/70  Max: 130/71  Temp  Av °F (36.7 °C)  Min: 97.4 °F (36.3 °C)  Max: 98.7 °F (37.1 °C)  Pulse  Av.4  Min: 61  Max: 93  Resp  Av.4  Min: 15  Max: 21  SpO2  Av.1 %  Min: 96 %  Max: 100 %  I/O last 3 completed shifts:  In: 1196 [P.O.:1196]  Out: 3625 [Urine:3625]    Physical Examination:  Constitutional: NAD, Atraumatic   HEENT: MMM  Cardiovascular: RRR, no murmurs noted, no chest tenderness to palpation, 2+ radial pulses b/l  Pulmonary: normal respiratory effort, CTAB, no crackles, wheezes  Abdominal: S/NT/ND  Musculoskeletal: No lower extremity edema noted  Neurological: Alert & oriented to self, location, time and situation. No gross neurological deficits  Skin: W/D/I  Psych: Appropriate affect, normal mood    Laboratory:  Personally reviewed    Other Results:  TTE:  Results for orders placed during the hospital encounter of 25    Echo    Interpretation Summary    Left Ventricle: There is low normal systolic function with a visually estimated ejection fraction of 50 - 55%. Unable to assess diastolic function due to atrial fibrillation.    Right Ventricle: The right ventricle is normal in size. Systolic function is mildly reduced.    Left Atrium: dilated    Right Atrium: Right atrium is mildly dilated.    Aortic Valve: There is moderate aortic valve sclerosis. Moderately restricted motion. There is moderate stenosis. Aortic valve area by VTI is 1.1 cm². Aortic valve peak velocity is 2.1 m/s. Mean gradient  is 10 mmHg. The dimensionless index is 0.35.    Mitral Valve: There is moderate mitral annular calcification. There is mild regurgitation.    Tricuspid Valve: There is moderate regurgitation.    Pulmonic Valve: There is mild regurgitation.    Pulmonary Artery: There is pulmonary hypertension. The estimated pulmonary artery systolic pressure is 75 mmHg.    IVC/SVC: Intermediate venous pressure at 8 mmHg.    Stress Testing:   No results found for this or any previous visit.     Coronary Angiogram:  No results found for this or any previous visit.      Time spent on this visit included personally:  -Reviewing Medical records & lab results  -Independently reviewing and interpreting (if not documented by myself) EKGs, echocardiograms, catherizations   -Obtaining a history, performing a relevant exam, counseling/educating the patient/family  -Documenting clinical information in the EMR including ordering of tests  -Care coordination and communicating with other health care providers         Assessment/Plan:     Active Hospital Problems    Diagnosis    *Acute hypoxic respiratory failure    Interstitial lung disease    Pulmonary hypertension    Nonrheumatic tricuspid valve regurgitation    CAP (community acquired pneumonia)    Atrial fibrillation with RVR    Hypokalemia    Hyponatremia    Elevated troponin    Nonrheumatic aortic valve stenosis     Cardiology managing surveillance       Longstanding persistent atrial fibrillation     Now in sinus rhythm . Is seeing dr curran wed is on xarelto now .      Essential hypertension     bp well controlled on current regimen   ekg looks good 8/21          Claudia Sierra is a 84 y.o. female with HTN, persistent AFib (on AC), p/w SOB found to be febrile/septic w/ afib/RVR and volumeoverload. S/P IV Abx for CAP and IV diuresis w/ improvement in sx.  TTE 50-55%, Mod AS, moderate TR, severe pHTN pap 75.  Much improved since admission.  Ready for discharge per      -continue  xarelto, toño 25, toprol 25 daily, jardiance 10  -on Bumex 1mg daily with good l UOP , would continue his outpatient  -toprol 50 XL    Thank you for the opportunity to care for this patient. Please don't hesitate to reach out with any questions/concerns,  Speech recognition software used for parts of this note. Please excuse grammar, out of context phrases, and/or syntax issues.

## 2025-04-27 NOTE — ASSESSMENT & PLAN NOTE
Patient has long standing persistent (>12 months) atrial fibrillation. Patient is currently in atrial fibrillation. [id number] Score: 3. The patients heart rate in the last 24 hours is as follows:  Pulse  Min: 67  Max: 93     Antiarrhythmics  metoprolol succinate (TOPROL-XL) 24 hr tablet 50 mg, Daily, Oral  metoprolol injection 5 mg, Every 6 hours PRN, Intravenous    Anticoagulants  rivaroxaban tablet 15 mg, Daily, Oral    Plan  - Replete lytes with a goal of K>4, Mg >2  - Patient is anticoagulated, see medications listed above.  - Patient's afib is currently controlled  - to be monitored closely  -cardiology is consulted

## 2025-04-27 NOTE — ASSESSMENT & PLAN NOTE
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 The patient's most recent sodium results are listed below.  Recent Labs     04/25/25  1039   *     Plan  - Correct the sodium by 4-6mEq in 24 hours.    -hyponatremia secondary to SIADH, currently patient on fluid restriction less than 1500 mL per day  - Will treat the hyponatremia with to be monitored since it is a chronic  - Monitor sodium Daily.   - Patient hyponatremia is monitored  Improving

## 2025-04-27 NOTE — ASSESSMENT & PLAN NOTE
Patient has persistent (7 days or more) atrial fibrillation. Patient is currently in atrial fibrillation. [id number] Score: 3. The patients heart rate in the last 24 hours is as follows:  Pulse  Min: 67  Max: 93     Antiarrhythmics  metoprolol succinate (TOPROL-XL) 24 hr tablet 50 mg, Daily, Oral  metoprolol injection 5 mg, Every 6 hours PRN, Intravenous    Anticoagulants  rivaroxaban tablet 15 mg, Daily, Oral    Plan  - Replete lytes with a goal of K>4, Mg >2  - Patient is anticoagulated, see medications listed above.  - Patient's afib is currently controlled

## 2025-04-27 NOTE — PLAN OF CARE
Pt will dc with  services. Pt will be contacted by  agency to schedule first appointment time/date. Pts son will provide transport home. SW will continue to follow pt throughout her transitions of care and assist with any dc needs.     SW contacted pts son Pradeep who expressed he will be at hospital for 10 am.     Pradeep Simms (Son)  [phone]       HH: [facility]    Cleared from  . Bedside Nurse and VN notified.      ------------    Pt son reports that he will take hard script for nebulizer and get it outpatient instead of waiting for it to be approved at hospital. MD and nursing staff aware.     Future Appointments   Date Time Provider Department Center   5/2/2025 11:00 AM Mikayla Crane MD [facility] IMPRI [place]   8/6/2025 10:00 AM LAB[facility] [place]   8/6/2025 10:15 AM CV [facility] ECHO [facility] [place]   8/27/2025  1:20 PM Keith Alexander Jr., MD [facility] CARDIO [place]   8/29/2025  2:30 PM Sajan Curtis MD [facility] PRICRE [place]   2/6/2026  1:00 PM [facility] MAMMO1 [facility] MAMMO [place]   2/13/2026 11:40 AM Mingo Wynne MD [facility] HEM ONC [facility]        04/27/25 0811   Final Note   Assessment Type Final Discharge Note   Anticipated Discharge Disposition [facility] Resources/Appts/Education Provided Appointments scheduled and added to AVS   Post-Acute Status   Discharge Delays None known at this time

## 2025-04-27 NOTE — DISCHARGE SUMMARY
[facility] Medicine  Discharge Summary      Patient Name: Claudia Sierra  MRN: [id number]  EVIE: [id number]  Patient Class: IP- Inpatient  Admission Date: 4/21/2025  Hospital Length of Stay: 5 days  Discharge Date and Time: 04/27/2025 11:14 AM  Attending Physician: Eris Chu MD   Discharging Provider: Eris Beyer MD  Primary Care Provider: Sajan Curtis MD    Primary Care Team: Networked reference to record PCT     HPI:   Patient is an 84-year-old female with a history of breast CA, HTN, Afib presented to ED today for shortness for breath which has been ongoing for the last 3 days.  Patient reports she went to urgent Care today had a chest x-ray and was sent home on  antibiotics however she did not start these and presented to ED for no improvement.  Patient  temperature was 103° currently in Afib which is chronic and she is on Xarelto.  Labwork remarkable for sodium 133, potassium 3.1.  BNP 5 5 9, troponin elevated at 0.059.  She denies any chest pain.  UA negative COVID and influenza negative.  Chest x-ray with interstitial changes.  Patient is started on Rocephin and azithromycin.  Patient will be admitted to Hospital Medicine.    * No surgery found *      Hospital Course:   Patient was admitted for pneumonia and pulmonary hypertension with a acute hypoxic respiratory failure, responded well to IV antibiotics then transitioned to p.o., with significant improvement of volume overload with diuresis, cardiology recommended discharging patient on Bumex 1 mg p.o. daily with Aldactone, patient to finish a course of 10 days of antibiotics with the steroids, worked with PT OT who recommended moderate intensity therapy but patient and the family opted into being discharged home on home health, patient was educated about following up with primary care doctor within 3-5 days of discharge and with the cardiologist within 1-2 weeks of discharge.     Goals of Care Treatment  Preferences:  Code Status: Full Code      SDOH Screening:  The patient was screened for utility difficulties, food insecurity, transport difficulties, housing insecurity, and interpersonal safety and there were no concerns identified this admission.     Consults:   Consults (From admission, onward)          Status Ordering Provider     Inpatient consult to [facility]iology[facility]  Once        Provider:  (Not yet assigned)    Completed ERICH JENNINGS            Assessment & Plan  Essential hypertension  Patient's blood pressure range in the last 24 hours was: BP  Min: 109/72  Max: 152/78.The patient's inpatient anti-hypertensive regimen is listed below:  Current Antihypertensives  metoprolol succinate (TOPROL-XL) 24 hr tablet 50 mg, Daily, Oral  metoprolol injection 5 mg, Every 6 hours PRN, Intravenous  spironolactone tablet 25 mg, Daily, Oral  bumetanide tablet 1 mg, Daily, Oral  spironolactone (ALDACTONE) tablet, Daily, Oral  bumetanide (BUMEX) tablet, Daily, Oral    Plan  - BP is controlled, no changes needed to their regimen  -   CAP (community acquired pneumonia)  Patient has a diagnosis of pneumonia. The cause of the pneumonia is unknown at this time. The pneumonia is worsening due to hypoxia . The patient has the following signs/symptoms of pneumonia: persistent hypoxia , cough, and shortness of breath. The patient does have a current oxygen requirement and the patient does not have a home oxygen requirement. I have reviewed the pertinent imaging. The following cultures have been collected: Blood cultures The culture results are listed below.     Current antimicrobial regimen consists of the antibiotics listed below. Will monitor patient closely and continue current treatment plan unchanged.     Continue antibiotics antibiotic deescalated on 04/25 into levofloxacin p.o. (to finish total of 7 days of antibiotics)   Supplemental O2      Antibiotics (From admission, onward)      Start     Stop Route Frequency  Ordered    04/25/25 0915  levoFLOXacin tablet 750 mg         -- Oral Every other day 04/25/25 0913            Microbiology Results (last 7 days)       Procedure Component Value Units Date/Time    Blood culture x two cultures. Draw prior to antibiotics. [[id number]]  (Normal) Collected: 04/21/25 2117    Order Status: Completed Specimen: Blood from Peripheral, Forearm, Left Updated: 04/27/25 0401     Blood Culture No Growth After 5 Days    Blood culture x two cultures. Draw prior to antibiotics. [[id number]]  (Normal) Collected: 04/21/25 2123    Order Status: Completed Specimen: Blood from Peripheral, Antecubital, Left Updated: 04/27/25 0401     Blood Culture No Growth After 5 Days    Culture, Respiratory with Gram Stain [[id number]]  (Abnormal) Collected: 04/23/25 1646    Order Status: Completed Specimen: Respiratory from Sputum, Expectorated Updated: 04/26/25 1747     Respiratory Culture Rare Candida albicans     GRAM STAIN No WBCs      No organisms seen    MRSA Screen by PCR [[id number]]  (Normal) Collected: 04/23/25 1505    Order Status: Completed Specimen: Nasal Swab Updated: 04/24/25 0429     MRSA PCR Screen Not Detected    Influenza A & B by Molecular [[id number]]  (Normal) Collected: 04/21/25 2114    Order Status: Completed Specimen: Nasopharyngeal Swab Updated: 04/21/25 2149     INFLUENZA A MOLECULAR Negative     INFLUENZA B MOLECULAR  Negative          Atrial fibrillation with RVR  Patient has persistent (7 days or more) atrial fibrillation. Patient is currently in atrial fibrillation. [id number] Score: 3. The patients heart rate in the last 24 hours is as follows:  Pulse  Min: 67  Max: 93     Antiarrhythmics  metoprolol succinate (TOPROL-XL) 24 hr tablet 50 mg, Daily, Oral  metoprolol injection 5 mg, Every 6 hours PRN, Intravenous    Anticoagulants  rivaroxaban tablet 15 mg, Daily, Oral    Plan  - Replete lytes with a goal of K>4, Mg >2  - Patient is anticoagulated, see medications listed above.  -  Patient's afib is currently controlled        Hypokalemia  Patient's most recent potassium results are listed below.   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 The patient's most recent sodium results are listed below.  Recent Labs     04/25/25  1039   *     Plan  - Correct the sodium by 4-6mEq in 24 hours.    -hyponatremia secondary to SIADH, currently patient on fluid restriction less than 1500 mL per day  - Will treat the hyponatremia with to be monitored since it is a chronic  - Monitor sodium Daily.   - Patient hyponatremia is  monitored  Improving  Acute hypoxic respiratory failure  Patient with Hypoxic Respiratory failure which is Acute.  she is not on home oxygen. Supplemental oxygen was provided and noted-      .   Signs/symptoms of respiratory failure include- tachypnea, increased work of breathing, respiratory distress, and wheezing. Contributing diagnoses includes - CHF and Pneumonia Labs and images were reviewed. Patient Has not had a recent ABG. Will treat underlying causes and adjust management of respiratory failure as follows- IV antibiotics nasal cannula, echo ordered cardiology was consulted  4/26 improved  Elevated troponin  Can be secondary to high demand ischemia  Ordered echocardiogram that showed severe pulmonary hypertension with moderate mitral regurg  Cardiology consulted appreciate recommendations  No ST changes noted    Longstanding persistent atrial fibrillation  Patient has long standing persistent (>12 months) atrial fibrillation. Patient is currently in atrial fibrillation. [id number] Score: 3. The patients heart rate in the last 24 hours is as follows:  Pulse  Min: 67  Max: 93     Antiarrhythmics  metoprolol succinate (TOPROL-XL) 24 hr tablet 50 mg, Daily, Oral  metoprolol injection 5  mg, Every 6 hours PRN, Intravenous    Anticoagulants  rivaroxaban tablet 15 mg, Daily, Oral    Plan  - Replete lytes with a goal of K>4, Mg >2  - Patient is anticoagulated, see medications listed above.  - Patient's afib is currently controlled  - to be monitored closely  -cardiology is consulted      Nonrheumatic aortic valve stenosis  Echocardiogram with evidence of mitral regurgitation that is moderate . The patient's most recent echocardiogram result is listed below. We will manage the valvular abnormality by diuresing the patient, cardiology is on board.    Echo  Result Date: 4/23/2025    Left Ventricle: There is low normal systolic function with a visually   estimated ejection fraction of 50 - 55%. Unable to assess diastolic   function due to atrial fibrillation.    Right Ventricle: The right ventricle is normal in size. Systolic   function is mildly reduced.    Left Atrium: dilated    Right Atrium: Right atrium is mildly dilated.    Aortic Valve: There is moderate aortic valve sclerosis. Moderately   restricted motion. There is moderate stenosis. Aortic valve area by VTI is   1.1 cm². Aortic valve peak velocity is 2.1 m/s. Mean gradient is 10 mmHg.   The dimensionless index is 0.35.    Mitral Valve: There is moderate mitral annular calcification. There is   mild regurgitation.    Tricuspid Valve: There is moderate regurgitation.    Pulmonic Valve: There is mild regurgitation.    Pulmonary Artery: There is pulmonary hypertension. The estimated   pulmonary artery systolic pressure is 75 mmHg.    IVC/SVC: Intermediate venous pressure at 8 mmHg.        Echo  Result Date: 10/10/2024    Left Ventricle: The left ventricle is normal in size. Normal wall   thickness. There is normal systolic function with a visually estimated   ejection fraction of 55 - 60%. Unable to assess diastolic function due to   atrial fibrillation.    Right Ventricle: Normal right ventricular cavity size. Wall thickness   is normal. Systolic  function is normal.    Left Atrium: Left atrium is severely dilated.    Right Atrium: Right atrium is severely dilated.    Aortic Valve: The aortic valve is a trileaflet valve. There is moderate   aortic valve sclerosis. Mildly restricted motion. There is mild stenosis.   Aortic valve area by VTI is 2.6 cm2. Aortic valve peak velocity is 1.8   m/s. Mean gradient is 7.3 mmHg. The dimensionless index is 0.92.    Mitral Valve: There is mild regurgitation.    Tricuspid Valve: There is severe regurgitation.    Pulmonary Artery: The estimated pulmonary artery systolic pressure is   64 mmHg.    IVC/SVC: Elevated venous pressure at 15 mmHg.       Per cardiology recommendation patient was switched into Bumex mg p.o. plus Aldactone  Pulmonary hypertension  Given her history of stroke ideation more than 10 years ago due to her history of breast cancer, that can be the reason for her pulmonary hypertension  Echo was done  On Aldactone and IV Lasix  Strict intake and output, less than 1500 mL per day with low-salt diet  Cardiology is on board  2.3L overnight negative 4.5L since admission   4/26 switched on home oxygen neurology recommended switching patient to 1 mg Bumex with Aldactone, interstitial lung disease was documented by repeat chest x-ray given her history of chest radiation 10 years ago  Nonrheumatic tricuspid valve regurgitation  Echocardiogram with evidence of tricuspid regurgitation that is moderate . The patient's most recent echocardiogram result is listed below.     Echo  Result Date: 4/23/2025    Left Ventricle: There is low normal systolic function with a visually   estimated ejection fraction of 50 - 55%. Unable to assess diastolic   function due to atrial fibrillation.    Right Ventricle: The right ventricle is normal in size. Systolic   function is mildly reduced.    Left Atrium: dilated    Right Atrium: Right atrium is mildly dilated.    Aortic Valve: There is moderate aortic valve sclerosis. Moderately    restricted motion. There is moderate stenosis. Aortic valve area by VTI is   1.1 cm². Aortic valve peak velocity is 2.1 m/s. Mean gradient is 10 mmHg.   The dimensionless index is 0.35.    Mitral Valve: There is moderate mitral annular calcification. There is   mild regurgitation.    Tricuspid Valve: There is moderate regurgitation.    Pulmonic Valve: There is mild regurgitation.    Pulmonary Artery: There is pulmonary hypertension. The estimated   pulmonary artery systolic pressure is 75 mmHg.    IVC/SVC: Intermediate venous pressure at 8 mmHg.        Echo  Result Date: 10/10/2024    Left Ventricle: The left ventricle is normal in size. Normal wall   thickness. There is normal systolic function with a visually estimated   ejection fraction of 55 - 60%. Unable to assess diastolic function due to   atrial fibrillation.    Right Ventricle: Normal right ventricular cavity size. Wall thickness   is normal. Systolic function is normal.    Left Atrium: Left atrium is severely dilated.    Right Atrium: Right atrium is severely dilated.    Aortic Valve: The aortic valve is a trileaflet valve. There is moderate   aortic valve sclerosis. Mildly restricted motion. There is mild stenosis.   Aortic valve area by VTI is 2.6 cm2. Aortic valve peak velocity is 1.8   m/s. Mean gradient is 7.3 mmHg. The dimensionless index is 0.92.    Mitral Valve: There is mild regurgitation.    Tricuspid Valve: There is severe regurgitation.    Pulmonary Artery: The estimated pulmonary artery systolic pressure is   64 mmHg.    IVC/SVC: Elevated venous pressure at 15 mmHg.       2.3L overnight negative 4.5L since admission   4/26 switch to 1 mg p.o. Bumex plus Aldactone cardiology recommendation  Interstitial lung disease  Documented by repeat chest x-ray patient had radiation exposure (secondary to breast cancer) 10 years ago      Final Active Diagnoses:    Diagnosis Date Noted POA    PRINCIPAL PROBLEM:  Acute hypoxic respiratory failure [J96.01]  04/22/2025 Yes    Interstitial lung disease [J84.9] 04/26/2025 Yes     Chronic    Pulmonary hypertension [I27.20] 04/23/2025 Yes     Chronic    Nonrheumatic tricuspid valve regurgitation [I36.1] 04/23/2025 Yes    CAP (community acquired pneumonia) [J18.9] 04/22/2025 Yes    Atrial fibrillation with RVR [I48.91] 04/22/2025 Yes    Hypokalemia [E87.6] 04/22/2025 Yes    Hyponatremia [E87.1] 04/22/2025 Yes    Elevated troponin [R79.89] 04/22/2025 Yes    Nonrheumatic aortic valve stenosis [I35.0] 06/21/2023 Yes     Chronic    Longstanding persistent atrial fibrillation [I48.11] 08/29/2013 Yes     Chronic    Essential hypertension [I10] 07/15/2013 Yes      Problems Resolved During this Admission:       Discharged Condition: fair    Disposition: [facility] Care [facility] [place]    Follow Up:   Follow-up Information       [facility] Follow up.    Specialties: Home Health Services, Home Therapy Services, Home Living Aide Services  Why: Patient will be contacted by Home Health Agency to schedule first appointment time/date.  Contact information:  [address] [facility] [address] [phone]                         Patient Instructions:      Ambulatory referral/consult to Outpatient Case Management   Referral Priority: Routine Referral Type: Consultation   Referral Reason: Specialty Services Required   Number of Visits Requested: 1       Significant Diagnostic Studies: Labs: All labs within the past 24 hours have been reviewed    Pending Diagnostic Studies:       Procedure Component Value Units Date/Time    Legionella antigen, urine [[id number]] Collected: 04/23/25 1144    Order Status: Sent Lab Status: In process Updated: 04/23/25 1152    Specimen: Urine            Medications:  Reconciled Home Medications:      Medication List        START taking these medications      benzonatate 100 MG capsule  Commonly known as: TESSALON  Take 1 capsule (100 mg total) by mouth 3 (three) times daily as  needed for Cough.     budesonide 0.5 mg/2 mL nebulizer solution  Commonly known as: PULMICORT  Take 2 mLs (0.5 mg total) by nebulization once daily. Controller for 7 days     bumetanide 1 MG tablet  Commonly known as: BUMEX  Take 1 tablet (1 mg total) by mouth once daily.     dexAMETHasone 4 MG Tab  Commonly known as: DECADRON  Take 1 tablet (4 mg total) by mouth once daily. for 3 days     guaiFENesin 600 mg 12 hr tablet  Commonly known as: MUCINEX  Take 1 tablet (600 mg total) by mouth 2 (two) times daily.     ipratropium-albuteroL  mcg/actuation inhaler  Commonly known as: CombiVENT  Inhale 1 puff into the lungs every 6 (six) hours as needed for Wheezing. Rescue     levoFLOXacin 750 MG tablet  Commonly known as: LEVAQUIN  Take 1 tablet (750 mg total) by mouth every other day. for 6 days     montelukast 10 mg tablet  Commonly known as: SINGULAIR  Take 1 tablet (10 mg total) by mouth once daily.            CHANGE how you take these medications      spironolactone 25 MG tablet  Commonly known as: ALDACTONE  Take 1 tablet (25 mg total) by mouth once daily.  What changed:   when to take this  reasons to take this            CONTINUE taking these medications      b complex vitamins tablet  Take 1 tablet by mouth once daily.     empagliflozin 10 mg tablet  Commonly known as: Jardiance  Take 1 tablet (10 mg total) by mouth once daily.     gabapentin 600 MG tablet  Commonly known as: NEURONTIN  Take 2 tablets (1,200 mg total) by mouth once daily.     lactulose 10 gram/15 mL solution  Commonly known as: CHRONULAC  Take 45 mLs (30 g total) by mouth 3 (three) times daily as needed (constipation).     MAPAP ARTHRITIS PAIN 650 mg Tbsr  Generic drug: acetaminophen  Take 1 tablet (650 mg total) by mouth every 6 to 8 hours as needed (pain).     metoprolol succinate 50 MG 24 hr tablet  Commonly known as: TOPROL-XL  Take 1 tablet (50 mg total) by mouth once daily.     OCUVITE ORAL  Take 1 tablet by mouth every evening.      polycarbophil 625 mg tablet  Commonly known as: FIBERCON  Take 625 mg by mouth once daily.     pravastatin 40 MG tablet  Commonly known as: PRAVACHOL  TAKE 1 TABLET BY MOUTH EVERY DAY     rivaroxaban 15 mg Tab  Commonly known as: XARELTO  Take 1 tablet (15 mg total) by mouth Daily.     traZODone 50 MG tablet  Commonly known as: DESYREL  1 pill PO PRN for insomnia at bedtime. If it does not help fall asleep fast enough, can take 30-40 minutes before bedtime.     VITAMIN C 1000 MG tablet  Generic drug: ascorbic acid (vitamin C)  Take 1,000 mg by mouth once daily.     vitamin D 1000 units Tab  Commonly known as: VITAMIN D3  Take 1,000 Units by mouth once daily.            STOP taking these medications      doxycycline 100 MG tablet  Commonly known as: VIBRA-TABS     furosemide 20 MG tablet  Commonly known as: LASIX     loratadine 10 mg tablet  Commonly known as: CLARITIN              Indwelling Lines/Drains at time of discharge:   Lines/Drains/Airways       None                   Time spent on the discharge of patient:  45 minutes         Eris Beyer MD  Department of Hospital Medicine  [facility]

## 2025-04-27 NOTE — PLAN OF CARE
[facility] - Telemetry      HOME HEALTH ORDERS  FACE TO FACE ENCOUNTER    Patient Name: Claudia Sierra  YOB: 1940    PCP: Sajan Curtis MD   PCP Address: [address] / [facility] [address]  PCP Phone Number: [phone]  PCP Fax: [phone]    Encounter Date: 4/21/25    Admit to Home Health    Diagnoses:  Active Hospital Problems    Diagnosis  POA    *Acute hypoxic respiratory failure [J96.01]  Yes    Interstitial lung disease [J84.9]  Yes     Chronic    Pulmonary hypertension [I27.20]  Yes     Chronic    Nonrheumatic tricuspid valve regurgitation [I36.1]  Yes    CAP (community acquired pneumonia) [J18.9]  Yes    Atrial fibrillation with RVR [I48.91]  Yes    Hypokalemia [E87.6]  Yes    Hyponatremia [E87.1]  Yes    Elevated troponin [R79.89]  Yes    Nonrheumatic aortic valve stenosis [I35.0]  Yes     Chronic     Cardiology managing surveillance       Longstanding persistent atrial fibrillation [I48.11]  Yes     Chronic     Now in sinus rhythm . Is seeing dr curran wed is on xarelto now .      Essential hypertension [I10]  Yes     bp well controlled on current regimen   ekg looks good 8/21         Resolved Hospital Problems   No resolved problems to display.       Follow Up Appointments:  Future Appointments   Date Time Provider Department Center   5/2/2025 11:00 AM Mikayla Crane MD [facility] IMPRI [place] [facility]   8/6/2025 10:00 AM LAB[facility] [place]   8/6/2025 10:15 AM CV [facility] ECHO [facility] [place]   8/27/2025  1:20 PM Keith Alexander Jr., MD [facility] CARDIO [place]   8/29/2025  2:30 PM Sajan Curtis MD [facility] PRICRE [place]   2/6/2026  1:00 PM [facility] MAMMO1 [facility]   2/13/2026 11:40 AM Mingo Wynne MD [facility] HEM ONC [facility]       Allergies:  Review of patient's allergies indicates:   Allergen Reactions    Codeine Other (See Comments)     Feel like (climbing the walls)    Hydrocodone Other (See Comments)      Restless and anxious similar to codeine. Tolerated oxycodone without issue.    Benadryl [diphenhydramine hcl] Anxiety       Medications: Review discharge medications with patient and family and provide education.    Current Medications[1]     Medication List        START taking these medications      benzonatate 100 MG capsule  Commonly known as: TESSALON  Take 1 capsule (100 mg total) by mouth 3 (three) times daily as needed for Cough.     budesonide 0.5 mg/2 mL nebulizer solution  Commonly known as: PULMICORT  Take 2 mLs (0.5 mg total) by nebulization once daily. Controller for 7 days     bumetanide 1 MG tablet  Commonly known as: BUMEX  Take 1 tablet (1 mg total) by mouth once daily.     dexAMETHasone 4 MG Tab  Commonly known as: DECADRON  Take 1 tablet (4 mg total) by mouth once daily. for 3 days     guaiFENesin 600 mg 12 hr tablet  Commonly known as: MUCINEX  Take 1 tablet (600 mg total) by mouth 2 (two) times daily.     ipratropium-albuteroL  mcg/actuation inhaler  Commonly known as: CombiVENT  Inhale 1 puff into the lungs every 6 (six) hours as needed for Wheezing. Rescue     levoFLOXacin 750 MG tablet  Commonly known as: LEVAQUIN  Take 1 tablet (750 mg total) by mouth every other day. for 6 days     montelukast 10 mg tablet  Commonly known as: SINGULAIR  Take 1 tablet (10 mg total) by mouth once daily.            CHANGE how you take these medications      spironolactone 25 MG tablet  Commonly known as: ALDACTONE  Take 1 tablet (25 mg total) by mouth once daily.  What changed:   when to take this  reasons to take this            CONTINUE taking these medications      b complex vitamins tablet  Take 1 tablet by mouth once daily.     empagliflozin 10 mg tablet  Commonly known as: Jardiance  Take 1 tablet (10 mg total) by mouth once daily.     gabapentin 600 MG tablet  Commonly known as: NEURONTIN  Take 2 tablets (1,200 mg total) by mouth once daily.     lactulose 10 gram/15 mL solution  Commonly known  as: CHRONULAC  Take 45 mLs (30 g total) by mouth 3 (three) times daily as needed (constipation).     MAPAP ARTHRITIS PAIN 650 mg Tbsr  Generic drug: acetaminophen  Take 1 tablet (650 mg total) by mouth every 6 to 8 hours as needed (pain).     metoprolol succinate 50 MG 24 hr tablet  Commonly known as: TOPROL-XL  Take 1 tablet (50 mg total) by mouth once daily.     OCUVITE ORAL  Take 1 tablet by mouth every evening.     polycarbophil 625 mg tablet  Commonly known as: FIBERCON  Take 625 mg by mouth once daily.     pravastatin 40 MG tablet  Commonly known as: PRAVACHOL  TAKE 1 TABLET BY MOUTH EVERY DAY     rivaroxaban 15 mg Tab  Commonly known as: XARELTO  Take 1 tablet (15 mg total) by mouth Daily.     traZODone 50 MG tablet  Commonly known as: DESYREL  1 pill PO PRN for insomnia at bedtime. If it does not help fall asleep fast enough, can take 30-40 minutes before bedtime.     VITAMIN C 1000 MG tablet  Generic drug: ascorbic acid (vitamin C)  Take 1,000 mg by mouth once daily.     vitamin D 1000 units Tab  Commonly known as: VITAMIN D3  Take 1,000 Units by mouth once daily.            STOP taking these medications      doxycycline 100 MG tablet  Commonly known as: VIBRA-TABS     furosemide 20 MG tablet  Commonly known as: LASIX     loratadine 10 mg tablet  Commonly known as: CLARITIN                I have seen and examined this patient within the last 30 days. My clinical findings that support the need for the home health skilled services and home bound status are the following:no   Weakness/numbness causing balance and gait disturbance due to Heart Failure, COPD Exacerbation, and Weakness/Debility making it taxing to leave home.     Diet:   cardiac diet    Labs:  Report Lab results to PCP.    Referrals/ Consults  Physical Therapy to evaluate and treat. Evaluate for home safety and equipment needs; Establish/upgrade home exercise program. Perform / instruct on therapeutic exercises, gait training, transfer training,  and Range of Motion.  Occupational Therapy to evaluate and treat. Evaluate home environment for safety and equipment needs. Perform/Instruct on transfers, ADL training, ROM, and therapeutic exercises.   to evaluate for community resources/long-range planning.  Aide to provide assistance with personal care, ADLs, and vital signs.    Activities:   activity as tolerated    Nursing:   Agency to admit patient within 24 hours of hospital discharge unless specified on physician order or at patient request    SN to complete comprehensive assessment including routine vital signs. Instruct on disease process and s/s of complications to report to MD. Review/verify medication list sent home with the patient at time of discharge  and instruct patient/caregiver as needed. Frequency may be adjusted depending on start of care date.     Skilled nurse to perform up to 3 visits PRN for symptoms related to diagnosis    Notify MD if SBP > 160 or < 90; DBP > 90 or < 50; HR > 120 or < 50; Temp > 101; O2 < 88%.    Ok to schedule additional visits based on staff availability and patient request on consecutive days within the home health episode.    When multiple disciplines ordered:    Start of Care occurs on Sunday - Wednesday schedule remaining discipline evaluations as ordered on separate consecutive days following the start of care.    Thursday SOC -schedule subsequent evaluations Friday and Monday the following week.     Friday - Saturday SOC - schedule subsequent discipline evaluations on consecutive days starting Monday of the following week.    For all post-discharge communication and subsequent orders please contact patient's primary care physician. If unable to reach primary care physician or do not receive response within 30 minutes, please contact PCP for clinical staff order clarification    Miscellaneous   N/a    Home Health Aide:  Physical Therapy Three times weekly, Occupational Therapy Three times weekly,  Medical Social Work Three times weekly, Respiratory Therapy Three times weekly, and Home Health Aide Three times weekly    Wound Care Orders  no    I certify that this patient is confined to her home and needs intermittent skilled nursing care, physical therapy, and occupational therapy.              [1]   Current Facility-Administered Medications   Medication Dose Route Frequency Provider Last Rate Last Admin    acetaminophen tablet 650 mg  650 mg Oral Q4H PRN Sang Alexandra, NP        albuterol sulfate nebulizer solution 2.5 mg  2.5 mg Nebulization PRN Eris Chu MD        albuterol-ipratropium 2.5 mg-0.5 mg/3 mL nebulizer solution 3 mL  3 mL Nebulization Q4H Al Eris Beyer MD   3 mL at 04/27/25 0723    benzonatate capsule 100 mg  100 mg Oral TID PRN Eris Chu MD        budesonide nebulizer solution 0.5 mg  0.5 mg Nebulization Q12H Eris Chu MD   0.5 mg at 04/27/25 0723    bumetanide tablet 1 mg  1 mg Oral Daily Eris Chu MD        cetirizine tablet 5 mg  5 mg Oral QHS Eris Chu MD   5 mg at 04/26/25 2030    dexAMETHasone tablet 4 mg  4 mg Oral Daily Eris Chu MD        dextrose 50% injection 12.5 g  12.5 g Intravenous PRN Sang Alexandra, NP        dextrose 50% injection 25 g  25 g Intravenous PRN Sang Alexandra, NP        empagliflozin (Jardiance) tablet 10 mg  10 mg Oral Daily Eris Chu MD   10 mg at 04/26/25 0923    gabapentin capsule 600 mg  600 mg Oral BID Belkis Leyva MD   600 mg at 04/26/25 2030    glucagon (human recombinant) injection 1 mg  1 mg Intramuscular PRN Sang Alexandra, NP        glucose chewable tablet 16 g  16 g Oral PRN Sang Alexandra, NP        glucose chewable tablet 24 g  24 g Oral PRN Sang Alexandra, NP        guaiFENesin 12 hr tablet 600 mg  600 mg Oral BID Eris Chu MD   600 mg at 04/26/25 2030    insulin aspart U-100 pen 0-5 Units  0-5 Units Subcutaneous QID (AC +  HS) PRN Sang Alexandra, NP   2 Units at 04/24/25 1724    lactulose 20 gram/30 mL solution Soln 30 g  30 g Oral TID PRN Al Eris Beyer MD   30 g at 04/26/25 1233    levoFLOXacin tablet 750 mg  750 mg Oral Every other day Al Eris Beyer MD   750 mg at 04/25/25 0957    metoprolol injection 5 mg  5 mg Intravenous Q6H PRN Al Eris Beyer MD        metoprolol succinate (TOPROL-XL) 24 hr tablet 50 mg  50 mg Oral Daily Sang Alexandra, NP   50 mg at 04/26/25 0923    montelukast tablet 10 mg  10 mg Oral Daily Al Eris Beyer MD   10 mg at 04/26/25 0923    naloxone 0.4 mg/mL injection 0.02 mg  0.02 mg Intravenous PRN Sang Alexandra, RUSTAM        ondansetron injection 4 mg  4 mg Intravenous Q8H PRN Sang Alexandra, RUSTAM        pravastatin tablet 40 mg  40 mg Oral Daily Sang Alexandra, NP   40 mg at 04/26/25 0923    rivaroxaban tablet 15 mg  15 mg Oral Daily Al Eris Beyer MD   15 mg at 04/26/25 1714    sodium chloride 0.9% flush 10 mL  10 mL Intravenous Q12H PRN Sang Alexandra, NP        sodium chloride 3% nebulizer solution 4 mL  4 mL Nebulization PRN Al Eris Beyer MD   4 mL at 04/23/25 1607    spironolactone tablet 25 mg  25 mg Oral Daily Al Eris Beyer MD   25 mg at 04/26/25 0923    trazodone split tablet 25 mg  25 mg Oral Nightly PRN Al Eris Beyer MD   25 mg at 04/25/25 2143

## 2025-04-27 NOTE — PLAN OF CARE
Problem: Adult Inpatient Plan of Care  Goal: Plan of Care Review  Outcome: Adequate for Care Transition  Goal: Patient-Specific Goal (Individualized)  Outcome: Adequate for Care Transition  Goal: Absence of Hospital-Acquired Illness or Injury  Outcome: Adequate for Care Transition  Goal: Optimal Comfort and Wellbeing  Outcome: Adequate for Care Transition  Goal: Readiness for Transition of Care  Outcome: Adequate for Care Transition     Problem: Pneumonia  Goal: Fluid Balance  Outcome: Adequate for Care Transition  Goal: Resolution of Infection Signs and Symptoms  Outcome: Adequate for Care Transition  Goal: Effective Oxygenation and Ventilation  Outcome: Adequate for Care Transition     Problem: Fall Injury Risk  Goal: Absence of Fall and Fall-Related Injury  Outcome: Adequate for Care Transition     Problem: Comorbidity Management  Goal: Maintenance of Heart Failure Symptom Control  Outcome: Adequate for Care Transition  Goal: Blood Pressure in Desired Range  Outcome: Adequate for Care Transition     Problem: Pulmonary Impairment  Goal: Improved Activity Tolerance  Outcome: Adequate for Care Transition  Goal: Effective Airway Clearance  Outcome: Adequate for Care Transition  Goal: Optimal Gas Exchange  Outcome: Adequate for Care Transition     Problem: Electrolyte Imbalance  Goal: Electrolyte Balance  Outcome: Adequate for Care Transition

## 2025-04-27 NOTE — ASSESSMENT & PLAN NOTE
Patient's blood pressure range in the last 24 hours was: BP  Min: 109/72  Max: 152/78.The patient's inpatient anti-hypertensive regimen is listed below:  Current Antihypertensives  metoprolol succinate (TOPROL-XL) 24 hr tablet 50 mg, Daily, Oral  metoprolol injection 5 mg, Every 6 hours PRN, Intravenous  spironolactone tablet 25 mg, Daily, Oral  bumetanide tablet 1 mg, Daily, Oral  spironolactone (ALDACTONE) tablet, Daily, Oral  bumetanide (BUMEX) tablet, Daily, Oral    Plan  - BP is controlled, no changes needed to their regimen  -

## 2025-04-27 NOTE — ASSESSMENT & PLAN NOTE
Patient's most recent potassium results are listed below.   Recent Labs     04/25/25  1039   K 4.1     Plan  - Replete potassium per protocol  - Monitor potassium Every 12 hours  - Patient's hypokalemia is monitored

## 2025-04-27 NOTE — HOSPITAL COURSE
Patient was admitted for pneumonia and pulmonary hypertension with a acute hypoxic respiratory failure, responded well to IV antibiotics then transitioned to p.o., with significant improvement of volume overload with diuresis, cardiology recommended discharging patient on Bumex 1 mg p.o. daily with Aldactone, patient to finish a course of 10 days of antibiotics with the steroids, worked with PT OT who recommended moderate intensity therapy but patient and the family opted into being discharged home on home health, patient was educated about following up with primary care doctor within 3-5 days of discharge and with the cardiologist within 1-2 weeks of discharge.

## 2025-04-28 LAB — W LEGIONELLA URINARY ANTIGEN: NEGATIVE

## 2025-04-29 ENCOUNTER — PATIENT OUTREACH (OUTPATIENT)
Dept: ADMINISTRATIVE | Facility: CLINIC | Age: 85
End: 2025-04-29
Payer: MEDICARE

## 2025-04-29 ENCOUNTER — PATIENT MESSAGE (OUTPATIENT)
Dept: ADMINISTRATIVE | Facility: CLINIC | Age: 85
End: 2025-04-29
Payer: MEDICARE

## 2025-04-29 NOTE — PROGRESS NOTES
C3 nurse spoke with patient who is unable to complete the call at this time. Patient requested a callback. Patient has a HOSFU with Mikayla Crane on 05/02/2025 @1100.

## 2025-04-30 ENCOUNTER — EXTERNAL CHRONIC CARE MANAGEMENT (OUTPATIENT)
Dept: PRIMARY CARE CLINIC | Facility: CLINIC | Age: 85
End: 2025-04-30
Payer: MEDICARE

## 2025-04-30 PROCEDURE — 99490 CHRNC CARE MGMT STAFF 1ST 20: CPT | Mod: PBBFAC | Performed by: INTERNAL MEDICINE

## 2025-04-30 NOTE — PROGRESS NOTES
"  C3 nurse spoke with Claudia Sierra for a TCC Post Discharge follow-up call. The patient has a scheduled hospitals appointment with the [facility] provider, Patricia Saul NP., on 05/02/2025. The patient was explained that the NP will call you to provide a time-frame for the appointment.     At time of follow up call, the patient states, "we'll have to do my medicines later, I have things to do now." C3 nurse will attempt to reach back out to patient this afternoon to review medications only.   "

## 2025-05-01 ENCOUNTER — TELEPHONE (OUTPATIENT)
Dept: HOME HEALTH SERVICES | Facility: CLINIC | Age: 85
End: 2025-05-01
Payer: MEDICARE

## 2025-05-01 NOTE — PROGRESS NOTES
C3 nurse spoke with Claudia Sierra for a TCC post hospital discharge follow up call to complete medication review only.

## 2025-05-01 NOTE — TELEPHONE ENCOUNTER
Patient called to reschedule HOSFU home visit with NP from tomorrow Friday 5/2 to next Friday 5/9.     Patient has been rescheduled

## 2025-05-06 ENCOUNTER — OUTPATIENT CASE MANAGEMENT (OUTPATIENT)
Dept: ADMINISTRATIVE | Facility: OTHER | Age: 85
End: 2025-05-06
Payer: MEDICARE

## 2025-05-06 VITALS — SYSTOLIC BLOOD PRESSURE: 116 MMHG | DIASTOLIC BLOOD PRESSURE: 68 MMHG

## 2025-05-06 NOTE — LETTER
Claudia Sierra  [facility] [address] [place] [address]      Dear Claudia,    Welcome to [facility] Complex [facility] Management [facility].  It was a pleasure talking with you today.  My name is Dahlia Jansen, and I look forward to being your Care Manager.  My goal is to help you function at the healthiest and highest level possible.  You can contact me directly at [phone].    As an [facility] patient, some of the services we may be able to provide include:     Development of an individualized care plan with a Registered Nurse   Connection with a   Connection with available resources and services    Coordinate communication among your care team members   Provide coaching and education   Help you understand your doctors treatment plan  Help you obtain information about your insurance coverage.     All services provided by [facility] Complex Care Managers and other care team members are coordinated with and communicated to your primary care team.      As part of your enrollment, you will be receiving education materials and more information about these services in your [facility] account, by phone or through the mail.  If you do not wish to participate or receive information, please contact our office at [phone].      [facility] Patient Rights and Responsibilities available upon request.    Sincerely,        Dahlia Jansen, RN  [facility]   Outpatient RN Complex Care Manager

## 2025-05-06 NOTE — PHYSICIAN QUERY
Please further specify the heart failure diagnosis.     Other (please specify):  Chronic Diastolic heart failure

## 2025-05-07 ENCOUNTER — PATIENT OUTREACH (OUTPATIENT)
Dept: ADMINISTRATIVE | Facility: OTHER | Age: 85
End: 2025-05-07
Payer: MEDICARE

## 2025-05-07 NOTE — PROGRESS NOTES
Outpatient Care Management  Initial Patient Assessment    Patient: Claudia Sierra  MRN: [id number]  Date of Service: 05/06/2025  Completed by: Dahlia Jansen RN  Referral Date: 04/22/2025  Date of Eligibility: 4/23/2025  Program:   High Risk  Status: Ongoing  Effective Dates: 5/6/2025 - present  Responsible Staff: Dahlia Jansen RN        Reason for Visit   Patient presents with    Nursing Assessment     5/6/2025    OPCM Enrollment Call     5/6/2025       Brief Summary:  Claudia Sierra was referred by Dr. Eris Chu, hospital provider,  for CAP. Patient qualifies for program based on high risk. Patient with risk score of 61%.  Active problem list, medical, surgical and social history reviewed. Active comorbidities include Hypertension, Afib, GERD, peripheral neuropathy, lymphedema, HLD, chronic pain, h/o breast cancer, impaired mobility, CHF, CKD, acute hypoxic respiratory failure, pulmonary HTN, interstitial lung disease, dizziness, and community acquired pna. Areas of need identified by patient include education on chronic conditions of hypertension, Afib, CHF, education on fall prevention, Information on COA.   Next steps: RN to educate patient on chronic conditions of HTN, Afib, and CHF. RN to educate on fall prevention. RN to mail patient information on her local COA. Referral sent to [facility] to complete SDOH.    Disability Status  Is the patient alert and oriented (person, place, time, and situation)?: Alert and oriented x 4  Hearing Difficulty or Deaf: no  Visual Difficulty or Blind: yes  Visual and Hearing Conclusion Statement: Patient does not use hearing aids, patient uses reading glasses.  Vision Management: Other (Patient uses reading glasses.)  Difficulty Concentrating, Remembering or Making Decisions: yes  Concentration Management: Patient states sometimes difficulty remembering names but then it comes to her.  Communication Difficulty: no  Eating/Swallowing Difficulty: no  Walking or Climbing  Stairs Difficulty: yes  Walking or Climbing Stairs: ambulation difficulty, requires equipment  Mobility Management: Patient has been using a walker since coming home from the hospital, patient was not using a walker prior hospitalization.  Dressing/Bathing Difficulty: no (Patient has been doing sponge baths on own since coming home from hospital for safety reasons since she lives alone.)  Grooming: independent  Transferring (e.g., getting in and out of chairs): independent  Toileting : Independent  Continence : Continence - Not a problem  Difficulty Managing Errands Independently: yes  Errands Management: Prior to hospital,  patient was driving, nieces and nephews have been helping with errands since home from the hospital. Patient plans to drive again once recovered from PNA.  Equipment Currently Used at Home: walker, rolling; bedside commode; blood pressure machine; scale  ADL Conclusion Statement: Patient was independent with ADLs prior to hospitalization for PNA, patient with some weakness but is working with therapy and recovering. Patient was driving prior to PNA. Patient has family and neighbors who are helping with errands.  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   Tobacco Use    Smoking status: Never    Smokeless tobacco: Never   Substance and Sexual Activity    Alcohol use: Yes     Alcohol/week: 3.0 standard drinks of alcohol     Types: 3 Glasses of wine per week     Comment: every 2 weeks    Drug use: No    Sexual activity: Not Currently     Partners: Male     Birth control/protection: None   Social History Narrative    Sister will  help at night    No stairs in her home     Social Drivers of Health     Financial Resource Strain: Low Risk  (4/23/2025)    Overall Financial Resource Strain (CARDIA)     Difficulty of Paying Living Expenses: Not hard at all   Food Insecurity: No Food Insecurity (4/23/2025)    Hunger Vital Sign     Worried About Running Out of Food in the Last Year: Never true     Ran Out of Food in the Last Year: Never true   Transportation Needs: No Transportation Needs (4/22/2025)    PRAPARE - Transportation     Lack of Transportation (Medical): No     Lack of Transportation (Non-Medical): No   Physical Activity: Unknown (4/22/2025)    Exercise Vital Sign     Days of Exercise per Week: Patient declined   Stress: Stress Concern Present (4/23/2025)    [employer] [employer] - Occupational Stress Questionnaire     Feeling of Stress : To some extent   Housing Stability: Low Risk  (4/23/2025)    Housing Stability Vital Sign     Unable to Pay for Housing in the Last Year: No     Homeless in the Last Year: No       Roles and Relationships  Primary Source of Support/Comfort: child(ely)  Name of Support/Comfort Primary Source: Pradeep  Secondary Source of Support/Comfort: extended family; nonrelative caregiver  Name of Support/Comfort Secondary Source: Mahesh and Soledad (nephew and Niece), Ray (neighbor)      Advance Directives (For Healthcare)  Advance Directive  (If Adv Dir status is received, view document under Adv Dir in header or Chart Review Media tab): Patient has advance directive, copy not received.  Patient Requests Assistance: Patient will do independently        Patient Reported Insurance  Verified current insurance plan:: Blue Cross; Medicare            5/6/2025     3:28 PM 2/28/2025     1:03 PM 2/11/2025     1:03 PM 8/27/2024     1:14 PM 2/20/2024     2:39 PM 2/15/2024     2:34 PM 6/19/2023     2:07 PM   Depression Patient Health Questionnaire   Over the last two weeks how often have you been bothered by little  interest or pleasure in doing things Not at all Not at all Not at all Not at all Not at all Not at all Not at all   Over the last two weeks how often have you been bothered by feeling down, depressed or hopeless Several days Not at all Not at all Not at all Not at all Not at all Not at all   PHQ-2 Total Score 1 0 0 0 0 0 0       Learning Assessment       05/06/2025 1327 [facility] (5/6/2025 - Present)   Created by Dahlia Jansen RN -  (Nurse) Status: Complete                 PRIMARY LEARNER     Primary Learner Name:  Claudia Sierra  - 05/06/2025 1327    Relationship:  Patient  - 05/06/2025 1327    Does the primary learner have any barriers to learning?:  No Barriers  - 05/06/2025 1327    What is the preferred language of the primary learner?:  English  - 05/06/2025 1327    Is an  required?:  No  - 05/06/2025 1327    How does the primary learner prefer to learn new concepts?:  Listening  - 05/06/2025 1327    How often do you need to have someone help you read instructions, pamphlets, or written material from your doctor or pharmacy?:  Never  - 05/06/2025 1327        CO-LEARNER #1     No question answered        CO-LEARNER #2     No question answered        SPECIAL TOPICS     No question answered        ANSWERED BY:     No question answered        Comments         Edit History       Dahlia Jansen, RN -  (Nurse)   05/06/2025 1327

## 2025-05-07 NOTE — PROGRESS NOTES
This Community Health Worker (CHW) completed Social Determinant of Health (SDOH)  Questionnaire with patient/caregiver via telephone today.  Notified OPCM CM Dahlia Jansen RN  of completion.      Patient denied any SDOH needs at this time.

## 2025-05-09 ENCOUNTER — OUTPATIENT CASE MANAGEMENT (OUTPATIENT)
Dept: ADMINISTRATIVE | Facility: OTHER | Age: 85
End: 2025-05-09
Payer: MEDICARE

## 2025-05-09 ENCOUNTER — OFFICE VISIT (OUTPATIENT)
Dept: HOME HEALTH SERVICES | Facility: CLINIC | Age: 85
End: 2025-05-09
Payer: MEDICARE

## 2025-05-09 VITALS
DIASTOLIC BLOOD PRESSURE: 67 MMHG | OXYGEN SATURATION: 95 % | TEMPERATURE: 98 F | SYSTOLIC BLOOD PRESSURE: 102 MMHG | RESPIRATION RATE: 16 BRPM | HEART RATE: 74 BPM

## 2025-05-09 DIAGNOSIS — I50.32 CHRONIC DIASTOLIC CONGESTIVE HEART FAILURE: ICD-10-CM

## 2025-05-09 DIAGNOSIS — T45.1X5A CHEMOTHERAPY-INDUCED PERIPHERAL NEUROPATHY: ICD-10-CM

## 2025-05-09 DIAGNOSIS — I48.91 ATRIAL FIBRILLATION WITH RVR: ICD-10-CM

## 2025-05-09 DIAGNOSIS — K59.00 CONSTIPATION, UNSPECIFIED CONSTIPATION TYPE: ICD-10-CM

## 2025-05-09 DIAGNOSIS — G62.0 CHEMOTHERAPY-INDUCED PERIPHERAL NEUROPATHY: ICD-10-CM

## 2025-05-09 DIAGNOSIS — I10 ESSENTIAL HYPERTENSION: ICD-10-CM

## 2025-05-09 DIAGNOSIS — K21.9 GASTROESOPHAGEAL REFLUX DISEASE WITHOUT ESOPHAGITIS: ICD-10-CM

## 2025-05-09 DIAGNOSIS — N18.32 STAGE 3B CHRONIC KIDNEY DISEASE: Primary | ICD-10-CM

## 2025-05-09 DIAGNOSIS — E78.2 MIXED HYPERLIPIDEMIA: ICD-10-CM

## 2025-05-09 DIAGNOSIS — J96.11 CHRONIC HYPOXIC RESPIRATORY FAILURE: ICD-10-CM

## 2025-05-09 PROCEDURE — 99495 TRANSJ CARE MGMT MOD F2F 14D: CPT | Mod: S$GLB,,, | Performed by: NURSE PRACTITIONER

## 2025-05-11 PROBLEM — N18.32 STAGE 3B CHRONIC KIDNEY DISEASE: Status: ACTIVE | Noted: 2025-05-11

## 2025-05-11 PROBLEM — J96.11 CHRONIC HYPOXIC RESPIRATORY FAILURE: Status: ACTIVE | Noted: 2025-04-22

## 2025-05-11 RX ORDER — DOCUSATE SODIUM 100 MG/1
100 CAPSULE, LIQUID FILLED ORAL 2 TIMES DAILY
Start: 2025-05-11

## 2025-05-11 RX ORDER — FOLIC ACID 1 MG/1
1 TABLET ORAL DAILY
Qty: 90 TABLET | Refills: 3 | Status: SHIPPED | OUTPATIENT
Start: 2025-05-11 | End: 2026-05-11

## 2025-05-12 NOTE — ASSESSMENT & PLAN NOTE
Lab Results   Component Value Date    CHOL 124 04/25/2024    CHOL 194 10/25/2018    CHOL 162 01/11/2018      Lab Results   Component Value Date    HDL 50 04/25/2024    HDL 74 10/25/2018    HDL 65 01/11/2018     Lab Results   Component Value Date    LDLCALC 63.6 04/25/2024    LDLCALC 102.6 10/25/2018    LDLCALC 83.6 01/11/2018      Lab Results   Component Value Date    TRIG 52 04/25/2024    TRIG 87 10/25/2018    TRIG 67 01/11/2018     Lab Results   Component Value Date    TOTALCHOLEST 2.5 04/25/2024    TOTALCHOLEST 2.6 10/25/2018    TOTALCHOLEST 2.5 01/11/2018     Lab Results   Component Value Date    NONHDLCHOL 74 04/25/2024    NONHDLCHOL 120 10/25/2018    NONHDLCHOL 97 01/11/2018     Lab Results   Component Value Date    CHOLHDL 40.3 04/25/2024    CHOLHDL 38.1 10/25/2018    CHOLHDL 40.1 01/11/2018    Well controlled.   - continue statin therapy   - heart healthy diet   - F/U w/ PCP

## 2025-05-12 NOTE — PROGRESS NOTES
[facility]  Transitional Care Management (TCM) Home Visit    Encounter Provider: Adia Evans   PCP: Sajan Curtis MD  Consult Requested By: No ref. provider found  Admit Date: 4/21/25   IP Discharge Date: 4/27/25  Hospital Length of Stay: 6  Days since discharge (from IP or SNF):   [facility] On [facility] Contact Note: 4/29/25  Hospital Diagnosis: No admission diagnoses are documented for this encounter.     HISTORY OF PRESENT ILLNESS      Patient ID: Claudia Sierra is a 84 y.o. female was recently admitted to the hospital, this is their TCM encounter.    Hospital Course Synopsis:  Patient was admitted for pneumonia and pulmonary hypertension with a acute hypoxic respiratory failure, responded well to IV antibiotics then transitioned to p.o., with significant improvement of volume overload with diuresis, cardiology recommended discharging patient on Bumex 1 mg p.o. daily with Aldactone, patient to finish a course of 10 days of antibiotics with the steroids, worked with PT OT who recommended moderate intensity therapy but patient and the family opted into being discharged home on home health, patient was educated about following up with primary care doctor within 3-5 days of discharge and with the cardiologist within 1-2 weeks of discharge.     DECISION MAKING TODAY       Assessment & Plan:  1. Stage 3b chronic kidney disease  Assessment & Plan:  Last recorded GFR 40 and creat 1.3.   Based on current GFR, CKD stage is stage 3 - GFR 30-59.  Monitor UOP and serial BMP and adjust therapy as needed. Renally dose meds. Avoid nephrotoxic medications and procedures. F/U w/ PCP.       2. Chemotherapy-induced peripheral neuropathy  Assessment & Plan:  Patient reports BLE neuropathy symptoms  - monitor for neuropathy symptoms  - continue Gabapentin   - F/U w/ PCP       3. Chronic hypoxic respiratory failure  Assessment & Plan:  O2 sats 95% on RA. Stable.   - monitor O2 sats   - continue Symbicort,  Combivent  - F/U w/ PCP       4. Atrial fibrillation with RVR  Assessment & Plan:  HR 74.   - monitor HR  - continue Xarelto   - F/U w/ PCP and/or Cardiology        5. Chronic diastolic congestive heart failure  Assessment & Plan:  Denies SOB.   - monitor for SOB and edema   - Monitor daily weights.    - Continue low sodium diet  - Continue fluid restriction of 1.5 - 2 L   - continue bumex and spironolactone  - F/U w/ PCP and Cardiology          6. Essential hypertension  Overview:  bp well controlled on current regimen   ekg looks good 8/21     Assessment & Plan:  /67. Well controlled.   - monitor BP   - continue metoprolol  - DASH diet   - F/U w/ PCP       7. Mixed hyperlipidemia  Overview:  Stable on current regimen of pravachol .     Assessment & Plan:  Lab Results   Component Value Date    CHOL 124 04/25/2024    CHOL 194 10/25/2018    CHOL 162 01/11/2018      Lab Results   Component Value Date    HDL 50 04/25/2024    HDL 74 10/25/2018    HDL 65 01/11/2018     Lab Results   Component Value Date    LDLCALC 63.6 04/25/2024    LDLCALC 102.6 10/25/2018    LDLCALC 83.6 01/11/2018      Lab Results   Component Value Date    TRIG 52 04/25/2024    TRIG 87 10/25/2018    TRIG 67 01/11/2018     Lab Results   Component Value Date    TOTALCHOLEST 2.5 04/25/2024    TOTALCHOLEST 2.6 10/25/2018    TOTALCHOLEST 2.5 01/11/2018     Lab Results   Component Value Date    NONHDLCHOL 74 04/25/2024    NONHDLCHOL 120 10/25/2018    NONHDLCHOL 97 01/11/2018     Lab Results   Component Value Date    CHOLHDL 40.3 04/25/2024    CHOLHDL 38.1 10/25/2018    CHOLHDL 40.1 01/11/2018    Well controlled.   - continue statin therapy   - heart healthy diet   - F/U w/ PCP       8. Constipation, unspecified constipation type  Overview:  Lactulose prn is workign well     Assessment & Plan:  Reports episode of constipation 2 days ago. Treated symptoms with lactulose with results.   - monitor bowel patterns  - continue colace and lactulose  - F/U w/  PCP       9. Gastroesophageal reflux disease without esophagitis  Overview:  Stable     Assessment & Plan:  Denies heartburn or indigestion.   -avoid spicy/greasy/acidic foods, as well as tea/coffee/chocolate   -Tylenol as needed for pain, avoid NSAIDs  -Keep food diary  - F/U w/ PCP             Medication List on Discharge:     Medication List            Accurate as of May 9, 2025 11:59 PM. If you have any questions, ask your nurse or doctor.                CHANGE how you take these medications      MAPAP ARTHRITIS PAIN 650 mg Tbsr  Generic drug: acetaminophen  Take 1 tablet (650 mg total) by mouth every 6 to 8 hours as needed (pain).  What changed: how much to take            CONTINUE taking these medications      b complex vitamins tablet  Take 1 tablet by mouth once daily.     budesonide-formoterol 80-4.5 mcg 80-4.5 mcg/actuation Hfaa  Commonly known as: SYMBICORT  Inhale 2 puffs into the lungs once daily. Controller     bumetanide 1 MG tablet  Commonly known as: BUMEX  Take 1 tablet (1 mg total) by mouth once daily.     empagliflozin 10 mg tablet  Commonly known as: Jardiance  Take 1 tablet (10 mg total) by mouth once daily.     gabapentin 600 MG tablet  Commonly known as: NEURONTIN  Take 2 tablets (1,200 mg total) by mouth once daily.     guaiFENesin 600 mg 12 hr tablet  Commonly known as: MUCINEX  Take 1 tablet (600 mg total) by mouth 2 (two) times daily.     ipratropium-albuteroL  mcg/actuation inhaler  Commonly known as: CombiVENT  Inhale 1 puff into the lungs every 6 (six) hours as needed for Wheezing. Rescue     lactulose 10 gram/15 mL solution  Commonly known as: CHRONULAC  Take 45 mLs (30 g total) by mouth 3 (three) times daily as needed (constipation).     metoprolol succinate 50 MG 24 hr tablet  Commonly known as: TOPROL-XL  Take 1 tablet (50 mg total) by mouth once daily.     montelukast 10 mg tablet  Commonly known as: SINGULAIR  Take 1 tablet (10 mg total) by mouth once daily.     OCUVITE  ORAL  Take 1 tablet by mouth every evening.     polycarbophil 625 mg tablet  Commonly known as: FIBERCON  Take 625 mg by mouth once daily.     pravastatin 40 MG tablet  Commonly known as: PRAVACHOL  TAKE 1 TABLET BY MOUTH EVERY DAY     rivaroxaban 15 mg Tab  Commonly known as: XARELTO  Take 1 tablet (15 mg total) by mouth Daily.     spironolactone 25 MG tablet  Commonly known as: ALDACTONE  Take 1 tablet (25 mg total) by mouth once daily.     traZODone 50 MG tablet  Commonly known as: DESYREL  1 pill PO PRN for insomnia at bedtime. If it does not help fall asleep fast enough, can take 30-40 minutes before bedtime.     VITAMIN C 1000 MG tablet  Generic drug: ascorbic acid (vitamin C)  Take 1,000 mg by mouth once daily.     vitamin D 1000 units Tab  Commonly known as: VITAMIN D3  Take 1,000 Units by mouth once daily.              Medication Reconciliation:  Were medications changed on discharge? Yes  Were medications in the home? Yes  Is the patient taking the medications as directed? Yes  Does the patient understand the medications and changes? Yes  Does updated med list accurately reflects meds patient is currently taking? Yes    ENVIRONMENT OF CARE      Family and/or Caregiver present at visit?  Yes  Name of Caregiver:   History provided by: patient    Advance Care Planning   Advanced Care Planning Status:  Patient has had an ACP conversation  Living Will: No  Power of : No  [place]: No    Does Caregiver have HCPoA: No  Changes today: NO  Is patient hospice appropriate: No  (If needed, use PPS <30 or FAST score >7)  Was referral to hospice placed: No       Impression upon entering the home:  Physical Dwelling: single family home   Appearance of home environment: cleaniness: clean  Functional Status: independent  Mobility: ambulatory with device  Nutritional access: available food but inadequate intake  Home Health: Yes,  Agency [facility]    DME/Supplies: none     Diagnostic tests reviewed/disposition:    Disease/illness education: CHF  Establishment or re-establishment of referral orders for community resources: No other necessary community resources.   Discussion with other health care providers: No discussion with other health care providers necessary.   Does patient have a PCP at [place]? Yes   Repatriation plan with PCP? follow-up with PCP within 90d   Does patient have an ostomy (ileostomy, colostomy, suprapubic catheter, nephrostomy tube, tracheostomy, PEG tube, pleurex catheter, cholecystostomy, etc)? No  Were BPAs reviewed? 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   Tobacco Use    Smoking status: Never    Smokeless tobacco: Never   Substance and Sexual Activity    Alcohol use: Yes     Alcohol/week: 3.0 standard drinks of alcohol     Types: 3 Glasses of wine per week     Comment: every 2 weeks    Drug use: No    Sexual activity: Not Currently     Partners: Male     Birth control/protection: None   Social History Narrative    Sister will help at night    No stairs in her home     Social Drivers of Health     Financial Resource Strain: High Risk (5/7/2025)    Overall Financial Resource Strain (CARDIA)     Difficulty of Paying Living Expenses: Very hard   Food Insecurity: No Food Insecurity (5/7/2025)    Hunger Vital Sign     Worried About Running Out of Food in the Last Year: Never true     Ran Out of Food in the Last Year: Never true   Transportation Needs: No Transportation Needs (5/7/2025)    PRAPARE - Transportation     Lack of Transportation (Medical): No     Lack of Transportation (Non-Medical): No   Physical Activity: Inactive (5/7/2025)    Exercise Vital Sign     Days of Exercise per Week: 0 days     Minutes of Exercise per Session: 0 min   Stress: Stress Concern Present (4/23/2025)    [employer] [employer] - Occupational Stress Questionnaire     Feeling of  Stress : To some extent   Housing Stability: Low Risk  (5/7/2025)    Housing Stability Vital Sign     Unable to Pay for Housing in the Last Year: No     Number of Times Moved in the Last Year: 0     Homeless in the Last Year: No       OBJECTIVE:     Vital Signs:  Vitals:    05/09/25 1131   BP: 102/67   Pulse: 74   Resp: 16   Temp: 97.8 °F (36.6 °C)       Review of Systems   Constitutional:  Negative for chills and fever.   HENT:  Negative for congestion, rhinorrhea and trouble swallowing.    Eyes:  Negative for visual disturbance.   Respiratory:  Negative for cough and shortness of breath.    Cardiovascular:  Negative for chest pain.   Gastrointestinal:  Positive for constipation. Negative for abdominal pain, diarrhea, nausea and vomiting.   Endocrine: Negative for cold intolerance and heat intolerance.   Genitourinary:  Negative for difficulty urinating, frequency and urgency.   Musculoskeletal:  Positive for arthralgias.        Bilateral shoulders    Allergic/Immunologic: Negative for environmental allergies.   Neurological:  Positive for numbness. Negative for dizziness and headaches.        Bilateral ankles    Psychiatric/Behavioral:  Negative for agitation, hallucinations and suicidal ideas.        Physical Exam:  Physical Exam  Constitutional:       General: She is not in acute distress.     Appearance: Normal appearance.   HENT:      Head: Normocephalic and atraumatic.      Nose: No congestion or rhinorrhea.   Eyes:      General: No scleral icterus.  Cardiovascular:      Rate and Rhythm: Normal rate.   Pulmonary:      Effort: Pulmonary effort is normal. No respiratory distress.   Abdominal:      General: Bowel sounds are normal. There is no distension.      Palpations: Abdomen is soft.      Tenderness: There is no abdominal tenderness.   Musculoskeletal:      Right lower leg: No edema.      Left lower leg: No edema.   Skin:     General: Skin is warm and dry.   Neurological:      Mental Status: She is alert  and oriented to person, place, and time.      Motor: Weakness present.   Psychiatric:         Mood and Affect: Mood normal.         Behavior: Behavior normal.         INSTRUCTIONS FOR PATIENT:     Scheduled Follow-up, Appts Reviewed with Modifications if Needed: Yes  Future Appointments   Date Time Provider Department Center   8/6/2025 10:00 AM LAB[facility] [place]   8/6/2025 10:15 AM CV [facility] ECHO [facility] [place]   8/27/2025  1:20 PM Keith Alexander Jr., MD [facility] CARDIO [place]   8/29/2025  2:30 PM Sajan Curtis MD [facility] PRICRE [place]   2/6/2026  1:00 PM [facility] MAMMO1 [facility] MAMMO [facility]   2/13/2026 11:40 AM Mingo Wynne MD [facility] HEM ONC [facility]     Take all medications as prescribed  Keep all follow-up appointments  Return to the hospital or call your primary care provider if any worsening symptoms such as fever, chest pain, shortness of breath, return of symptoms, or any other concerns.      Signature: JUAN DIEGO Nguyen    Transition of Care Visit:  I have reviewed and updated the history and problem list.  I have reconciled the medication list.  I have discussed the hospitalization and current medical issues, prognosis and plans with the patient/family.

## 2025-05-12 NOTE — ASSESSMENT & PLAN NOTE
Last recorded GFR 40 and creat 1.3.   Based on current GFR, CKD stage is stage 3 - GFR 30-59.  Monitor UOP and serial BMP and adjust therapy as needed. Renally dose meds. Avoid nephrotoxic medications and procedures. F/U w/ PCP.

## 2025-05-12 NOTE — ASSESSMENT & PLAN NOTE
Patient reports BLE neuropathy symptoms  - monitor for neuropathy symptoms  - continue Gabapentin   - F/U w/ PCP

## 2025-05-12 NOTE — ASSESSMENT & PLAN NOTE
Denies heartburn or indigestion.   -avoid spicy/greasy/acidic foods, as well as tea/coffee/chocolate   -Tylenol as needed for pain, avoid NSAIDs  -Keep food diary  - F/U w/ PCP

## 2025-05-12 NOTE — ASSESSMENT & PLAN NOTE
Reports episode of constipation 2 days ago. Treated symptoms with lactulose with results.   - monitor bowel patterns  - continue colace and lactulose  - F/U w/ PCP

## 2025-05-12 NOTE — ASSESSMENT & PLAN NOTE
Denies SOB.   - monitor for SOB and edema   - Monitor daily weights.    - Continue low sodium diet  - Continue fluid restriction of 1.5 - 2 L   - continue bumex and spironolactone  - F/U w/ PCP and Cardiology

## 2025-05-13 ENCOUNTER — OUTPATIENT CASE MANAGEMENT (OUTPATIENT)
Dept: ADMINISTRATIVE | Facility: OTHER | Age: 85
End: 2025-05-13
Payer: MEDICARE

## 2025-05-31 ENCOUNTER — EXTERNAL CHRONIC CARE MANAGEMENT (OUTPATIENT)
Dept: PRIMARY CARE CLINIC | Facility: CLINIC | Age: 85
End: 2025-05-31
Payer: MEDICARE

## 2025-05-31 PROCEDURE — 99490 CHRNC CARE MGMT STAFF 1ST 20: CPT | Mod: S$PBB,,, | Performed by: INTERNAL MEDICINE

## 2025-05-31 PROCEDURE — 99490 CHRNC CARE MGMT STAFF 1ST 20: CPT | Mod: PBBFAC | Performed by: INTERNAL MEDICINE

## 2025-06-03 ENCOUNTER — OUTPATIENT CASE MANAGEMENT (OUTPATIENT)
Dept: ADMINISTRATIVE | Facility: OTHER | Age: 85
End: 2025-06-03
Payer: MEDICARE

## 2025-06-10 ENCOUNTER — OUTPATIENT CASE MANAGEMENT (OUTPATIENT)
Dept: ADMINISTRATIVE | Facility: OTHER | Age: 85
End: 2025-06-10
Payer: MEDICARE

## 2025-06-17 ENCOUNTER — OUTPATIENT CASE MANAGEMENT (OUTPATIENT)
Dept: ADMINISTRATIVE | Facility: OTHER | Age: 85
End: 2025-06-17
Payer: MEDICARE

## 2025-06-30 ENCOUNTER — EXTERNAL CHRONIC CARE MANAGEMENT (OUTPATIENT)
Dept: PRIMARY CARE CLINIC | Facility: CLINIC | Age: 85
End: 2025-06-30
Payer: MEDICARE

## 2025-06-30 PROCEDURE — 99439 CHRNC CARE MGMT STAF EA ADDL: CPT | Mod: PBBFAC | Performed by: INTERNAL MEDICINE

## 2025-06-30 PROCEDURE — 99490 CHRNC CARE MGMT STAFF 1ST 20: CPT | Mod: PBBFAC | Performed by: INTERNAL MEDICINE

## 2025-06-30 PROCEDURE — 99490 CHRNC CARE MGMT STAFF 1ST 20: CPT | Mod: S$PBB,,, | Performed by: INTERNAL MEDICINE

## 2025-06-30 PROCEDURE — 99439 CHRNC CARE MGMT STAF EA ADDL: CPT | Mod: S$PBB,,, | Performed by: INTERNAL MEDICINE

## 2025-07-08 ENCOUNTER — OUTPATIENT CASE MANAGEMENT (OUTPATIENT)
Dept: ADMINISTRATIVE | Facility: OTHER | Age: 85
End: 2025-07-08
Payer: MEDICARE

## 2025-07-15 ENCOUNTER — EXTERNAL HOME HEALTH (OUTPATIENT)
Dept: HOME HEALTH SERVICES | Facility: HOSPITAL | Age: 85
End: 2025-07-15
Payer: MEDICARE

## 2025-07-15 ENCOUNTER — OUTPATIENT CASE MANAGEMENT (OUTPATIENT)
Dept: ADMINISTRATIVE | Facility: OTHER | Age: 85
End: 2025-07-15
Payer: MEDICARE

## 2025-07-18 ENCOUNTER — DOCUMENT SCAN (OUTPATIENT)
Dept: HOME HEALTH SERVICES | Facility: HOSPITAL | Age: 85
End: 2025-07-18
Payer: MEDICARE

## 2025-07-22 ENCOUNTER — OUTPATIENT CASE MANAGEMENT (OUTPATIENT)
Dept: ADMINISTRATIVE | Facility: OTHER | Age: 85
End: 2025-07-22
Payer: MEDICARE

## 2025-07-22 VITALS — SYSTOLIC BLOOD PRESSURE: 114 MMHG | DIASTOLIC BLOOD PRESSURE: 61 MMHG

## 2025-07-31 ENCOUNTER — EXTERNAL CHRONIC CARE MANAGEMENT (OUTPATIENT)
Dept: PRIMARY CARE CLINIC | Facility: CLINIC | Age: 85
End: 2025-07-31
Payer: MEDICARE

## 2025-07-31 PROCEDURE — 99490 CHRNC CARE MGMT STAFF 1ST 20: CPT | Mod: S$PBB,,, | Performed by: INTERNAL MEDICINE

## 2025-07-31 PROCEDURE — 99490 CHRNC CARE MGMT STAFF 1ST 20: CPT | Mod: PBBFAC | Performed by: INTERNAL MEDICINE

## 2025-08-04 DIAGNOSIS — B02.29 POST HERPETIC NEURALGIA: ICD-10-CM

## 2025-08-04 DIAGNOSIS — I50.32 CHRONIC DIASTOLIC CONGESTIVE HEART FAILURE: ICD-10-CM

## 2025-08-04 RX ORDER — EMPAGLIFLOZIN 10 MG/1
10 TABLET, FILM COATED ORAL
Qty: 90 TABLET | Refills: 3 | Status: SHIPPED | OUTPATIENT
Start: 2025-08-04

## 2025-08-04 RX ORDER — GABAPENTIN 600 MG/1
1200 TABLET ORAL DAILY
Qty: 180 TABLET | Refills: 0 | Status: SHIPPED | OUTPATIENT
Start: 2025-08-04 | End: 2025-11-02

## 2025-08-04 NOTE — TELEPHONE ENCOUNTER
No care due was identified.  Health [facility] Embedded Care Due Messages. Reference number: [id number].   8/04/2025 9:18:07 AM CDT

## 2025-08-06 ENCOUNTER — HOSPITAL ENCOUNTER (OUTPATIENT)
Dept: CARDIOLOGY | Facility: HOSPITAL | Age: 85
Discharge: HOME | End: 2025-08-06
Attending: INTERNAL MEDICINE
Payer: COMMERCIAL

## 2025-08-06 VITALS
HEART RATE: 75 BPM | DIASTOLIC BLOOD PRESSURE: 75 MMHG | HEIGHT: 59 IN | WEIGHT: 132 LBS | BODY MASS INDEX: 26.61 KG/M2 | SYSTOLIC BLOOD PRESSURE: 134 MMHG

## 2025-08-06 DIAGNOSIS — I50.32 CHRONIC DIASTOLIC CONGESTIVE HEART FAILURE: ICD-10-CM

## 2025-08-06 LAB
AORTIC SIZE INDEX (SOV): 1.8 CM/M2
AORTIC SIZE INDEX: 1.8 CM/M2
ASCENDING AORTA: 2.8 CM
AV AREA BY CONTINUOUS VTI: 1 CM2
AV INDEX (PROSTH): 0.36
AV LVOT MEAN GRADIENT: 1 MMHG
AV LVOT PEAK GRADIENT: 1 MMHG
AV MEAN GRADIENT: 7 MMHG
AV PEAK GRADIENT: 13 MMHG
AV VALVE AREA BY VELOCITY RATIO: 0.9 CM²
AV VALVE AREA: 1 CM2
AV VELOCITY RATIO: 0.33
BSA FOR ECHO PROCEDURE: 1.58 M2
CV ECHO LV RWT: 0.38 CM
DOP CALC AO PEAK VEL: 1.8 M/S
DOP CALC AO VTI: 40 CM
DOP CALC LVOT AREA: 2.8 CM2
DOP CALC LVOT DIAMETER: 1.9 CM
DOP CALC LVOT PEAK VEL: 0.6 M/S
DOP CALC RVOT AREA: 2.63 CM2
DOP CALC RVOT DIAMETER: 1.83 CM
DOP CALCLVOT PEAK VEL VTI: 14.3 CM
E WAVE DECELERATION TIME: 151 MS
E/A RATIO: 3.05
E/E' RATIO: 11 M/S
ECHO EF ESTIMATED: 50 %
ECHO LV POSTERIOR WALL: 0.6 CM (ref 0.6–1.1)
FRACTIONAL SHORTENING: 25 % (ref 28–44)
HR MV ECHO: 75 BPM
INTERVENTRICULAR SEPTUM: 0.7 CM (ref 0.6–1.1)
IVC DIAMETER: 1.9 CM
IVRT: 57 MS
LA MAJOR: 6 CM
LA MINOR: 6 CM
LA WIDTH: 3.7 CM
LEFT ATRIUM SIZE: 3.7 CM
LEFT ATRIUM VOLUME INDEX MOD: 34 ML/M2
LEFT ATRIUM VOLUME INDEX: 45 ML/M2
LEFT ATRIUM VOLUME MOD: 52 ML
LEFT ATRIUM VOLUME: 70 CM3
LEFT INTERNAL DIMENSION IN SYSTOLE: 2.4 CM (ref 2.1–4)
LEFT VENTRICLE DIASTOLIC VOLUME INDEX: 25.81 ML/M2
LEFT VENTRICLE DIASTOLIC VOLUME: 40 ML
LEFT VENTRICLE MASS INDEX: 31.7 G/M2
LEFT VENTRICLE SYSTOLIC VOLUME INDEX: 12.9 ML/M2
LEFT VENTRICLE SYSTOLIC VOLUME: 20 ML
LEFT VENTRICULAR INTERNAL DIMENSION IN DIASTOLE: 3.2 CM (ref 3.5–6)
LEFT VENTRICULAR MASS: 49.2 G
LV LATERAL E/E' RATIO: 9.4
LV SEPTAL E/E' RATIO: 12.6
Lab: 1.9 CM/M
Lab: 1.9 CM/M
MR PISA EROA: 0.05 CM2
MV MEAN GRADIENT: 2 MMHG
MV PEAK A VEL: 0.37 M/S
MV PEAK E VEL: 1.13 M/S
MV PEAK GRADIENT: 5 MMHG
OHS CV CPX PATIENT HEIGHT IN: 59
OHS CV RV/LV RATIO: 0.97 CM
PISA MRMAX VEL: 4.96 M/S
PISA RADIUS: 0.35 CM
PISA TR MAX VEL: 3.2 M/S
PISA VN NYQUIST: 0.33 M/S
PULM VEIN S/D RATIO: 0.18
PV PEAK D VEL: 0.9 M/S
PV PEAK S VEL: 0.16 M/S
RA MAJOR: 5.4 CM
RA PRESSURE ESTIMATED: 8 MMHG
RA WIDTH: 3.59 CM
RIGHT ATRIAL AREA: 16.3 CM2
RIGHT VENTRICLE DIASTOLIC BASEL DIMENSION: 3.1 CM
RV TB RVSP: 11 MMHG
RV TISSUE DOPPLER FREE WALL SYSTOLIC VELOCITY 1 (APICAL 4 CHAMBER VIEW): 9.08 CM/S
SINUS: 2.8 CM
STJ: 2.2 CM
TDI LATERAL: 0.12 M/S
TDI SEPTAL: 0.09 M/S
TDI: 0.11 M/S
TRICUSPID ANNULAR PLANE SYSTOLIC EXCURSION: 1 CM
TV PEAK GRADIENT: 41 MMHG
TV REST PULMONARY ARTERY PRESSURE: 49 MMHG
Z-SCORE OF LEFT VENTRICULAR DIMENSION IN END DIASTOLE: -3.38
Z-SCORE OF LEFT VENTRICULAR DIMENSION IN END SYSTOLE: -1.17

## 2025-08-06 PROCEDURE — 93306 TTE W/DOPPLER COMPLETE: CPT | Mod: 26,,, | Performed by: INTERNAL MEDICINE

## 2025-08-06 PROCEDURE — 93306 TTE W/DOPPLER COMPLETE: CPT

## 2025-08-27 ENCOUNTER — OFFICE VISIT (OUTPATIENT)
Dept: CARDIOLOGY | Facility: CLINIC | Age: 85
End: 2025-08-27
Payer: COMMERCIAL

## 2025-08-27 VITALS
WEIGHT: 127.19 LBS | HEART RATE: 80 BPM | BODY MASS INDEX: 25.64 KG/M2 | SYSTOLIC BLOOD PRESSURE: 126 MMHG | DIASTOLIC BLOOD PRESSURE: 80 MMHG | HEIGHT: 59 IN

## 2025-08-27 DIAGNOSIS — I50.32 CHRONIC DIASTOLIC CONGESTIVE HEART FAILURE: Primary | ICD-10-CM

## 2025-08-27 DIAGNOSIS — I48.11 LONGSTANDING PERSISTENT ATRIAL FIBRILLATION: Chronic | ICD-10-CM

## 2025-08-27 DIAGNOSIS — I10 ESSENTIAL HYPERTENSION: ICD-10-CM

## 2025-08-27 DIAGNOSIS — I27.20 PULMONARY HYPERTENSION: Chronic | ICD-10-CM

## 2025-08-27 DIAGNOSIS — E78.2 MIXED HYPERLIPIDEMIA: ICD-10-CM

## 2025-08-27 DIAGNOSIS — I35.0 NONRHEUMATIC AORTIC VALVE STENOSIS: Chronic | ICD-10-CM

## 2025-08-27 PROCEDURE — 99214 OFFICE O/P EST MOD 30 MIN: CPT | Mod: S$GLB,,, | Performed by: INTERNAL MEDICINE

## 2025-08-27 PROCEDURE — 3288F FALL RISK ASSESSMENT DOCD: CPT | Mod: CPTII,S$GLB,, | Performed by: INTERNAL MEDICINE

## 2025-08-27 PROCEDURE — 99999 PR PBB SHADOW E&M-EST. PATIENT-LVL IV: CPT | Mod: PBBFAC,,, | Performed by: INTERNAL MEDICINE

## 2025-08-27 PROCEDURE — G2211 COMPLEX E/M VISIT ADD ON: HCPCS | Mod: S$GLB,,, | Performed by: INTERNAL MEDICINE

## 2025-08-27 PROCEDURE — 1159F MED LIST DOCD IN RCRD: CPT | Mod: CPTII,S$GLB,, | Performed by: INTERNAL MEDICINE

## 2025-08-27 PROCEDURE — 1101F PT FALLS ASSESS-DOCD LE1/YR: CPT | Mod: CPTII,S$GLB,, | Performed by: INTERNAL MEDICINE

## 2025-08-27 PROCEDURE — 1126F AMNT PAIN NOTED NONE PRSNT: CPT | Mod: CPTII,S$GLB,, | Performed by: INTERNAL MEDICINE

## 2025-08-27 RX ORDER — BUMETANIDE 1 MG/1
1 TABLET ORAL DAILY PRN
Qty: 30 TABLET | Refills: 11 | Status: SHIPPED | OUTPATIENT
Start: 2025-08-27 | End: 2026-08-27

## (undated) DEVICE — UNDERGLOVE BIOGEL PI SZ 6.5 LF

## (undated) DEVICE — DRAPE IOBAN 2 STERI

## (undated) DEVICE — SOL BETADINE 5%

## (undated) DEVICE — TIP IRR FEM CANAL CO-AXIAL

## (undated) DEVICE — SEE MEDLINE ITEM 152548

## (undated) DEVICE — BRUSH SCRUB HIBICLENS 4%

## (undated) DEVICE — SEE MEDLINE ITEM 157144

## (undated) DEVICE — UNDERGLOVES BIOGEL PI SIZE 8

## (undated) DEVICE — BNDG COFLEX FOAM LF2 ST 4X5YD

## (undated) DEVICE — SUT MONOCYRL 4-0 PS2 UND

## (undated) DEVICE — UNDERGLOVES BIOGEL PI SIZE 8.5

## (undated) DEVICE — GOWN SMARTGOWN LVL4 X-LONG XL

## (undated) DEVICE — MASK FLYTE HOOD PEEL AWAY

## (undated) DEVICE — GAUZE SPONGE 4X4 12PLY

## (undated) DEVICE — GLOVE BIOGEL SKINSENSE PI 8.0

## (undated) DEVICE — PACK DRAPE UNIVERSAL CONVERTOR

## (undated) DEVICE — DRAPE C-ARM ELAS CLIP 42X120IN

## (undated) DEVICE — BLADE SAGITTAL 18 X 1.27 X 90M

## (undated) DEVICE — SUT 2/0 36IN COATED VICRYL

## (undated) DEVICE — SYS CLSR DERMABOND PRINEO 22CM

## (undated) DEVICE — KIT IRR SUCTION HND PIECE

## (undated) DEVICE — GOWN SMARTGOWN 3XL XLONG

## (undated) DEVICE — SUT MONOCRYL 3-0 PS-2 UND

## (undated) DEVICE — KIT BONE CEMENT PREPARATION

## (undated) DEVICE — TOWEL OR XRAY WHITE 17X26IN

## (undated) DEVICE — CONTAINER SPECIMEN OR STER 4OZ

## (undated) DEVICE — ELECTRODE REM PLYHSV RETURN 9

## (undated) DEVICE — DRESSING TRANS 4X4 TEGADERM

## (undated) DEVICE — ALCOHOL 70% ISOP W/GREEN 16OZ

## (undated) DEVICE — TUBE SUCTION YANKAUER

## (undated) DEVICE — TAPE SILK 3IN

## (undated) DEVICE — SEE L#156916

## (undated) DEVICE — KIT PT CARE HANA PROFX SSXT

## (undated) DEVICE — GLOVE BIOGEL PI MICRO SZ 7

## (undated) DEVICE — DRESSING AQUACEL AG RBBN 2X45

## (undated) DEVICE — SUT 1 36IN COATED VICRYL UN

## (undated) DEVICE — SEE MEDLINE ITEM 157131

## (undated) DEVICE — DRESSING TELFA N ADH 3X8

## (undated) DEVICE — NDL 18GA X1 1/2 REG BEVEL